# Patient Record
Sex: MALE | Race: WHITE | Employment: FULL TIME | URBAN - METROPOLITAN AREA
[De-identification: names, ages, dates, MRNs, and addresses within clinical notes are randomized per-mention and may not be internally consistent; named-entity substitution may affect disease eponyms.]

---

## 2018-10-24 ENCOUNTER — OFFICE VISIT (OUTPATIENT)
Dept: ORTHOPEDIC SURGERY | Age: 58
End: 2018-10-24

## 2018-10-24 VITALS
HEIGHT: 70 IN | RESPIRATION RATE: 16 BRPM | DIASTOLIC BLOOD PRESSURE: 106 MMHG | BODY MASS INDEX: 38.25 KG/M2 | WEIGHT: 267.2 LBS | HEART RATE: 89 BPM | SYSTOLIC BLOOD PRESSURE: 187 MMHG

## 2018-10-24 DIAGNOSIS — M54.42 CHRONIC BILATERAL LOW BACK PAIN WITH LEFT-SIDED SCIATICA: Primary | ICD-10-CM

## 2018-10-24 DIAGNOSIS — G89.29 CHRONIC BILATERAL LOW BACK PAIN WITH LEFT-SIDED SCIATICA: Primary | ICD-10-CM

## 2018-10-24 PROBLEM — E66.01 SEVERE OBESITY (HCC): Status: ACTIVE | Noted: 2018-10-24

## 2018-10-24 RX ORDER — GABAPENTIN 300 MG/1
CAPSULE ORAL
Qty: 90 CAP | Refills: 1 | Status: SHIPPED | OUTPATIENT
Start: 2018-10-24 | End: 2018-12-19 | Stop reason: ALTCHOICE

## 2018-10-24 RX ORDER — ISOSORBIDE MONONITRATE 60 MG/1
TABLET, EXTENDED RELEASE ORAL
Refills: 0 | COMMUNITY
Start: 2018-10-13 | End: 2020-09-30

## 2018-10-24 RX ORDER — NITROGLYCERIN 400 UG/1
SPRAY ORAL
Refills: 1 | COMMUNITY
Start: 2018-10-13 | End: 2020-10-26

## 2018-10-24 RX ORDER — CLOPIDOGREL BISULFATE 75 MG/1
75 TABLET ORAL DAILY
Refills: 0 | COMMUNITY
Start: 2018-08-27 | End: 2020-11-25

## 2018-10-24 RX ORDER — ATORVASTATIN CALCIUM 20 MG/1
TABLET, FILM COATED ORAL
Refills: 0 | COMMUNITY
Start: 2018-09-25 | End: 2020-12-24

## 2018-10-24 RX ORDER — GLUCOSAMINE/MSM/CHONDROITIN A 500-83-400
100 TABLET ORAL DAILY
COMMUNITY
End: 2021-06-25

## 2018-10-24 RX ORDER — METOPROLOL TARTRATE 100 MG/1
100 TABLET ORAL DAILY
Refills: 0 | COMMUNITY
Start: 2018-08-06 | End: 2020-08-03

## 2018-10-24 RX ORDER — FLUOCINONIDE 0.5 MG/G
CREAM TOPICAL
Refills: 3 | COMMUNITY
Start: 2018-10-17 | End: 2021-06-25

## 2018-10-24 RX ORDER — DICLOFENAC SODIUM 16.05 MG/ML
SOLUTION TOPICAL
Refills: 3 | Status: ON HOLD | COMMUNITY
Start: 2018-10-17 | End: 2021-06-09

## 2018-10-24 RX ORDER — FUROSEMIDE 20 MG/1
TABLET ORAL
Refills: 0 | COMMUNITY
Start: 2018-10-13 | End: 2020-10-26

## 2018-10-24 NOTE — PATIENT INSTRUCTIONS
Back Pain: Care Instructions  Your Care Instructions    Back pain has many possible causes. It is often related to problems with muscles and ligaments of the back. It may also be related to problems with the nerves, discs, or bones of the back. Moving, lifting, standing, sitting, or sleeping in an awkward way can strain the back. Sometimes you don't notice the injury until later. Arthritis is another common cause of back pain. Although it may hurt a lot, back pain usually improves on its own within several weeks. Most people recover in 12 weeks or less. Using good home treatment and being careful not to stress your back can help you feel better sooner. Follow-up care is a key part of your treatment and safety. Be sure to make and go to all appointments, and call your doctor if you are having problems. It's also a good idea to know your test results and keep a list of the medicines you take. How can you care for yourself at home? · Sit or lie in positions that are most comfortable and reduce your pain. Try one of these positions when you lie down:  ? Lie on your back with your knees bent and supported by large pillows. ? Lie on the floor with your legs on the seat of a sofa or chair. ? Lie on your side with your knees and hips bent and a pillow between your legs. ? Lie on your stomach if it does not make pain worse. · Do not sit up in bed, and avoid soft couches and twisted positions. Bed rest can help relieve pain at first, but it delays healing. Avoid bed rest after the first day of back pain. · Change positions every 30 minutes. If you must sit for long periods of time, take breaks from sitting. Get up and walk around, or lie in a comfortable position. · Try using a heating pad on a low or medium setting for 15 to 20 minutes every 2 or 3 hours. Try a warm shower in place of one session with the heating pad. · You can also try an ice pack for 10 to 15 minutes every 2 to 3 hours.  Put a thin cloth between the ice pack and your skin. · Take pain medicines exactly as directed. ? If the doctor gave you a prescription medicine for pain, take it as prescribed. ? If you are not taking a prescription pain medicine, ask your doctor if you can take an over-the-counter medicine. · Take short walks several times a day. You can start with 5 to 10 minutes, 3 or 4 times a day, and work up to longer walks. Walk on level surfaces and avoid hills and stairs until your back is better. · Return to work and other activities as soon as you can. Continued rest without activity is usually not good for your back. · To prevent future back pain, do exercises to stretch and strengthen your back and stomach. Learn how to use good posture, safe lifting techniques, and proper body mechanics. When should you call for help? Call your doctor now or seek immediate medical care if:    · You have new or worsening numbness in your legs.     · You have new or worsening weakness in your legs. (This could make it hard to stand up.)     · You lose control of your bladder or bowels.    Watch closely for changes in your health, and be sure to contact your doctor if:    · You have a fever, lose weight, or don't feel well.     · You do not get better as expected. Where can you learn more? Go to http://meg-edy.info/. Enter D931 in the search box to learn more about \"Back Pain: Care Instructions. \"  Current as of: November 29, 2017  Content Version: 11.8  © 5627-0463 [employer]. Care instructions adapted under license by [employer] (which disclaims liability or warranty for this information). If you have questions about a medical condition or this instruction, always ask your healthcare professional. [place] [address] any warranty or liability for your use of this information.

## 2018-10-24 NOTE — LETTER
NOTIFICATION OF RETURN TO WORK / SCHOOL 
 
10/24/2018 2:09 PM 
 
Mr. Liss Chavez [address] [place] [address] Willi Quinteros To Whom It May Concern: 
 
Liss Chavez was under the care of [address] today 10-24-18. Mr. Alberto Jimenez is to remain out of work until his follow up appointment on 11-21-18 . If you have any questions please call the office at [phone]. Sincerely, Олег Hitchcock NP

## 2018-10-24 NOTE — PROGRESS NOTES
Chief complaint/History of Present Illness:  Chief Complaint   Patient presents with    Back Pain     NP ref by Dr. Brain Morales for low back pain     Leg Pain     LLE    Foot Pain     left foot     NEW PATEINT:    DOMINGA Moncada is a  59 y.o.  male      HISTORY OF PRESENT ILLNESS:  Mr. Feliciano Lisa is a new patient comes in today with severe back pain that radiates to his left hip, anterior thigh and down towards his toes. He gets numbness in his anterior thigh. He has numbness at the bottom of his left foot. This has been ongoing for about 6 months, but over the last month or so, it has gotten extremely bad without injury. He states on 09/03/2018 he underwent a stent placement in his heart. He has had 2 prior stents back in 2001 and 2003. He is seen with [facility]. He states while he was on the table getting the stent placed was when his back really flared up and started hurting. Since then, it has just gotten progressively worse to the point it is a 10/10 pain all the time now. He does not have any bowel or bladder dysfunction, but he states it makes it very difficult for him to void or defecate due to the pain. He is able to get it out. He has had prednisone in the past, tramadol, Percocet and Vicodin all of which did not help. He was placed on a Medrol Dosepak yesterday. He has not seen any difference in that yet. They also gave him some diclofenac cream.  He has not started yet. He had a CT of the left hip that only showed mild arthritis there. PMH:  CAD, hypertension,hyperlipidemia. He has had a cholecystectomy and abdominal hernia repair in  the past.  He denies fever, bowel or bladder dysfunction. He describes the pain in his back and leg as a burning electrical pain. It is worse at night. He works as a  at the [employer] in [place]. His last work was 09/03/2018. That was due to his heart, but now he is out more for his back.       PHYSICAL EXAMINATION:  Mr. Boykin Primrose is a 49-year-old male. He is alert and oriented with normal mood and affect. He has a full weight bearing antalgic gait favoring that left leg. No assistive device. He has 5/5 strength of his right lower extremity and 3/5 strength of his left lower extremity. I think a lot of that is more pain inhibited. He has pain with hyperextension of the spine. ASSESSMENT/PLAN:  This is a patient who is having severe low back pain with radiating left hip pain and anterior thigh pain that radiates down to his foot with numbness and burning. He is going to finish out the Medrol Dosepak. I am going to start him on gabapentin 300 mg b.i.d. and may go to 300 in the morning and 2 at night. After 5 days, I hope that will help with the radicular pain. I am also going to put him in some physical therapy. He is unable to take NSAIDs due to his heart issues. Currently if he cannot tolerate the physical therapy or it does not help, we will have to get a CT myelogram.  We will see him back in 4 weeks, sooner if needed.            Review of systems:    Past Medical History:   Diagnosis Date    Coronary arteriosclerosis     Dupuytren's disease of palm     High cholesterol     Hypertension      Past Surgical History:   Procedure Laterality Date    HX CHOLECYSTECTOMY      HX CORONARY STENT PLACEMENT      HX HERNIA REPAIR       Social History     Socioeconomic History    Marital status:      Spouse name: Not on file    Number of children: Not on file    Years of education: Not on file    Highest education level: Not on file   Social Needs    Financial resource strain: Not on file    Food insecurity - worry: Not on file    Food insecurity - inability: Not on file   [employer] needs - medical: Not on file   [employer] needs - non-medical: Not on file   Occupational History    Not on file   Tobacco Use    Smoking status: Former Smoker    Smokeless tobacco: Never Used Substance and Sexual Activity    Alcohol use: Yes    Drug use: No    Sexual activity: Not on file   Other Topics Concern     Service Not Asked    Blood Transfusions Not Asked    Caffeine Concern Not Asked    Occupational Exposure Not Asked    Hobby Hazards Not Asked    Sleep Concern Not Asked    Stress Concern Not Asked    Weight Concern Not Asked    Special Diet Not Asked    Back Care Not Asked    Exercise Not Asked    Bike Helmet Not Asked   [address] Not Asked    Self-Exams Not Asked   Social History Narrative    Not on file     Family History   Problem Relation Age of Onset    Hypertension Mother     Diabetes Mother        Physical Exam:  Visit Vitals  BP (!) 187/106   Pulse 89   Resp 16   Ht 5' 10\" (1.778 m)   Wt 267 lb 3.2 oz (121.2 kg)   BMI 38.34 kg/m²     Pain Scale: 10 - Worst pain ever/10       has been . reviewed and is appropriate        Diagnoses and all orders for this visit:    1. Chronic bilateral low back pain with left-sided sciatica  -     AMB POC XRAY, SPINE, LUMBOSACRAL; 4+  -     REFERRAL TO PHYSICAL THERAPY    Other orders  -     gabapentin (NEURONTIN) 300 mg capsule; 1 cap bid x 5 days then 1 in the am and 2 in the pm            Follow-up Disposition:  Return in about 4 weeks (around 11/21/2018) for with Flor.         We have informed Jessica Nix to notify us for immediate appointment if he has any worsening neurogical symptoms or if an emergency situation presents, then call [phone]

## 2018-11-05 ENCOUNTER — DOCUMENTATION ONLY (OUTPATIENT)
Dept: ORTHOPEDIC SURGERY | Age: 58
End: 2018-11-05

## 2018-11-21 ENCOUNTER — OFFICE VISIT (OUTPATIENT)
Dept: ORTHOPEDIC SURGERY | Age: 58
End: 2018-11-21

## 2018-11-21 ENCOUNTER — TELEPHONE (OUTPATIENT)
Dept: ORTHOPEDIC SURGERY | Age: 58
End: 2018-11-21

## 2018-11-21 VITALS
RESPIRATION RATE: 18 BRPM | OXYGEN SATURATION: 98 % | SYSTOLIC BLOOD PRESSURE: 142 MMHG | HEIGHT: 70 IN | DIASTOLIC BLOOD PRESSURE: 89 MMHG | BODY MASS INDEX: 38.11 KG/M2 | HEART RATE: 86 BPM | WEIGHT: 266.2 LBS

## 2018-11-21 DIAGNOSIS — G89.4 CHRONIC PAIN SYNDROME: ICD-10-CM

## 2018-11-21 DIAGNOSIS — G89.29 CHRONIC LEFT-SIDED LOW BACK PAIN WITH LEFT-SIDED SCIATICA: Primary | ICD-10-CM

## 2018-11-21 DIAGNOSIS — M54.42 CHRONIC LEFT-SIDED LOW BACK PAIN WITH LEFT-SIDED SCIATICA: Primary | ICD-10-CM

## 2018-11-21 RX ORDER — TOPIRAMATE 25 MG/1
TABLET ORAL
Qty: 90 TAB | Refills: 1 | Status: ON HOLD | OUTPATIENT
Start: 2018-11-21 | End: 2021-06-09

## 2018-11-21 NOTE — TELEPHONE ENCOUNTER
11- Patient brought [employer] Disability Form to be completed at the [facility] .  Please call patient when done

## 2018-11-21 NOTE — LETTER
NOTIFICATION OF RETURN TO WORK / SCHOOL 
 
11/21/2018 11:41 AM 
 
Mr. Shira Pierce [address] To Whom It May Concern: 
 
Shira Pierce was under the care of [address] today 11-21-18. Mr. Dell Stephens is to remain out of work until his follow up appointment on 12-19-18 . If you have any questions please call the office at [phone]. Sincerely, Jackelyn Gibbs MD

## 2018-11-21 NOTE — PROGRESS NOTES
HISTORY OF PRESENT ILLNESS:   is reviewed. The patient is new to me. He was most recently seen by our nurse practitioner, Eliane Piña, on 10/24/2018. Her note was reviewed. Per her note, he had severe low back pain radiating to the left lower extremity. At that time, he noted it had been going on for roughly 6 months and progressive in nature. It was noted that he underwent cardiac stenting on 09/03/2018 through [facility]. At the last clinic appointment, she noted his pain to be a 10/10. She started him on Neurontin. He was subsequently set up for physical therapy. She discussed setting him up for physical therapy. She discussed setting him up for a CT myelogram, as he is not a candidate for MRI due to his recent cardiac stenting. The physical therapy note is reviewed. The patient reports that he was intolerant of the gabapentin and discontinued its use. He has continued complaints of low back pain extending to the left lower extremity in roughly an L5 and S1 distribution to the foot. He denies any change in bowel and bladder habit. PHYSICAL EXAMINATION:  He rates his pain to be a 6/10. He is in no apparent distress, alert and oriented times 3. Sensation decreased to light touch in the L4 and L5 distribution of the left lower extremity. Straight leg raise negative bilaterally. Motor examination revealed the patient to have 4+/5 strength throughout the bilateral lower extremities. ASSESSMENT/PLAN:  The patient does not think he can work with his current level of pain. I have given him a note to be out of work until follow-up. I am going to set him up for a CT myelogram for further evaluation of his back and leg pain. I am going to try him on Topamax. He has been instructed to call the office if he is intolerant of the Topamax.   I will plan on seeing him back after the myelogram.      Dianne Martines III, MD              Visit Vitals  /89   Pulse 86 Resp 18   Ht 5' 10\" (1.778 m)   Wt 120.7 kg (266 lb 3.2 oz)   SpO2 98%   BMI 38.20 kg/m²             Past Medical History:   Diagnosis Date    Coronary arteriosclerosis     Dupuytren's disease of palm     High cholesterol     Hypertension                Current Outpatient Medications:     topiramate (TOPAMAX) 25 mg tablet, 3 tabs PO QHS, Disp: 90 Tab, Rfl: 1    fluocinoNIDE (LIDEX) 0.05 % topical cream, , Disp: , Rfl: 3    diclofenac sodium 1.5 % drop, , Disp: , Rfl: 3    atorvastatin (LIPITOR) 20 mg tablet, take 1 tablet by mouth once daily, Disp: , Rfl: 0    metoprolol tartrate (LOPRESSOR) 100 mg IR tablet, , Disp: , Rfl: 0    isosorbide mononitrate ER (IMDUR) 60 mg CR tablet, TAKE 1 TABLET BY MOUTH TWICE DAILY, Disp: , Rfl: 0    clopidogrel (PLAVIX) 75 mg tab, , Disp: , Rfl: 0    furosemide (LASIX) 20 mg tablet, take 1 tablet by mouth once daily, Disp: , Rfl: 0    co-enzyme Q-10 (COQ-10) 100 mg capsule, Take 100 mg by mouth daily. , Disp: , Rfl:     nitroglycerin (NITROLINGUAL) 400 mcg/spray spray, PLACE 1 SPRAY UNDER THE TONGUE EVERY 5 MINUTES AS NEEDED FOR CHEST PAIN, Disp: , Rfl: 1    gabapentin (NEURONTIN) 300 mg capsule, 1 cap bid x 5 days then 1 in the am and 2 in the pm (Patient not taking: Reported on 11/21/2018), Disp: 90 Cap, Rfl: 1           ALLERGIES:    Pcn [penicillins]

## 2018-11-27 ENCOUNTER — DOCUMENTATION ONLY (OUTPATIENT)
Dept: ORTHOPEDIC SURGERY | Age: 58
End: 2018-11-27

## 2018-11-27 NOTE — PROGRESS NOTES
PATIENT HAD BROUGHT IN AN [employer] FORM ON 11-. IT WAS THE WRONG FORM HE NEEDED COMPLETED. PER BEREKET AND SUJIT WE REFUNDED THE MAN HIS $20, BUT I RECOLLECTED IT TODAY AS WE RECEIVED A CORRECT FORM STRAIGHT FROM [employer]. I HAVE GIVEN FORM TO BEREKET AS SHE HAD THE OLD FORM AND LET HER KNOW THAT THE PATIENT HAS NOT HAD A CHECK IN 1 MONTH.   SHE STATED SHE WOULD GET TO IT Loren TELLEZ

## 2018-11-29 ENCOUNTER — DOCUMENTATION ONLY (OUTPATIENT)
Dept: ORTHOPEDIC SURGERY | Age: 58
End: 2018-11-29

## 2018-11-29 NOTE — PROGRESS NOTES
Myelogram/CT Lumbar Spine with contrast is scheduled at [facility], [phone], on 12/14/18, arrive 7:00AM, test 9:00AM. Must have a , must stop Plavix on 12/11/18. No BCBS pre-authorization required per [facility], Hao Crowley on 11/29/18 at 1:19 CST.

## 2018-12-11 NOTE — PROGRESS NOTES
YUN CASAREZ  11/21/2018      HISTORY OF PRESENT ILLNESS:   is reviewed. The patient is new to me. He was most recently seen by our nurse practitioner, Criselda Mckeon, on 10/24/2018. Her note was reviewed. Per her note, he had severe low back pain radiating to the left lower extremity. At that time, he noted it had been going on for roughly 6 months and progressive in nature. It was noted that he underwent cardiac stenting on 09/03/2018 through [facility]. At the last clinic appointment, she noted his pain to be a 10/10. She started him on Neurontin. He was subsequently set up for physical therapy. She discussed setting him up for physical therapy. She discussed setting him up for a CT myelogram, as he is not a candidate for MRI due to his recent cardiac stenting. The physical therapy note is reviewed. The patient reports that he was intolerant of the gabapentin and discontinued its use. He has continued complaints of low back pain extending to the left lower extremity in roughly an L5 and S1 distribution to the foot. He denies any change in bowel and bladder habit. PHYSICAL EXAMINATION:  He rates his pain to be a 6/10. He is in no apparent distress, alert and oriented times 3. Sensation decreased to light touch in the L4 and L5 distribution of the left lower extremity. Straight leg raise negative bilaterally. Motor examination revealed the patient to have 4+/5 strength throughout the bilateral lower extremities. ASSESSMENT/PLAN:  The patient does not think he can work with his current level of pain. I have given him a note to be out of work until follow-up. I am going to set him up for a CT myelogram for further evaluation of his back and leg pain. I am going to try him on Topamax. He has been instructed to call the office if he is intolerant of the Topamax.   I will plan on seeing him back after the myelogram.      Christian Hilliard III, MD

## 2018-12-19 ENCOUNTER — OFFICE VISIT (OUTPATIENT)
Dept: ORTHOPEDIC SURGERY | Age: 58
End: 2018-12-19

## 2018-12-19 VITALS
WEIGHT: 271.6 LBS | OXYGEN SATURATION: 97 % | SYSTOLIC BLOOD PRESSURE: 141 MMHG | HEART RATE: 83 BPM | TEMPERATURE: 98.7 F | DIASTOLIC BLOOD PRESSURE: 98 MMHG | BODY MASS INDEX: 38.88 KG/M2 | RESPIRATION RATE: 18 BRPM | HEIGHT: 70 IN

## 2018-12-19 DIAGNOSIS — F41.8 TEST ANXIETY: Primary | ICD-10-CM

## 2018-12-19 DIAGNOSIS — M47.817 LUMBOSACRAL SPONDYLOSIS WITHOUT MYELOPATHY: ICD-10-CM

## 2018-12-19 DIAGNOSIS — G89.29 CHRONIC MIDLINE LOW BACK PAIN WITH LEFT-SIDED SCIATICA: ICD-10-CM

## 2018-12-19 DIAGNOSIS — M51.36 DDD (DEGENERATIVE DISC DISEASE), LUMBAR: ICD-10-CM

## 2018-12-19 DIAGNOSIS — M54.16 LUMBAR NEURITIS: ICD-10-CM

## 2018-12-19 DIAGNOSIS — M54.42 CHRONIC MIDLINE LOW BACK PAIN WITH LEFT-SIDED SCIATICA: ICD-10-CM

## 2018-12-19 DIAGNOSIS — M51.26 HNP (HERNIATED NUCLEUS PULPOSUS), LUMBAR: Primary | ICD-10-CM

## 2018-12-19 RX ORDER — DIAZEPAM 10 MG/1
TABLET ORAL
Qty: 1 TAB | Refills: 0 | Status: ON HOLD | OUTPATIENT
Start: 2018-12-19 | End: 2021-06-09

## 2018-12-19 RX ORDER — PREGABALIN 50 MG/1
50 CAPSULE ORAL 2 TIMES DAILY
Qty: 60 CAP | Refills: 1 | Status: ON HOLD | OUTPATIENT
Start: 2018-12-19 | End: 2021-06-09

## 2018-12-19 RX ORDER — PREGABALIN 50 MG/1
CAPSULE ORAL
Qty: 42 CAP | Refills: 0 | Status: SHIPPED | OUTPATIENT
Start: 2018-12-19 | End: 2019-07-01 | Stop reason: SDUPTHER

## 2018-12-19 NOTE — LETTER
NOTIFICATION OF RETURN TO WORK / SCHOOL 
 
12/19/2018 10:46 AM 
 
Mr. Navi Katz [address] [address] Alex Garcia To Whom It May Concern: 
 
Navi Katz was under the care of [address] today 12-19-18. Mr. Sade Billingsley is to remain out of work until his follow up appointment on 1-23-19. If you have any questions please call the office at [phone]. Sincerely, Stacie Andrea MD

## 2018-12-19 NOTE — PROGRESS NOTES
[facility]  [address], [address]  Phone: [phone]  Fax: [phone]        PROGRESS NOTE      HISTORY OF PRESENT ILLNESS:  The patient is a 59 y.o. male and was seen today for follow up of severe low back pain radiating to the left lower extremity in roughly an L5 and S1 distribution to the foot. At that time, he noted it had been going on for roughly 6 months and progressive in nature. It was noted that he underwent cardiac stenting on 09/03/2018 through [facility]. The patient reports that he was intolerant of the Neurontin and discontinued its use. He is on Plavix. At his last clinical appointment, the patient did not think he could work with his current level of pain. I have given him a note to be out of work until follow-up. I set him up for a CT myelogram for further evaluation of his back and leg pain. I tried him on Topamax. The patient returns today with pain location and distribution remain unchanged. He rates his pain 4-10/10, consistent with his last visit (6/10). Pt is accompanied by his wife today. He did not tolerate Topamax 75 mg qhs due to nightmares, although he reports it did provide relief. Pt denies change in bowel or bladder habits. Lumbar spine CT Myelogram dated 12/14/18 reviewed. Per report, left paracentral and inferior disc extrusion at L3-L4. This does appear to affect the left L4 root. Clinical correlation is recommended as to whether or not the patient's left leg symptoms might be L4 in nature. Fairly severe subarticular and peripheral outlet foramen stenosis at L5-S1. This does appear to potentially affect the left L5 nerve root. Clinical correlation is recommended as to whether or not the patient's left leg symptoms might be L5 in nature. Broad-based protrusion/inferior extrusion on the left at L5-S1 which does appear to affect the left S1 root.  Clinical correlation is recommended as to whether or not the patient's left leg symptoms might be S1 in nature. Additional degenerative change but no additional high-grade stenosis. Melvin Patino  reviewed. Body mass index is 38.97 kg/m². PCP: Paco Engel MD      Past Medical History:   Diagnosis Date    Coronary arteriosclerosis     Dupuytren's disease of palm     High cholesterol     Hypertension         Social History     Socioeconomic History    Marital status:      Spouse name: Not on file    Number of children: Not on file    Years of education: Not on file    Highest education level: Not on file   Social Needs    Financial resource strain: Not on file    Food insecurity - worry: Not on file    Food insecurity - inability: Not on file    Transportation needs - medical: Not on file   [employer] needs - non-medical: Not on file   Occupational History    Not on file   Tobacco Use    Smoking status: Former Smoker    Smokeless tobacco: Never Used   Substance and Sexual Activity    Alcohol use:  Yes    Drug use: No    Sexual activity: Not on file   Other Topics Concern     Service Not Asked    Blood Transfusions Not Asked    Caffeine Concern Not Asked    Occupational Exposure Not Asked    Hobby Hazards Not Asked    Sleep Concern Not Asked    Stress Concern Not Asked    Weight Concern Not Asked    Special Diet Not Asked    Back Care Not Asked    Exercise Not Asked    Bike Helmet Not Asked    Seat Belt Not Asked    Self-Exams Not Asked   Social History Narrative    Not on file       Current Outpatient Medications   Medication Sig Dispense Refill    topiramate (TOPAMAX) 25 mg tablet 3 tabs PO QHS 90 Tab 1    fluocinoNIDE (LIDEX) 0.05 % topical cream   3    diclofenac sodium 1.5 % drop   3    atorvastatin (LIPITOR) 20 mg tablet take 1 tablet by mouth once daily  0    metoprolol tartrate (LOPRESSOR) 100 mg IR tablet   0    isosorbide mononitrate ER (IMDUR) 60 mg CR tablet TAKE 1 TABLET BY MOUTH TWICE DAILY  0    clopidogrel (PLAVIX) 75 mg tab   0    furosemide (LASIX) 20 mg tablet take 1 tablet by mouth once daily  0    nitroglycerin (NITROLINGUAL) 400 mcg/spray spray PLACE 1 SPRAY UNDER THE TONGUE EVERY 5 MINUTES AS NEEDED FOR CHEST PAIN  1    co-enzyme Q-10 (COQ-10) 100 mg capsule Take 100 mg by mouth daily.  gabapentin (NEURONTIN) 300 mg capsule 1 cap bid x 5 days then 1 in the am and 2 in the pm (Patient not taking: Reported on 11/21/2018) 90 Cap 1       Allergies   Allergen Reactions    Pcn [Penicillins] Hives          PHYSICAL EXAMINATION    Visit Vitals  BP (!) 141/98   Pulse 83   Temp 98.7 °F (37.1 °C) (Oral)   Resp 18   Ht 5' 10\" (1.778 m)   Wt 271 lb 9.6 oz (123.2 kg)   SpO2 97%   BMI 38.97 kg/m²       CONSTITUTIONAL: NAD, A&O x 3  SENSATION: Decreased sensation to light touch L4/5 in the LLE. Sensation to light touch otherwise intact. RANGE OF MOTION: The patient has full passive range of motion in all four extremities. MOTOR:  Straight Leg Raise: Positive, left               Hip Flex Knee Ext Knee Flex Ankle DF GTE Ankle PF Tone   Right +4/5 +4/5 +4/5 +4/5 +4/5 +4/5 +4/5   Left +4/5 +4/5 +4/5 +4/5 +4/5 +4/5 +4/5       ASSESSMENT   Diagnoses and all orders for this visit:    1. HNP (herniated nucleus pulposus), lumbar    2. Lumbosacral spondylosis without myelopathy    3. Lumbar neuritis    4. DDD (degenerative disc disease), lumbar    5. Chronic midline low back pain with left-sided sciatica          IMPRESSION AND PLAN:  Patient wishes to proceed with blocks. I will check with his cardiologist about coming off his Plavix temporarily. Following approval, I will order left-sided L4, L5, and S1 SNRBs. Physical therapy deferred until following blocks. Patient should wean off Topamax 75 mg qhs. I will try him on Lyrica 50 mg BID. The risks, benefits, and potential side effects of this medication were discussed. Patient understands and wishes to proceed.  Patient advised to call the office if intolerant to new medication. Patient is neurologically intact. Patient is not interested in surgical intervention at this time. I will see the patient back following blocks. Written by Violette Mar, as dictated by Claudia Abbasi MD  I examined the patient, reviewed and agree with the note.

## 2018-12-20 ENCOUNTER — TELEPHONE (OUTPATIENT)
Dept: ORTHOPEDIC SURGERY | Age: 58
End: 2018-12-20

## 2018-12-20 NOTE — TELEPHONE ENCOUNTER
I SENT FOR CLEARANCE FOR PT TO D/C HIS PLAVIX 5 DAYS BEFORE SPINAL INJECTION AND RECEIVED FAX BACK STATING THAT CARDIOLOGIST OKAYS THIS IF AN EMERGENCY  AND THAT HE RECOMMENDED THAT PATIENT WAIT UNTIL 3/2019. PLEASE ADVISE IF THIS  IS CONSIDERED AN EMERGENCY OR SHOULD PATIENT WAIT?

## 2019-01-03 ENCOUNTER — TELEPHONE (OUTPATIENT)
Dept: ORTHOPEDIC SURGERY | Age: 59
End: 2019-01-03

## 2019-01-03 ENCOUNTER — DOCUMENTATION ONLY (OUTPATIENT)
Dept: ORTHOPEDIC SURGERY | Age: 59
End: 2019-01-03

## 2019-01-03 DIAGNOSIS — M54.50 LUMBAR PAIN: Primary | ICD-10-CM

## 2019-01-03 RX ORDER — DIAZEPAM 10 MG/1
TABLET ORAL
Qty: 1 TAB | Refills: 0 | Status: ON HOLD | OUTPATIENT
Start: 2019-01-03 | End: 2021-06-09

## 2019-01-03 NOTE — PROGRESS NOTES
FRANCO RN FROM DR CASTRO'S OFFICE CALLED REGARDING DRS OKAY FOR PT TO STOP HIS PLAVIX AND ASPIRIN BEFORE SPINAL INJECTION. FRANCO TOLD ME THAT SHE WOULD SEND THE LAST OFFICE NOTE WHEN PATIENT WAS SEEN AT THEIR OFFICE ON 12/28/2019 AND THAT DR Isra Hernandez HAD ADVISED THIS PATIENT THAT HE WAS NOT COMFORTABLE WITH GIVING THE OKAY AS IT HAS ONLY BEEN 4 MONTHS SINCE HIS HEART CATHETERIZATION AND THAT THE OFFICE HAD EMPHASIZED THAT THEY DID NOT WANT TO INTERRUPT IT VOLUNTARILY AFTER THIS SHORT OF A TIME AFTER THE CATHETERIZATION. HE ALSO NOTED THAT THE PATIENT HAS D/C'D HIS BLOOD THINNER BEFORE FOR A MYLEOGRAM  VOLUNTARILY WITHOUT HIS CONSENT. ALSO IN DR HYATT NOTE IT STATES THAT THE RISKS INVOLVED HAD BEEN EXPLAINED TO THE PATIENT AND HIS WIFE. I SENT THIS DRS NOTE OVER TO DR BAPTISTE FOR REVIEW AND HE IS OKAY TO PROCEED AS LONG AS THE PATIENT IS AWARE OF THE RISKS. I WENT OVER THIS AGAIN TODAY WITH THE PATIENT AND HE STATES THAT HE DOES UNDERSTAND THE RISKS INVOLVED.

## 2019-01-09 ENCOUNTER — TELEPHONE (OUTPATIENT)
Dept: ORTHOPEDIC SURGERY | Age: 59
End: 2019-01-09

## 2019-01-09 NOTE — TELEPHONE ENCOUNTER
Patient called stating he had a spinal block injection yesterday 01/08/19 with Dr. Nanda Davenport, and last night he started having chest pains so bad he ended up going to the hospital, and he is currently still there as he was admitted last night. He is at [facility] in room 257. He is requesting a call back as soon as possible today at his room phone number [phone].

## 2019-01-09 NOTE — TELEPHONE ENCOUNTER
Let Dr. Alejandro Godwin know that I called the patient who was having a test at the moment, but I spoke with his wife and she stated the doctors are still working up his chest pain. He has a follow up appt with Dr. Alejandro Godwin on 1/23/19.

## 2019-01-11 NOTE — TELEPHONE ENCOUNTER
Informed Dr. Lizabeth Pantoja of the message per NP Graham Delaney and he stated \"Noted\". No further action needed at this time.

## 2019-01-21 ENCOUNTER — DOCUMENTATION ONLY (OUTPATIENT)
Dept: ORTHOPEDIC SURGERY | Age: 59
End: 2019-01-21

## 2019-01-23 ENCOUNTER — OFFICE VISIT (OUTPATIENT)
Dept: ORTHOPEDIC SURGERY | Age: 59
End: 2019-01-23

## 2019-01-23 VITALS
HEART RATE: 71 BPM | RESPIRATION RATE: 16 BRPM | OXYGEN SATURATION: 95 % | BODY MASS INDEX: 40.03 KG/M2 | WEIGHT: 279.6 LBS | DIASTOLIC BLOOD PRESSURE: 78 MMHG | TEMPERATURE: 97 F | HEIGHT: 70 IN | SYSTOLIC BLOOD PRESSURE: 131 MMHG

## 2019-01-23 DIAGNOSIS — M51.26 HNP (HERNIATED NUCLEUS PULPOSUS), LUMBAR: ICD-10-CM

## 2019-01-23 DIAGNOSIS — M54.16 LUMBAR RADICULOPATHY: ICD-10-CM

## 2019-01-23 DIAGNOSIS — M51.36 DDD (DEGENERATIVE DISC DISEASE), LUMBAR: Primary | ICD-10-CM

## 2019-01-23 RX ORDER — TIAGABINE HYDROCHLORIDE 2 MG/1
4 TABLET, FILM COATED ORAL 2 TIMES DAILY WITH MEALS
Qty: 60 TAB | Refills: 1 | Status: ON HOLD | OUTPATIENT
Start: 2019-01-23 | End: 2021-06-09

## 2019-01-23 NOTE — LETTER
NOTIFICATION OF RETURN TO WORK / SCHOOL 
 
1/23/2019 1:41 PM 
 
Mr. Misael Michelle [address] [address] [facility] To Whom It May Concern: 
 
Misael Michelle was under the care of [address] [place] [address] [facility] today 1-23-19. Mr. Chelsi Najera is cleared to return to work from a [address]. If you have any questions please call the office at [phone]. Sincerely, Renate Osler, MD

## 2019-01-23 NOTE — PROGRESS NOTES
[facility]  [address], [address]  Phone: [phone]  Fax: [phone]        PROGRESS NOTE      HISTORY OF PRESENT ILLNESS:  The patient is a 59 y.o. male and was seen today for follow up of severe low back pain radiating to the left lower extremity in roughly an L5 and S1 distribution to the foot. At that time, he noted it had been going on for roughly 6 months and progressive in nature. It was noted that he underwent cardiac stenting on 09/03/2018 through [facility]. The patient reports that he was intolerant of the Neurontin and discontinued its use. He did not tolerate Topamax 75 mg qhs due to nightmares, although he reports it did provide relief. He is on Plavix. Lumbar spine CT Myelogram dated 12/14/18 reviewed. Per report, left paracentral and inferior disc extrusion at L3-L4. This does appear to affect the left L4 root. Clinical correlation is recommended as to whether or not the patient's left leg symptoms might be L4 in nature. Fairly severe subarticular and peripheral outlet foramen stenosis at L5-S1. This does appear to potentially affect the left L5 nerve root. Clinical correlation is recommended as to whether or not the patient's left leg symptoms might be L5 in nature. Broad-based protrusion/inferior extrusion on the left at L5-S1 which does appear to affect the left S1 root. Clinical correlation is recommended as to whether or not the patient's left leg symptoms might be S1 in nature. Additional degenerative change but no additional high-grade stenosis. At his last clinical appointment, patient wished to proceed with blocks. I checked with his cardiologist about coming off his Plavix temporarily. Following approval, I ordered left-sided L4, L5, and S1 SNRBs. Physical therapy deferred until following blocks. Patient weaned off Topamax 75 mg qhs. I tried him on Lyrica 50 mg BID.        The patient returns today with pain location and distribution remain unchanged. He rates his pain 2-6/10, a decrease from his last visit (4-10/10). He is tolerating Lyrica 50 mg BID with some relief, although he does report some memory loss. Pt is accompanied by his wife today. Per patient, he was sent to [facility] for additional cardiac testing. He reports his stents are fine, and his tests showed unchanged damage from prior to his recent hospital stay. He has a f/u appointment with his cardiologist in July. Pt denies change in bowel or bladder habits. Hospital note indicating patient was admitted 1/8/18, discharged 1/10/19 at [facility] Admitted for chest pains. He had a myocardial perfusion test which was abnormal. Hx of alcohol abuse. Patient reports that in spite of his cardiologists recommendation, he came off his Plavix temporarily for blocks. Pt underwent left-sided L4, L5, and S1 SNRBs on 1/8/19 with some relief.  reviewed. Body mass index is 40.12 kg/m². PCP: Lauren Kaufman MD      Past Medical History:   Diagnosis Date    Coronary arteriosclerosis     Dupuytren's disease of palm     High cholesterol     Hypertension         Social History     Socioeconomic History    Marital status:      Spouse name: Not on file    Number of children: Not on file    Years of education: Not on file    Highest education level: Not on file   Social Needs    Financial resource strain: Not on file    Food insecurity - worry: Not on file    Food insecurity - inability: Not on file    Transportation needs - medical: Not on file   [employer] needs - non-medical: Not on file   Occupational History    Not on file   Tobacco Use    Smoking status: Former Smoker    Smokeless tobacco: Never Used   Substance and Sexual Activity    Alcohol use:  Yes    Drug use: No    Sexual activity: Not on file   Other Topics Concern     Service Not Asked    Blood Transfusions Not Asked    Caffeine Concern Not Asked    Occupational Exposure Not Asked    Hobby Hazards Not Asked    Sleep Concern Not Asked    Stress Concern Not Asked    Weight Concern Not Asked    Special Diet Not Asked    Back Care Not Asked    Exercise Not Asked    Bike Helmet Not Asked   [address] Not Asked    Self-Exams Not Asked   Social History Narrative    Not on file       Current Outpatient Medications   Medication Sig Dispense Refill    diazePAM (VALIUM) 10 mg tablet Take 1 tab by mouth as directed by nurse prior to procedure 1 Tab 0    pregabalin (LYRICA) 50 mg capsule 1 tab PO BID 42 Cap 0    pregabalin (LYRICA) 50 mg capsule Take 1 Cap by mouth two (2) times a day. Max Daily Amount: 100 mg. 60 Cap 1    diazePAM (VALIUM) 10 mg tablet Take 1 tab by mouth as directed by nurse prior to procedure 1 Tab 0    topiramate (TOPAMAX) 25 mg tablet 3 tabs PO QHS (Patient taking differently: Take 75 mg by mouth nightly. 3 tabs PO QHS) 90 Tab 1    fluocinoNIDE (LIDEX) 0.05 % topical cream   3    diclofenac sodium 1.5 % drop   3    atorvastatin (LIPITOR) 20 mg tablet take 1 tablet by mouth once daily  0    metoprolol tartrate (LOPRESSOR) 100 mg IR tablet Take 100 mg by mouth daily. 0    isosorbide mononitrate ER (IMDUR) 60 mg CR tablet TAKE 1 TABLET BY MOUTH TWICE DAILY  0    clopidogrel (PLAVIX) 75 mg tab Take 75 mg by mouth daily. 0    furosemide (LASIX) 20 mg tablet take 1 tablet by mouth once daily  0    nitroglycerin (NITROLINGUAL) 400 mcg/spray spray PLACE 1 SPRAY UNDER THE TONGUE EVERY 5 MINUTES AS NEEDED FOR CHEST PAIN  1    co-enzyme Q-10 (COQ-10) 100 mg capsule Take 100 mg by mouth daily.          Allergies   Allergen Reactions    Statins-Hmg-Coa Reductase Inhibitors Other (comments)     Other reaction(s): SEVERE PAIN PER PT    Gabapentin Other (comments)     \"Caused bad nightmares\"    Pcn [Penicillins] Hives          PHYSICAL EXAMINATION    Visit Vitals  /78   Pulse 71   Temp 97 °F (36.1 °C) (Oral)   Resp 16   Ht 5' 10\" (1.778 m)   Wt 279 lb 9.6 oz (126.8 kg)   SpO2 95%   BMI 40.12 kg/m²       CONSTITUTIONAL: NAD, A&O x 3  SENSATION: Decreased sensation to light touch L4 in the LLE. Sensation to light touch otherwise intact. RANGE OF MOTION: The patient has full passive range of motion in all four extremities. MOTOR:  Straight Leg Raise: Negative, bilateral               Hip Flex Knee Ext Knee Flex Ankle DF GTE Ankle PF Tone   Right +4/5 +4/5 +4/5 +4/5 +4/5 +4/5 +4/5   Left +4/5 +4/5 +4/5 +4/5 +4/5 +4/5 +4/5       ASSESSMENT   Diagnoses and all orders for this visit:    1. DDD (degenerative disc disease), lumbar    2. HNP (herniated nucleus pulposus), lumbar    3. Lumbar radiculopathy          IMPRESSION AND PLAN:  Patient is s/p left-sided L4, L5, and S1 SNRBs on 1/8/19 noting some relief. I do not recommend patient undergo additional blocks at this time due to his cardiac issues following coming off his Plavix for his recent block. I have advised he see his cardiologist prior to proceeding with future procedures. I provided him with a note that it is ok to return to work from a spinal standpoint. We discussed that increasing his Lyrica might not be advisable due to his reported memory loss. Patient should wean off his Lyrica 50 mg BID. I will try him on Gabitril 2 mg BID. The risks, benefits, and potential side effects of this medication were discussed. Patient understands and wishes to proceed. Patient advised to call the office if intolerant to new medication. Patient is neurologically intact. Patient is not interested in surgical intervention or lumbar blocks at this time. I will see the patient back in 1 month's time or earlier if needed. Written by Margarita Nj, as dictated by Jocy Cantu MD  I examined the patient, reviewed and agree with the note.

## 2019-01-24 DIAGNOSIS — G89.29 CHRONIC LEFT-SIDED LOW BACK PAIN WITH LEFT-SIDED SCIATICA: ICD-10-CM

## 2019-01-24 DIAGNOSIS — G89.4 CHRONIC PAIN SYNDROME: ICD-10-CM

## 2019-01-24 DIAGNOSIS — M54.42 CHRONIC LEFT-SIDED LOW BACK PAIN WITH LEFT-SIDED SCIATICA: ICD-10-CM

## 2019-01-24 RX ORDER — TOPIRAMATE 25 MG/1
TABLET ORAL
Qty: 90 TAB | Refills: 1 | Status: CANCELLED | OUTPATIENT
Start: 2019-01-24

## 2019-01-24 NOTE — TELEPHONE ENCOUNTER
Last Visit: 1/23  Next Appt: RTC 2/20/19  Previous Refill Encounter: 11/21-90+1    Requested Prescriptions     Pending Prescriptions Disp Refills    topiramate (TOPAMAX) 25 mg tablet 90 Tab 1     Sig: 3 tabs PO QHS

## 2019-01-24 NOTE — TELEPHONE ENCOUNTER
Per last office note,     He did not tolerate Topamax 75 mg qhs due to nightmares, although he reports it did provide relief.

## 2019-02-08 ENCOUNTER — TELEPHONE (OUTPATIENT)
Dept: ORTHOPEDIC SURGERY | Age: 59
End: 2019-02-08

## 2019-02-08 DIAGNOSIS — M54.16 LUMBAR RADICULOPATHY: Primary | ICD-10-CM

## 2019-02-08 RX ORDER — DULOXETIN HYDROCHLORIDE 20 MG/1
CAPSULE, DELAYED RELEASE ORAL
Qty: 30 CAP | Refills: 1 | Status: SHIPPED | OUTPATIENT
Start: 2019-02-08 | End: 2019-04-10 | Stop reason: SDUPTHER

## 2019-02-08 NOTE — TELEPHONE ENCOUNTER
Patient states he can't tell if the prescription tiaGABine (GABITRIL) 2 mg tablet is working or not but it is giving him nightmares. He wants to know if it is possible for him to go back to taking the prescription pregabalin (LYRICA) 50 mg capsule. Please advise patient back regarding this at [phone].

## 2019-02-08 NOTE — TELEPHONE ENCOUNTER
Would he be willing to try Cymbalta 20 mg QHS first, #30, 1 RF? I really do not want to prescribe a medication that causes known memory issues with him.

## 2019-02-08 NOTE — TELEPHONE ENCOUNTER
Called patient and inquired per the if he had been on any anti- depressants. Patient stated no he has not. I inquired if he had a history of kidney issues. Patient stated he does not. I asked the patient was he familiar with a medication called Cymbalta and he stated he had seen it on the television before. I also informed the patient that the provider stopped the Lyrica because it caused memory loss. Patient stated he would rather deal with the memory loss. He states that all the medications he has taken he can not tell if the medication is helping. Patient uses [facility] in [facility]. Please advise.

## 2019-02-08 NOTE — TELEPHONE ENCOUNTER
Per the note, Lyrica caused memory loss. Is he on anti-depressants? I do not see where he has tried Cymbalta. Please confirm he does not have a history of kidney issues. I do not see any recent labs.

## 2019-02-08 NOTE — TELEPHONE ENCOUNTER
Called patient and inquired per the provider if he would like to try the Cymbalta 20 mg. Patient states he would like to try it. Patient inquired how does he come off the [place]. I informed him that he will go down to taking 1 pill a day for 3 days then stop and then start taking the Cymbalta 20 mg 1 pill at night. Patient verbalized understanding. Spoke to NP [facility] and she gave a verbal to have the patient wean off the Gabitril.

## 2019-04-10 DIAGNOSIS — M54.16 LUMBAR RADICULOPATHY: ICD-10-CM

## 2019-04-11 RX ORDER — DULOXETIN HYDROCHLORIDE 20 MG/1
CAPSULE, DELAYED RELEASE ORAL
Qty: 30 CAP | Refills: 1 | Status: SHIPPED | OUTPATIENT
Start: 2019-04-11 | End: 2019-07-01 | Stop reason: DRUGHIGH

## 2019-04-11 NOTE — TELEPHONE ENCOUNTER
Called patient to schedule him an appointment but did not get an answer. . This refill request was submitted through [facility]. The patient was changed to this medication d/t not being able to tolerate the previously prescibed medication. Mr. Maritza Schmidt MUST be seen prior to ANYMORE refills as he has not been seen since starting Cymbalta.

## 2019-06-05 DIAGNOSIS — M54.16 LUMBAR RADICULOPATHY: ICD-10-CM

## 2019-06-05 RX ORDER — DULOXETIN HYDROCHLORIDE 20 MG/1
CAPSULE, DELAYED RELEASE ORAL
Qty: 30 CAP | Refills: 0 | OUTPATIENT
Start: 2019-06-05

## 2019-06-18 NOTE — TELEPHONE ENCOUNTER
Patient called regarding his refill for DULoxetine (CYMBALTA) 20 mg capsule. He said he has been ut of it for about a week now and would like it to be sent to he preferred pharmacy [facility], [address] [place] [address]. Please advise patient at [phone] when completed.

## 2019-06-21 NOTE — TELEPHONE ENCOUNTER
We have to see him back for a FU. We already made an exception for hte last refill. Can obtain from PCP if he does not plan on FU with Spine.

## 2019-06-21 NOTE — TELEPHONE ENCOUNTER
Patient called again and was advise that a FU appointment was needed. Patient states that he can not get off work. That he works 14 hours a day , 7 days a week. That he is unable to get off work. Patient is asking if we can make an exception . Patient tel. [phone].

## 2019-07-01 ENCOUNTER — OFFICE VISIT (OUTPATIENT)
Dept: ORTHOPEDIC SURGERY | Age: 59
End: 2019-07-01

## 2019-07-01 VITALS
TEMPERATURE: 98 F | WEIGHT: 279.4 LBS | SYSTOLIC BLOOD PRESSURE: 137 MMHG | OXYGEN SATURATION: 96 % | DIASTOLIC BLOOD PRESSURE: 87 MMHG | BODY MASS INDEX: 40 KG/M2 | HEIGHT: 70 IN | HEART RATE: 83 BPM | RESPIRATION RATE: 18 BRPM

## 2019-07-01 DIAGNOSIS — M54.16 LUMBAR RADICULOPATHY: ICD-10-CM

## 2019-07-01 RX ORDER — ASPIRIN 81 MG/1
81 TABLET ORAL DAILY
Refills: 1 | COMMUNITY
Start: 2019-04-29

## 2019-07-01 RX ORDER — DULOXETIN HYDROCHLORIDE 30 MG/1
30 CAPSULE, DELAYED RELEASE ORAL DAILY
Qty: 90 CAP | Refills: 1 | Status: SHIPPED | OUTPATIENT
Start: 2019-07-01 | End: 2019-08-15 | Stop reason: ALTCHOICE

## 2019-07-01 NOTE — PATIENT INSTRUCTIONS
Back Pain: Care Instructions  Your Care Instructions    Back pain has many possible causes. It is often related to problems with muscles and ligaments of the back. It may also be related to problems with the nerves, discs, or bones of the back. Moving, lifting, standing, sitting, or sleeping in an awkward way can strain the back. Sometimes you don't notice the injury until later. Arthritis is another common cause of back pain. Although it may hurt a lot, back pain usually improves on its own within several weeks. Most people recover in 12 weeks or less. Using good home treatment and being careful not to stress your back can help you feel better sooner. Follow-up care is a key part of your treatment and safety. Be sure to make and go to all appointments, and call your doctor if you are having problems. It's also a good idea to know your test results and keep a list of the medicines you take. How can you care for yourself at home? · Sit or lie in positions that are most comfortable and reduce your pain. Try one of these positions when you lie down:  ? Lie on your back with your knees bent and supported by large pillows. ? Lie on the floor with your legs on the seat of a sofa or chair. ? Lie on your side with your knees and hips bent and a pillow between your legs. ? Lie on your stomach if it does not make pain worse. · Do not sit up in bed, and avoid soft couches and twisted positions. Bed rest can help relieve pain at first, but it delays healing. Avoid bed rest after the first day of back pain. · Change positions every 30 minutes. If you must sit for long periods of time, take breaks from sitting. Get up and walk around, or lie in a comfortable position. · Try using a heating pad on a low or medium setting for 15 to 20 minutes every 2 or 3 hours. Try a warm shower in place of one session with the heating pad. · You can also try an ice pack for 10 to 15 minutes every 2 to 3 hours.  Put a thin cloth between the ice pack and your skin. · Take pain medicines exactly as directed. ? If the doctor gave you a prescription medicine for pain, take it as prescribed. ? If you are not taking a prescription pain medicine, ask your doctor if you can take an over-the-counter medicine. · Take short walks several times a day. You can start with 5 to 10 minutes, 3 or 4 times a day, and work up to longer walks. Walk on level surfaces and avoid hills and stairs until your back is better. · Return to work and other activities as soon as you can. Continued rest without activity is usually not good for your back. · To prevent future back pain, do exercises to stretch and strengthen your back and stomach. Learn how to use good posture, safe lifting techniques, and proper body mechanics. When should you call for help? Call your doctor now or seek immediate medical care if:    · You have new or worsening numbness in your legs.     · You have new or worsening weakness in your legs. (This could make it hard to stand up.)     · You lose control of your bladder or bowels.    Watch closely for changes in your health, and be sure to contact your doctor if:    · You have a fever, lose weight, or don't feel well.     · You do not get better as expected. Where can you learn more? Go to http://meg-edy.info/. Enter U554 in the search box to learn more about \"Back Pain: Care Instructions. \"  Current as of: September 20, 2018  Content Version: 11.9  © 1003-9307 [employer]. Care instructions adapted under license by [employer] (which disclaims liability or warranty for this information). If you have questions about a medical condition or this instruction, always ask your healthcare professional. [place] [address] any warranty or liability for your use of this information.          Low Back Arthritis: Exercises  Your Care Instructions  Here are some examples of typical rehabilitation exercises for your condition. Start each exercise slowly. Ease off the exercise if you start to have pain. Your doctor or physical therapist will tell you when you can start these exercises and which ones will work best for you. When you are not being active, find a comfortable position for rest. Some people are comfortable on the floor or a medium-firm bed with a small pillow under their head and another under their knees. Some people prefer to lie on their side with a pillow between their knees. Don't stay in one position for too long. Take short walks (10 to 20 minutes) every 2 to 3 hours. Avoid slopes, hills, and stairs until you feel better. Walk only distances you can manage without pain, especially leg pain. How to do the exercises  Pelvic tilt    1. Lie on your back with your knees bent. 2. \"Brace\" your stomach--tighten your muscles by pulling in and imagining your belly button moving toward your spine. 3. Press your lower back into the floor. You should feel your hips and pelvis rock back. 4. Hold for 6 seconds while breathing smoothly. 5. Relax and allow your pelvis and hips to rock forward. 6. Repeat 8 to 12 times. Back stretches    1. Get down on your hands and knees on the floor. 2. Relax your head and allow it to droop. Round your back up toward the ceiling until you feel a nice stretch in your upper, middle, and lower back. Hold this stretch for as long as it feels comfortable, or about 15 to 30 seconds. 3. Return to the starting position with a flat back while you are on your hands and knees. 4. Let your back sway by pressing your stomach toward the floor. Lift your buttocks toward the ceiling. 5. Hold this position for 15 to 30 seconds. 6. Repeat 2 to 4 times. Follow-up care is a key part of your treatment and safety. Be sure to make and go to all appointments, and call your doctor if you are having problems.  It's also a good idea to know your test results and keep a list of the medicines you take. Where can you learn more? Go to http://meg-edy.info/. Enter O828 in the search box to learn more about \"Low Back Arthritis: Exercises. \"  Current as of: September 20, 2018  Content Version: 11.9  © 2507-3604 [employer]. Care instructions adapted under license by [employer] (which disclaims liability or warranty for this information). If you have questions about a medical condition or this instruction, always ask your healthcare professional. [address] any warranty or liability for your use of this information.

## 2019-07-01 NOTE — PROGRESS NOTES
[address]  [place]  Phone: [phone]  Fax: [phone]    Anais Crawley  : 1960  PCP: Rogerio Alanis MD    PROGRESS NOTE    HISTORY OF PRESENT ILLNESS:  Chief Complaint   Patient presents with    Back Pain     Follow up and med refill    Hip Pain     left hip    Leg Pain     LLE    Foot Pain     left foot     Boy Arrington is a 59 y.o.  male with history of severe low back pain radiating to the left lower extremity in roughly an L5 and S1 distribution to the foot. The patient reports that he was intolerant of the Neurontin and discontinued its use. He did not tolerate Topamax 75 mg qhs due to nightmares, although he reports it did provide relief. He is on Plavix. Dr. Juana Cartwright was going to check with his cardiologist about coming off his Plavix temporarily. Following approval, I ordered left-sided L4, L5, and S1 SNRBs. Physical therapy deferred until following blocks. Patient weaned off Topamax 75 mg qhs and was started on Lyrica 50 mg BID. Martha Pratt Hx of alcohol abuse. Patient reports that in spite of his cardiologists recommendation, he came off his Plavix temporarily for blocks. Pt underwent left-sided L4, L5, and S1 SNRBs on 19 with some relief. Patient is s/p left-sided L4, L5, and S1 SNRBs on 19 noting some relief. Dr. Juana Cartwright do not recommend patient undergo additional blocks at this time due to his cardiac issues following coming off his Plavix for his recent block. he was advised to see his cardiologist prior to proceeding with future procedures. He was provided a note that it is ok to return to work from a spinal standpoint. He was to wean off Lyrica and was started on Gabitril 2 mg BID. He was to continue Cymbalta. Today, he states he is feeling terrible as he ran out of the cymbalta. He stopped the gabitril. He is asking for a higher does of Cymbalta. He has left leg pain into his foot. Lumbar spine CT Myelogram dated 12/14/18 reviewed. Per report, left paracentral and inferior disc extrusion at L3-L4. This does appear to affect the left L4 root. Clinical correlation is recommended as to whether or not the patient's left leg symptoms might be L4 in nature. Fairly severe subarticular and peripheral outlet foramen stenosis at L5-S1. This does appear to potentially affect the left L5 nerve root. Clinical correlation is recommended as to whether or not the patient's left leg symptoms might be L5 in nature. Broad-based protrusion/inferior extrusion on the left at L5-S1 which does appear to affect the left S1 root. Clinical correlation is recommended as to whether or not the patient's left leg symptoms might be S1 in nature. Additional degenerative change but no additional high-grade stenosis    Denies bladder/bowel dysfunction, saddle paresthesia, weakness, gait disturbance, or other neurological deficit. Pt at this time desires to  continue with current care/proceed with medication evaluation. ASSESSMENT  59 y.o. male with lumbar pain. Diagnoses and all orders for this visit:    1. Lumbar radiculopathy  -     DULoxetine (CYMBALTA) 30 mg capsule; Take 1 Cap by mouth daily. IMPRESSION/PLAN    1) Pt was given information on lumbar exercises. 2) Restart Cymbalta, 30 mg.  3) Can call in the next 1-2 months and we can increase his Cymbalta to either 60 mg daily or 30 mg BID. 4) Mr. Ernestine Jarvis has a reminder for a \"due or due soon\" health maintenance. I have asked that he contact his primary care provider, Alyce Orta MD, for follow-up on this health maintenance. 5) We have informed patient to notify us for immediate appointment if he has any worsening neurogical symptoms or if an emergency situation presents, then call 911  6) Pt will follow-up in 6 months for med fu. Risks and benefits of ongoing  therapy have been reviewed with the patient.  is appropriate.      PAST MEDICAL HISTORY  Past Medical History:   Diagnosis Date    Coronary arteriosclerosis     Dupuytren's disease of palm     High cholesterol     Hypertension         MEDICATIONS  Current Outpatient Medications   Medication Sig Dispense Refill    aspirin delayed-release 81 mg tablet   1    DULoxetine (CYMBALTA) 30 mg capsule Take 1 Cap by mouth daily. 90 Cap 1    atorvastatin (LIPITOR) 20 mg tablet take 1 tablet by mouth once daily  0    metoprolol tartrate (LOPRESSOR) 100 mg IR tablet Take 100 mg by mouth daily. 0    isosorbide mononitrate ER (IMDUR) 60 mg CR tablet TAKE 1 TABLET BY MOUTH TWICE DAILY  0    clopidogrel (PLAVIX) 75 mg tab Take 75 mg by mouth daily. 0    furosemide (LASIX) 20 mg tablet take 1 tablet by mouth once daily  0    co-enzyme Q-10 (COQ-10) 100 mg capsule Take 100 mg by mouth daily.  tiaGABine (GABITRIL) 2 mg tablet Take 2 Tabs by mouth two (2) times daily (with meals). 60 Tab 1    diazePAM (VALIUM) 10 mg tablet Take 1 tab by mouth as directed by nurse prior to procedure 1 Tab 0    pregabalin (LYRICA) 50 mg capsule Take 1 Cap by mouth two (2) times a day. Max Daily Amount: 100 mg. 60 Cap 1    diazePAM (VALIUM) 10 mg tablet Take 1 tab by mouth as directed by nurse prior to procedure 1 Tab 0    topiramate (TOPAMAX) 25 mg tablet 3 tabs PO QHS (Patient taking differently: Take 75 mg by mouth nightly.  3 tabs PO QHS) 90 Tab 1    fluocinoNIDE (LIDEX) 0.05 % topical cream   3    diclofenac sodium 1.5 % drop   3    nitroglycerin (NITROLINGUAL) 400 mcg/spray spray PLACE 1 SPRAY UNDER THE TONGUE EVERY 5 MINUTES AS NEEDED FOR CHEST PAIN  1       ALLERGIES  Allergies   Allergen Reactions    Statins-Hmg-Coa Reductase Inhibitors Other (comments)     Other reaction(s): SEVERE PAIN PER PT    Gabapentin Other (comments)     \"Caused bad nightmares\"    Pcn [Penicillins] Hives       SOCIAL HISTORY    Social History     Socioeconomic History    Marital status:      Spouse name: Not on file  Number of children: Not on file    Years of education: Not on file    Highest education level: Not on file   Occupational History    Not on file   Social Needs    Financial resource strain: Not on file    Food insecurity:     Worry: Not on file     Inability: Not on file    Transportation needs:     Medical: Not on file     Non-medical: Not on file   Tobacco Use    Smoking status: Former Smoker    Smokeless tobacco: Never Used   Substance and Sexual Activity    Alcohol use: Yes    Drug use: No    Sexual activity: Not on file   Lifestyle    Physical activity:     Days per week: Not on file     Minutes per session: Not on file    Stress: Not on file   Relationships    Social connections:     Talks on phone: Not on file     Gets together: Not on file     Attends Restorationist service: Not on file     Active member of club or organization: Not on file     Attends meetings of clubs or organizations: Not on file     Relationship status: Not on file    Intimate partner violence:     Fear of current or ex partner: Not on file     Emotionally abused: Not on file     Physically abused: Not on file     Forced sexual activity: Not on file   Other Topics Concern     Service Not Asked    Blood Transfusions Not Asked    Caffeine Concern Not Asked    Occupational Exposure Not Asked   Heri Pat Hazards Not Asked    Sleep Concern Not Asked    Stress Concern Not Asked    Weight Concern Not Asked    Special Diet Not Asked    Back Care Not Asked    Exercise Not Asked    Bike Helmet Not Asked   [address] Not Asked    Self-Exams Not Asked   Social History Narrative    Not on file       SUBJECTIVE      Pain Scale: 8/10    Pain Assessment  7/1/2019   Location of Pain Back;Leg;Hip; Foot   Location Modifiers Left   Severity of Pain 8   Quality of Pain Throbbing; Sharp;Dull;Burning; Other (Comment)   Quality of Pain Comment stabbing, throbbing   Duration of Pain Persistent   Frequency of Pain Constant Aggravating Factors Bending;Walking;Stretching;Stairs;Straightening; Other (Comment); Exercise;Kneeling;Squatting;Standing   Aggravating Factors Comment -   Limiting Behavior Some   Relieving Factors Rest   Relieving Factors Comment certain way he sits   Result of Injury -       Accompanied by family member. REVIEW OF SYSTEMS  ROS    Constitutional: Negative for fever, chills, or weight change. Respiratory: Negative for cough or shortness of breath. Cardiovascular: Negative for chest pain or palpitations. Gastrointestinal: Negative for acid reflux, change in bowel habits, or constipation. Genitourinary: Negative for incontinence, dysuria and flank pain. Musculoskeletal: Positive for lumbar pain. Skin: Negative for rash. Neurological: Negative for headaches, dizziness, or numbness. Endo/Heme/Allergies: Negative . Psychiatric/Behavioral: Negative. PHYSICAL EXAMINATION  Visit Vitals  /87   Pulse 83   Temp 98 °F (36.7 °C) (Oral)   Resp 18   Ht 5' 10\" (1.778 m)   Wt 279 lb 6.4 oz (126.7 kg)   SpO2 96%   BMI 40.09 kg/m²       Constitutional: Well developed,  well nourished,  awake, alert, and in no acute distress. Neurological:  Sensation to light touch is intact. Psychiatric: Affect and mood are appropriate. Integumentary: No rashes or abrasions noted on exposed areas,  warm, dry and intact. Cardiovascular/Peripheral Vascular:  No peripheral edema is noted. Lymphatic:  No evidence of lymphedema. No cervical lymphadenopathy. SPINE/MUSCULOSKELETAL EXAM    Lumbar spine:  No rash, ecchymosis, or gross obliquity. No fasciculations. No focal atrophy is noted. Range of motion is intact. NoTenderness to palpation to lumbar spine. SI joints non-tender. Trochanters non tender. Musculoskeletal:  No pain with extension, axial loading, or forward flexion. No pain with internal or external rotation of his hips.      MOTOR     Hip Flex  Quads Hamstrings Ankle DF EHL Ankle PF   Right +4/5 +4/5 +4/5 +4/5 +4/5 +4/5   Left +4/5 +4/5 +4/5 +4/5 +4/5 +4/5     Straight Leg raise - bilaterally. normal gait and station    Ambulation without assistive device. full weight bearing, non-antalgic gait.     Monae Stands, NP

## 2019-08-15 ENCOUNTER — TELEPHONE (OUTPATIENT)
Dept: ORTHOPEDIC SURGERY | Age: 59
End: 2019-08-15

## 2019-08-15 RX ORDER — DULOXETIN HYDROCHLORIDE 60 MG/1
60 CAPSULE, DELAYED RELEASE ORAL DAILY
Qty: 30 CAP | Refills: 1 | Status: SHIPPED | OUTPATIENT
Start: 2019-08-15 | End: 2019-10-02 | Stop reason: SDUPTHER

## 2019-08-15 NOTE — TELEPHONE ENCOUNTER
60mg is the highest dose. He should set-up a sooner follow up if he needs more than that.     60mg QHS sent to pharmacy

## 2019-08-15 NOTE — TELEPHONE ENCOUNTER
Regarding RX:   DULoxetine (CYMBALTA) 30 mg capsule    Preferred Pharmacy:   [id number]     Received call from patient asking to speak w/ NP Tam about the [address] she prescribed him. The patient advised he has been messing around with the prescription doses trying to make it work and relieve his pain. Patient advised he tried taking 1 pill at night (no relief), 2 pills at night ((no relief), 1 pill in AM and 1 pill in PM (no relief), and 2 pills in AM and 2 pills in PM (no relief). Patient advised he is still in a lot of pain and it is affecting his work. Patient asked if NP could increase the dose to something higher then 30mg and prescribe it so he can take 2 pills in AM and 2 pills in PM (4 pills daily). I advised would have nurses review and follow up to advise.      Patient can be reached at [phone]

## 2019-08-15 NOTE — TELEPHONE ENCOUNTER
Called patient and left voice mail informing patient that the provider has sent a new prescription for Cymbalta 60 mg take 1 po Q hs and this is the highest dose. If he feels he needs more than that then he needs to set up an earlier appointment. No further action needed at this time.

## 2020-08-03 RX ORDER — METOPROLOL TARTRATE 100 MG/1
TABLET ORAL
Qty: 90 TAB | Refills: 1 | Status: SHIPPED | OUTPATIENT
Start: 2020-08-03 | End: 2021-01-18

## 2020-09-30 RX ORDER — ISOSORBIDE MONONITRATE 60 MG/1
TABLET, EXTENDED RELEASE ORAL
Qty: 180 TAB | Refills: 3 | Status: SHIPPED | OUTPATIENT
Start: 2020-09-30 | End: 2021-06-04

## 2020-10-26 RX ORDER — NITROGLYCERIN 400 UG/1
SPRAY ORAL
Qty: 1 CONTAINER | Refills: 5 | Status: SHIPPED | OUTPATIENT
Start: 2020-10-26 | End: 2021-05-23

## 2020-10-26 RX ORDER — FUROSEMIDE 20 MG/1
TABLET ORAL
Qty: 180 TAB | Refills: 2 | Status: SHIPPED | OUTPATIENT
Start: 2020-10-26 | End: 2021-06-17

## 2020-11-25 RX ORDER — CLOPIDOGREL BISULFATE 75 MG/1
TABLET ORAL
Qty: 90 TAB | Refills: 3 | Status: SHIPPED | OUTPATIENT
Start: 2020-11-25 | End: 2021-06-17

## 2020-12-24 RX ORDER — ATORVASTATIN CALCIUM 20 MG/1
TABLET, FILM COATED ORAL
Qty: 90 TAB | Refills: 3 | Status: SHIPPED | OUTPATIENT
Start: 2020-12-24 | End: 2021-06-17

## 2021-01-18 RX ORDER — METOPROLOL TARTRATE 100 MG/1
TABLET ORAL
Qty: 90 TAB | Refills: 1 | Status: SHIPPED | OUTPATIENT
Start: 2021-01-18 | End: 2021-06-17

## 2021-05-23 RX ORDER — NITROGLYCERIN 400 UG/1
SPRAY ORAL
Qty: 12 G | Refills: 5 | Status: SHIPPED | OUTPATIENT
Start: 2021-05-23

## 2021-06-03 DIAGNOSIS — I25.810 CORONARY ARTERIOSCLEROSIS AFTER CORONARY ARTERY BYPASS GRAFTING: Primary | ICD-10-CM

## 2021-06-04 RX ORDER — ISOSORBIDE MONONITRATE 60 MG/1
TABLET, EXTENDED RELEASE ORAL
Qty: 180 TABLET | Refills: 3 | Status: SHIPPED | OUTPATIENT
Start: 2021-06-04 | End: 2021-06-17

## 2021-06-09 ENCOUNTER — APPOINTMENT (OUTPATIENT)
Dept: GENERAL RADIOLOGY | Age: 61
DRG: 164 | End: 2021-06-09
Attending: EMERGENCY MEDICINE
Payer: COMMERCIAL

## 2021-06-09 ENCOUNTER — APPOINTMENT (OUTPATIENT)
Dept: NON INVASIVE DIAGNOSTICS | Age: 61
DRG: 164 | End: 2021-06-09
Attending: PHYSICIAN ASSISTANT
Payer: COMMERCIAL

## 2021-06-09 ENCOUNTER — HOSPITAL ENCOUNTER (INPATIENT)
Age: 61
LOS: 8 days | Discharge: HOME | DRG: 164 | End: 2021-06-17
Attending: EMERGENCY MEDICINE | Admitting: INTERNAL MEDICINE
Payer: COMMERCIAL

## 2021-06-09 DIAGNOSIS — I26.02 ACUTE SADDLE PULMONARY EMBOLISM WITH ACUTE COR PULMONALE (HCC): ICD-10-CM

## 2021-06-09 DIAGNOSIS — F17.200 TOBACCO DEPENDENCE: ICD-10-CM

## 2021-06-09 DIAGNOSIS — I26.94 MULTIPLE SUBSEGMENTAL PULMONARY EMBOLI WITHOUT ACUTE COR PULMONALE (HCC): ICD-10-CM

## 2021-06-09 DIAGNOSIS — I20.0 UNSTABLE ANGINA (HCC): ICD-10-CM

## 2021-06-09 DIAGNOSIS — I25.10 CORONARY ARTERY DISEASE INVOLVING NATIVE HEART WITHOUT ANGINA PECTORIS, UNSPECIFIED VESSEL OR LESION TYPE: ICD-10-CM

## 2021-06-09 DIAGNOSIS — F17.210 NICOTINE DEPENDENCE, CIGARETTES, UNCOMPLICATED: ICD-10-CM

## 2021-06-09 DIAGNOSIS — G47.33 OSA (OBSTRUCTIVE SLEEP APNEA): ICD-10-CM

## 2021-06-09 DIAGNOSIS — E66.01 SEVERE OBESITY (HCC): ICD-10-CM

## 2021-06-09 PROBLEM — R07.9 CHEST PAIN: Status: ACTIVE | Noted: 2021-06-09

## 2021-06-09 PROBLEM — E78.5 HYPERLIPIDEMIA: Status: ACTIVE | Noted: 2021-06-09

## 2021-06-09 LAB
ACT BLD: 153 SECS (ref 79–138)
ACT BLD: 241 SECS (ref 79–138)
ALBUMIN SERPL-MCNC: 3.2 G/DL (ref 3.4–5)
ALBUMIN SERPL-MCNC: 3.4 G/DL (ref 3.4–5)
ALBUMIN/GLOB SERPL: 1 {RATIO} (ref 0.8–1.7)
ALBUMIN/GLOB SERPL: 1.1 {RATIO} (ref 0.8–1.7)
ALP SERPL-CCNC: 90 U/L (ref 45–117)
ALP SERPL-CCNC: 91 U/L (ref 45–117)
ALT SERPL-CCNC: 55 U/L (ref 16–61)
ALT SERPL-CCNC: 60 U/L (ref 16–61)
ANION GAP SERPL CALC-SCNC: 11 MMOL/L (ref 3–18)
APPEARANCE UR: CLEAR
APTT PPP: 27.8 SEC (ref 23–36.4)
APTT PPP: 86.5 SEC (ref 23–36.4)
AST SERPL-CCNC: 35 U/L (ref 10–38)
AST SERPL-CCNC: 47 U/L (ref 10–38)
ATRIAL RATE: 100 BPM
ATRIAL RATE: 84 BPM
ATRIAL RATE: 86 BPM
BACTERIA URNS QL MICRO: ABNORMAL /HPF
BASOPHILS # BLD: 0.1 K/UL (ref 0–0.1)
BASOPHILS NFR BLD: 1 % (ref 0–2)
BILIRUB DIRECT SERPL-MCNC: 0.5 MG/DL (ref 0–0.2)
BILIRUB SERPL-MCNC: 1.3 MG/DL (ref 0.2–1)
BILIRUB SERPL-MCNC: 1.5 MG/DL (ref 0.2–1)
BILIRUB UR QL: NEGATIVE
BNP SERPL-MCNC: 1366 PG/ML (ref 0–900)
BUN SERPL-MCNC: 13 MG/DL (ref 7–18)
BUN/CREAT SERPL: 14 (ref 12–20)
CALCIUM SERPL-MCNC: 8.6 MG/DL (ref 8.5–10.1)
CALCULATED P AXIS, ECG09: 63 DEGREES
CALCULATED P AXIS, ECG09: 64 DEGREES
CALCULATED P AXIS, ECG09: 68 DEGREES
CALCULATED R AXIS, ECG10: -159 DEGREES
CALCULATED R AXIS, ECG10: -27 DEGREES
CALCULATED R AXIS, ECG10: -3 DEGREES
CALCULATED T AXIS, ECG11: -34 DEGREES
CALCULATED T AXIS, ECG11: -46 DEGREES
CALCULATED T AXIS, ECG11: -50 DEGREES
CHLORIDE SERPL-SCNC: 106 MMOL/L (ref 100–111)
CK MB CFR SERPL CALC: ABNORMAL % (ref 0–4)
CK MB SERPL-MCNC: <1 NG/ML (ref 5–25)
CK SERPL-CCNC: 29 U/L (ref 39–308)
CK SERPL-CCNC: 32 U/L (ref 39–308)
CK SERPL-CCNC: 34 U/L (ref 39–308)
CO2 SERPL-SCNC: 24 MMOL/L (ref 21–32)
COLOR UR: ABNORMAL
CREAT SERPL-MCNC: 0.94 MG/DL (ref 0.6–1.3)
D DIMER PPP FEU-MCNC: 2.54 UG/ML(FEU)
DIAGNOSIS, 93000: NORMAL
DIFFERENTIAL METHOD BLD: ABNORMAL
ECHO AO ROOT DIAM: 3.84 CM
ECHO LV E' LATERAL VELOCITY: 7.12 CM/S
ECHO LV INTERNAL DIMENSION DIASTOLIC: 4.69 CM (ref 4.2–5.9)
ECHO LV INTERNAL DIMENSION SYSTOLIC: 3.61 CM
ECHO LV IVSD: 1.08 CM (ref 0.6–1)
ECHO LV MASS 2D: 179.9 G (ref 88–224)
ECHO LV MASS INDEX 2D: 78.5 G/M2 (ref 49–115)
ECHO LV POSTERIOR WALL DIASTOLIC: 1.06 CM (ref 0.6–1)
ECHO LVOT CARDIAC OUTPUT: 5.03 LITER/MINUTE
ECHO LVOT DIAM: 2.19 CM
ECHO LVOT PEAK GRADIENT: 4.26 MMHG
ECHO LVOT PEAK VELOCITY: 103.06 CM/S
ECHO LVOT SV: 59.5 ML
ECHO LVOT VTI: 15.83 CM
ECHO MV A VELOCITY: 72.46 CM/S
ECHO MV E DECELERATION TIME (DT): 221.14 MS
ECHO MV E VELOCITY: 53.03 CM/S
ECHO MV E/A RATIO: 0.73
ECHO MV E/E' LATERAL: 7.45
ECHO PV REGURGITANT MAX VELOCITY: 363.38 CM/S
ECHO RV TAPSE: 1.71 CM (ref 1.5–2)
ECHO TV REGURGITANT PEAK GRADIENT: 53.37 MMHG
EOSINOPHIL # BLD: 0.1 K/UL (ref 0–0.4)
EOSINOPHIL NFR BLD: 1 % (ref 0–5)
EPITH CASTS URNS QL MICRO: ABNORMAL /LPF (ref 0–5)
ERYTHROCYTE [DISTWIDTH] IN BLOOD BY AUTOMATED COUNT: 11.9 % (ref 11.6–14.5)
GLOBULIN SER CALC-MCNC: 3.1 G/DL (ref 2–4)
GLOBULIN SER CALC-MCNC: 3.3 G/DL (ref 2–4)
GLUCOSE SERPL-MCNC: 133 MG/DL (ref 74–99)
GLUCOSE UR STRIP.AUTO-MCNC: NEGATIVE MG/DL
HBA1C MFR BLD: 5.3 % (ref 4.2–5.6)
HCT VFR BLD AUTO: 39.1 % (ref 36–48)
HGB BLD-MCNC: 14 G/DL (ref 13–16)
HGB UR QL STRIP: ABNORMAL
INR PPP: 1.1 (ref 0.8–1.2)
KETONES UR QL STRIP.AUTO: ABNORMAL MG/DL
LEUKOCYTE ESTERASE UR QL STRIP.AUTO: NEGATIVE
LVOT MG: 2.1 MMHG
LYMPHOCYTES # BLD: 2.5 K/UL (ref 0.9–3.6)
LYMPHOCYTES NFR BLD: 23 % (ref 21–52)
MAGNESIUM SERPL-MCNC: 1.6 MG/DL (ref 1.6–2.6)
MCH RBC QN AUTO: 33.8 PG (ref 24–34)
MCHC RBC AUTO-ENTMCNC: 35.8 G/DL (ref 31–37)
MCV RBC AUTO: 94.4 FL (ref 74–97)
MONOCYTES # BLD: 0.9 K/UL (ref 0.05–1.2)
MONOCYTES NFR BLD: 8 % (ref 3–10)
NEUTS SEG # BLD: 7.2 K/UL (ref 1.8–8)
NEUTS SEG NFR BLD: 66 % (ref 40–73)
NITRITE UR QL STRIP.AUTO: NEGATIVE
P-R INTERVAL, ECG05: 146 MS
P-R INTERVAL, ECG05: 148 MS
P-R INTERVAL, ECG05: 148 MS
PH UR STRIP: 6 [PH] (ref 5–8)
PLATELET # BLD AUTO: 162 K/UL (ref 135–420)
PMV BLD AUTO: 10.4 FL (ref 9.2–11.8)
POTASSIUM SERPL-SCNC: 3.1 MMOL/L (ref 3.5–5.5)
PROT SERPL-MCNC: 6.5 G/DL (ref 6.4–8.2)
PROT SERPL-MCNC: 6.5 G/DL (ref 6.4–8.2)
PROT UR STRIP-MCNC: ABNORMAL MG/DL
PROTHROMBIN TIME: 14.5 SEC (ref 11.5–15.2)
Q-T INTERVAL, ECG07: 356 MS
Q-T INTERVAL, ECG07: 408 MS
Q-T INTERVAL, ECG07: 418 MS
QRS DURATION, ECG06: 100 MS
QRS DURATION, ECG06: 96 MS
QRS DURATION, ECG06: 98 MS
QTC CALCULATION (BEZET), ECG08: 459 MS
QTC CALCULATION (BEZET), ECG08: 488 MS
QTC CALCULATION (BEZET), ECG08: 493 MS
RBC # BLD AUTO: 4.14 M/UL (ref 4.35–5.65)
RBC #/AREA URNS HPF: ABNORMAL /HPF (ref 0–5)
SODIUM SERPL-SCNC: 141 MMOL/L (ref 136–145)
SP GR UR REFRACTOMETRY: >1.03 (ref 1–1.03)
TROPONIN I SERPL-MCNC: 0.04 NG/ML (ref 0–0.04)
TROPONIN I SERPL-MCNC: 0.05 NG/ML (ref 0–0.04)
TROPONIN I SERPL-MCNC: 0.05 NG/ML (ref 0–0.04)
UROBILINOGEN UR QL STRIP.AUTO: 1 EU/DL (ref 0.2–1)
VENTRICULAR RATE, ECG03: 100 BPM
VENTRICULAR RATE, ECG03: 84 BPM
VENTRICULAR RATE, ECG03: 86 BPM
WBC # BLD AUTO: 10.8 K/UL (ref 4.6–13.2)
WBC URNS QL MICRO: ABNORMAL /HPF (ref 0–5)

## 2021-06-09 PROCEDURE — 85730 THROMBOPLASTIN TIME PARTIAL: CPT

## 2021-06-09 PROCEDURE — 99285 EMERGENCY DEPT VISIT HI MDM: CPT

## 2021-06-09 PROCEDURE — 74011250637 HC RX REV CODE- 250/637: Performed by: EMERGENCY MEDICINE

## 2021-06-09 PROCEDURE — 93458 L HRT ARTERY/VENTRICLE ANGIO: CPT | Performed by: INTERNAL MEDICINE

## 2021-06-09 PROCEDURE — 99152 MOD SED SAME PHYS/QHP 5/>YRS: CPT | Performed by: INTERNAL MEDICINE

## 2021-06-09 PROCEDURE — 74011250636 HC RX REV CODE- 250/636: Performed by: INTERNAL MEDICINE

## 2021-06-09 PROCEDURE — 96374 THER/PROPH/DIAG INJ IV PUSH: CPT

## 2021-06-09 PROCEDURE — 85576 BLOOD PLATELET AGGREGATION: CPT

## 2021-06-09 PROCEDURE — 93005 ELECTROCARDIOGRAM TRACING: CPT

## 2021-06-09 PROCEDURE — 80053 COMPREHEN METABOLIC PANEL: CPT

## 2021-06-09 PROCEDURE — 77030012597: Performed by: INTERNAL MEDICINE

## 2021-06-09 PROCEDURE — 74011250637 HC RX REV CODE- 250/637: Performed by: PHYSICIAN ASSISTANT

## 2021-06-09 PROCEDURE — 74011000250 HC RX REV CODE- 250: Performed by: INTERNAL MEDICINE

## 2021-06-09 PROCEDURE — C1894 INTRO/SHEATH, NON-LASER: HCPCS | Performed by: INTERNAL MEDICINE

## 2021-06-09 PROCEDURE — 81001 URINALYSIS AUTO W/SCOPE: CPT

## 2021-06-09 PROCEDURE — C1876 STENT, NON-COA/NON-COV W/DEL: HCPCS | Performed by: INTERNAL MEDICINE

## 2021-06-09 PROCEDURE — 74011000250 HC RX REV CODE- 250: Performed by: SPECIALIST

## 2021-06-09 PROCEDURE — 77030015766: Performed by: INTERNAL MEDICINE

## 2021-06-09 PROCEDURE — 85379 FIBRIN DEGRADATION QUANT: CPT

## 2021-06-09 PROCEDURE — 71045 X-RAY EXAM CHEST 1 VIEW: CPT

## 2021-06-09 PROCEDURE — 83036 HEMOGLOBIN GLYCOSYLATED A1C: CPT

## 2021-06-09 PROCEDURE — 85025 COMPLETE CBC W/AUTO DIFF WBC: CPT

## 2021-06-09 PROCEDURE — 99153 MOD SED SAME PHYS/QHP EA: CPT | Performed by: INTERNAL MEDICINE

## 2021-06-09 PROCEDURE — 80076 HEPATIC FUNCTION PANEL: CPT

## 2021-06-09 PROCEDURE — 93306 TTE W/DOPPLER COMPLETE: CPT

## 2021-06-09 PROCEDURE — 77030013761 HC KT HRT LFT ANGI -B: Performed by: INTERNAL MEDICINE

## 2021-06-09 PROCEDURE — 99223 1ST HOSP IP/OBS HIGH 75: CPT | Performed by: INTERNAL MEDICINE

## 2021-06-09 PROCEDURE — 74011000636 HC RX REV CODE- 636: Performed by: INTERNAL MEDICINE

## 2021-06-09 PROCEDURE — 77030029997 HC DEV COM RDL R BND TELE -B: Performed by: INTERNAL MEDICINE

## 2021-06-09 PROCEDURE — 77030013797 HC KT TRNSDUC PRSSR EDWD -A: Performed by: INTERNAL MEDICINE

## 2021-06-09 PROCEDURE — 83735 ASSAY OF MAGNESIUM: CPT

## 2021-06-09 PROCEDURE — 36415 COLL VENOUS BLD VENIPUNCTURE: CPT

## 2021-06-09 PROCEDURE — 96375 TX/PRO/DX INJ NEW DRUG ADDON: CPT

## 2021-06-09 PROCEDURE — 74011250636 HC RX REV CODE- 250/636: Performed by: EMERGENCY MEDICINE

## 2021-06-09 PROCEDURE — 99255 IP/OBS CONSLTJ NEW/EST HI 80: CPT | Performed by: SPECIALIST

## 2021-06-09 PROCEDURE — 65620000000 HC RM CCU GENERAL

## 2021-06-09 PROCEDURE — 83880 ASSAY OF NATRIURETIC PEPTIDE: CPT

## 2021-06-09 PROCEDURE — 74011250637 HC RX REV CODE- 250/637: Performed by: INTERNAL MEDICINE

## 2021-06-09 PROCEDURE — 85347 COAGULATION TIME ACTIVATED: CPT

## 2021-06-09 PROCEDURE — 85610 PROTHROMBIN TIME: CPT

## 2021-06-09 PROCEDURE — 82553 CREATINE MB FRACTION: CPT

## 2021-06-09 PROCEDURE — C1887 CATHETER, GUIDING: HCPCS | Performed by: INTERNAL MEDICINE

## 2021-06-09 RX ORDER — POLYETHYLENE GLYCOL 3350 17 G/17G
17 POWDER, FOR SOLUTION ORAL DAILY PRN
Status: DISCONTINUED | OUTPATIENT
Start: 2021-06-09 | End: 2021-06-17 | Stop reason: HOSPADM

## 2021-06-09 RX ORDER — FENTANYL CITRATE 50 UG/ML
INJECTION, SOLUTION INTRAMUSCULAR; INTRAVENOUS AS NEEDED
Status: DISCONTINUED | OUTPATIENT
Start: 2021-06-09 | End: 2021-06-09 | Stop reason: HOSPADM

## 2021-06-09 RX ORDER — SODIUM CHLORIDE 9 MG/ML
50 INJECTION, SOLUTION INTRAVENOUS CONTINUOUS
Status: DISCONTINUED | OUTPATIENT
Start: 2021-06-09 | End: 2021-06-17 | Stop reason: HOSPADM

## 2021-06-09 RX ORDER — ASPIRIN 81 MG/1
81 TABLET ORAL DAILY
Status: DISCONTINUED | OUTPATIENT
Start: 2021-06-10 | End: 2021-06-17 | Stop reason: HOSPADM

## 2021-06-09 RX ORDER — HEPARIN SODIUM 10000 [USP'U]/100ML
8.8-25 INJECTION, SOLUTION INTRAVENOUS
Status: DISCONTINUED | OUTPATIENT
Start: 2021-06-09 | End: 2021-06-09

## 2021-06-09 RX ORDER — ATORVASTATIN CALCIUM 40 MG/1
40 TABLET, FILM COATED ORAL
Status: DISCONTINUED | OUTPATIENT
Start: 2021-06-09 | End: 2021-06-16

## 2021-06-09 RX ORDER — METOPROLOL TARTRATE 5 MG/5ML
2.5 INJECTION INTRAVENOUS
Status: DISCONTINUED | OUTPATIENT
Start: 2021-06-09 | End: 2021-06-09

## 2021-06-09 RX ORDER — ACETAMINOPHEN 325 MG/1
650 TABLET ORAL
Status: DISCONTINUED | OUTPATIENT
Start: 2021-06-09 | End: 2021-06-17 | Stop reason: HOSPADM

## 2021-06-09 RX ORDER — FUROSEMIDE 40 MG/1
40 TABLET ORAL
Status: DISCONTINUED | OUTPATIENT
Start: 2021-06-09 | End: 2021-06-17 | Stop reason: HOSPADM

## 2021-06-09 RX ORDER — HEPARIN SODIUM 10000 [USP'U]/100ML
18-36 INJECTION, SOLUTION INTRAVENOUS
Status: DISCONTINUED | OUTPATIENT
Start: 2021-06-09 | End: 2021-06-16

## 2021-06-09 RX ORDER — SODIUM CHLORIDE 0.9 % (FLUSH) 0.9 %
5-40 SYRINGE (ML) INJECTION AS NEEDED
Status: DISCONTINUED | OUTPATIENT
Start: 2021-06-09 | End: 2021-06-16 | Stop reason: SDUPTHER

## 2021-06-09 RX ORDER — FAMOTIDINE 20 MG/1
20 TABLET, FILM COATED ORAL EVERY EVENING
Status: DISCONTINUED | OUTPATIENT
Start: 2021-06-09 | End: 2021-06-17 | Stop reason: HOSPADM

## 2021-06-09 RX ORDER — NITROGLYCERIN 0.4 MG/1
0.4 TABLET SUBLINGUAL
Status: COMPLETED | OUTPATIENT
Start: 2021-06-09 | End: 2021-06-09

## 2021-06-09 RX ORDER — MORPHINE SULFATE 2 MG/ML
2 INJECTION, SOLUTION INTRAMUSCULAR; INTRAVENOUS ONCE
Status: COMPLETED | OUTPATIENT
Start: 2021-06-09 | End: 2021-06-09

## 2021-06-09 RX ORDER — MORPHINE SULFATE 2 MG/ML
2 INJECTION, SOLUTION INTRAMUSCULAR; INTRAVENOUS
Status: DISCONTINUED | OUTPATIENT
Start: 2021-06-09 | End: 2021-06-09

## 2021-06-09 RX ORDER — ATORVASTATIN CALCIUM 40 MG/1
40 TABLET, FILM COATED ORAL
Status: DISCONTINUED | OUTPATIENT
Start: 2021-06-09 | End: 2021-06-09

## 2021-06-09 RX ORDER — LANOLIN ALCOHOL/MO/W.PET/CERES
400 CREAM (GRAM) TOPICAL DAILY
Status: COMPLETED | OUTPATIENT
Start: 2021-06-09 | End: 2021-06-11

## 2021-06-09 RX ORDER — POTASSIUM CHLORIDE 7.45 MG/ML
10 INJECTION INTRAVENOUS
Status: DISCONTINUED | OUTPATIENT
Start: 2021-06-09 | End: 2021-06-09

## 2021-06-09 RX ORDER — DOCUSATE SODIUM 100 MG/1
100 CAPSULE, LIQUID FILLED ORAL 2 TIMES DAILY
Status: DISCONTINUED | OUTPATIENT
Start: 2021-06-09 | End: 2021-06-17 | Stop reason: HOSPADM

## 2021-06-09 RX ORDER — HEPARIN SODIUM 1000 [USP'U]/ML
4000 INJECTION, SOLUTION INTRAVENOUS; SUBCUTANEOUS ONCE
Status: COMPLETED | OUTPATIENT
Start: 2021-06-09 | End: 2021-06-09

## 2021-06-09 RX ORDER — SODIUM CHLORIDE 0.9 % (FLUSH) 0.9 %
5-40 SYRINGE (ML) INJECTION EVERY 8 HOURS
Status: DISCONTINUED | OUTPATIENT
Start: 2021-06-09 | End: 2021-06-16 | Stop reason: SDUPTHER

## 2021-06-09 RX ORDER — AMIODARONE HYDROCHLORIDE 200 MG/1
400 TABLET ORAL 3 TIMES DAILY
Status: DISPENSED | OUTPATIENT
Start: 2021-06-09 | End: 2021-06-14

## 2021-06-09 RX ORDER — METOPROLOL TARTRATE 50 MG/1
50 TABLET ORAL 2 TIMES DAILY
Status: DISCONTINUED | OUTPATIENT
Start: 2021-06-09 | End: 2021-06-17 | Stop reason: HOSPADM

## 2021-06-09 RX ORDER — MIDAZOLAM HYDROCHLORIDE 1 MG/ML
INJECTION, SOLUTION INTRAMUSCULAR; INTRAVENOUS AS NEEDED
Status: DISCONTINUED | OUTPATIENT
Start: 2021-06-09 | End: 2021-06-09 | Stop reason: HOSPADM

## 2021-06-09 RX ORDER — NITROGLYCERIN 40 MG/100ML
INJECTION INTRAVENOUS
Status: COMPLETED | OUTPATIENT
Start: 2021-06-09 | End: 2021-06-09

## 2021-06-09 RX ORDER — NITROGLYCERIN 40 MG/100ML
0-20 INJECTION INTRAVENOUS
Status: DISCONTINUED | OUTPATIENT
Start: 2021-06-09 | End: 2021-06-09

## 2021-06-09 RX ORDER — PROMETHAZINE HYDROCHLORIDE 12.5 MG/1
12.5 TABLET ORAL
Status: DISCONTINUED | OUTPATIENT
Start: 2021-06-09 | End: 2021-06-17 | Stop reason: HOSPADM

## 2021-06-09 RX ORDER — ACETAMINOPHEN 650 MG/1
650 SUPPOSITORY RECTAL
Status: DISCONTINUED | OUTPATIENT
Start: 2021-06-09 | End: 2021-06-17 | Stop reason: HOSPADM

## 2021-06-09 RX ORDER — MUPIROCIN 20 MG/G
OINTMENT TOPICAL 2 TIMES DAILY
Status: DISCONTINUED | OUTPATIENT
Start: 2021-06-09 | End: 2021-06-17 | Stop reason: HOSPADM

## 2021-06-09 RX ORDER — HEPARIN SODIUM 10000 [USP'U]/100ML
8.8-25 INJECTION, SOLUTION INTRAVENOUS
Status: DISCONTINUED | OUTPATIENT
Start: 2021-06-09 | End: 2021-06-09 | Stop reason: SDUPTHER

## 2021-06-09 RX ORDER — MAGNESIUM SULFATE HEPTAHYDRATE 40 MG/ML
2 INJECTION, SOLUTION INTRAVENOUS ONCE
Status: DISCONTINUED | OUTPATIENT
Start: 2021-06-09 | End: 2021-06-09

## 2021-06-09 RX ORDER — IPRATROPIUM BROMIDE AND ALBUTEROL SULFATE 2.5; .5 MG/3ML; MG/3ML
3 SOLUTION RESPIRATORY (INHALATION)
Status: DISCONTINUED | OUTPATIENT
Start: 2021-06-09 | End: 2021-06-17 | Stop reason: HOSPADM

## 2021-06-09 RX ORDER — POTASSIUM CHLORIDE 750 MG/1
40 TABLET, FILM COATED, EXTENDED RELEASE ORAL
Status: COMPLETED | OUTPATIENT
Start: 2021-06-09 | End: 2021-06-09

## 2021-06-09 RX ORDER — VERAPAMIL HYDROCHLORIDE 2.5 MG/ML
INJECTION, SOLUTION INTRAVENOUS AS NEEDED
Status: DISCONTINUED | OUTPATIENT
Start: 2021-06-09 | End: 2021-06-09 | Stop reason: HOSPADM

## 2021-06-09 RX ORDER — ONDANSETRON 2 MG/ML
4 INJECTION INTRAMUSCULAR; INTRAVENOUS
Status: DISCONTINUED | OUTPATIENT
Start: 2021-06-09 | End: 2021-06-17 | Stop reason: HOSPADM

## 2021-06-09 RX ORDER — POTASSIUM CHLORIDE 750 MG/1
20 TABLET, FILM COATED, EXTENDED RELEASE ORAL DAILY
Status: COMPLETED | OUTPATIENT
Start: 2021-06-10 | End: 2021-06-12

## 2021-06-09 RX ORDER — ONDANSETRON 2 MG/ML
4 INJECTION INTRAMUSCULAR; INTRAVENOUS
Status: COMPLETED | OUTPATIENT
Start: 2021-06-09 | End: 2021-06-09

## 2021-06-09 RX ORDER — HEPARIN SODIUM 1000 [USP'U]/ML
INJECTION, SOLUTION INTRAVENOUS; SUBCUTANEOUS AS NEEDED
Status: DISCONTINUED | OUTPATIENT
Start: 2021-06-09 | End: 2021-06-09 | Stop reason: HOSPADM

## 2021-06-09 RX ORDER — NITROGLYCERIN 40 MG/100ML
0-200 INJECTION INTRAVENOUS
Status: DISCONTINUED | OUTPATIENT
Start: 2021-06-09 | End: 2021-06-11

## 2021-06-09 RX ORDER — LIDOCAINE HYDROCHLORIDE 10 MG/ML
INJECTION INFILTRATION; PERINEURAL AS NEEDED
Status: DISCONTINUED | OUTPATIENT
Start: 2021-06-09 | End: 2021-06-09 | Stop reason: HOSPADM

## 2021-06-09 RX ADMIN — POTASSIUM CHLORIDE 40 MEQ: 750 TABLET, FILM COATED, EXTENDED RELEASE ORAL at 13:11

## 2021-06-09 RX ADMIN — HEPARIN SODIUM 8.8 UNITS/KG/HR: 10000 INJECTION, SOLUTION INTRAVENOUS at 14:11

## 2021-06-09 RX ADMIN — NITROGLYCERIN 0.4 MG: 0.4 TABLET SUBLINGUAL at 09:08

## 2021-06-09 RX ADMIN — SODIUM CHLORIDE 50 ML/HR: 900 INJECTION, SOLUTION INTRAVENOUS at 15:56

## 2021-06-09 RX ADMIN — AMIODARONE HYDROCHLORIDE 400 MG: 200 TABLET ORAL at 15:50

## 2021-06-09 RX ADMIN — ONDANSETRON 4 MG: 2 INJECTION INTRAMUSCULAR; INTRAVENOUS at 10:01

## 2021-06-09 RX ADMIN — FAMOTIDINE 20 MG: 20 TABLET ORAL at 17:36

## 2021-06-09 RX ADMIN — METOPROLOL TARTRATE 50 MG: 50 TABLET, FILM COATED ORAL at 21:23

## 2021-06-09 RX ADMIN — MUPIROCIN: 20 OINTMENT TOPICAL at 17:36

## 2021-06-09 RX ADMIN — NITROGLYCERIN 30 MCG/MIN: 40 INJECTION INTRAVENOUS at 14:57

## 2021-06-09 RX ADMIN — DOCUSATE SODIUM 100 MG: 100 CAPSULE, LIQUID FILLED ORAL at 17:36

## 2021-06-09 RX ADMIN — ATORVASTATIN CALCIUM 40 MG: 40 TABLET, FILM COATED ORAL at 21:23

## 2021-06-09 RX ADMIN — FUROSEMIDE 40 MG: 40 TABLET ORAL at 15:50

## 2021-06-09 RX ADMIN — HEPARIN SODIUM 4000 UNITS: 1000 INJECTION INTRAVENOUS; SUBCUTANEOUS at 10:09

## 2021-06-09 RX ADMIN — ACETAMINOPHEN 650 MG: 325 TABLET, FILM COATED ORAL at 17:36

## 2021-06-09 RX ADMIN — Medication 10 ML: at 13:12

## 2021-06-09 RX ADMIN — Medication 400 MG: at 13:11

## 2021-06-09 RX ADMIN — MORPHINE SULFATE 2 MG: 2 INJECTION, SOLUTION INTRAMUSCULAR; INTRAVENOUS at 15:50

## 2021-06-09 NOTE — CONSULTS
Cardiology Consult Note    Consultation request by No admitting provider for patient encounter. for advice/opinion related to evaluating CP    Date of  Admission: 6/9/2021  8:52 AM   Primary Care Physician:  Octavio Miles MD    Consulting Cardiologist: Dr. Lila Noguera:     -Unstable Angina, presented with central chest pain radiating to his throat, with associated N/V and diaphoresis. Symptoms are historically similar. Reports worsening exertional chest pain requiring increased NTG usage. Initial troponin normal. ECG SR without dynamic ST changes. -CAD s/p LHC (2019,2018) Widely patent Lcx stents, known occluded RCA, diffuse LM/LAD disease.   -Hypertension, on lopressor, Imdur, Lasix as outpatient   -Hyperlipidemia  -Tobacco Abuse, 1 ppd, cessation advised    Primary Cardiologist: [address]:       I saw, evaluated, interviewed and examined the patient personally. I agree with the findings and plan of care as documented below with PA-C note  Saw patient emergently at bedside for chest pain started this morning. Symptoms are concerning for unstable angina. Hemodynamically stable. Exam without any significant fluid overload. Pertinent labs and EKG reviewed. EKG with some subtle ST changes  His symptoms are consistent with onset of angina. Last cardiac catheterization from [facility] reviewed which showed patent circumflex that and mild to moderate diffuse LAD disease. RCA was occluded with collaterals from the left  Start IV heparin per ACS protocol  Start IV nitroglycerin  Aspirin, beta-blocker and statin  Discussed regarding management strategy which includes medical management vs. Ischemia evaluation ( non-invasive vs. Invasive). Risk, benefit and alternatives of each strategy discussed in detail.   Risk, benefit, complication of LHC and possible PCI ( including but not limited to bleeding, vascular trauma requiring surgery,  infection, heart failure, stroke, MI, emergent bypass surgery, severe allergic reactions, kidney failure, dialysis and death ) were discussed with patient and also spouse on the phone and they are and willing to proceed with procedure. Will be using moderate sedation   By stating these are possible risks, this does not exclude the potential for additional risks not named here. Addendum:  Cath finding discussed with patient. CT surgery evaluation in progress  Echocardiogram images reviewed personally which showed RV dysfunction, dilation of RV with elevated PA P. Concerning for PE.  D-dimer added which showed elevation of D-dimer  Discussed with attending physician and plan to change heparin to PE protocol  Plan to consider CT chest for PE tomorrow. Gentle IV hydration overnight      Hanna Templeton MD       -NPO for [facility] today with tentative PCI. Discussed risks and benefits of LHC with patient, he is willing to proceed. -ASA given en route, Heparin gtt started in the ED   -Will give one dose of IV lopressor now for tachycardia  -Zofran for nausea and vomiting   -Patient currently with 6/10 chest pain, orders to start IV NTG gtt given in the ED   -Replace K+, recommend maintaining at 4.0; Replace Mg recommend maintaining at 2.0   -Trend cardiac biomarkers   -Continue outpatient antihypertensives   -Continue high intensity statin and ASA   -Further recommendations to follow      History of Present Illness: This is a 56 y.o. male admitted for No admission diagnoses are documented for this encounter. .    Patient complains of: chest pain       Aixa James is a 56 y.o. male, PMHx as stated above, who we are seeing in consult for chest pain. Patient states he awoke from his sleep with centralized chest pressure, rated at 8/10. States this is similar to the pain he felt prior to his last heart catheterization. He states the pain radiates up his neck. He endorses associated diaphoresis, nausea and vomiting. Denies SOB.  States over the last few months, he has been having more exertional chest pain that is requiring more frequent NTG use. Denies near syncope or syncope, orthopnea, PND, palpitations, dizziness. Cardiac risk factors: smoking/ tobacco exposure, dyslipidemia, obesity, male gender, hypertension      Review of Symptoms:  Except as stated above include:  Constitutional:  negative  Respiratory:  negative  Cardiovascular:  negative  Gastrointestinal: negative  Genitourinary:  negative  Musculoskeletal:  Negative  Neurological:  Negative  Dermatological:  Negative  Endocrinological: Negative  Psychological:  Negative    A comprehensive review of systems was negative except for that written in the HPI. Past Medical History:     Past Medical History:   Diagnosis Date    Coronary arteriosclerosis     Dupuytren's disease of palm     High cholesterol     Hypertension          Social History:     Social History     Socioeconomic History    Marital status:      Spouse name: Not on file    Number of children: Not on file    Years of education: Not on file    Highest education level: Not on file   Tobacco Use    Smoking status: Former Smoker    Smokeless tobacco: Never Used   Substance and Sexual Activity    Alcohol use: Yes    Drug use: No   Other Topics Concern     Social Determinants of Health     Financial Resource Strain:     Difficulty of Paying Living Expenses:    Food Insecurity:     Worried About Running Out of Food in the Last Year:     [address] in the Last Year:    Transportation Needs:     Lack of Transportation (Medical):      Lack of Transportation (Non-Medical):    Physical Activity:     Days of Exercise per Week:     Minutes of Exercise per Session:    Stress:     Feeling of Stress :    Social Connections:     Frequency of Communication with Friends and Family:     Frequency of Social Gatherings with Friends and Family:     Attends [employer] Services:     Active Member of Clubs or Organizations:  Attends Club or Organization Meetings:     Marital Status:         Family History:     Family History   Problem Relation Age of Onset    Hypertension Mother     Diabetes Mother         Medications: Allergies   Allergen Reactions    Statins-Hmg-Coa Reductase Inhibitors Other (comments)     Other reaction(s): SEVERE PAIN PER PT    Gabapentin Other (comments)     \"Caused bad nightmares\"    Pcn [Penicillins] Hives        Current Facility-Administered Medications   Medication Dose Route Frequency    morphine injection 2 mg  2 mg IntraVENous NOW    nitroglycerin (TRIDIL) 400 mcg/ml infusion  0-20 mcg/min IntraVENous TITRATE    heparin 25,000 units in D5W 250 ml infusion  8.8-25 Units/kg/hr IntraVENous NOW    heparin (porcine) 1,000 unit/mL injection 4,000 Units  4,000 Units IntraVENous ONCE    heparin 25,000 units in D5W 250 ml infusion  12-25 Units/kg/hr IntraVENous TITRATE     Current Outpatient Medications   Medication Sig    isosorbide mononitrate ER (IMDUR) 60 mg CR tablet TAKE 1 TABLET BY MOUTH TWICE A DAY AS DIRECTED     nitroglycerin (NITROLINGUAL) 400 mcg/spray spray SPRAY ONE SPRAY UNDER THE TONGUE AS NEEDED MAY REPEAT UP TO 2 TIMES AS DIRECTED     metoprolol tartrate (LOPRESSOR) 100 mg IR tablet TAKE 1/2 TABLET (50MG) TWICE A DAY FOR  BLOOD PRESSURE     atorvastatin (LIPITOR) 20 mg tablet TAKE 1 TABLET BY MOUTH DAILY FOR 90 DAYS     clopidogreL (PLAVIX) 75 mg tab TAKE 1 TABLET BY MOUTH DAILY FOR 90 DAYS    furosemide (LASIX) 20 mg tablet TAKE TWO (2) TABLETS BY MOUTH EVERY DAY AS NEEDED FOR SWELLING    DULoxetine (CYMBALTA) 60 mg capsule TAKE 1 CAPSULE BY MOUTH DAILY.  aspirin delayed-release 81 mg tablet     tiaGABine (GABITRIL) 2 mg tablet Take 2 Tabs by mouth two (2) times daily (with meals).  diazePAM (VALIUM) 10 mg tablet Take 1 tab by mouth as directed by nurse prior to procedure    pregabalin (LYRICA) 50 mg capsule Take 1 Cap by mouth two (2) times a day.  Max Daily Amount: 100 mg.    diazePAM (VALIUM) 10 mg tablet Take 1 tab by mouth as directed by nurse prior to procedure    topiramate (TOPAMAX) 25 mg tablet 3 tabs PO QHS (Patient taking differently: Take 75 mg by mouth nightly. 3 tabs PO QHS)    fluocinoNIDE (LIDEX) 0.05 % topical cream     diclofenac sodium 1.5 % drop     co-enzyme Q-10 (COQ-10) 100 mg capsule Take 100 mg by mouth daily. Physical Exam:     Visit Vitals  /73   Pulse 85   Resp 22   Wt 112.5 kg (248 lb)   SpO2 91%   BMI 35.58 kg/m²       TELE: normal sinus rhythm    BP Readings from Last 3 Encounters:   06/09/21 116/73   07/01/19 137/87   01/23/19 131/78     Pulse Readings from Last 3 Encounters:   06/09/21 85   07/01/19 83   01/23/19 71     Wt Readings from Last 3 Encounters:   06/09/21 112.5 kg (248 lb)   07/01/19 126.7 kg (279 lb 6.4 oz)   01/23/19 126.8 kg (279 lb 9.6 oz)       General:  alert, cooperative, no distress, appears stated age  Neck:  nontender, no JVD  Lungs:  clear to auscultation bilaterally  Heart:  regular rate and rhythm, S1, S2 normal, no murmur, click, rub or gallop  Abdomen:  abdomen is soft without significant tenderness, masses, organomegaly or guarding  Extremities:  extremities normal, atraumatic, no cyanosis or edema  Skin: Warm and dry. no hyperpigmentation, vitiligo, or suspicious lesions  Neuro: alert, oriented x3, affect appropriate, no focal neurological deficits, moves all extremities well, no involuntary movements  Psych: non focal     Data Review:     No results for input(s): WBC, HGB, HCT, PLT, HGBEXT, HCTEXT, PLTEXT in the last 72 hours. No results for input(s): NA, K, CL, CO2, GLU, BUN, CREA, CA, MG, PHOS, ALB, TBIL, ALT, INR, INREXT in the last 72 hours. No lab exists for component: SGOT,  BNP    No results found for this or any previous visit.     All Cardiac Markers in the last 24 hours:  No results found for: CPK, CK, CKMMB, CKMB, RCK3, CKMBT, CKNDX, CKND1, PALMA, TROPT, TROIQ, SIL, TROPT, TNIPOC, BNP, BNPP    Last Lipid:  No results found for: CHOL, CHOLX, CHLST, CHOLV, HDL, HDLP, LDL, LDLC, DLDLP, TGLX, TRIGL, TRIGP, CHHD, CHHDX    Cardiographics:     EKG Results     Procedure 720 Value Units Date/Time    EKG, 12 LEAD, SUBSEQUENT [[id number]]     Order Status: Sent     EKG, 12 LEAD, INITIAL [[id number]]     Order Status: Sent             02/08/19    NM [facility] MYOCARDIAL PERFUSION MULTIPLE  2/8/2019          XR Results (most recent):  Results from Abstract encounter on 02/08/19    XR CHEST AP LAT        Signed By: Tree Hodgson PA-C     June 9, 2021

## 2021-06-09 NOTE — ED PROVIDER NOTES
EMERGENCY DEPARTMENT HISTORY AND PHYSICAL EXAM    8:41 AM  Date: (Not on file)  Patient Name: Andrew Powell    History of Presenting Illness       History Provided By: Patient    HPI: Andrew Powell is a 56 y.o. male with medical history of hypertension hypercholesterolemia, coronary artery disease with 3 stents presents with chest pain that woke him up from sleep at 4:00. His pain was central, severe, sharp, accompanied by shortness of breath. Denies any nausea, no vomiting. Patient received 10 mg of morphine, aspirin and nitroglycerin in route. Social history smoker   PCP: Malena Hardy MD    Past History     Past Medical History:  Past Medical History:   Diagnosis Date    Coronary arteriosclerosis     Dupuytren's disease of palm     High cholesterol     Hypertension        Past Surgical History:  Past Surgical History:   Procedure Laterality Date    HX CHOLECYSTECTOMY      HX CORONARY STENT PLACEMENT      HX HERNIA REPAIR         Family History:  Family History   Problem Relation Age of Onset    Hypertension Mother     Diabetes Mother        Social History:  Social History     Tobacco Use    Smoking status: Former Smoker    Smokeless tobacco: Never Used   Substance Use Topics    Alcohol use: Yes    Drug use: No       Allergies: Allergies   Allergen Reactions    Statins-Hmg-Coa Reductase Inhibitors Other (comments)     Other reaction(s): SEVERE PAIN PER PT    Gabapentin Other (comments)     \"Caused bad nightmares\"    Pcn [Penicillins] Hives       Review of Systems   Review of Systems   Constitutional: Negative for activity change, appetite change and chills. HENT: Negative for congestion, ear discharge, ear pain and sore throat. Eyes: Negative for photophobia and pain. Respiratory: Negative for cough and choking. Cardiovascular: Positive for chest pain. Negative for palpitations and leg swelling. Gastrointestinal: Negative for anal bleeding and rectal pain.    Endocrine: Negative for polydipsia and polyuria. Genitourinary: Negative for genital sores and urgency. Musculoskeletal: Negative for arthralgias and myalgias. Neurological: Negative for dizziness, seizures and speech difficulty. Psychiatric/Behavioral: Negative for hallucinations, self-injury and suicidal ideas. Physical Exam     No data found. Physical Exam  Vitals and nursing note reviewed. Constitutional:       Appearance: He is well-developed. He is obese. HENT:      Head: Normocephalic and atraumatic. Eyes:      General:         Right eye: No discharge. Left eye: No discharge. Cardiovascular:      Rate and Rhythm: Normal rate and regular rhythm. Heart sounds: Normal heart sounds. No murmur heard. Pulmonary:      Effort: Pulmonary effort is normal. No respiratory distress. Breath sounds: Normal breath sounds. No stridor. No wheezing or rales. Chest:      Chest wall: No tenderness. Abdominal:      General: Bowel sounds are normal. There is no distension. Palpations: Abdomen is soft. Tenderness: There is no abdominal tenderness. There is no guarding or rebound. Musculoskeletal:         General: Normal range of motion. Cervical back: Normal range of motion and neck supple. Skin:     General: Skin is warm and dry. Neurological:      Mental Status: He is alert and oriented to person, place, and time. Diagnostic Study Results     Labs -  No results found for this or any previous visit (from the past 12 hour(s)). Radiologic Studies -   No results found. Medical Decision Making     ED Course: Progress Notes, Reevaluation, and Consults:    8:41 AM Initial assessment performed. The patients presenting problems have been discussed, and they/their family are in agreement with the care plan formulated and outlined with them. I have encouraged them to ask questions as they arise throughout their visit.       Provider Notes (Medical Decision Making):   Patient presents with chest pain   Patient has multiple risk factors, heart score 5  Plan to obtain labs, imaging  Old medical records reviewed:  EKG as interpreted by me: 86, sinus rhythm, no ST changes, no ST changes  Patient initially chest pain-free on arrival  Started complaining of chest pain while in the ED  Emergently consulted cardiology  Patient started on Heparin and nitroglycerin drip  Labs interpreted by me no leukocytosis, mild hypokalemia. Potassium and magnesium to be repleted. First troponin negative  X-ray chest no pneumonia  Consulted cardiology Dr. Aarti Barrera at bedside  Patient taken to PCI for unstable angina  Discussed with hospitalist Dr. Elier Parson who accepts admission. Critical Care:  CRITICAL CARE:    I have spent 35 minutes of critical care time involved in lab review, consultations with specialist, family decision-making, and documentation. This time does not include separately billable procedures. During this entire length of time I was immediately available to the patient. Critical Care: The reason for providing this level of medical care for this critically ill patient was due a critical illness that impaired one or more vital organ systems such that there was a high probability of imminent or life threatening deterioration in the patients condition. This care involved high complexity decision making to assess, manipulate, and support vital system functions, to treat this degreee vital organ system failure and to prevent further life threatening deterioration of the patients condition. Vital Signs-Reviewed the patient's vital signs. Reviewed pt's pulse ox reading. Records Reviewed: old medical records  -I am the first provider for this patient.  -I reviewed the vital signs, available nursing notes, past medical history, past surgical history, family history and social history.     Current Outpatient Medications   Medication Sig Dispense Refill    isosorbide mononitrate ER (IMDUR) 60 mg CR tablet TAKE 1 TABLET BY MOUTH TWICE A DAY AS DIRECTED  180 Tablet 3    nitroglycerin (NITROLINGUAL) 400 mcg/spray spray SPRAY ONE SPRAY UNDER THE TONGUE AS NEEDED MAY REPEAT UP TO 2 TIMES AS DIRECTED  12 g 5    metoprolol tartrate (LOPRESSOR) 100 mg IR tablet TAKE 1/2 TABLET (50MG) TWICE A DAY FOR  BLOOD PRESSURE  90 Tab 1    atorvastatin (LIPITOR) 20 mg tablet TAKE 1 TABLET BY MOUTH DAILY FOR 90 DAYS  90 Tab 3    clopidogreL (PLAVIX) 75 mg tab TAKE 1 TABLET BY MOUTH DAILY FOR 90 DAYS 90 Tab 3    furosemide (LASIX) 20 mg tablet TAKE TWO (2) TABLETS BY MOUTH EVERY DAY AS NEEDED FOR SWELLING 180 Tab 2    DULoxetine (CYMBALTA) 60 mg capsule TAKE 1 CAPSULE BY MOUTH DAILY. 30 Cap 2    aspirin delayed-release 81 mg tablet   1    tiaGABine (GABITRIL) 2 mg tablet Take 2 Tabs by mouth two (2) times daily (with meals). 60 Tab 1    diazePAM (VALIUM) 10 mg tablet Take 1 tab by mouth as directed by nurse prior to procedure 1 Tab 0    pregabalin (LYRICA) 50 mg capsule Take 1 Cap by mouth two (2) times a day. Max Daily Amount: 100 mg. 60 Cap 1    diazePAM (VALIUM) 10 mg tablet Take 1 tab by mouth as directed by nurse prior to procedure 1 Tab 0    topiramate (TOPAMAX) 25 mg tablet 3 tabs PO QHS (Patient taking differently: Take 75 mg by mouth nightly. 3 tabs PO QHS) 90 Tab 1    fluocinoNIDE (LIDEX) 0.05 % topical cream   3    diclofenac sodium 1.5 % drop   3    co-enzyme Q-10 (COQ-10) 100 mg capsule Take 100 mg by mouth daily. Clinical Impression     Clinical Impression: No diagnosis found. Disposition: This note was dictated utilizing voice recognition software which may lead to typographical errors. I apologize in advance if the situation occurs. If questions arise please do not hesitate to contact me or call our department.     Adal Fisher MD  8:41 AM

## 2021-06-09 NOTE — PROGRESS NOTES
Bedside and Verbal shift change report given to Kelvin Nova RN (oncoming nurse) by Francisca Stacy RN (offgoing nurse). Report included the following information SBAR, Kardex, Procedure Summary, Intake/Output, MAR, Recent Results and Med Rec Status.

## 2021-06-09 NOTE — Clinical Note
TRANSFER - OUT REPORT:     Verbal report given to: Mayito Corrales. Report consisted of patient's Situation, Background, Assessment and   Recommendations(SBAR). Opportunity for questions and clarification was provided. Patient transported with a Cardiac Cath Tech / Patient Care Tech. Patient transported to: CVT ICU.
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
## 2021-06-09 NOTE — Clinical Note
Sheath #1: Sheath: inserted. Sheath inserted/placed in the right radial  artery. Hemostasis achieved. Upon evaluation of the common femoral artery stick using fluoroscopy, the access site puncture was within the safe zone.

## 2021-06-09 NOTE — H&P
History & Physical    Patient: Nissa Wilson MRN: [id number]  CSN: [id number]    YOB: 1960  Age: 56 y.o. Sex: male      DOA: 6/9/2021  CC: chest pain and shortness of breath    PCP: Johan Payton MD       HPI:     Nissa Wilson is a 56 y.o. male with medical co-morbidities including HTN, CAD with stent, hyperlipidemia, active tobacco smoking, SHILPI on CPAP, morbid obesity, presented from home with chest pain. Accordingly, he has substernal chest pain associates with exertion over the last weeks. He has chest pain with radiation to his neck today when it woke him from his sleep. He has associates shortness of breath. No chest palpitation. No leg swelling. No cough. No recent sick contact. In the ER, cardiac enzyme was negative. EKG was non specific ST change. Cardiology consulted for concern of unstable angina. He was taken to OR for cardiac cath. Per cardiology, he has significant atherosclerosis disease but no obstructive finding. No stent place today. He continue on heparin gtt and Nitrogen gtt. He remains with intermittent chest pain. Review of Systems  GENERAL: No fever, No chill, + malaise   HEENT: No change in vision, no ear ache, no sore throat or sinus congestion. NECK: No pain or stiffness. PULMONARY: No shortness of breath, no cough or wheeze. Cardiovascular: no pnd / orthopnea, ++ Chest Pain  GASTROINTESTINAL: No abd pain, No nausea/vomiting, No diarrhea, No bright red blood per rectum. GENITOURINARY: No urinary frequency, No urgency or pain with urination. MUSCULOSKELETAL: No joint or muscle pain, no back pain, no recent trauma. DERMATOLOGIC: No rash, no itching, no lesions. ENDOCRINE: No polyuria, polydipsia, No recent change in weight. HEMATOLOGICAL: No easy bruising or bleeding.    NEUROLOGIC: No headache, No seizures, No generalized weakness         Past Medical History:   Diagnosis Date    CAD (coronary artery disease) 6/9/2021    Coronary arteriosclerosis     Dupuytren's disease of palm     Hyperlipidemia 6/9/2021    Hypertension     SHILPI (obstructive sleep apnea) 6/9/2021    Severe obesity ([address] 10/24/2018    Tobacco dependence 6/9/2021       Past Surgical History:   Procedure Laterality Date    HX CHOLECYSTECTOMY      HX CORONARY STENT PLACEMENT      HX HERNIA REPAIR         Family History   Problem Relation Age of Onset    Hypertension Mother     Diabetes Mother        Social History     Socioeconomic History    Marital status:      Spouse name: Not on file    Number of children: Not on file    Years of education: Not on file    Highest education level: Not on file   Tobacco Use    Smoking status: Current Every Day Smoker     Types: Cigarettes    Smokeless tobacco: Never Used   Substance and Sexual Activity    Alcohol use: Yes    Drug use: No   Other Topics Concern     Social Determinants of Health     Financial Resource Strain:     Difficulty of Paying Living Expenses:    Food Insecurity:     Worried About Running Out of Food in the Last Year:     [address] in the Last Year:    Transportation Needs:     Lack of Transportation (Medical):  Lack of Transportation (Non-Medical):    Physical Activity:     Days of Exercise per Week:     Minutes of Exercise per Session:    Stress:     Feeling of Stress :    Social Connections:     Frequency of Communication with Friends and Family:     Frequency of Social Gatherings with Friends and Family:     Attends Confucianist Services:     Active Member of Clubs or Organizations:     Attends Club or Organization Meetings:     Marital Status:        Prior to Admission medications    Medication Sig Start Date End Date Taking?  Authorizing Provider   isosorbide mononitrate ER (IMDUR) 60 mg CR tablet TAKE 1 TABLET BY MOUTH TWICE A DAY AS DIRECTED  6/4/21  Yes Luzmaria Narayan MD   nitroglycerin (NITROLINGUAL) 400 mcg/spray spray SPRAY ONE SPRAY UNDER THE TONGUE AS NEEDED MAY REPEAT UP TO 2 TIMES AS DIRECTED  5/23/21  Yes Andrew Moyer MD   metoprolol tartrate (LOPRESSOR) 100 mg IR tablet TAKE 1/2 TABLET (50MG) TWICE A DAY FOR  BLOOD PRESSURE  1/18/21  Yes Andrew Moyer MD   atorvastatin (LIPITOR) 20 mg tablet TAKE 1 TABLET BY MOUTH DAILY FOR 90 DAYS  12/24/20  Yes Andrew Moyer MD   clopidogreL (PLAVIX) 75 mg tab TAKE 1 TABLET BY MOUTH DAILY FOR 90 DAYS 11/25/20  Yes Andrew Moyer MD   furosemide (LASIX) 20 mg tablet TAKE TWO (2) TABLETS BY MOUTH EVERY DAY AS NEEDED FOR SWELLING 10/26/20  Yes Andrew Moyer MD   aspirin delayed-release 81 mg tablet  4/29/19  Yes Provider, Historical   co-enzyme Q-10 (COQ-10) 100 mg capsule Take 100 mg by mouth daily. Yes Provider, Historical   fluocinoNIDE (LIDEX) 0.05 % topical cream  10/17/18   Provider, Historical       Allergies   Allergen Reactions    Statins-Hmg-Coa Reductase Inhibitors Other (comments)     Other reaction(s): SEVERE PAIN PER PT    Gabapentin Other (comments)     \"Caused bad nightmares\"    Pcn [Penicillins] Hives              Physical Exam:      Visit Vitals  /82   Pulse 84   Resp 16   Ht 5' 10.08\" (1.78 m)   Wt 112.5 kg (248 lb)   SpO2 94%   BMI 35.50 kg/m²       Physical Exam:  Tele: sinus   General:  Cooperative, Not in acute distress, speaks in full sentence while in bed  HEENT: PERRL, EOMI, supple neck, no JVD, dry oral mucosa  Cardiovascular: S1S2 regular, no rub/gallop   Pulmonary: air entry bilaterally, + wheezing, no crackle  GI:  Soft, non tender, non distended, +bs, no guarding   Extremities:  No pedal edema, +distal pulses appreciated   Neuro: AOx3, moving all extremities, no gross deficit.      Lab/Data Review:  Labs: Results:       Chemistry Recent Labs     06/09/21  0915   *      K 3.1*      CO2 24   BUN 13   CREA 0.94   CA 8.6   AGAP 11   BUCR 14   AP 91   TP 6.5   ALB 3.2*   GLOB 3.3   AGRAT 1.0      CBC w/Diff Recent Labs     06/09/21  0915   WBC 10.8   RBC 4.14*   HGB 14.0   HCT 39.1      GRANS 66   LYMPH 23   EOS 1      Coagulation Recent Labs     06/09/21  0915   APTT 27.8       Iron/Ferritin No results for input(s): IRON in the last 72 hours. No lab exists for component: TIBCCALC   BNP No results for input(s): BNPP in the last 72 hours. Cardiac Enzymes Recent Labs     06/09/21  0915   CPK 32*   CKND1 CALCULATION NOT PERFORMED WHEN RESULT IS BELOW LINEAR LIMIT      Liver Enzymes Recent Labs     06/09/21  0915   TP 6.5   ALB 3.2*   AP 91      Thyroid Studies No results found for: T4, T3U, TSH, TSHEXT, TSHEXT       All Micro Results     None          Imaging Reviewed:  XR Results (most recent):  Results from Hospital Encounter encounter on 06/09/21    XR CHEST PORT    Narrative  AP CHEST, PORTABLE    INDICATION: Chest pain    COMPARISON: Chest x-ray 1/8/2019    FINDINGS:  EKG leads overlie the patient. The cardiac silhouette is normal in size. Central pulmonary arteries appear  slightly prominent, similar to prior exams. The pulmonary vasculature is  unremarkable. No focal consolidation, pleural effusion, or pneumothorax. No  acute osseous abnormalities are identified. Impression  1. No clearly acute finding. 2. Chronic prominence of the central pulmonary arteries, could indicate  pulmonary arterial hypertension          Assessment:   Active Problems:  1   Unstable angina ([address] (6/9/2021)  2. CAD (coronary artery disease) with h/o stent   3. Hypertension   4. Hyperlipidemia   5. Tobacco dependence  6. SHILPI (obstructive sleep apnea), on CPAP   7. Hypokalemia   8. Wheezing, suspected bronchial reactive disease, he stated his PCP thinks he has COPD      Plan:     Admitted to CVT ICU post cardiac cath. Agree with Nitro gtt and Heparin gtt for now. Cont ASA and Statin.  Hold plavix for now   Will check D-dimer and duplex of leg, if significantly elevated D-dimer, will consider CTA chest.   Cardiac enzyme trending, check lipid and HgbA1c  Cardiothoracic surgery consult follow up  Cardiology follows   CPAP ordered.    Risks associate with tobacco smoking educated, advised on tobacco smoking cessation  pepcid   ICS       Risk of deterioration:  []Low    [x]Moderate  []High     Prophylaxis:  []Lovenox  []Coumadin  [x]Hep gg  []SCDs  [x]H2B/PPI     Disposition:  []Home w/ Family   [x]HH PT,OT,RN   []SNF/LTC   []SAH/Rehab     Discussed Code Status:         [x]Full Code      []DNR         ___________________________________________________     Care Plan discussed with:    [x]Patient   []Family    []ED Care Manager  [x]ED Doc   [x]Specialist :  Total Time Coordinating Admission:   70   minutes    []Total Critical Care Time:       Misti Barrow MD  6/9/2021, 12:01 PM

## 2021-06-09 NOTE — ED TRIAGE NOTES
Per EMS, Patient had c/p that awoke the patient x3 hr ago. Sublingual spray nitro without relief. He called around 0710. He woke up around 0400. Patient is currently pain free. Short of breath.  324 ASA, 10 mg morphine, Dr. Jeovany Jones at [facility]

## 2021-06-09 NOTE — Clinical Note
Pt. Being moved to Stretcher on portable monitor. Will go to [address] [place] [address] when room ready.

## 2021-06-09 NOTE — PROGRESS NOTES
conducted an initial consultation and Spiritual Assessment for Sam Gomez, who is a 56 y. o.,male. Patients Primary Language is: [place]. According to the patients EMR Restorationist Affiliation is: Islam.     Pt is nervous about his surgery and has a lot of fears. Pt could use daily spiritual support. His family can't be here consistently. Spiritual Care will continue to follow-up. The reason the Patient came to the hospital is:   Patient Active Problem List    Diagnosis Date Noted    Unstable angina ([facility] 06/09/2021    Chest pain 06/09/2021    CAD (coronary artery disease) 06/09/2021    SHILPI (obstructive sleep apnea) 06/09/2021    Tobacco dependence 06/09/2021    Hyperlipidemia 06/09/2021    Severe obesity ([facility] 10/24/2018        The  provided the following Interventions:  Initiated a relationship of care and support. Explored issues of garrett, spirituality and/or Druze needs while hospitalized. Listened empathically. Provided chaplaincy education. Provided information about Spiritual Care Services. Offered prayer and assurance of continued prayers on patient's behalf. Chart reviewed. The following outcomes were achieved:  Patient shared some information about their medical narrative and spiritual journey/beliefs. Patient processed feeling about current hospitalization. Patient expressed gratitude for the 's visit. Assessment:  Patient did not indicate any spiritual or Druze issues which require Spiritual Care Services interventions at this time. Patient does not have any Druze/cultural needs that will affect patients preferences in health care. Plan:  Chaplains will continue to follow and will provide pastoral care on an as needed or requested basis.  recommends bedside caregivers page  on duty if patient shows signs of acute spiritual or emotional distress.

## 2021-06-10 ENCOUNTER — APPOINTMENT (OUTPATIENT)
Dept: VASCULAR SURGERY | Age: 61
DRG: 164 | End: 2021-06-10
Attending: PHYSICIAN ASSISTANT
Payer: COMMERCIAL

## 2021-06-10 ENCOUNTER — APPOINTMENT (OUTPATIENT)
Dept: VASCULAR SURGERY | Age: 61
DRG: 164 | End: 2021-06-10
Attending: INTERNAL MEDICINE
Payer: COMMERCIAL

## 2021-06-10 ENCOUNTER — APPOINTMENT (OUTPATIENT)
Dept: CT IMAGING | Age: 61
DRG: 164 | End: 2021-06-10
Attending: INTERNAL MEDICINE
Payer: COMMERCIAL

## 2021-06-10 LAB
ABO + RH BLD: NORMAL
ALBUMIN SERPL-MCNC: 3.2 G/DL (ref 3.4–5)
ALBUMIN/GLOB SERPL: 1 {RATIO} (ref 0.8–1.7)
ALP SERPL-CCNC: 85 U/L (ref 45–117)
ALT SERPL-CCNC: 48 U/L (ref 16–61)
ANION GAP SERPL CALC-SCNC: 5 MMOL/L (ref 3–18)
APTT PPP: 58.6 SEC (ref 23–36.4)
APTT PPP: 81.6 SEC (ref 23–36.4)
APTT PPP: >180 SEC (ref 23–36.4)
APTT PPP: >180 SEC (ref 23–36.4)
AST SERPL-CCNC: 25 U/L (ref 10–38)
B PERT DNA SPEC QL NAA+PROBE: NOT DETECTED
BILIRUB DIRECT SERPL-MCNC: 0.2 MG/DL (ref 0–0.2)
BILIRUB SERPL-MCNC: 0.8 MG/DL (ref 0.2–1)
BLOOD GROUP ANTIBODIES SERPL: NORMAL
BORDETELLA PARAPERTUSSIS PCR, BORPAR: NOT DETECTED
BUN SERPL-MCNC: 17 MG/DL (ref 7–18)
BUN/CREAT SERPL: 17 (ref 12–20)
C PNEUM DNA SPEC QL NAA+PROBE: NOT DETECTED
CALCIUM SERPL-MCNC: 8.9 MG/DL (ref 8.5–10.1)
CHLORIDE SERPL-SCNC: 108 MMOL/L (ref 100–111)
CHOLEST SERPL-MCNC: 120 MG/DL
CO2 SERPL-SCNC: 27 MMOL/L (ref 21–32)
CREAT SERPL-MCNC: 1.03 MG/DL (ref 0.6–1.3)
ERYTHROCYTE [DISTWIDTH] IN BLOOD BY AUTOMATED COUNT: 12 % (ref 11.6–14.5)
ERYTHROCYTE [SEDIMENTATION RATE] IN BLOOD: 9 MM/HR (ref 0–20)
EST. AVERAGE GLUCOSE BLD GHB EST-MCNC: 105 MG/DL
FLUAV H1 2009 PAND RNA SPEC QL NAA+PROBE: NOT DETECTED
FLUAV H1 RNA SPEC QL NAA+PROBE: NOT DETECTED
FLUAV H3 RNA SPEC QL NAA+PROBE: NOT DETECTED
FLUAV SUBTYP SPEC NAA+PROBE: NOT DETECTED
FLUBV RNA SPEC QL NAA+PROBE: NOT DETECTED
GLOBULIN SER CALC-MCNC: 3.1 G/DL (ref 2–4)
GLUCOSE SERPL-MCNC: 100 MG/DL (ref 74–99)
HADV DNA SPEC QL NAA+PROBE: NOT DETECTED
HBA1C MFR BLD: 5.3 % (ref 4.2–5.6)
HCOV 229E RNA SPEC QL NAA+PROBE: NOT DETECTED
HCOV HKU1 RNA SPEC QL NAA+PROBE: NOT DETECTED
HCOV NL63 RNA SPEC QL NAA+PROBE: NOT DETECTED
HCOV OC43 RNA SPEC QL NAA+PROBE: NOT DETECTED
HCT VFR BLD AUTO: 42.6 % (ref 36–48)
HDLC SERPL-MCNC: 33 MG/DL (ref 40–60)
HDLC SERPL: 3.6 {RATIO} (ref 0–5)
HGB BLD-MCNC: 14.1 G/DL (ref 13–16)
HMPV RNA SPEC QL NAA+PROBE: NOT DETECTED
HPIV1 RNA SPEC QL NAA+PROBE: NOT DETECTED
HPIV2 RNA SPEC QL NAA+PROBE: NOT DETECTED
HPIV3 RNA SPEC QL NAA+PROBE: NOT DETECTED
HPIV4 RNA SPEC QL NAA+PROBE: NOT DETECTED
LDLC SERPL CALC-MCNC: 69.6 MG/DL (ref 0–100)
LEFT CCA DIST DIAS: 24.1 CM/S
LEFT CCA DIST SYS: 63.6 CM/S
LEFT CCA MID DIAS: 25.17 CM/S
LEFT CCA MID SYS: 64.73 CM/S
LEFT CCA PROX DIAS: 26.3 CM/S
LEFT CCA PROX SYS: 71.3 CM/S
LEFT ECA DIAS: 14.2 CM/S
LEFT ECA SYS: 50.5 CM/S
LEFT ICA DIST DIAS: 19.9 CM/S
LEFT ICA DIST SYS: 53.2 CM/S
LEFT ICA MID DIAS: 34 CM/S
LEFT ICA MID SYS: 74.7 CM/S
LEFT ICA PROX DIAS: 18.6 CM/S
LEFT ICA PROX SYS: 45 CM/S
LEFT ICA/CCA SYS: 1.17
LEFT SUBCLAVIAN SYS: 62.2 CM/S
LEFT VERTEBRAL DIAS: 13.87 CM/S
LEFT VERTEBRAL SYS: 33.1 CM/S
LIPID PROFILE,FLP: ABNORMAL
M PNEUMO DNA SPEC QL NAA+PROBE: NOT DETECTED
MAGNESIUM SERPL-MCNC: 2.1 MG/DL (ref 1.6–2.6)
MCH RBC QN AUTO: 33 PG (ref 24–34)
MCHC RBC AUTO-ENTMCNC: 33.1 G/DL (ref 31–37)
MCV RBC AUTO: 99.8 FL (ref 74–97)
PLATELET # BLD AUTO: 172 K/UL (ref 135–420)
PMV BLD AUTO: 11.2 FL (ref 9.2–11.8)
POTASSIUM SERPL-SCNC: 3.7 MMOL/L (ref 3.5–5.5)
PROT SERPL-MCNC: 6.3 G/DL (ref 6.4–8.2)
RBC # BLD AUTO: 4.27 M/UL (ref 4.35–5.65)
RIGHT CCA DIST DIAS: 22 CM/S
RIGHT CCA DIST SYS: 54 CM/S
RIGHT CCA MID DIAS: 27.26 CM/S
RIGHT CCA MID SYS: 67.7 CM/S
RIGHT CCA PROX DIAS: 22.1 CM/S
RIGHT CCA PROX SYS: 72.9 CM/S
RIGHT ECA DIAS: 18.66 CM/S
RIGHT ECA SYS: 75 CM/S
RIGHT ICA DIST DIAS: 33.4 CM/S
RIGHT ICA DIST SYS: 73.7 CM/S
RIGHT ICA MID DIAS: 23.4 CM/S
RIGHT ICA MID SYS: 53.4 CM/S
RIGHT ICA PROX SYS: 53.4 CM/S
RIGHT ICA/CCA SYS: 1.4
RIGHT SUBCLAVIAN DIAS: 0 CM/S
RIGHT SUBCLAVIAN SYS: 60.2 CM/S
RIGHT VERTEBRAL DIAS: 12.25 CM/S
RIGHT VERTEBRAL SYS: 28.9 CM/S
RSV RNA SPEC QL NAA+PROBE: NOT DETECTED
RV+EV RNA SPEC QL NAA+PROBE: NOT DETECTED
SARS-COV-2 PCR, COVPCR: NOT DETECTED
SODIUM SERPL-SCNC: 140 MMOL/L (ref 136–145)
SPECIMEN EXP DATE BLD: NORMAL
TRIGL SERPL-MCNC: 87 MG/DL (ref ?–150)
VLDLC SERPL CALC-MCNC: 17.4 MG/DL
WBC # BLD AUTO: 8.2 K/UL (ref 4.6–13.2)

## 2021-06-10 PROCEDURE — 80061 LIPID PANEL: CPT

## 2021-06-10 PROCEDURE — 85652 RBC SED RATE AUTOMATED: CPT

## 2021-06-10 PROCEDURE — 65620000000 HC RM CCU GENERAL

## 2021-06-10 PROCEDURE — 93970 EXTREMITY STUDY: CPT

## 2021-06-10 PROCEDURE — 74011000636 HC RX REV CODE- 636: Performed by: INTERNAL MEDICINE

## 2021-06-10 PROCEDURE — 74011250637 HC RX REV CODE- 250/637: Performed by: INTERNAL MEDICINE

## 2021-06-10 PROCEDURE — 71275 CT ANGIOGRAPHY CHEST: CPT

## 2021-06-10 PROCEDURE — 74011250637 HC RX REV CODE- 250/637: Performed by: PHYSICIAN ASSISTANT

## 2021-06-10 PROCEDURE — 85027 COMPLETE CBC AUTOMATED: CPT

## 2021-06-10 PROCEDURE — 99233 SBSQ HOSP IP/OBS HIGH 50: CPT | Performed by: INTERNAL MEDICINE

## 2021-06-10 PROCEDURE — 36415 COLL VENOUS BLD VENIPUNCTURE: CPT

## 2021-06-10 PROCEDURE — 99233 SBSQ HOSP IP/OBS HIGH 50: CPT | Performed by: PHYSICIAN ASSISTANT

## 2021-06-10 PROCEDURE — 0202U NFCT DS 22 TRGT SARS-COV-2: CPT

## 2021-06-10 PROCEDURE — 80048 BASIC METABOLIC PNL TOTAL CA: CPT

## 2021-06-10 PROCEDURE — 93880 EXTRACRANIAL BILAT STUDY: CPT

## 2021-06-10 PROCEDURE — 99232 SBSQ HOSP IP/OBS MODERATE 35: CPT | Performed by: PHYSICIAN ASSISTANT

## 2021-06-10 PROCEDURE — 86901 BLOOD TYPING SEROLOGIC RH(D): CPT

## 2021-06-10 PROCEDURE — 83735 ASSAY OF MAGNESIUM: CPT

## 2021-06-10 PROCEDURE — 85730 THROMBOPLASTIN TIME PARTIAL: CPT

## 2021-06-10 PROCEDURE — 74011250636 HC RX REV CODE- 250/636: Performed by: INTERNAL MEDICINE

## 2021-06-10 PROCEDURE — 99223 1ST HOSP IP/OBS HIGH 75: CPT | Performed by: INTERNAL MEDICINE

## 2021-06-10 PROCEDURE — 83036 HEMOGLOBIN GLYCOSYLATED A1C: CPT

## 2021-06-10 PROCEDURE — 80076 HEPATIC FUNCTION PANEL: CPT

## 2021-06-10 RX ORDER — MORPHINE SULFATE 2 MG/ML
1 INJECTION, SOLUTION INTRAMUSCULAR; INTRAVENOUS
Status: DISCONTINUED | OUTPATIENT
Start: 2021-06-10 | End: 2021-06-17 | Stop reason: HOSPADM

## 2021-06-10 RX ORDER — ZOLPIDEM TARTRATE 5 MG/1
5 TABLET ORAL
Status: DISCONTINUED | OUTPATIENT
Start: 2021-06-10 | End: 2021-06-17 | Stop reason: HOSPADM

## 2021-06-10 RX ADMIN — Medication 10 ML: at 14:46

## 2021-06-10 RX ADMIN — AMIODARONE HYDROCHLORIDE 400 MG: 200 TABLET ORAL at 17:40

## 2021-06-10 RX ADMIN — MUPIROCIN: 20 OINTMENT TOPICAL at 17:40

## 2021-06-10 RX ADMIN — METOPROLOL TARTRATE 50 MG: 50 TABLET, FILM COATED ORAL at 23:12

## 2021-06-10 RX ADMIN — METOPROLOL TARTRATE 50 MG: 50 TABLET, FILM COATED ORAL at 11:03

## 2021-06-10 RX ADMIN — DOCUSATE SODIUM 100 MG: 100 CAPSULE, LIQUID FILLED ORAL at 17:40

## 2021-06-10 RX ADMIN — HEPARIN SODIUM 12 UNITS/KG/HR: 10000 INJECTION, SOLUTION INTRAVENOUS at 08:00

## 2021-06-10 RX ADMIN — Medication 10 ML: at 23:18

## 2021-06-10 RX ADMIN — HEPARIN SODIUM 15 UNITS/KG/HR: 10000 INJECTION, SOLUTION INTRAVENOUS at 01:04

## 2021-06-10 RX ADMIN — FUROSEMIDE 40 MG: 40 TABLET ORAL at 17:40

## 2021-06-10 RX ADMIN — IOPAMIDOL 100 ML: 755 INJECTION, SOLUTION INTRAVENOUS at 10:21

## 2021-06-10 RX ADMIN — ZOLPIDEM TARTRATE 5 MG: 5 TABLET ORAL at 23:18

## 2021-06-10 RX ADMIN — Medication 81 MG: at 11:02

## 2021-06-10 RX ADMIN — ATORVASTATIN CALCIUM 40 MG: 40 TABLET, FILM COATED ORAL at 23:12

## 2021-06-10 RX ADMIN — DOCUSATE SODIUM 100 MG: 100 CAPSULE, LIQUID FILLED ORAL at 11:02

## 2021-06-10 RX ADMIN — MUPIROCIN: 20 OINTMENT TOPICAL at 09:00

## 2021-06-10 RX ADMIN — Medication 400 MG: at 11:02

## 2021-06-10 RX ADMIN — FAMOTIDINE 20 MG: 20 TABLET ORAL at 17:40

## 2021-06-10 RX ADMIN — POTASSIUM CHLORIDE 20 MEQ: 750 TABLET, FILM COATED, EXTENDED RELEASE ORAL at 11:03

## 2021-06-10 RX ADMIN — MORPHINE SULFATE 1 MG: 2 INJECTION, SOLUTION INTRAMUSCULAR; INTRAVENOUS at 17:40

## 2021-06-10 RX ADMIN — Medication 10 ML: at 07:00

## 2021-06-10 RX ADMIN — AMIODARONE HYDROCHLORIDE 400 MG: 200 TABLET ORAL at 11:02

## 2021-06-10 RX ADMIN — FUROSEMIDE 40 MG: 40 TABLET ORAL at 11:03

## 2021-06-10 RX ADMIN — ZOLPIDEM TARTRATE 5 MG: 5 TABLET ORAL at 01:04

## 2021-06-10 RX ADMIN — HEPARIN SODIUM 15 UNITS/KG/HR: 10000 INJECTION, SOLUTION INTRAVENOUS at 03:57

## 2021-06-10 RX ADMIN — AMIODARONE HYDROCHLORIDE 400 MG: 200 TABLET ORAL at 23:11

## 2021-06-10 NOTE — ADT AUTH CERT NOTES
[address] [place] [address]      FACILITY NPI : [id number]     [address] [place] [address] Dr [facility]  [facility] [address] [phone]            Patient Name :Kaila Falcon   : 1960 (60 yrs)  MRN : [id number]     Patient Mailing Address [address]                                          [facility] [47] , [address]                                                               .         Insurance Plan Payor: Art Spivey / Plan: Treinta Y Curt [id number] PPO / Product Type: PPO /      Primary Coverage Subscriber ID : [id number]      Current Patient Class : INPATIENT  Admit Date : 2021     REQUESTED LEVEL OF CARE: INPATIENT [101]                                                           Diagnosis : Chest pain;Unstable angina [facility])                          ICD10 Code : Chest pain [R07.9]  Unstable angina ([facility] [address] [I20.0]        Admitting and Attending Info:  Admitting Provider : Manuel Choudhary MD   NPI: [id number]  Admitting Provider Phone. [phone]  Admitting Provider Address: [address] [place] [address]     Attending Provider Temo Marquez MD   [id number]  Attending Provider Address:  SAME AS FACILITY            Comments  Comment     Last edited by  on  [address] Name: [address] [place] [address]                                Patient Demographics    Patient Name   Eri Sharif Legal Sex   Male    1960 Address   [address]   [address] [address] Phone   [phone] (Home)   [phone] [facility] Account    Name Acct ID Class Status Primary Coverage   Eri Sharif [id number] INPATIENT Open BLUE CROSS - Treinta Y Curt [id number] PPO          Guarantor Account (for Hospital Account [de-identified])    Name Relation to Pt Service Area Active?  Acct Type   Hildrethrama Sharif Self [facility] Yes Personal/Family   Address Phone     [address]   [facility] [phone]()          Coverage Information (for Hospital Account [de-identified])    F/O Payor/Plan Subscriber  Subscriber Sex Precert #   WIL CROSS/VA ene CROSS [facility] PPO 56 M    Subscriber Subscriber #   Eri Sharif [id number]   Grp # Group Name   [id number]    Address Phone   [address] [facility], [address] [facility]    Policy Number Status Effective Date Benefits Phone   [id number] -  -   Auth/Cert             Diagnosis     Codes Comments   Coronary artery disease involving native heart without angina pectoris, unspecified vessel or lesion type  ICD-10-CM: I25.10   ICD-9-CM: 414.01           Admission Information    Arrival Date/Time: 2021 0835 Admit Date/Time: 2021 0373 IP Adm.  Date/Time: 2021 1017   Admission Type: Emergency Point of Origin: Non-health Care Facility/self Admit Category: Home   Means of Arrival: Ambulance Primary Service: Medicine Secondary Service: N/A   Transfer Source:  Service Area: [facility] Unit: [facility]  [address] Provider: Sami Smallwood MD Attending Provider: Alejandro Gordon MD Referring Provider:    Admission Information    Attending Provider Admission Dx Admitted on   Temo Marquez MD Chest pain, Unstable angina [facility]) 21   Service Isolation Code Status   MEDICINE  Full Code   Allergies Advance Care Planning    Statins-hmg-coa Reductase Inhibitors, Gabapentin, Pcn [Penicillins] Jump to the Activity     Admission Information    Unit/Bed: [facility] CV INTNSV CARE/01 Service: MEDICINE   Admitting provider: Sami Smallwood MD Phone: [phone]   Attending provider: Temo Marquez MD Phone: [phone]   PCP: Duyen Nelson MD Phone: [phone]   Admission dx:  Patient class: I   Admission type: ER     Patient Demographics    Patient Name   Gil Apgar   [id number] Legal Sex   Male    1960 Address   [address]   [address] [address] Phone   [phone] (Home)   [phone] (Mobile)   H&P Notes     H&P by Sami Smallwood MD at 06/09/21 1201 documented on ED to Hosp-Admission (Current) from 6/9/2021 in [facility] CV INTNSV CARE  Author: Selena Valerio MD Author Type: Physician Filed: 06/09/21 1607   Note Status: Signed Cosign: Cosign Not Required Date of Service: 06/09/21 1201   : Selena Valerio MD (Physician)   Expand AllCollapse All                History & Physical     Patient: Jorge Turner MRN: [id number]  CSN: [id number]    YOB: 1960  Age: 56 y.o. Sex: male       DOA: 6/9/2021  CC: chest pain and shortness of breath     PCP: Yousif Wang MD       HPI:      Jorge Turner is a 56 y.o. male with medical co-morbidities including HTN, CAD with stent, hyperlipidemia, active tobacco smoking, SHILPI on CPAP, morbid obesity, presented from home with chest pain. Accordingly, he has substernal chest pain associates with exertion over the last weeks. He has chest pain with radiation to his neck today when it woke him from his sleep. He has associates shortness of breath. No chest palpitation. No leg swelling. No cough. No recent sick contact. In the ER, cardiac enzyme was negative. EKG was non specific ST change. Cardiology consulted for concern of unstable angina. He was taken to OR for cardiac cath. Per cardiology, he has significant atherosclerosis disease but no obstructive finding. No stent place today. He continue on heparin gtt and Nitrogen gtt. He remains with intermittent chest pain.             Review of Systems  GENERAL: No fever, No chill, + malaise   HEENT: No change in vision, no ear ache, no sore throat or sinus congestion. NECK: No pain or stiffness. PULMONARY: No shortness of breath, no cough or wheeze. Cardiovascular: no pnd / orthopnea, ++ Chest Pain  GASTROINTESTINAL: No abd pain, No nausea/vomiting, No diarrhea, No bright red blood per rectum. GENITOURINARY: No urinary frequency, No urgency or pain with urination. MUSCULOSKELETAL: No joint or muscle pain, no back pain, no recent trauma. DERMATOLOGIC: No rash, no itching, no lesions. ENDOCRINE: No polyuria, polydipsia, No recent change in weight. HEMATOLOGICAL: No easy bruising or bleeding. NEUROLOGIC: No headache, No seizures, No generalized weakness                 Past Medical History:   Diagnosis Date    CAD (coronary artery disease) 6/9/2021    Coronary arteriosclerosis      Dupuytren's disease of palm      Hyperlipidemia 6/9/2021    Hypertension      SHILPI (obstructive sleep apnea) 6/9/2021    Severe obesity ([address] 10/24/2018    Tobacco dependence 6/9/2021               Past Surgical History:   Procedure Laterality Date    HX CHOLECYSTECTOMY        HX CORONARY STENT PLACEMENT        HX HERNIA REPAIR                   Family History   Problem Relation Age of Onset    Hypertension Mother      Diabetes Mother           Social History               Socioeconomic History    Marital status:        Spouse name: Not on file    Number of children: Not on file    Years of education: Not on file    Highest education level: Not on file   Tobacco Use    Smoking status: Current Every Day Smoker       Types: Cigarettes    Smokeless tobacco: Never Used   Substance and Sexual Activity    Alcohol use: Yes    Drug use: No   Other Topics Concern      Social Determinants of Health          Financial Resource Strain:     Difficulty of Paying Living Expenses:    Food Insecurity:     Worried About Running Out of Food in the Last Year:     [address] in the Last Year:    Transportation Needs:     Lack of Transportation (Medical):      Lack of Transportation (Non-Medical):    Physical Activity:     Days of Exercise per Week:     Minutes of Exercise per Session:    Stress:     Feeling of Stress :    Social Connections:     Frequency of Communication with Friends and Family:     Frequency of Social Gatherings with Friends and Family:     Attends Confucianist Services:     Active Member of Clubs or Organizations:     Attends Club or Organization Meetings:     Marital Status:                     Prior to Admission medications    Medication Sig Start Date End Date Taking?  Authorizing Provider   isosorbide mononitrate ER (IMDUR) 60 mg CR tablet TAKE 1 TABLET BY MOUTH TWICE A DAY AS DIRECTED  6/4/21   Yes Johan Payton MD   nitroglycerin (NITROLINGUAL) 400 mcg/spray spray SPRAY ONE SPRAY UNDER THE TONGUE AS NEEDED MAY REPEAT UP TO 2 TIMES AS DIRECTED  5/23/21   Yes Johan Payton MD   metoprolol tartrate (LOPRESSOR) 100 mg IR tablet TAKE 1/2 TABLET (50MG) TWICE A DAY FOR  BLOOD PRESSURE  1/18/21   Yes Johan Payton MD   atorvastatin (LIPITOR) 20 mg tablet TAKE 1 TABLET BY MOUTH DAILY FOR 90 DAYS  12/24/20   Yes Johan Payton MD   clopidogreL (PLAVIX) 75 mg tab TAKE 1 TABLET BY MOUTH DAILY FOR 90 DAYS 11/25/20   Yes Johan Payton MD   furosemide (LASIX) 20 mg tablet TAKE TWO (2) TABLETS BY MOUTH EVERY DAY AS NEEDED FOR SWELLING 10/26/20   Yes Johan Payton MD   aspirin delayed-release 81 mg tablet   4/29/19   Yes Provider, Historical   co-enzyme Q-10 (COQ-10) 100 mg capsule Take 100 mg by mouth daily.     Yes Provider, Historical   fluocinoNIDE (LIDEX) 0.05 % topical cream   10/17/18     Provider, Historical               Allergies   Allergen Reactions    Statins-Hmg-Coa Reductase Inhibitors Other (comments)       Other reaction(s): SEVERE PAIN PER PT    Gabapentin Other (comments)       \"Caused bad nightmares\"    Pcn [Penicillins] Hives                 Physical Exam:      Visit Vitals  /82   Pulse 84   Resp 16   Ht 5' 10.08\" (1.78 m)   Wt 112.5 kg (248 lb)   SpO2 94%   BMI 35.50 kg/m²         Physical Exam:  Tele: sinus   General:  Cooperative, Not in acute distress, speaks in full sentence while in bed  HEENT: PERRL, EOMI, supple neck, no JVD, dry oral mucosa  Cardiovascular: S1S2 regular, no rub/gallop   Pulmonary: air entry bilaterally, + wheezing, no crackle  GI:  Soft, non tender, non distended, +bs, no guarding   Extremities:  No pedal edema, +distal pulses appreciated   Neuro: AOx3, moving all extremities, no gross deficit.      Lab/Data Review:  Labs: Results:         Chemistry     Recent Labs     06/09/21 0915   *      K 3.1*      CO2 24   BUN 13   CREA 0.94   CA 8.6   AGAP 11   BUCR 14   AP 91   TP 6.5   ALB 3.2*   GLOB 3.3   AGRAT 1.0       CBC w/Diff     Recent Labs     06/09/21 0915   WBC 10.8   RBC 4.14*   HGB 14.0   HCT 39.1      GRANS 66   LYMPH 23   EOS 1       Coagulation     Recent Labs     06/09/21 0915   APTT 27.8       Iron/Ferritin No results for input(s): IRON in the last 72 hours.     No lab exists for component: TIBCCALC   BNP No results for input(s): BNPP in the last 72 hours. Cardiac Enzymes     Recent Labs     06/09/21 0915   CPK 32*   CKND1 CALCULATION NOT PERFORMED WHEN RESULT IS BELOW LINEAR LIMIT       Liver Enzymes     Recent Labs     06/09/21 0915   TP 6.5   ALB 3.2*   AP 91       Thyroid Studies No results found for: T4, T3U, TSH, TSHEXT, TSHEXT            All Micro Results      None             Imaging Reviewed:  XR Results (most recent):  Results from [place] encounter on 06/09/21     XR CHEST PORT     Narrative  AP CHEST, PORTABLE     INDICATION: Chest pain     COMPARISON: Chest x-ray 1/8/2019     FINDINGS:  EKG leads overlie the patient.     The cardiac silhouette is normal in size. Central pulmonary arteries appear  slightly prominent, similar to prior exams. The pulmonary vasculature is  unremarkable. No focal consolidation, pleural effusion, or pneumothorax. No  acute osseous abnormalities are identified.     Impression  1. No clearly acute finding. 2. Chronic prominence of the central pulmonary arteries, could indicate  pulmonary arterial hypertension              Assessment:   Active Problems:  1   Unstable angina ([address] (6/9/2021)  2. CAD (coronary artery disease) with h/o stent   3. Hypertension   4. Hyperlipidemia   5. Tobacco dependence  6. SHILPI (obstructive sleep apnea), on CPAP   7. Hypokalemia   8. Wheezing, suspected bronchial reactive disease, he stated his PCP thinks he has COPD        Plan:      Admitted to CVT ICU post cardiac cath. Agree with Nitro gtt and Heparin gtt for now. Cont ASA and Statin. Hold plavix for now   Will check D-dimer and duplex of leg, if significantly elevated D-dimer, will consider CTA chest.   Cardiac enzyme trending, check lipid and HgbA1c  Cardiothoracic surgery consult follow up  Cardiology follows   CPAP ordered. Risks associate with tobacco smoking educated, advised on tobacco smoking cessation  pepcid   ICS         Risk of deterioration:  []? Low    [x]? Moderate  []? High     Prophylaxis:  []? Lovenox  []? Coumadin  [x]? Hep gg  []? SCDs  [x]? H2B/PPI     Disposition:  []? Home w/ Family   [x]? HH PT,OT,RN   []? SNF/LTC   []? SAH/Rehab     Discussed Code Status:         [x]? Full Code      []? DNR         ___________________________________________________     Care Plan discussed with:    [x]? Patient   []? Family    []? ED Care Manager  [x]? ED Doc   [x]? Specialist :  Total Time Coordinating Admission:   70   minutes    []? Total Critical Care Time: Lucas Pratt MD  2021, 12:01 PM            Patient Demographics    Patient Name   Chilango Poe   [id number] Legal Sex   Male    1960 Address   [address] [place] [address]   [address] [facility] [address] Phone   [phone] (Home)   [phone] (Mobile)   CSN:   [id number]   Admit Date: Admit Time Room Bed   2021  8:52 AM 3137 [02196] 01 [40841]   Attending Providers    Provider Pager From To   Ebony Jackson MD  21   Maris Patton MD  06/09/21 06/10/21   Marleny Clemente MD  06/10/21    Emergency Contact(s)    Name Relation Home Work [address] [facility]) Spouse [phone]     Utilization Reviews       Angina - Care Day 2 (6/10/2021) by Nayeli Nunn, RN       Review Entered Review Status   6/10/2021 13:57 Completed      Criteria Review      Care Day: 2 Care Date: 6/10/2021 Level of Care: ICU    Guideline Day 2    Clinical Status    (X) * Hemodynamic stability    6/10/2021 13:57:06 EDT by Akil Galindo      stable  VITALS:T- 97.8  B/P-140/96  HR-76  RR-18  SATS-93 5L NC    (X) * Dangerous arrhythmia absent    6/10/2021 13:57:06 EDT by Akil Galindo      absent    (X) * Chest pain, dyspnea, or anginal equivalent absent    6/10/2021 13:57:06 EDT by Akil Galindo      absent    (X) * No evidence of bleeding    6/10/2021 13:57:06 EDT by Akil Galindo      none    (X) * No evidence of myocardial ischemia    6/10/2021 13:57:06 EDT by Akil Galindo      none    (X) * Vascular access site without evidence of infection, aneurysm, or growing hematoma    6/10/2021 13:57:06 EDT by Akil Galindo      no hematoma    (X) * Renal function at baseline or acceptable for next level of care    6/10/2021 13:57:06 EDT by Akil Galindo      acceptable    ( ) * Discharge plans and education understood    Activity    ( ) * Ambulatory or acceptable for next level of care    Routes    (X) * Oral hydration    6/10/2021 13:57:06 EDT by Lorenzo Diallo as listed    (X) * Oral medications or regimen acceptable for next level of care    6/10/2021 13:57:06 EDT by Lorenzo Diallo as listed    (X) * Oral diet or acceptable for next level of care    6/10/2021 13:57:06 EDT by Lorenzo Diallo reg    Interventions    ( ) * Oxygen absent    6/10/2021 13:57:06 EDT by Lorenzo Diallo 5L NC    (X) Cardiac monitoring    6/10/2021 13:57:06 EDT by Akil Galindo      telemetry    (X) Possible cardiac angiography, with percutaneous coronary intervention if indicated    6/10/2021 13:57:06 EDT by Akil Gailndo      heart cath    Medications    ( ) * Anticoagulants absent    6/10/2021 13:57:06 EDT by Lorenzo Diallo continues on heparin gtt    (X) Antiplatelet agents (eg, aspirin, clopidogrel, ticagrelor)    6/10/2021 13:57:06 EDT by George Madison continues with asa, off plavix x 5 days    (X) Possible beta-blocker    6/10/2021 13:57:06 EDT by George Madison lopressor    (X) Possible statin    6/10/2021 13:57:06 EDT by Shane Mera      lipitor    6/10/2021 13:57:06 EDT by Shane Mera    Subject: Additional Clinical Information    Clinical 6-: Patient is now s/p heart cath:  Right Coronary Artery    Proximal-mid 100% in-stent occlusion. Previously known by cardiac catheterization in 2019. Evidence of good collaterals from left to right supplying distal RCA and RPDA    -ECHO (06/09/21) EF 65-07%, Systolic flattening of the interventricular septum consistent with right ventricle pressure overload. Mild (grade 1) left ventricular diastolic dysfunction. -Mild-Moderate TR by echo    -Moderate pulmonary HTN by echo PASP 60 mmHg    -Hypertension, on lopressor, Imdur, Lasix as outpatient    -Hyperlipidemia    -Tobacco Abuse, 1 ppd, cessation advised    -Obesity, BMI 35.58 kg/m2          Patient doing well s/p LHC yesterday. Right wrist w/o hematoma, strong radial pulse. -ECHO findings suggestive of possible PE-Right ventricular findings are consistent with Powell's sign, CT chest today- flaco.  pulmonary emboli     -Continued on Heparin gtt for ACS and possible PE    -Tentative CABG planned for Monday post plavix washout/PE workup    -Continue to follow CT sx recommendations              LABS:      WBC-8.2      h/h-14.1/42.6      PTT>180.0      Na-140      K+-3.7      glucose-100      albumin-3.2              ORDERS:      heparin gtt      d/c plavix      IV [email or web address]      d/c trildil gtt      continue current tx      cardiothoracic consult       * Milestone      Angina - Care Day 1 (6/9/2021) by Sheryl Tobar RN       Review Entered Review Status   6/10/2021 12:45 Completed      Criteria Review      Care Day: 1 Care Date: 6/9/2021 Level of Care: ICU    Guideline Day 1    Level Of Care    (X) ICU, intermediate care, or telemetry    6/10/2021 12:45:32 EDT by Zach Vanegas ICU    (X) Discharge planning    6/10/2021 12:45:32 EDT by Zach Vanegas as per d/c planning criteria    Clinical Status    (X) * Clinical Indications met    6/10/2021 12:45:32 EDT by Julia Katz      prolonged chest pain, SOB    Activity    (X) Possible limited ambulation    6/10/2021 12:45:32 EDT by Zach Vanegas up ad alida    Routes    (X) Oral hydration    6/10/2021 12:45:32 EDT by Zach Vanegas as listed    (X) Oral diet as tolerated    6/10/2021 12:45:32 EDT by Julia Katz      reg    (X) Parenteral and oral medications    6/10/2021 12:45:32 EDT by Zach Vanegas as listed    Interventions    (X) Cardiac monitoring    6/10/2021 12:45:32 EDT by Julia Katz      telemetry    (X) Possible PTT    6/10/2021 12:45:32 EDT by Zach Vanegas ordered    (X) ECG    6/10/2021 12:45:32 EDT by Zach Vanegas ordered    (X) Cardiac biomarkers    6/10/2021 12:45:32 EDT by Julia Katz      telemetry    (X) Oxygen    6/10/2021 12:45:32 EDT by Julia Katz      O2 2L NC    Medications    (X) Anticoagulants    6/10/2021 12:45:32 EDT by Julia Katz      heparin gtt    (X) Possible beta-blocker    6/10/2021 12:45:32 EDT by Zach Ang lopressor    (X) Possible statin    6/10/2021 12:45:32 EDT by Arian Love    (X) Possible nitroglycerin    6/10/2021 12:45:32 EDT by Zach Vanegas tridil gtt    6/10/2021 12:45:32 EDT by Julia Katz    Subject: Additional Clinical Information    Admission 6-9-2021: Patient is admitted to ICU for management of unstable angina, Lobo pul. emboli, DVT LLE, CAD, HTN, HLD, tobacco use, SHILPI, uses CPAP, hypokalemia, and wheezing. Cardiology is consulted.  Currently on heparin gtt              ORDERS:      ICU      telemetry      O2 2L NC      heparin gtt      IV [email or web address]      tridil gtt      amiodarone 400mg po TID      asa 81 mg po qd      lipitor 40 mg po q hs pepcid 20 mg po q hs      lasix 40 mg po bid      mag ox 400mg po qd      lopressor 50 mg po bid      klor con 20 meq po qd x 3 doses      tylenol 650 mg po/rect q 6h prn      duonebs q 6h prn      MS 1mg iv q 3h prn      phenergan 12.5 mg po q 6h prn      zofran 4mg iv q 6h prn      ABG      labs      reg diet      cough, deep breathe      IS q 2h      I&O      PT/OT               * Milestone      Angina - Clinical Indications for Admission to Inpatient Care by Nguyen Younger RN       Review Entered Review Status   6/10/2021 12:36 Completed      Criteria Review      Clinical Indications for Admission to Inpatient Care    Most Recent : Miguel Angel Mcadams Most Recent Date: 6/10/2021 12:36:42 EDT    (X) Admission is indicated for  ALL  of the following  (1) (2) (3) (4) (5):       (X) Unstable angina [A] is present due to ischemic symptoms (eg, chest pain, dyspnea) and  1 or       more  of the following :          (X) Symptoms at rest or with minimal exertion          6/10/2021 12:36:42 EDT by Miguel Angel Mcadams            woke up with chest pain radiating to neck       (X) Ischemic symptoms warrant acute intervention (eg, coronary angiography with intent to revascularize       if appropriate), as indicated by  1 or more  of the following :          (X) Prolonged (greater than 20 minutes) symptoms at rest          6/10/2021 12:36:42 EDT by Miguel Angel Mcadams            prolonged symptoms    Notes:    6/10/2021 12:36:42 EDT by Miguel Angel Mcadams    Subject: Additional Clinical Information    Admission 6-9-2021: 55 yo male admitted from ED with c/o being woke from sleep with chest pain radiating to neck, shortness of breath. Pain located centrally and severe. SATS declined to 70's in ED.               VITALS:T- none in ED      B/P-122/73      HR-85      RR-22      SATS 72%RA              LABS:      WBC-10.8      h/h-14.0/39.1      platelets-162      NBAPZCXVX-2.58,  0.05      Na-141      K+-3.1      glucose-133 t-bili-1.5      AST-47      albumin-3.2      BNP-1366      d-dimer- 2.54      U/A-SG>1.020, prot-tr,ket-tr,bact-few      BLE PVL- thrombus left popliteal & post tibial      CTA chest-1.  Extensive bilateral pulmonary emboli from distal mainstem extending to all    the lobar arteries and their branches with near occlusion. No occlusive emboli    also noted in inferior right pulmonary vein and multiple joining branches.         2.  Moderate dilatation of pulmonary trunk and proximal mainstem arteries.         3. Pulmonary infarction at posterior right lower lobe.         4. No pleural effusion or adenopathy.         5. Several small lung nodules as incidental findings.  Recommendation as provided    below      EKG-SR, occ PVC, LAE, incomplete RBBB, T wave abn              MEDS IN ED:      NTG SL 0.4 x 1      heparin 4000u iv x 1      heparin gtt      zofran 4 mg iv x 1

## 2021-06-10 NOTE — PROGRESS NOTES
PT order received and chart reviewed. Spoke with Dr. John Roberts and requests to hold therapy evaluation today. Will continue to follow. Thank you.    Jordyn Virgen PT, DPT

## 2021-06-10 NOTE — PROGRESS NOTES
Cardiology Progress Note    Consultation request by Low Grullon MD for advice/opinion related to evaluating CP    Date of  Admission: 6/9/2021  8:52 AM   Primary Care Physician:  Giovanny Ruiz MD    Attending Cardiologist: Dr. Ellis Chavarria:     -Acute Submassive B/L PE, initially presented with symptoms concerning for unstable angina. CT chest showed extensive bilateral large nonobstructing emboli noted in right distal pulmonary artery, left mainstem PA extending into the lobar, segmental and subsegmental arteries. In addition there is right inferior pulmonary vein clot. Initial troponin normal. ECG SR with subtle ST changes. ECHO this admission showed Dilated right ventricle. Abnormal wall motion: free wall hypokinesis of the right ventricle. Reduced systolic function. The findings are consistent with Powell's sign   -CAD s/p [facility] (2019,2018) Widely patent Lcx stents, known occluded RCA, diffuse LM/LAD disease. · S/p C (06/09/21)   Left Main   Large-caliber vessel. Distal vessel approximately 50% stenosis with calcification that involves LAD and circumflex bifurcation   Left Anterior Descending   The vessel is calcified. The vessel is tortuous. Ostial, proximal and the mid LAD has diffuse calcified moderate disease with sequential eccentric 75% lesions. Mid-distal LAD has no significant obstructive disease Diagonal branch: Moderate disease. Medium caliber vessel   Left Circumflex   The vessel is calcified. The vessel is tortuous. Proximal LCx stent has mild in-stent restenosis followed by hazy calcified borderline 70% stenosis. OM1: Small caliber vessel OM 2: Large bifurcating vessel. Calcified hazy 70% stenosis before bifurcation. Superior branch distally distally has subtotal occlusion with faint homocollateral. Inferior branch without any significant obstructive disease   Right Coronary Artery   Proximal-mid 100% in-stent occlusion. Previously known by cardiac catheterization in 2019. Evidence of good collaterals from left to right supplying distal RCA and RPDA   -ECHO (06/09/21) EF 98-86%, Systolic flattening of the interventricular septum consistent with right ventricle pressure overload. Mild (grade 1) left ventricular diastolic dysfunction. -Mild-Moderate TR by echo   -Moderate pulmonary HTN by echo PASP 60 mmHg  -Hypertension, on lopressor, Imdur, Lasix as outpatient   -Hyperlipidemia  -Tobacco Abuse, 1 ppd, cessation advised  -Obesity, BMI 35.58 kg/m2    Primary Cardiologist: [address]:     -Patient doing well s/p LHC yesterday. Right wrist w/o hematoma, strong radial pulse. -ECHO findings suggestive of possible PE-Right ventricular findings are consistent with Powell's sign, CT chest today, results pending.   -Continued on Heparin gtt for ACS and possible PE   -Tentative CABG planned for Monday post plavix washout/PE workup  -Continue to follow CT sx recommendations      Subjective:     Continues to have intermittent chest pressure. Past Medical History:     Past Medical History:   Diagnosis Date    CAD (coronary artery disease) 6/9/2021    Coronary arteriosclerosis     Dupuytren's disease of palm     Hyperlipidemia 6/9/2021    Hypertension     SHILPI (obstructive sleep apnea) 6/9/2021    Severe obesity ([address] 10/24/2018    Tobacco dependence 6/9/2021         Social History:     Social History     Socioeconomic History    Marital status:      Spouse name: Not on file    Number of children: Not on file    Years of education: Not on file    Highest education level: Not on file   Tobacco Use    Smoking status: Current Every Day Smoker     Types: Cigarettes    Smokeless tobacco: Never Used   Substance and Sexual Activity    Alcohol use:  Yes    Drug use: No   Other Topics Concern     Social Determinants of Health     Financial Resource Strain:     Difficulty of Paying Living Expenses:    Food Insecurity:     Worried About Running Out of Food in the Last Year:    [address] in the Last Year:    Transportation Needs:     Lack of Transportation (Medical):  Lack of Transportation (Non-Medical):    Physical Activity:     Days of Exercise per Week:     Minutes of Exercise per Session:    Stress:     Feeling of Stress :    Social Connections:     Frequency of Communication with Friends and Family:     Frequency of Social Gatherings with Friends and Family:     Attends Mandaen Services:     Active Member of Clubs or Organizations:     Attends Club or Organization Meetings:     Marital Status:         Family History:     Family History   Problem Relation Age of Onset    Hypertension Mother     Diabetes Mother         Medications:      Allergies   Allergen Reactions    Statins-Hmg-Coa Reductase Inhibitors Other (comments)     Other reaction(s): SEVERE PAIN PER PT    Gabapentin Other (comments)     \"Caused bad nightmares\"    Pcn [Penicillins] Hives        Current Facility-Administered Medications   Medication Dose Route Frequency    zolpidem (AMBIEN) tablet 5 mg  5 mg Oral QHS PRN    aspirin delayed-release tablet 81 mg  81 mg Oral DAILY    metoprolol tartrate (LOPRESSOR) tablet 50 mg  50 mg Oral BID    sodium chloride (NS) flush 5-40 mL  5-40 mL IntraVENous Q8H    sodium chloride (NS) flush 5-40 mL  5-40 mL IntraVENous PRN    acetaminophen (TYLENOL) tablet 650 mg  650 mg Oral Q6H PRN    Or    acetaminophen (TYLENOL) suppository 650 mg  650 mg Rectal Q6H PRN    polyethylene glycol (MIRALAX) packet 17 g  17 g Oral DAILY PRN    promethazine (PHENERGAN) tablet 12.5 mg  12.5 mg Oral Q6H PRN    Or    ondansetron (ZOFRAN) injection 4 mg  4 mg IntraVENous Q6H PRN    atorvastatin (LIPITOR) tablet 40 mg  40 mg Oral QHS    magnesium oxide (MAG-OX) tablet 400 mg  400 mg Oral DAILY    albuterol-ipratropium (DUO-NEB) 2.5 MG-0.5 MG/3 ML  3 mL Nebulization Q6H PRN    famotidine (PEPCID) tablet 20 mg  20 mg Oral QPM    furosemide (LASIX) tablet 40 mg  40 mg Oral ACB&D    potassium chloride SR (KLOR-CON 10) tablet 20 mEq  20 mEq Oral DAILY    nitroglycerin (TRIDIL) 400 mcg/ml infusion  0-200 mcg/min IntraVENous TITRATE    heparin 25,000 units in D5W 250 ml infusion  18-36 Units/kg/hr IntraVENous TITRATE    amiodarone (CORDARONE) tablet 400 mg  400 mg Oral TID    mupirocin (BACTROBAN) 2 % ointment   Both Nostrils BID    docusate sodium (COLACE) capsule 100 mg  100 mg Oral BID    0.9% sodium chloride infusion  50 mL/hr IntraVENous CONTINUOUS         Physical Exam:     Visit Vitals  /87   Pulse 75   Temp 97.8 °F (36.6 °C)   Resp 17   Ht 5' 10\" (1.778 m)   Wt 112.5 kg (248 lb)   SpO2 95%   BMI 35.58 kg/m²       TELE: normal sinus rhythm    BP Readings from Last 3 Encounters:   06/10/21 133/87   07/01/19 137/87   01/23/19 131/78     Pulse Readings from Last 3 Encounters:   06/10/21 75   07/01/19 83   01/23/19 71     Wt Readings from Last 3 Encounters:   06/09/21 112.5 kg (248 lb)   07/01/19 126.7 kg (279 lb 6.4 oz)   01/23/19 126.8 kg (279 lb 9.6 oz)       General:  alert, cooperative, no distress, appears stated age  Neck:  nontender, no JVD  Lungs:  clear to auscultation bilaterally  Heart:  regular rate and rhythm, S1, S2 normal, no murmur, click, rub or gallop  Abdomen:  abdomen is soft without significant tenderness, masses, organomegaly or guarding  Extremities:  extremities normal, atraumatic, no cyanosis or edema       Data Review:     Recent Labs     06/10/21  0530 06/09/21  0915   WBC 8.2 10.8   HGB 14.1 14.0   HCT 42.6 39.1    162     Recent Labs     06/10/21  0530 06/09/21  1610 06/09/21  0915     --  141   K 3.7  --  3.1*     --  106   CO2 27  --  24   *  --  133*   BUN 17  --  13   CREA 1.03  --  0.94   CA 8.9  --  8.6   MG 2.1  --  1.6   ALB 3.2* 3.4 3.2*   ALT 48 55 60   INR  --  1.1  --        Results for orders placed or performed during the hospital encounter of 06/09/21   EKG, 12 LEAD, INITIAL   Result Value Ref Range    Ventricular Rate 86 BPM    Atrial Rate 86 BPM    P-R Interval 148 ms    QRS Duration 100 ms    Q-T Interval 408 ms    QTC Calculation (Bezet) 488 ms    Calculated P Axis 68 degrees    Calculated R Axis -3 degrees    Calculated T Axis -50 degrees    Diagnosis       Sinus rhythm with occasional premature ventricular complexes  Possible Left atrial enlargement  Incomplete right bundle branch block  T wave abnormality, consider inferior ischemia  Abnormal ECG  No previous ECGs available  Confirmed by [employer] Oven ([id number]) on 6/9/2021 9:27:02 PM         All Cardiac Markers in the last 24 hours:    Lab Results   Component Value Date/Time    CPK 29 (L) 06/09/2021 07:45 PM    CPK 34 (L) 06/09/2021 12:45 PM    CKMB <1.0 06/09/2021 07:45 PM    CKMB <1.0 06/09/2021 12:45 PM    CKND1  06/09/2021 07:45 PM     CALCULATION NOT PERFORMED WHEN RESULT IS BELOW LINEAR LIMIT    CKND1  06/09/2021 12:45 PM     CALCULATION NOT PERFORMED WHEN RESULT IS BELOW LINEAR LIMIT    TROIQ 0.05 (H) 06/09/2021 07:45 PM    TROIQ 0.05 (H) 06/09/2021 12:45 PM       Last Lipid:    Lab Results   Component Value Date/Time    Cholesterol, total 120 06/10/2021 05:30 AM    HDL Cholesterol 33 (L) 06/10/2021 05:30 AM    LDL, calculated 69.6 06/10/2021 05:30 AM    Triglyceride 87 06/10/2021 05:30 AM    CHOL/HDL Ratio 3.6 06/10/2021 05:30 AM       Cardiographics:     EKG Results     Procedure 720 Value Units Date/Time    EKG, 12 LEAD, SUBSEQUENT [[id number]] Collected: 06/09/21 0953    Order Status: Completed Updated: 06/09/21 2129     Ventricular Rate 100 BPM      Atrial Rate 100 BPM      P-R Interval 146 ms      QRS Duration 96 ms      Q-T Interval 356 ms      QTC Calculation (Bezet) 459 ms      Calculated P Axis 63 degrees      Calculated R Axis -159 degrees      Calculated T Axis -34 degrees      Diagnosis --     Sinus rhythm with occasional premature ventricular complexes and premature   atrial complexes  Possible Left atrial enlargement  Indeterminate axis  Incomplete right bundle branch block  T wave abnormality, consider inferior ischemia  Abnormal ECG  When compared with ECG of 09-JUN-2021 09:31,  premature atrial complexes are now present  Nonspecific T wave abnormality has replaced inverted T waves in Anterior   leads  Confirmed by Saqib Hood ([id number]) on 6/9/2021 9:28:53 PM      EKG, 12 LEAD, SUBSEQUENT [[id number]] Collected: 06/09/21 0931    Order Status: Completed Updated: 06/09/21 2127     Ventricular Rate 84 BPM      Atrial Rate 84 BPM      P-R Interval 148 ms      QRS Duration 98 ms      Q-T Interval 418 ms      QTC Calculation (Bezet) 493 ms      Calculated P Axis 64 degrees      Calculated R Axis -27 degrees      Calculated T Axis -46 degrees      Diagnosis --     Sinus rhythm with occasional premature ventricular complexes  Possible Left atrial enlargement  Incomplete right bundle branch block  T wave abnormality, consider inferior ischemia  Prolonged QT  Abnormal ECG  When compared with ECG of 09-JUN-2021 08:51,  Inverted T waves have replaced nonspecific T wave abnormality in Anterior   leads  Confirmed by Saqib Hood ([id number]) on 6/9/2021 9:27:46 PM      EKG, 12 LEAD, INITIAL [[id number]] Collected: 06/09/21 0851    Order Status: Completed Updated: 06/09/21 2127     Ventricular Rate 86 BPM      Atrial Rate 86 BPM      P-R Interval 148 ms      QRS Duration 100 ms      Q-T Interval 408 ms      QTC Calculation (Bezet) 488 ms      Calculated P Axis 68 degrees      Calculated R Axis -3 degrees      Calculated T Axis -50 degrees      Diagnosis --     Sinus rhythm with occasional premature ventricular complexes  Possible Left atrial enlargement  Incomplete right bundle branch block  T wave abnormality, consider inferior ischemia  Abnormal ECG  No previous ECGs available  Confirmed by Saqib Hood ([id number]) on 6/9/2021 9:27:02 PM              02/08/19    NM [facility] MYOCARDIAL PERFUSION MULTIPLE  2/8/2019          XR Results (most recent):  Results from [place] encounter on 06/09/21    XR CHEST PORT    Narrative  AP CHEST, PORTABLE    INDICATION: Chest pain    COMPARISON: Chest x-ray 1/8/2019    FINDINGS:  EKG leads overlie the patient. The cardiac silhouette is normal in size. Central pulmonary arteries appear  slightly prominent, similar to prior exams. The pulmonary vasculature is  unremarkable. No focal consolidation, pleural effusion, or pneumothorax. No  acute osseous abnormalities are identified. Impression  1. No clearly acute finding.   2. Chronic prominence of the central pulmonary arteries, could indicate  pulmonary arterial hypertension        Signed By: Nemours FoundationLUCILLE supervised    Ally 10, 2021

## 2021-06-10 NOTE — PROGRESS NOTES
New OT orders received and chart reviewed. Unable to see pt for OT evaluation at this time due to: Attempt x1: Pt is off Unit to CT (10:22)  Attempt x2: Dr. Howard Smallwood via [employer] requests to hold OT evaluation for today (13:12). Will follow up later as pt's schedule allows.  Thank you for this referral.    Stef Saravia MS, OTR/L

## 2021-06-10 NOTE — PROGRESS NOTES
Admit Date: 6/9/2021  Date of Service: 6/10/2021    Reason for follow-up: Chest pain and shortness of breath      Assessment:         Extensive bilateral pulmonary emboli with near occlusion with RV strain: Dilation of the pulmonary trunk and proximal mainstem arteries. Hemodynamically stable   -6/9/2021 echocardiogram: Ejection fraction 50 to 55%, abnormal left ventricular septal wall motion with flattening of the interventricular septum consistent with right ventricular pressure overload. Posterior right lower lobe pulmonary infarct  Left popliteal and posterior tibial acute DVT  Unstable angina with associated nausea vomiting diaphoresis:    -Status post cardiac cath on 6/9/2021 revealing multivessel disease prompting referral for CABG  Moderate pulmonary hypertension  Hypertension  Dyslipidemia  Obesity  Active tobacco use  Plan:   Ongoing discussions regarding management of PE and DVTs. Currently on IV heparin drip on PE protocol   -Discussions with cardiology as well as pulmonary ongoing. For now CABG is on hold. PT and OT on hold for now  Follow CBC BMP monitor for bleeding  Continue amiodarone, aspirin, Lipitor, Lasix, magnesium, beta-blocker  Replace potassium  Titrate nitro drip as per cardiology  O2 nasal cannula maintain sats greater than 92%  As needed duo nebs    Current Antibtiocs:   None    Lines:   Peripheral    Case discussed with:  [x]Patient  []Family  [x]Nursing  [x]Case Management  DVT Prophylaxis:  []Lovenox  []Hep SQ  []SCDs  []Coumadin   [x]On Heparin gtt    I have independently examined the patient and reviewed all lab studies and imgaing as well as review of nursing notes and physican notes from the past 24 hours. Radha Solorio D.O.   Pager [phone]      Allergies   Allergen Reactions    Statins-Hmg-Coa Reductase Inhibitors Other (comments)     Other reaction(s): SEVERE PAIN PER PT    Gabapentin Other (comments)     \"Caused bad nightmares\"    Pcn [Penicillins] Hives Subjective:      Pt seen and examined. Feeling overall okay. Still has an intermittent cough. With mild shortness of breath with exertion. No current chest pain. Denies any fevers chills. No hemoptysis at the present time. Objective:        Visit Vitals  BP (!) 167/111   Pulse 78   Temp 97.8 °F (36.6 °C)   Resp 17   Ht 5' 10\" (1.778 m)   Wt 112.5 kg (248 lb)   SpO2 97%   BMI 35.58 kg/m²     Temp (24hrs), Av.2 °F (36.8 °C), Min:97.5 °F (36.4 °C), Max:99.2 °F (37.3 °C)        General:   awake alert and oriented, non-toxic   Skin:   no rashes or skin lesions noted on limited exam, dry and warm   HEENT:  No scleral icterus or pallor; oral mucosa moist, lips moist   Lymph Nodes:   not assessed today   Lungs:   non, labored; bilaterally clear to aspiration- no crackles wheezes rales or rhonchi   Heart:  RRR, s1 and s2; no murmurs rubs or gallops; no edema, + pedal pulses   Abdomen:  soft, non-distended, active bowel sounds, non-tender, ventral hernia reducible   Genitourinary:  deferred   Extremities:   average muscle tone; no contractures, no joint effusions, slight hematoma on the right wrist at the cath site; left lower extremity slightly more swollen than right   Neurologic:  No gross focal motor or sensory abnormalities; CN 2-12 intact; Follows commands. Psychiatric:   appropriate and interactive.        Labs: Results:   Chemistry Recent Labs     06/10/21  0530 21  1610 21  0915   *  --  133*     --  141   K 3.7  --  3.1*     --  106   CO2 27  --  24   BUN 17  --  13   CREA 1.03  --  0.94   CA 8.9  --  8.6   AGAP 5  --  11   BUCR 17  --  14   AP 85 90 91   TP 6.3* 6.5 6.5   ALB 3.2* 3.4 3.2*   GLOB 3.1 3.1 3.3   AGRAT 1.0 1.1 1.0      CBC w/Diff Recent Labs     06/10/21  0530 21  0915   WBC 8.2 10.8   RBC 4.27* 4.14*   HGB 14.1 14.0   HCT 42.6 39.1    162   GRANS  --  66   LYMPH  --  23   EOS  --  1        No results found for: SDES No results found for: CULT     Results     Procedure Component Value Units Date/Time    COVID-19 RAPID TEST [[id number]]     Order Status: Sent     COVID-19 RAPID TEST [[id number]]     Order Status: Canceled           Imaging:     CTA CHEST W OR W WO CONT    Result Date: 6/10/2021  1. Extensive bilateral pulmonary emboli from distal mainstem extending to all the lobar arteries and their branches with near occlusion. No occlusive emboli also noted in inferior right pulmonary vein and multiple joining branches. 2.  Moderate dilatation of pulmonary trunk and proximal mainstem arteries. 3. Pulmonary infarction at posterior right lower lobe. 4. No pleural effusion or adenopathy. 5. Several small lung nodules as incidental findings. Recommendation as provided below. ----------------------------------- [facility] SOCIETY RECOMMENDATIONS (2017): Less than 6 mm nodule: A. Any solitary nodule(solid, semisolid or groundglass) or multiple solid:      Low Risk: No follow-up; If suspicious morphology or upper lobe location consider 12 month follow-up. High Risk: Optional CT at 12 months. B. Multiple subsolid/groundglass:  CT in 3 to 6 months. If unchanged consider CT at 2 and 4 years. A wet read was provided to the floor nurse, Halbert Peabody, for the ordering physician Dr. Mary Quinones by me upon the interpretation at approximately  1150  hours. Thank you for your referral.     XR CHEST PORT    Result Date: 6/9/2021   1. No clearly acute finding. 2. Chronic prominence of the central pulmonary arteries, could indicate pulmonary arterial hypertension     ECHO ADULT COMPLETE    Addendum Date: 6/9/2021    · LV: Estimated LVEF is 50 - 55%. Normal cavity size, wall thickness and systolic function (ejection fraction normal). Abnormal left ventricular septal motion. Systolic flattening of the interventricular septum consistent with right ventricle pressure overload. Mild (grade 1) left ventricular diastolic dysfunction. · AO: Mild aortic root dilatation.  · TV: Non-specific thickening of the tricuspid valve. Mild to moderate tricuspid valve regurgitation is present. · RV: Dilated right ventricle. Reduced systolic function. Right ventricular findings are consistent with Powell's sign. · IVC: Moderately elevated central venous pressure (8 mmHg); IVC diameter is larger than 21 mm and collapses more than 50% with respiration. · PA: Moderate pulmonary hypertension. Pulmonary arterial systolic pressure is 60 mmHg. CARDIAC PROCEDURE    Addendum Date: 6/9/2021    Severe three-vessel coronary disease as mentioned in detail Normal LVEF and LVEDP. Echocardiogram will be performed We will have CT surgery evaluation for possible CABG    Addendum Date: 6/9/2021    Severe three-vessel coronary disease as mentioned in detail Normal LVEF and LVEDP.   Echocardiogram will be performed We will have CT surgery evaluation for possible CABG

## 2021-06-10 NOTE — PROGRESS NOTES
Reason for Admission:  Chest pain [R07.9]  Unstable angina ([address] [I20.0]                 RUR Score:    10%           Plan for utilizing home health: To be determined                    Likelihood of Readmission:   LOW                         Transition of Care Plan:              Initial assessment completed with patient. Cognitive status of patient: oriented to time, place, person and situation. Face sheet information confirmed:  yes. The patient designates Jeff Harding, spouse ([phone]) to participate in his discharge plan and to receive any needed information. This patient lives in a single family home with spouse. Patient is able to navigate steps as needed. Prior to hospitalization, patient was considered to be independent with ADLs/IADLS : yes . Patient has a current ACP document on file: no      Healthcare Decision Maker:  no      The patient and spouse will be available to transport patient home upon discharge. The patient already has  CPAP medical equipment available in the home. Patient is not currently active with home health. Patient has not stayed in a skilled nursing facility or rehab. This patient is on dialysis :no     Freedom of choice signed: no.    Currently, the discharge plan is Home. - Will monitor for PT/OT evaluations for needs. CM will continue to monitor and assist with transitional needs. The patient states that he can obtain his medications from the pharmacy, and take his medications as directed. Patient's current insurance is [employer]  PCP Verified by CM:  Yes  Mode of Transport at Discharge: Self  Transition of Care Consult (CM Consult): Discharge Planning  Discharge Durable Medical Equipment: No  Physical Therapy Consult: Yes  Occupational Therapy Consult: Yes  Speech Therapy Consult: No  Current Support Network: Lives with Spouse  Confirm Follow Up Transport: Self  Discharge Location  Discharge Placement: KEVIN PalomoN, RN  Pager # [phone]  Care Manager

## 2021-06-10 NOTE — CONSULTS
[facility]  Pulmonary, Critical Care, and Sleep Medicine    Initial Patient Consult    Name: Aixa James MRN: [id number]   : 1960 Hospital: [address] [place] [address] Dr   Date: 6/10/2021        IMPRESSION:   · Acute submassive PE with extensive bilateral large nonobstructing emboli noted in right distal pulmonary artery, left mainstem PA extending into the lobar, segmental and subsegmental arteries. In addition there is right inferior pulmonary vein clot. Evidence of RV dilatation with flattening of septum suggesting RV load and positive Powell sign with measured PA P of 60 mmHg. Patient has remained hemodynamically stable and requiring minimal supplemental oxygen. Patient is not aware of any hypercoagulable state-has had screening colonoscopies and no known malignancies. Denies having had Covid but cannot completely exclude as a causative etiology for hypercoagulability  · Pulmonary hypertension-acute moderate secondary to above  · Chest pain-likely related to pulmonary infarct-right lower lobe  · DVT-left popliteal and posterior tibial veins. Patient gives a history of lumbar radiculopathy L4-S1 disc disease and numbness in his left lower extremity  · Unstable angina-s/p cardiac cath 2021 revealing multivessel disease. Patient is s/p PCI in 2019 done at [facility] and has been on antiplatelet agents since   · Hypertension  · Active tobacco use-committed to quit  · Unvaccinated for Covid  · Obstructive sleep apnea on CPAP      RECOMMENDATIONS:   · Discussed with patient, treating team about options for acute and subsequent treatment for pulmonary embolism. Recommend continuing anticoagulation with heparin since patient is hemodynamically stable and would benefit from effect of heparin for clot stabilization prior to proceeding with thrombectomy.   Can consider subacute thrombectomy early next week-catheter guided mechanical thrombectomy to stabilize and improve pulmonary hypertension and RV function prior to considering coronary artery bypass grafting. Discussed with Dr. Adriane Boothe  · Oxygen-continue supplementation with SaO2 goal more than 92%  · Can consider IVC filter placement at time of thrombectomy  · Check Covid test for completion  · Aspiration precautions  · Need for further diagnostics- CT scan, echocardiogram, ILD serologies, hypercoagulable panel-can be ordered at stable state as outpatient  · Out patient testing- PFT, 6 min walk, Polysomnogram  · Assess home Oxygen needs at discharge  · OT, PT, OOB and ambulate  · Healthy weight  · Will Follow  · DVT, PUD prophylaxis     Subjective: This patient has been seen and evaluated at the request of Dr. Radha Sosa for pulmonary embolism    Patient is a 56 y.o. male with medical co-morbidities including HTN, CAD with stent, hyperlipidemia, active tobacco smoking, SHILPI on CPAP, morbid obesity, presented from home with chest pain. Accordingly, he has substernal chest pain associates with exertion over the last weeks. He has chest pain with radiation to his neck today when it woke him from his sleep. Patient states that the ambulance brought him to [address] [facility] [facility] route patient was given morphine 10 mg for severe chest pain. He has associated shortness of breath. No chest palpitation. No leg swelling. Complains of numbness of his left leg from L4-S1 radiculopathy  No cough. He is a every day smoker and occasionally has a smoker's cough  No recent sick contact. In the ER, cardiac enzyme was negative. EKG was non specific ST change. Cardiology consulted for concern of unstable angina. He was taken to OR for cardiac cath. Per cardiology, he has significant atherosclerosis disease but no obstructive finding. No PCI was done. Patient was referred for coronary artery bypass grafting surgical opinion.   He continued to complain of chest pain and D-dimer was noted to be elevated so CT of the chest was done which showed evidence of large nonobstructing pulmonary embolism and therefore pulmonary has been consulted  Currently patient is on IV heparin wearing oxygen at 2 L sitting up in bed in no distress. He states that intermittently hurts in the mid chest but overall significantly improved. On further questioning he is unaware of any previous blood clots  States he has been on Plavix since 1999  He smokes actively and is committed to quit now that he has multiple problems  He works as a  for a [employer]-fairly active  Denies any known Covid exposures  He has not received Covid vaccination  I have reviewed all of the available data including the patient's previous history external records and radiological imaging available for review. In addition applicable cardiology and other lab data were also reviewed. Past Medical History:   Diagnosis Date    CAD (coronary artery disease) 6/9/2021    Coronary arteriosclerosis     Dupuytren's disease of palm     Hyperlipidemia 6/9/2021    Hypertension     SHILPI (obstructive sleep apnea) 6/9/2021    Severe obesity ([address] 10/24/2018    Tobacco dependence 6/9/2021      Past Surgical History:   Procedure Laterality Date    HX CHOLECYSTECTOMY      HX CORONARY STENT PLACEMENT      HX HERNIA REPAIR        Prior to Admission medications    Medication Sig Start Date End Date Taking?  Authorizing Provider   isosorbide mononitrate ER (IMDUR) 60 mg CR tablet TAKE 1 TABLET BY MOUTH TWICE A DAY AS DIRECTED  6/4/21  Yes Giovanny Ruiz MD   nitroglycerin (NITROLINGUAL) 400 mcg/spray spray SPRAY ONE SPRAY UNDER THE TONGUE AS NEEDED MAY REPEAT UP TO 2 TIMES AS DIRECTED  5/23/21  Yes Giovanny Ruiz MD   metoprolol tartrate (LOPRESSOR) 100 mg IR tablet TAKE 1/2 TABLET (50MG) TWICE A DAY FOR  BLOOD PRESSURE  1/18/21  Yes Giovanny Ruiz MD   atorvastatin (LIPITOR) 20 mg tablet TAKE 1 TABLET BY MOUTH DAILY FOR 90 DAYS  12/24/20  Yes Giovanny Ruiz MD   clopidogreL (PLAVIX) 75 mg tab TAKE 1 TABLET BY MOUTH DAILY FOR 90 DAYS 11/25/20  Yes Bong Mccord MD   furosemide (LASIX) 20 mg tablet TAKE TWO (2) TABLETS BY MOUTH EVERY DAY AS NEEDED FOR SWELLING 10/26/20  Yes Bong Mccord MD   aspirin delayed-release 81 mg tablet  4/29/19  Yes Provider, Historical   co-enzyme Q-10 (COQ-10) 100 mg capsule Take 100 mg by mouth daily.    Yes Provider, Historical   fluocinoNIDE (LIDEX) 0.05 % topical cream  10/17/18   Provider, Historical     Allergies   Allergen Reactions    Statins-Hmg-Coa Reductase Inhibitors Other (comments)     Other reaction(s): SEVERE PAIN PER PT    Gabapentin Other (comments)     \"Caused bad nightmares\"    Pcn [Penicillins] Hives      Social History     Tobacco Use    Smoking status: Current Every Day Smoker     Types: Cigarettes    Smokeless tobacco: Never Used   Substance Use Topics    Alcohol use: Yes      Family History   Problem Relation Age of Onset    Hypertension Mother     Diabetes Mother       Current Facility-Administered Medications   Medication Dose Route Frequency    aspirin delayed-release tablet 81 mg  81 mg Oral DAILY    metoprolol tartrate (LOPRESSOR) tablet 50 mg  50 mg Oral BID    sodium chloride (NS) flush 5-40 mL  5-40 mL IntraVENous Q8H    atorvastatin (LIPITOR) tablet 40 mg  40 mg Oral QHS    magnesium oxide (MAG-OX) tablet 400 mg  400 mg Oral DAILY    famotidine (PEPCID) tablet 20 mg  20 mg Oral QPM    furosemide (LASIX) tablet 40 mg  40 mg Oral ACB&D    potassium chloride SR (KLOR-CON 10) tablet 20 mEq  20 mEq Oral DAILY    nitroglycerin (TRIDIL) 400 mcg/ml infusion  0-200 mcg/min IntraVENous TITRATE    heparin 25,000 units in D5W 250 ml infusion  18-36 Units/kg/hr IntraVENous TITRATE    amiodarone (CORDARONE) tablet 400 mg  400 mg Oral TID    mupirocin (BACTROBAN) 2 % ointment   Both Nostrils BID    docusate sodium (COLACE) capsule 100 mg  100 mg Oral BID    0.9% sodium chloride infusion  50 mL/hr IntraVENous CONTINUOUS Review of Systems:  A comprehensive review of systems was negative except for that written in the HPI. Objective:   Vital Signs:    Visit Vitals  BP (!) 167/111   Pulse 78   Temp 97.8 °F (36.6 °C)   Resp 17   Ht 5' 10\" (1.778 m)   Wt 112.5 kg (248 lb)   SpO2 97%   BMI 35.58 kg/m²       O2 Device: Nasal cannula   O2 Flow Rate (L/min): 4 l/min   Temp (24hrs), Av.2 °F (36.8 °C), Min:97.5 °F (36.4 °C), Max:99.2 °F (37.3 °C)       Intake/Output:   Last shift:      No intake/output data recorded. Last 3 shifts:  1901 - 06/10 0700  In: 1313.2 [P.O.:480; I.V.:833.2]  Out: 1150 [Urine:1150]    Intake/Output Summary (Last 24 hours) at 6/10/2021 1432  Last data filed at 6/10/2021 0600  Gross per 24 hour   Intake 1313.23 ml   Output 1150 ml   Net 163.23 ml      Physical Exam:   General:  Alert, cooperative, no distress, appears stated age. Head:  Normocephalic, without obvious abnormality, atraumatic. Eyes:  Conjunctivae/corneas clear. PERRL, EOMs intact. Nose: Nares normal. Septum midline. Mucosa normal. No drainage or sinus tenderness. Throat: Lips, mucosa, and tongue normal. Teeth and gums normal.   Neck: Supple, symmetrical, trachea midline, no adenopathy, thyroid: no enlargment/tenderness/nodules, no carotid bruit and no JVD. Back:   Symmetric, no curvature. ROM normal.   Lungs:   Clear to auscultation bilaterally. Chest wall:  No tenderness or deformity. Heart:  Regular rate and rhythm, S1, S2 normal, no murmur, click, rub or gallop. Abdomen:   Soft, non-tender. Bowel sounds normal. No masses,  No organomegaly. Extremities: Extremities normal, atraumatic, no cyanosis or edema. Pulses: 2+ and symmetric all extremities.    Skin: Skin color, texture, turgor normal. No rashes or lesions   Lymph nodes: Cervical, supraclavicular, and axillary nodes normal.   Neurologic: Grossly nonfocal     Data review:     Recent Results (from the past 24 hour(s))   PLATELET FUNCTION, VERIFY NOW P2Y12 Collection Time: 06/09/21  3:15 PM   Result Value Ref Range    P2Y12 ASSAY 216 194 - 418 PRU   HEPATIC FUNCTION PANEL    Collection Time: 06/09/21  4:10 PM   Result Value Ref Range    Protein, total 6.5 6.4 - 8.2 g/dL    Albumin 3.4 3.4 - 5.0 g/dL    Globulin 3.1 2.0 - 4.0 g/dL    A-G Ratio 1.1 0.8 - 1.7      Bilirubin, total 1.3 (H) 0.2 - 1.0 MG/DL    Bilirubin, direct 0.5 (H) 0.0 - 0.2 MG/DL    Alk.  phosphatase 90 45 - 117 U/L    AST (SGOT) 35 10 - 38 U/L    ALT (SGPT) 55 16 - 61 U/L   PROTHROMBIN TIME + INR    Collection Time: 06/09/21  4:10 PM   Result Value Ref Range    Prothrombin time 14.5 11.5 - 15.2 sec    INR 1.1 0.8 - 1.2     HEMOGLOBIN A1C W/O EAG    Collection Time: 06/09/21  4:10 PM   Result Value Ref Range    Hemoglobin A1c 5.3 4.2 - 5.6 %   URINALYSIS W/ RFLX MICROSCOPIC    Collection Time: 06/09/21  5:45 PM   Result Value Ref Range    Color DARK YELLOW      Appearance CLEAR      Specific gravity >1.030 (H) 1.005 - 1.030    pH (UA) 6.0 5.0 - 8.0      Protein TRACE (A) NEG mg/dL    Glucose Negative NEG mg/dL    Ketone TRACE (A) NEG mg/dL    Bilirubin Negative NEG      Blood MODERATE (A) NEG      Urobilinogen 1.0 0.2 - 1.0 EU/dL    Nitrites Negative NEG      Leukocyte Esterase Negative NEG     URINE MICROSCOPIC ONLY    Collection Time: 06/09/21  5:45 PM   Result Value Ref Range    WBC 0 to 3 0 - 5 /hpf    RBC 21 to 35 0 - 5 /hpf    Epithelial cells FEW 0 - 5 /lpf    Bacteria FEW (A) NEG /hpf   CARDIAC PANEL,(CK, CKMB & TROPONIN)    Collection Time: 06/09/21  7:45 PM   Result Value Ref Range    CK - MB <1.0 <3.6 ng/ml    CK-MB Index  0.0 - 4.0 %     CALCULATION NOT PERFORMED WHEN RESULT IS BELOW LINEAR LIMIT    CK 29 (L) 39 - 308 U/L    Troponin-I, QT 0.05 (H) 0.0 - 0.045 NG/ML   PTT    Collection Time: 06/09/21 10:30 PM   Result Value Ref Range    aPTT >180.0 (HH) 23.0 - 36.4 SEC   PTT    Collection Time: 06/10/21  5:30 AM   Result Value Ref Range    aPTT >180.0 (HH) 23.0 - 23.0 SEC   METABOLIC PANEL, BASIC    Collection Time: 06/10/21  5:30 AM   Result Value Ref Range    Sodium 140 136 - 145 mmol/L    Potassium 3.7 3.5 - 5.5 mmol/L    Chloride 108 100 - 111 mmol/L    CO2 27 21 - 32 mmol/L    Anion gap 5 3.0 - 18 mmol/L    Glucose 100 (H) 74 - 99 mg/dL    BUN 17 7.0 - 18 MG/DL    Creatinine 1.03 0.6 - 1.3 MG/DL    BUN/Creatinine ratio 17 12 - 20      GFR est AA >60 >60 ml/min/1.73m2    GFR est non-AA >60 >60 ml/min/1.73m2    Calcium 8.9 8.5 - 10.1 MG/DL   MAGNESIUM    Collection Time: 06/10/21  5:30 AM   Result Value Ref Range    Magnesium 2.1 1.6 - 2.6 mg/dL   CBC W/O DIFF    Collection Time: 06/10/21  5:30 AM   Result Value Ref Range    WBC 8.2 4.6 - 13.2 K/uL    RBC 4.27 (L) 4.35 - 5.65 M/uL    HGB 14.1 13.0 - 16.0 g/dL    HCT 42.6 36.0 - 48.0 %    MCV 99.8 (H) 74.0 - 97.0 FL    MCH 33.0 24.0 - 34.0 PG    MCHC 33.1 31.0 - 37.0 g/dL    RDW 12.0 11.6 - 14.5 %    PLATELET 044 845 - 204 K/uL    MPV 11.2 9.2 - 11.8 FL   SED RATE (ESR)    Collection Time: 06/10/21  5:30 AM   Result Value Ref Range    Sed rate, automated 9 0 - 20 mm/hr   LIPID PANEL    Collection Time: 06/10/21  5:30 AM   Result Value Ref Range    LIPID PROFILE          Cholesterol, total 120 <200 MG/DL    Triglyceride 87 <150 MG/DL    HDL Cholesterol 33 (L) 40 - 60 MG/DL    LDL, calculated 69.6 0 - 100 MG/DL    VLDL, calculated 17.4 MG/DL    CHOL/HDL Ratio 3.6 0 - 5.0     HEMOGLOBIN A1C WITH EAG    Collection Time: 06/10/21  5:30 AM   Result Value Ref Range    Hemoglobin A1c 5.3 4.2 - 5.6 %    Est. average glucose 105 mg/dL   HEPATIC FUNCTION PANEL    Collection Time: 06/10/21  5:30 AM   Result Value Ref Range    Protein, total 6.3 (L) 6.4 - 8.2 g/dL    Albumin 3.2 (L) 3.4 - 5.0 g/dL    Globulin 3.1 2.0 - 4.0 g/dL    A-G Ratio 1.0 0.8 - 1.7      Bilirubin, total 0.8 0.2 - 1.0 MG/DL    Bilirubin, direct 0.2 0.0 - 0.2 MG/DL    Alk.  phosphatase 85 45 - 117 U/L    AST (SGOT) 25 10 - 38 U/L    ALT (SGPT) 48 16 - 61 U/L   TYPE & SCREEN    Collection Time: 06/10/21  6:35 AM   Result Value Ref Range    Crossmatch Expiration 06/13/2021,2359     ABO/Rh(D) A POSITIVE     Antibody screen NEG    DUPLEX CAROTID BILATERAL    Collection Time: 06/10/21  8:30 AM   Result Value Ref Range    Right cca dist sys 54.0 cm/s    Right CCA dist recio 22.0 cm/s    RIGHT COMMON CAROTID ARTERY MID S 67.70 cm/s    RIGHT COMMON CAROTID ARTERY MID D 27.26 cm/s    Right CCA prox sys 72.9 cm/s    Right CCA prox recio 22.1 cm/s    Right ICA dist sys 73.7 cm/s    Right ICA dist recio 33.4 cm/s    Right ICA mid sys 53.4 cm/s    Right ICA mid recio 23.4 cm/s    Right ICA prox sys 53.4 cm/s    Right eca sys 75.0 cm/s    RIGHT EXTERNAL CAROTID ARTERY D 18.66 cm/s    Right vertebral sys 28.9 cm/s    RIGHT VERTEBRAL ARTERY D 12.25 cm/s    Right subclavian sys 60.2 cm/s    RIGHT SUBCLAVIAN ARTERY D 0.00 cm/s    Right ICA/CCA sys 1.4     Left CCA dist sys 63.6 cm/s    Left CCA dist recio 24.1 cm/s    LEFT COMMON CAROTID ARTERY MID S 64.73 cm/s    LEFT COMMON CAROTID ARTERY MID D 25.17 cm/s    Left CCA prox sys 71.3 cm/s    Left CCA prox recio 26.3 cm/s    Left ICA dist sys 53.2 cm/s    Left ICA dist recio 19.9 cm/s    Left ICA mid sys 74.7 cm/s    Left ICA mid recio 34.0 cm/s    Left ICA prox sys 45.0 cm/s    Left ICA prox recio 18.6 cm/s    Left ECA sys 50.5 cm/s    LEFT EXTERNAL CAROTID ARTERY D 14.20 cm/s    Left vertebral sys 33.1 cm/s    LEFT VERTEBRAL ARTERY D 13.87 cm/s    Left subclavian sys 62.2 cm/s    Left ICA/CCA sys 1.17    DUPLEX LOWER EXT VEIN MAP BILAT    Collection Time: 06/10/21  9:00 AM   Result Value Ref Range    Right GSV Thigh Dist Diam 0.61 cm    Right GSV Thigh Mid Diam 0.58 cm    Right GSV Thigh Prox Diam 0.77 cm    Right GSV at Knee Diam 0.53 cm    Right GSV BK Dist Diam 0.34 cm    Right GSV BK Mid Diam 0.38 cm    Right GSV BK Prox Diam 0.47 cm    Right GSV Ankle Diam 0.37 cm    Left GSV Thigh Dist Diam 0.59 cm    Left GSV Thigh Mid Diam 0.63 cm    Left GSV Thigh Prox Diam 0.75 cm    Left GSV at Knee Diam 0.54 cm    Left GSV BK Dist Diam 0.37 cm    Left GSV BK Mid Diam 0.41 cm    Left GSV BK Prox Diam 0.45 cm    Left GSV Ankle Diam 0.42 cm    Left SSV junction diameter 0.79 cm    Left GSV Junc Diam 0.77 cm    Right GSV Junc Diam 1.30 cm       Imaging:  I have personally reviewed the patients radiographs and have reviewed the reports:  XR Results (most recent):  Results from Hospital Encounter encounter on 06/09/21    XR CHEST PORT    Narrative  AP CHEST, PORTABLE    INDICATION: Chest pain    COMPARISON: Chest x-ray 1/8/2019    FINDINGS:  EKG leads overlie the patient. The cardiac silhouette is normal in size. Central pulmonary arteries appear  slightly prominent, similar to prior exams. The pulmonary vasculature is  unremarkable. No focal consolidation, pleural effusion, or pneumothorax. No  acute osseous abnormalities are identified. Impression  1. No clearly acute finding. 2. Chronic prominence of the central pulmonary arteries, could indicate  pulmonary arterial hypertension      CT Results (most recent):  Results from Hospital Encounter encounter on 06/09/21    CTA CHEST W OR W WO CONT    Narrative  CT chest with contrast for PE    HISTORY: Shortness of breath    COMPARISON: None. TECHNIQUE: Dynamic spiral scan through the chest is obtained from the thoracic  inlet to the diaphragm after dynamic nonionic IV contrast administration  per PE  protocol. Coronal and sagittal MIP computer reconstructions are also obtained  for better visualization of the integrity of pulmonary arteries in 3D dimension,  particularly for lobar/interlobar arterial branches and to minimize radiation  dose.     All CT scans at this facility performed using dose optimization techniques as  appreciated to a performed exam, to include automated exposure control,  adjustment of the mA and or KU according to patient size (including appropriate  matching for site specific examination), or use of iterative reconstruction  technique. FINDINGS:    PULMONARY ARTERIES: The contrast bolus is adequate. There are large  nonobstructing pulmonary emboli identified in the distal right and left mainstem  pulmonary arteries with extension to all the lobar, segmental and subsegmental  branches bilaterally. Moderate dilatation of pulmonary trunk and the proximal  mainstem arteries. Near occlusive intraluminal filling defects also seen in the right inferior  pulmonary vein and multiple adjoining venous branches. AORTA AND VASCULAR STRUCTURES: No aortic aneurysm or dissection. Unremarkable  great vessels. Heart: The heart is normal in size. No pericardial effusion. LUNG PARENCHYMA: Patchy opacities at the posterior basal segment of right lower  lobe, most likely pulmonary infarction. No other airspace infiltration or  consolidation seen. Several tiny lung nodules identified, 3 mm in left apex on  #11/3, 2 x 4 mm in lateral left upper lobe on #24/3 3 mm nodule in anterior  right upper lobe on #26/3. IMAGED THYROID: Unremarkable. MEDIASTINUM: No adenopathy. PLEURAL SPACES AND CHEST WALL: No significant pleural pathology. VISUALIZED UPPER ABDOMEN: Unremarkable. OSSEOUS STRUCTURES: Unremarkable. Impression  1. Extensive bilateral pulmonary emboli from distal mainstem extending to all  the lobar arteries and their branches with near occlusion. No occlusive emboli  also noted in inferior right pulmonary vein and multiple joining branches. 2.  Moderate dilatation of pulmonary trunk and proximal mainstem arteries. 3. Pulmonary infarction at posterior right lower lobe. 4. No pleural effusion or adenopathy. 5. Several small lung nodules as incidental findings. Recommendation as provided  below. -----------------------------------  [facility] SOCIETY RECOMMENDATIONS (2017):    Less than 6 mm nodule:  A.  Any solitary nodule(solid, semisolid or groundglass) or multiple solid:  Low Risk: No follow-up; If suspicious morphology or upper lobe location  consider 12 month follow-up. High Risk: Optional CT at 12 months. B. Multiple subsolid/groundglass:  CT in 3 to 6 months. If unchanged consider CT at 2 and 4 years. A wet read was provided to the floor nurse, Taylor Ahuja, for the ordering physician  Dr. Garcia Kim by me upon the interpretation at approximately  1150  hours. Thank you for your referral.         06/09/21    ECHO ADULT COMPLETE 06/09/2021 6/9/2021    Interpretation Summary  · LV: Estimated LVEF is 50 - 55%. Normal cavity size, wall thickness and systolic function (ejection fraction normal). Abnormal left ventricular septal motion. Systolic flattening of the interventricular septum consistent with right ventricle pressure overload. Mild (grade 1) left ventricular diastolic dysfunction. · AO: Mild aortic root dilatation. · TV: Non-specific thickening of the tricuspid valve. Mild to moderate tricuspid valve regurgitation is present. · RV: Dilated right ventricle. Reduced systolic function. Right ventricular findings are consistent with Powell's sign. · IVC: Moderately elevated central venous pressure (8 mmHg); IVC diameter is larger than 21 mm and collapses more than 50% with respiration. · PA: Moderate pulmonary hypertension. Pulmonary arterial systolic pressure is 60 mmHg. Addendum by: Merlinda Caras, MD on 6/9/2021  4:16 PM    Signed by: Merlinda Caras, MD on 6/9/2021  3:11 PM    Complex decision making was made in the evaluation and management plans during this consultation. More than 50% of time was spent in counseling and coordination of care including review of data and discussion with other team members.          Pau Stokes MD  Pulmonary & Critical Care

## 2021-06-10 NOTE — PROGRESS NOTES
PT order received and chart reviewed. Attempted to see patient for skilled PT though going  [facility] at this time. Will follow up as schedule permits.      Thjank you for this referral.   Hemanth Feldman PT, DPT

## 2021-06-10 NOTE — PROGRESS NOTES
[facility]      Follow up for CAD    Chief Complaint:  none    Interval History:  Chest discomfort prior to going to CT relieved with narcotics. CTA chest with extensive bilateral PE with dilated pulm arteries and pulmonary infarction at poster RLL. LE Duplex with acute occlusive DVT left popliteal and post tibial veins. Carotids <50% bilat. Continues on heparin and NTG infusions. Plavix on hold. ROS:    Denies pain or SOB. Exam:     Visit Vitals  BP (!) 167/111   Pulse 78   Temp 97.8 °F (36.6 °C)   Resp 17   Ht 5' 10\" (1.778 m)   Wt 112.5 kg (248 lb)   SpO2 97%   BMI 35.58 kg/m²        Const:  alert and oriented. NAD   CV:   RRR without murmur or rub. PMI not displaced. No peripheral edema   Respiratory:  Clear to ascultation without wheezes, rales or rhonchi. No accessory muscle use. EXT:  Warm and well perfused. Trace R pedal edema    Assessment:  Unstable angina due to severe 3 v CAD with acute LLE DVT and large bilat PE with RV compromise. PLAN:  Will need multidisciplinary approach to address subacute extensive PE with reduced RV function in the setting of severe CAD with 50% LM disease. Pulmonary consulted. ? IR catheter directed therapy. Not ready for CABG.

## 2021-06-11 LAB
ANION GAP SERPL CALC-SCNC: 4 MMOL/L (ref 3–18)
APTT PPP: 83.6 SEC (ref 23–36.4)
BUN SERPL-MCNC: 14 MG/DL (ref 7–18)
BUN/CREAT SERPL: 13 (ref 12–20)
CALCIUM SERPL-MCNC: 9 MG/DL (ref 8.5–10.1)
CHLORIDE SERPL-SCNC: 108 MMOL/L (ref 100–111)
CO2 SERPL-SCNC: 27 MMOL/L (ref 21–32)
CREAT SERPL-MCNC: 1.06 MG/DL (ref 0.6–1.3)
ERYTHROCYTE [DISTWIDTH] IN BLOOD BY AUTOMATED COUNT: 12 % (ref 11.6–14.5)
GLUCOSE SERPL-MCNC: 109 MG/DL (ref 74–99)
HCT VFR BLD AUTO: 39.1 % (ref 36–48)
HGB BLD-MCNC: 13.1 G/DL (ref 13–16)
LEFT GSV ANKLE DIAM: 0.42 CM
LEFT GSV AT KNEE DIAM: 0.54 CM
LEFT GSV BK DIST DIAM: 0.37 CM
LEFT GSV BK MID DIAM: 0.41 CM
LEFT GSV BK PROX DIAM: 0.45 CM
LEFT GSV JUNC DIAM: 0.77 CM
LEFT GSV THIGH DIST DIAM: 0.59 CM
LEFT GSV THIGH MID DIAM: 0.63 CM
LEFT GSV THIGH PROX DIAM: 0.75 CM
LEFT SSV JUNCTION DIAM: 0.79 CM
MAGNESIUM SERPL-MCNC: 2.1 MG/DL (ref 1.6–2.6)
MCH RBC QN AUTO: 33.2 PG (ref 24–34)
MCHC RBC AUTO-ENTMCNC: 33.5 G/DL (ref 31–37)
MCV RBC AUTO: 99 FL (ref 74–97)
PLATELET # BLD AUTO: 184 K/UL (ref 135–420)
PMV BLD AUTO: 10.9 FL (ref 9.2–11.8)
POTASSIUM SERPL-SCNC: 3.5 MMOL/L (ref 3.5–5.5)
RBC # BLD AUTO: 3.95 M/UL (ref 4.35–5.65)
RIGHT GSV ANKLE DIAM: 0.37 CM
RIGHT GSV AT KNEE DIAM: 0.53 CM
RIGHT GSV BK DIST DIAM: 0.34 CM
RIGHT GSV BK MID DIAM: 0.38 CM
RIGHT GSV BK PROX DIAM: 0.47 CM
RIGHT GSV JUNC DIAM: 1.3 CM
RIGHT GSV THIGH DIST DIAM: 0.61 CM
RIGHT GSV THIGH MID DIAM: 0.58 CM
RIGHT GSV THIGH PROX DIAM: 0.77 CM
SODIUM SERPL-SCNC: 139 MMOL/L (ref 136–145)
WBC # BLD AUTO: 8.9 K/UL (ref 4.6–13.2)

## 2021-06-11 PROCEDURE — 85730 THROMBOPLASTIN TIME PARTIAL: CPT

## 2021-06-11 PROCEDURE — 74011250636 HC RX REV CODE- 250/636: Performed by: INTERNAL MEDICINE

## 2021-06-11 PROCEDURE — 99233 SBSQ HOSP IP/OBS HIGH 50: CPT | Performed by: INTERNAL MEDICINE

## 2021-06-11 PROCEDURE — 83735 ASSAY OF MAGNESIUM: CPT

## 2021-06-11 PROCEDURE — 74011000250 HC RX REV CODE- 250: Performed by: INTERNAL MEDICINE

## 2021-06-11 PROCEDURE — 74011250637 HC RX REV CODE- 250/637: Performed by: PHYSICIAN ASSISTANT

## 2021-06-11 PROCEDURE — 74011250637 HC RX REV CODE- 250/637: Performed by: INTERNAL MEDICINE

## 2021-06-11 PROCEDURE — APPNB30 APP NON BILLABLE TIME 0-30 MINS: Performed by: PHYSICIAN ASSISTANT

## 2021-06-11 PROCEDURE — 77030027138 HC INCENT SPIROMETER -A

## 2021-06-11 PROCEDURE — 99232 SBSQ HOSP IP/OBS MODERATE 35: CPT | Performed by: INTERNAL MEDICINE

## 2021-06-11 PROCEDURE — 80048 BASIC METABOLIC PNL TOTAL CA: CPT

## 2021-06-11 PROCEDURE — 97162 PT EVAL MOD COMPLEX 30 MIN: CPT

## 2021-06-11 PROCEDURE — 36415 COLL VENOUS BLD VENIPUNCTURE: CPT

## 2021-06-11 PROCEDURE — 2709999900 HC NON-CHARGEABLE SUPPLY

## 2021-06-11 PROCEDURE — 97165 OT EVAL LOW COMPLEX 30 MIN: CPT

## 2021-06-11 PROCEDURE — 85027 COMPLETE CBC AUTOMATED: CPT

## 2021-06-11 PROCEDURE — 65660000004 HC RM CVT STEPDOWN

## 2021-06-11 PROCEDURE — 99232 SBSQ HOSP IP/OBS MODERATE 35: CPT | Performed by: PHYSICIAN ASSISTANT

## 2021-06-11 RX ORDER — ISOSORBIDE DINITRATE 10 MG/1
30 TABLET ORAL 3 TIMES DAILY
Status: DISCONTINUED | OUTPATIENT
Start: 2021-06-11 | End: 2021-06-16

## 2021-06-11 RX ORDER — NITROGLYCERIN 40 MG/100ML
40 INJECTION INTRAVENOUS CONTINUOUS
Status: DISCONTINUED | OUTPATIENT
Start: 2021-06-11 | End: 2021-06-16

## 2021-06-11 RX ADMIN — MORPHINE SULFATE 1 MG: 2 INJECTION, SOLUTION INTRAMUSCULAR; INTRAVENOUS at 20:07

## 2021-06-11 RX ADMIN — HEPARIN SODIUM 14 UNITS/KG/HR: 10000 INJECTION, SOLUTION INTRAVENOUS at 01:58

## 2021-06-11 RX ADMIN — ISOSORBIDE DINITRATE 30 MG: 10 TABLET ORAL at 20:07

## 2021-06-11 RX ADMIN — ISOSORBIDE DINITRATE 30 MG: 10 TABLET ORAL at 22:35

## 2021-06-11 RX ADMIN — Medication 400 MG: at 08:19

## 2021-06-11 RX ADMIN — Medication 10 ML: at 06:29

## 2021-06-11 RX ADMIN — AMIODARONE HYDROCHLORIDE 400 MG: 200 TABLET ORAL at 22:35

## 2021-06-11 RX ADMIN — ATORVASTATIN CALCIUM 40 MG: 40 TABLET, FILM COATED ORAL at 22:35

## 2021-06-11 RX ADMIN — MUPIROCIN: 20 OINTMENT TOPICAL at 08:19

## 2021-06-11 RX ADMIN — HEPARIN SODIUM 14 UNITS/KG/HR: 10000 INJECTION, SOLUTION INTRAVENOUS at 20:33

## 2021-06-11 RX ADMIN — DOCUSATE SODIUM 100 MG: 100 CAPSULE, LIQUID FILLED ORAL at 17:25

## 2021-06-11 RX ADMIN — AMIODARONE HYDROCHLORIDE 400 MG: 200 TABLET ORAL at 16:34

## 2021-06-11 RX ADMIN — MORPHINE SULFATE 1 MG: 2 INJECTION, SOLUTION INTRAMUSCULAR; INTRAVENOUS at 14:08

## 2021-06-11 RX ADMIN — DOCUSATE SODIUM 100 MG: 100 CAPSULE, LIQUID FILLED ORAL at 08:19

## 2021-06-11 RX ADMIN — METOPROLOL TARTRATE 50 MG: 50 TABLET, FILM COATED ORAL at 20:07

## 2021-06-11 RX ADMIN — FUROSEMIDE 40 MG: 40 TABLET ORAL at 08:19

## 2021-06-11 RX ADMIN — FAMOTIDINE 20 MG: 20 TABLET ORAL at 17:25

## 2021-06-11 RX ADMIN — MORPHINE SULFATE 1 MG: 2 INJECTION, SOLUTION INTRAMUSCULAR; INTRAVENOUS at 01:55

## 2021-06-11 RX ADMIN — POTASSIUM CHLORIDE 20 MEQ: 750 TABLET, FILM COATED, EXTENDED RELEASE ORAL at 08:19

## 2021-06-11 RX ADMIN — Medication 81 MG: at 08:19

## 2021-06-11 RX ADMIN — FUROSEMIDE 40 MG: 40 TABLET ORAL at 16:34

## 2021-06-11 RX ADMIN — MUPIROCIN: 20 OINTMENT TOPICAL at 17:54

## 2021-06-11 RX ADMIN — NITROGLYCERIN 40 MCG/MIN: 40 INJECTION INTRAVENOUS at 11:29

## 2021-06-11 RX ADMIN — METOPROLOL TARTRATE 50 MG: 50 TABLET, FILM COATED ORAL at 08:19

## 2021-06-11 RX ADMIN — AMIODARONE HYDROCHLORIDE 400 MG: 200 TABLET ORAL at 08:19

## 2021-06-11 RX ADMIN — Medication 10 ML: at 16:35

## 2021-06-11 RX ADMIN — SODIUM CHLORIDE 50 ML/HR: 900 INJECTION, SOLUTION INTRAVENOUS at 11:32

## 2021-06-11 RX ADMIN — MORPHINE SULFATE 1 MG: 2 INJECTION, SOLUTION INTRAMUSCULAR; INTRAVENOUS at 11:30

## 2021-06-11 NOTE — PROGRESS NOTES
Problem: Mobility Impaired (Adult and Pediatric)  Goal: *Acute Goals and Plan of Care (Insert Text)  Outcome: Resolved/Met   PHYSICAL THERAPY EVALUATION AND DISCHARGE    Patient: Andrew Powell (68 y.o. male)  Date: 6/11/2021  Primary Diagnosis: Chest pain [R07.9]  Unstable angina ([facility] [address] [I20.0]  Procedure(s) (LRB):  LEFT HEART CATH (N/A)  Coronary Angiography (N/A) 2 Days Post-Op   Precautions: Fall  ASSESSMENT :  Cleared by nursing for PT in room. Co-treated with OT to maximize patient safety and participation in functional mobility. Educated on use of activity pacing, breathing, and safe mobility. Mod I for supine to sit. Good seated balance. Mod I for sit to stand. Deferred amb d/t lines and medical status. Declines OOB in chair; up earlier with nursing. Educated on benefits of mobility and OOB/EOB for meals and toileting' verbalized understanding. Call bell in reach. No further skilled physical therapy needs at this time; however please re-order post cardiothoracic procedures as appropriate if change in mobility status. PLAN :  Discharge Recommendations: None  Further Equipment Recommendations for Discharge: None     SUBJECTIVE:   Patient stated I'm a  in a [employer].     OBJECTIVE DATA SUMMARY:     Past Medical History:   Diagnosis Date    CAD (coronary artery disease) 6/9/2021    Coronary arteriosclerosis     Dupuytren's disease of palm     Hyperlipidemia 6/9/2021    Hypertension     SHILPI (obstructive sleep apnea) 6/9/2021    Severe obesity ([facility] [address] 10/24/2018    Tobacco dependence 6/9/2021     Past Surgical History:   Procedure Laterality Date    HX CHOLECYSTECTOMY      HX CORONARY STENT PLACEMENT      HX HERNIA REPAIR       Barriers to Learning/Limitations: None  Compensate with: Visual Cues, Verbal Cues, Tactile Cues and Kinesthetic Cues  Home Situation:   Home Situation  Home Environment: Private residence  # Steps to Enter: 4  Rails to Enter: Yes  One/Two Story Residence: One story  Living Alone: No  Support Systems: Spouse/Significant Other/Partner  Patient Expects to be Discharged to[de-identified] House  Current DME Used/Available at Home: None  Critical Behavior:  Neurologic State: Alert  Orientation Level: Oriented X4  Cognition: Follows commands     Psychosocial  Patient Behaviors: Cooperative    Strength:    Manual Muscle Testing (LE)         R     L    Hip Flexion:   5/5 5/5  Knee EXT:   5/5 5/5  Knee FLEX:   5/5 5/5  Ankle DF:   5/5 5/5  _________________________________________________   Range Of Motion:  BLE AROM WFL  Functional Mobility:  Bed Mobility:  Supine to Sit: Modified independent  Sit to Supine: Modified independent  Transfers:  Sit to Stand: Modified independent  Stand to Sit: Modified independent  Balance:   Sitting: Intact  Standing: Intact    Pain:  Pain level pre-treatment: 0/10   Pain level post-treatment: 0/10    Activity Tolerance:   Good    After treatment:   []         Patient left in no apparent distress sitting up in chair  [x]         Patient left in no apparent distress in bed  [x]         Call bell left within reach  [x]         Nursing notified  []         Caregiver present  []         Bed alarm activated  []         SCDs applied    COMMUNICATION/EDUCATION:   [x]         Role of physical therapy in the acute care setting. [x]         Fall prevention education was provided and the patient/caregiver indicated understanding. [x]         Patient/family have participated as able in goal setting and plan of care. [x]         Patient/family agree to work toward stated goals and plan of care. []         Patient understands intent and goals of therapy, but is neutral about his/her participation. []         Patient is unable to participate in goal setting/plan of care: ongoing with therapy staff.     Thank you for this referral.  Chantale Lieberman, PT   Time Calculation: 12 mins    Eval Complexity: History: MEDIUM  Complexity : 1-2 comorbidities / personal factors will impact the outcome/ POC Exam:MEDIUM Complexity : 3 Standardized tests and measures addressing body structure, function, activity limitation and / or participation in recreation  Presentation: MEDIUM Complexity : Evolving with changing characteristics  Clinical Decision Making:Medium Complexity  Clinical judgement; ROM, MMT, functional mobility Overall Complexity:MEDIUM

## 2021-06-11 NOTE — PROGRESS NOTES
Cardiology Progress Note    Consultation request by Juany Woodard MD for advice/opinion related to evaluating CP    Date of  Admission: 6/9/2021  8:52 AM   Primary Care Physician:  Jeana Sprague MD    Attending Cardiologist: Dr. Misti Thompson MD      Assessment:     -Acute Submassive B/L PE, initially presented with symptoms concerning for unstable angina. CT chest showed extensive bilateral large nonobstructing emboli noted in right distal pulmonary artery, left mainstem PA extending into the lobar, segmental and subsegmental arteries. In addition there is right inferior pulmonary vein clot. Initial troponin normal. ECG SR with subtle ST changes. ECHO this admission showed Dilated right ventricle. Abnormal wall motion: free wall hypokinesis of the right ventricle. Reduced systolic function. The findings are consistent with Powell's sign   -CAD s/p [facility] (2019,2018) Widely patent Lcx stents, known occluded RCA, diffuse LM/LAD disease. · S/p C (06/09/21)   Left Main   Large-caliber vessel. Distal vessel approximately 50% stenosis with calcification that involves LAD and circumflex bifurcation   Left Anterior Descending   The vessel is calcified. The vessel is tortuous. Ostial, proximal and the mid LAD has diffuse calcified moderate disease with sequential eccentric 75% lesions. Mid-distal LAD has no significant obstructive disease Diagonal branch: Moderate disease. Medium caliber vessel   Left Circumflex   The vessel is calcified. The vessel is tortuous. Proximal LCx stent has mild in-stent restenosis followed by hazy calcified borderline 70% stenosis. OM1: Small caliber vessel OM 2: Large bifurcating vessel. Calcified hazy 70% stenosis before bifurcation. Superior branch distally distally has subtotal occlusion with faint homocollateral. Inferior branch without any significant obstructive disease   Right Coronary Artery   Proximal-mid 100% in-stent occlusion. Previously known by cardiac catheterization in 2019. Evidence of good collaterals from left to right supplying distal RCA and RPDA   -ECHO (06/09/21) EF 03-30%, Systolic flattening of the interventricular septum consistent with right ventricle pressure overload. Mild (grade 1) left ventricular diastolic dysfunction. -Mild-Moderate TR by echo   -Moderate pulmonary HTN by echo PASP 60 mmHg  -Hypertension, on lopressor, Imdur, Lasix as outpatient   -Hyperlipidemia  -Tobacco Abuse, 1 ppd, cessation advised  -Obesity, BMI 35.58 kg/m2    Primary Cardiologist: [address]:       I saw, evaluated, interviewed and examined the patient personally. I agree with the findings and plan of care as documented below with PA-C note  Denies any chest pain concerning for angina. Occasional rib cage pain. Hemodynamically stable. No significant fluid overload on exam.  Pertinent labs reviewed  Discussed with pulmonary attending. Plan for IR guided PE management Monday  Continue IV heparin  Patient with three-vessel CAD however not critical at this time. Continue with aspirin, beta-blocker. Starting Isordil 3 times daily with the hope to reduce and turn off IV nitroglycerin    Would recommend to hold doing CABG at this time in the setting of RV strain in RV dysfunction with new massive PE  Would like to have successful IR guided PE management followed by repeat echo with hope to improve RV function before considering CABG      Rodrigo Rosa MD       -Continued on Heparin gtt for clot stabilization prior to thrombectomy next week   -CABG currently on hold with acute RV failure in setting of large B/L PE. Subjective:     Continues to have intermittent chest pressure.        Past Medical History:     Past Medical History:   Diagnosis Date    CAD (coronary artery disease) 6/9/2021    Coronary arteriosclerosis     Dupuytren's disease of palm     Hyperlipidemia 6/9/2021    Hypertension     SHILPI (obstructive sleep apnea) 6/9/2021    Severe obesity ([address] 10/24/2018    Tobacco dependence 6/9/2021         Social History:     Social History     Socioeconomic History    Marital status:      Spouse name: Not on file    Number of children: Not on file    Years of education: Not on file    Highest education level: Not on file   Tobacco Use    Smoking status: Current Every Day Smoker     Types: Cigarettes    Smokeless tobacco: Never Used   Substance and Sexual Activity    Alcohol use: Yes    Drug use: No   Other Topics Concern     Social Determinants of Health     Financial Resource Strain:     Difficulty of Paying Living Expenses:    Food Insecurity:     Worried About Running Out of Food in the Last Year:     [address] in the Last Year:    Transportation Needs:     Lack of Transportation (Medical):  Lack of Transportation (Non-Medical):    Physical Activity:     Days of Exercise per Week:     Minutes of Exercise per Session:    Stress:     Feeling of Stress :    Social Connections:     Frequency of Communication with Friends and Family:     Frequency of Social Gatherings with Friends and Family:     Attends [employer]oration Services:     Active Member of Clubs or Organizations:     Attends Club or Organization Meetings:     Marital Status:         Family History:     Family History   Problem Relation Age of Onset    Hypertension Mother     Diabetes Mother         Medications:      Allergies   Allergen Reactions    Statins-Hmg-Coa Reductase Inhibitors Other (comments)     Other reaction(s): SEVERE PAIN PER PT    Gabapentin Other (comments)     \"Caused bad nightmares\"    Pcn [Penicillins] Hives        Current Facility-Administered Medications   Medication Dose Route Frequency    nitroglycerin (TRIDIL) 400 mcg/ml infusion  40 mcg/min IntraVENous CONTINUOUS    zolpidem (AMBIEN) tablet 5 mg  5 mg Oral QHS PRN    morphine injection 1 mg  1 mg IntraVENous Q3H PRN    aspirin delayed-release tablet 81 mg  81 mg Oral DAILY    metoprolol tartrate (LOPRESSOR) tablet 50 mg  50 mg Oral BID    sodium chloride (NS) flush 5-40 mL  5-40 mL IntraVENous Q8H    sodium chloride (NS) flush 5-40 mL  5-40 mL IntraVENous PRN    acetaminophen (TYLENOL) tablet 650 mg  650 mg Oral Q6H PRN    Or    acetaminophen (TYLENOL) suppository 650 mg  650 mg Rectal Q6H PRN    polyethylene glycol (MIRALAX) packet 17 g  17 g Oral DAILY PRN    promethazine (PHENERGAN) tablet 12.5 mg  12.5 mg Oral Q6H PRN    Or    ondansetron (ZOFRAN) injection 4 mg  4 mg IntraVENous Q6H PRN    atorvastatin (LIPITOR) tablet 40 mg  40 mg Oral QHS    albuterol-ipratropium (DUO-NEB) 2.5 MG-0.5 MG/3 ML  3 mL Nebulization Q6H PRN    famotidine (PEPCID) tablet 20 mg  20 mg Oral QPM    furosemide (LASIX) tablet 40 mg  40 mg Oral ACB&D    potassium chloride SR (KLOR-CON 10) tablet 20 mEq  20 mEq Oral DAILY    heparin 25,000 units in D5W 250 ml infusion  18-36 Units/kg/hr IntraVENous TITRATE    amiodarone (CORDARONE) tablet 400 mg  400 mg Oral TID    mupirocin (BACTROBAN) 2 % ointment   Both Nostrils BID    docusate sodium (COLACE) capsule 100 mg  100 mg Oral BID    0.9% sodium chloride infusion  50 mL/hr IntraVENous CONTINUOUS         Physical Exam:     Visit Vitals  /84   Pulse 67   Temp 98 °F (36.7 °C)   Resp 18   Ht 5' 10\" (1.778 m)   Wt 112.5 kg (248 lb)   SpO2 96%   BMI 35.58 kg/m²       TELE: normal sinus rhythm    BP Readings from Last 3 Encounters:   06/11/21 135/84   07/01/19 137/87   01/23/19 131/78     Pulse Readings from Last 3 Encounters:   06/11/21 67   07/01/19 83   01/23/19 71     Wt Readings from Last 3 Encounters:   06/09/21 112.5 kg (248 lb)   07/01/19 126.7 kg (279 lb 6.4 oz)   01/23/19 126.8 kg (279 lb 9.6 oz)       General:  alert, cooperative, no distress, appears stated age  Neck:  nontender, no JVD  Lungs:  clear to auscultation bilaterally  Heart:  regular rate and rhythm, S1, S2 normal, no murmur, click, rub or gallop  Abdomen:  abdomen is soft without significant tenderness, masses, organomegaly or guarding  Extremities:  extremities normal, atraumatic, no cyanosis or edema       Data Review:     Recent Labs     06/11/21  0540 06/10/21  0530 06/09/21  0915   WBC 8.9 8.2 10.8   HGB 13.1 14.1 14.0   HCT 39.1 42.6 39.1    172 162     Recent Labs     06/11/21  0540 06/10/21  0530 06/09/21  1610 06/09/21  0915    140  --  141   K 3.5 3.7  --  3.1*    108  --  106   CO2 27 27  --  24   * 100*  --  133*   BUN 14 17  --  13   CREA 1.06 1.03  --  0.94   CA 9.0 8.9  --  8.6   MG 2.1 2.1  --  1.6   ALB  --  3.2* 3.4 3.2*   ALT  --  48 55 60   INR  --   --  1.1  --        Results for orders placed or performed during the hospital encounter of 06/09/21   EKG, 12 LEAD, INITIAL   Result Value Ref Range    Ventricular Rate 86 BPM    Atrial Rate 86 BPM    P-R Interval 148 ms    QRS Duration 100 ms    Q-T Interval 408 ms    QTC Calculation (Bezet) 488 ms    Calculated P Axis 68 degrees    Calculated R Axis -3 degrees    Calculated T Axis -50 degrees    Diagnosis       Sinus rhythm with occasional premature ventricular complexes  Possible Left atrial enlargement  Incomplete right bundle branch block  T wave abnormality, consider inferior ischemia  Abnormal ECG  No previous ECGs available  Confirmed by Jimbo Blackwell ([id number]) on 6/9/2021 9:27:02 PM         All Cardiac Markers in the last 24 hours:    No results found for: CPK, CK, CKMMB, CKMB, RCK3, CKMBT, CKNDX, CKND1, PALMA, TROPT, TROIQ, SIL, TROPT, TNIPOC, BNP, BNPP    Last Lipid:    Lab Results   Component Value Date/Time    Cholesterol, total 120 06/10/2021 05:30 AM    HDL Cholesterol 33 (L) 06/10/2021 05:30 AM    LDL, calculated 69.6 06/10/2021 05:30 AM    Triglyceride 87 06/10/2021 05:30 AM    CHOL/HDL Ratio 3.6 06/10/2021 05:30 AM       Cardiographics:     EKG Results     Procedure 720 Value Units Date/Time    EKG, 12 LEAD, SUBSEQUENT [[id number]] Collected: 06/09/21 0953    Order Status: Completed Updated: 06/09/21 2129     Ventricular Rate 100 BPM      Atrial Rate 100 BPM      P-R Interval 146 ms      QRS Duration 96 ms      Q-T Interval 356 ms      QTC Calculation (Bezet) 459 ms      Calculated P Axis 63 degrees      Calculated R Axis -159 degrees      Calculated T Axis -34 degrees      Diagnosis --     Sinus rhythm with occasional premature ventricular complexes and premature   atrial complexes  Possible Left atrial enlargement  Indeterminate axis  Incomplete right bundle branch block  T wave abnormality, consider inferior ischemia  Abnormal ECG  When compared with ECG of 09-JUN-2021 09:31,  premature atrial complexes are now present  Nonspecific T wave abnormality has replaced inverted T waves in Anterior   leads  Confirmed by Dov Lung ([id number]) on 6/9/2021 9:28:53 PM      EKG, 12 LEAD, SUBSEQUENT [[id number]] Collected: 06/09/21 0931    Order Status: Completed Updated: 06/09/21 2127     Ventricular Rate 84 BPM      Atrial Rate 84 BPM      P-R Interval 148 ms      QRS Duration 98 ms      Q-T Interval 418 ms      QTC Calculation (Bezet) 493 ms      Calculated P Axis 64 degrees      Calculated R Axis -27 degrees      Calculated T Axis -46 degrees      Diagnosis --     Sinus rhythm with occasional premature ventricular complexes  Possible Left atrial enlargement  Incomplete right bundle branch block  T wave abnormality, consider inferior ischemia  Prolonged QT  Abnormal ECG  When compared with ECG of 09-JUN-2021 08:51,  Inverted T waves have replaced nonspecific T wave abnormality in Anterior   leads  Confirmed by Dov Lung ([id number]) on 6/9/2021 9:27:46 PM      EKG, 12 LEAD, INITIAL [[id number]] Collected: 06/09/21 0851    Order Status: Completed Updated: 06/09/21 2127     Ventricular Rate 86 BPM      Atrial Rate 86 BPM      P-R Interval 148 ms      QRS Duration 100 ms      Q-T Interval 408 ms      QTC Calculation (Bezet) 488 ms      Calculated P Axis 68 degrees      Calculated R Axis -3 degrees Calculated T Axis -50 degrees      Diagnosis --     Sinus rhythm with occasional premature ventricular complexes  Possible Left atrial enlargement  Incomplete right bundle branch block  T wave abnormality, consider inferior ischemia  Abnormal ECG  No previous ECGs available  Confirmed by Natalie Mattson ([id number]) on 6/9/2021 9:27:02 PM              02/08/19    NM [facility] MYOCARDIAL PERFUSION MULTIPLE  2/8/2019          XR Results (most recent):  Results from [place] encounter on 06/09/21    XR CHEST PORT    Narrative  AP CHEST, PORTABLE    INDICATION: Chest pain    COMPARISON: Chest x-ray 1/8/2019    FINDINGS:  EKG leads overlie the patient. The cardiac silhouette is normal in size. Central pulmonary arteries appear  slightly prominent, similar to prior exams. The pulmonary vasculature is  unremarkable. No focal consolidation, pleural effusion, or pneumothorax. No  acute osseous abnormalities are identified. Impression  1. No clearly acute finding.   2. Chronic prominence of the central pulmonary arteries, could indicate  pulmonary arterial hypertension        Signed By: Bryan Ingram PA-C supervised    June 11, 2021

## 2021-06-11 NOTE — PROGRESS NOTES
[facility]  Pulmonary, Critical Care, and Sleep Medicine    Follow up progress note    Name: Yoana Fay MRN: [id number]   : 1960 Hospital: [address] [place] [address]   Date: 2021        IMPRESSION:   · Acute submassive PE with extensive bilateral large nonobstructing emboli noted in right distal pulmonary artery, left mainstem PA extending into the lobar, segmental and subsegmental arteries. In addition there is right inferior pulmonary vein clot. Evidence of RV dilatation with flattening of septum suggesting RV load and positive Powell sign with measured PA P of 60 mmHg. Patient has remained hemodynamically stable and requiring minimal supplemental oxygen. Patient is not aware of any hypercoagulable state-has had screening colonoscopies and no known malignancies. Denies having Covid. [facility] including SARS[facility]2 is negative for Covid  · Pulmonary hypertension-acute moderate secondary to above  · Chest pain-likely related to pulmonary infarct-right lower lobe  · DVT-left popliteal and posterior tibial veins. Patient gives a history of lumbar radiculopathy L4-S1 disc disease and numbness in his left lower extremity  · Unstable angina-s/p cardiac cath 2021 revealing multivessel disease. Patient is s/p PCI in 2019 done at [place] and has been on antiplatelet agents since   · Hypertension  · Active tobacco use-committed to quit  · Unvaccinated for Covid  · Obstructive sleep apnea on CPAP      RECOMMENDATIONS:   · Discussed with patient, treating team about options for acute and subsequent treatment for pulmonary embolism. Recommend continuing anticoagulation with heparin since patient is hemodynamically stable and would benefit from effect of heparin for clot stabilization prior to proceeding with thrombectomy.   Can consider subacute thrombectomy early next week-catheter guided mechanical thrombectomy to stabilize and improve pulmonary hypertension and RV function prior to considering coronary artery bypass grafting. Discussed with patient, Dr. Shubham Mclaughlin and Dr. Lizette Campbell  · Oxygen-continue supplementation with SaO2 goal more than 92%  · Can consider IVC filter placement at time of thrombectomy  · Aspiration precautions  · Need for further diagnostics- CT scan, echocardiogram, ILD serologies, hypercoagulable panel-can be ordered at stable state as outpatient  · Out patient testing- PFT, 6 min walk, Polysomnogram  · Assess home Oxygen needs at discharge  · OT, PT, OOB and ambulate  · Healthy weight  · Will Follow  · DVT, PUD prophylaxis  · Patient can be downgraded to stepdown status-discussed with Dr. Alfonso Crawley     Subjective: This patient has been seen and evaluated at the request of Dr. Alfonso Crawley for pulmonary embolism    06/11/21   Denies any new complaints-chest pain is somewhat improved. He states that he has been getting less morphine today. Did get out of bed and sit in a chair  Used his home CPAP at night  Denies any cough, hemoptysis  Denies any new complaints    HPI  Patient is a 56 y.o. male with medical co-morbidities including HTN, CAD with stent, hyperlipidemia, active tobacco smoking, SHILPI on CPAP, morbid obesity, presented from home with chest pain. Accordingly, he has substernal chest pain associates with exertion over the last weeks. He has chest pain with radiation to his neck today when it woke him from his sleep. Patient states that the ambulance brought him to [address] [facility] route patient was given morphine 10 mg for severe chest pain. He has associated shortness of breath. No chest palpitation. No leg swelling. Complains of numbness of his left leg from L4-S1 radiculopathy  No cough. He is a every day smoker and occasionally has a smoker's cough  No recent sick contact. In the ER, cardiac enzyme was negative. EKG was non specific ST change. Cardiology consulted for concern of unstable angina.  He was taken to OR for cardiac cath. Per cardiology, he has significant atherosclerosis disease but no obstructive finding. No PCI was done. Patient was referred for coronary artery bypass grafting surgical opinion. He continued to complain of chest pain and D-dimer was noted to be elevated so CT of the chest was done which showed evidence of large nonobstructing pulmonary embolism and therefore pulmonary has been consulted  Currently patient is on IV heparin wearing oxygen at 2 L sitting up in bed in no distress. He states that intermittently hurts in the mid chest but overall significantly improved. On further questioning he is unaware of any previous blood clots  States he has been on Plavix since 1999  He smokes actively and is committed to quit now that he has multiple problems  He works as a  for a [employer]-fairly active  Denies any known Covid exposures  He has not received Covid vaccination  I have reviewed all of the available data including the patient's previous history external records and radiological imaging available for review. In addition applicable cardiology and other lab data were also reviewed.     Past Medical History:   Diagnosis Date    CAD (coronary artery disease) 6/9/2021    Coronary arteriosclerosis     Dupuytren's disease of palm     Hyperlipidemia 6/9/2021    Hypertension     SHILPI (obstructive sleep apnea) 6/9/2021    Severe obesity ([address] 10/24/2018    Tobacco dependence 6/9/2021      Past Surgical History:   Procedure Laterality Date    HX CHOLECYSTECTOMY      HX CORONARY STENT PLACEMENT      HX HERNIA REPAIR        Allergies   Allergen Reactions    Statins-Hmg-Coa Reductase Inhibitors Other (comments)     Other reaction(s): SEVERE PAIN PER PT    Gabapentin Other (comments)     \"Caused bad nightmares\"    Pcn [Penicillins] Hives       Current Facility-Administered Medications   Medication Dose Route Frequency    nitroglycerin (TRIDIL) 400 mcg/ml infusion  40 mcg/min IntraVENous CONTINUOUS    aspirin delayed-release tablet 81 mg  81 mg Oral DAILY    metoprolol tartrate (LOPRESSOR) tablet 50 mg  50 mg Oral BID    sodium chloride (NS) flush 5-40 mL  5-40 mL IntraVENous Q8H    atorvastatin (LIPITOR) tablet 40 mg  40 mg Oral QHS    famotidine (PEPCID) tablet 20 mg  20 mg Oral QPM    furosemide (LASIX) tablet 40 mg  40 mg Oral ACB&D    potassium chloride SR (KLOR-CON 10) tablet 20 mEq  20 mEq Oral DAILY    heparin 25,000 units in D5W 250 ml infusion  18-36 Units/kg/hr IntraVENous TITRATE    amiodarone (CORDARONE) tablet 400 mg  400 mg Oral TID    mupirocin (BACTROBAN) 2 % ointment   Both Nostrils BID    docusate sodium (COLACE) capsule 100 mg  100 mg Oral BID    0.9% sodium chloride infusion  50 mL/hr IntraVENous CONTINUOUS       Review of Systems:  A comprehensive review of systems was negative except for that written in the HPI. Objective:   Vital Signs:    Visit Vitals  /82   Pulse 66   Temp 98 °F (36.7 °C)   Resp 18   Ht 5' 10\" (1.778 m)   Wt 112.5 kg (248 lb)   SpO2 92%   BMI 35.58 kg/m²       O2 Device: Nasal cannula   O2 Flow Rate (L/min): 4 l/min   Temp (24hrs), Av.1 °F (36.7 °C), Min:97.9 °F (36.6 °C), Max:98.4 °F (36.9 °C)       Intake/Output:   Last shift:       07 -  1900  In: 423.3 [I.V.:423.3]  Out: 1450 [Urine:1450]  Last 3 shifts:  190 -  0700  In: 3709.5 [P.O.:1260; I.V.:2449.5]  Out: 2325 [Urine:2325]    Intake/Output Summary (Last 24 hours) at 2021 1405  Last data filed at 2021 1200  Gross per 24 hour   Intake 2083 ml   Output 2775 ml   Net -692 ml      Physical Exam:   General:  Alert, cooperative, no distress, appears stated age. Head:  Normocephalic, without obvious abnormality, atraumatic. Eyes:  Conjunctivae/corneas clear. PERRL, EOMs intact. Nose: Nares normal. Septum midline. Mucosa normal. No drainage or sinus tenderness.    Throat: Lips, mucosa, and tongue normal. Teeth and gums normal.   Neck: Supple, symmetrical, trachea midline, no adenopathy, thyroid: no enlargment/tenderness/nodules, no carotid bruit and no JVD. Back:   Symmetric, no curvature. ROM normal.   Lungs:   Clear to auscultation bilaterally. Chest wall:  No tenderness or deformity. Heart:  Regular rate and rhythm, S1, S2 normal, no murmur, click, rub or gallop. Abdomen:   Soft, non-tender. Bowel sounds normal. No masses,  No organomegaly. Extremities: Extremities normal, atraumatic, no cyanosis or edema. Pulses: 2+ and symmetric all extremities.    Skin: Skin color, texture, turgor normal. No rashes or lesions   Lymph nodes: Cervical, supraclavicular, and axillary nodes normal.   Neurologic: Grossly nonfocal     Data review:     Recent Results (from the past 24 hour(s))   RESPIRATORY VIRUS PANEL W/COVID-19, PCR    Collection Time: 06/10/21  2:51 PM    Specimen: Nasopharyngeal   Result Value Ref Range    Adenovirus Not detected NOTD      Coronavirus 229E Not detected NOTD      Coronavirus HKU1 Not detected NOTD      Coronavirus CVNL63 Not detected NOTD      Coronavirus OC43 Not detected NOTD      SARS-CoV-2, PCR Not detected NOTD      Metapneumovirus Not detected NOTD      Rhinovirus and Enterovirus Not detected NOTD      Influenza A Not detected NOTD      Influenza A, subtype H1 Not detected NOTD      Influenza A, subtype H3 Not detected NOTD      INFLUENZA A H1N1 PCR Not detected NOTD      Influenza B Not detected NOTD      Parainfluenza 1 Not detected NOTD      Parainfluenza 2 Not detected NOTD      Parainfluenza 3 Not detected NOTD      Parainfluenza virus 4 Not detected NOTD      RSV by PCR Not detected NOTD      B. parapertussis, PCR Not detected NOTD      Bordetella pertussis - PCR Not detected NOTD      Chlamydophila pneumoniae DNA, QL, PCR Not detected NOTD      Mycoplasma pneumoniae DNA, QL, PCR Not detected NOTD     PTT    Collection Time: 06/10/21  3:15 PM   Result Value Ref Range    aPTT 58.6 (H) 23.0 - 36.4 SEC   PTT Collection Time: 06/10/21  9:15 PM   Result Value Ref Range    aPTT 81.6 (H) 23.0 - 36.4 SEC   PTT    Collection Time: 06/11/21  5:40 AM   Result Value Ref Range    aPTT 83.6 (H) 23.0 - 38.1 SEC   METABOLIC PANEL, BASIC    Collection Time: 06/11/21  5:40 AM   Result Value Ref Range    Sodium 139 136 - 145 mmol/L    Potassium 3.5 3.5 - 5.5 mmol/L    Chloride 108 100 - 111 mmol/L    CO2 27 21 - 32 mmol/L    Anion gap 4 3.0 - 18 mmol/L    Glucose 109 (H) 74 - 99 mg/dL    BUN 14 7.0 - 18 MG/DL    Creatinine 1.06 0.6 - 1.3 MG/DL    BUN/Creatinine ratio 13 12 - 20      GFR est AA >60 >60 ml/min/1.73m2    GFR est non-AA >60 >60 ml/min/1.73m2    Calcium 9.0 8.5 - 10.1 MG/DL   MAGNESIUM    Collection Time: 06/11/21  5:40 AM   Result Value Ref Range    Magnesium 2.1 1.6 - 2.6 mg/dL   CBC W/O DIFF    Collection Time: 06/11/21  5:40 AM   Result Value Ref Range    WBC 8.9 4.6 - 13.2 K/uL    RBC 3.95 (L) 4.35 - 5.65 M/uL    HGB 13.1 13.0 - 16.0 g/dL    HCT 39.1 36.0 - 48.0 %    MCV 99.0 (H) 74.0 - 97.0 FL    MCH 33.2 24.0 - 34.0 PG    MCHC 33.5 31.0 - 37.0 g/dL    RDW 12.0 11.6 - 14.5 %    PLATELET 181 143 - 925 K/uL    MPV 10.9 9.2 - 11.8 FL       Imaging:  I have personally reviewed the patients radiographs and have reviewed the reports:  XR Results (most recent):  Results from Hospital Encounter encounter on 06/09/21    XR CHEST PORT    Narrative  AP CHEST, PORTABLE    INDICATION: Chest pain    COMPARISON: Chest x-ray 1/8/2019    FINDINGS:  EKG leads overlie the patient. The cardiac silhouette is normal in size. Central pulmonary arteries appear  slightly prominent, similar to prior exams. The pulmonary vasculature is  unremarkable. No focal consolidation, pleural effusion, or pneumothorax. No  acute osseous abnormalities are identified. Impression  1. No clearly acute finding.   2. Chronic prominence of the central pulmonary arteries, could indicate  pulmonary arterial hypertension      CT Results (most recent):  Results from Hospital Encounter encounter on 06/09/21    CTA CHEST W OR W WO CONT    Narrative  CT chest with contrast for PE    HISTORY: Shortness of breath    COMPARISON: None. TECHNIQUE: Dynamic spiral scan through the chest is obtained from the thoracic  inlet to the diaphragm after dynamic nonionic IV contrast administration  per PE  protocol. Coronal and sagittal MIP computer reconstructions are also obtained  for better visualization of the integrity of pulmonary arteries in 3D dimension,  particularly for lobar/interlobar arterial branches and to minimize radiation  dose. All CT scans at this facility performed using dose optimization techniques as  appreciated to a performed exam, to include automated exposure control,  adjustment of the mA and or KU according to patient size (including appropriate  matching for site specific examination), or use of iterative reconstruction  technique. FINDINGS:    PULMONARY ARTERIES: The contrast bolus is adequate. There are large  nonobstructing pulmonary emboli identified in the distal right and left mainstem  pulmonary arteries with extension to all the lobar, segmental and subsegmental  branches bilaterally. Moderate dilatation of pulmonary trunk and the proximal  mainstem arteries. Near occlusive intraluminal filling defects also seen in the right inferior  pulmonary vein and multiple adjoining venous branches. AORTA AND VASCULAR STRUCTURES: No aortic aneurysm or dissection. Unremarkable  great vessels. Heart: The heart is normal in size. No pericardial effusion. LUNG PARENCHYMA: Patchy opacities at the posterior basal segment of right lower  lobe, most likely pulmonary infarction. No other airspace infiltration or  consolidation seen. Several tiny lung nodules identified, 3 mm in left apex on  #11/3, 2 x 4 mm in lateral left upper lobe on #24/3 3 mm nodule in anterior  right upper lobe on #26/3.     IMAGED THYROID: Unremarkable. MEDIASTINUM: No adenopathy. PLEURAL SPACES AND CHEST WALL: No significant pleural pathology. VISUALIZED UPPER ABDOMEN: Unremarkable. OSSEOUS STRUCTURES: Unremarkable. Impression  1. Extensive bilateral pulmonary emboli from distal mainstem extending to all  the lobar arteries and their branches with near occlusion. No occlusive emboli  also noted in inferior right pulmonary vein and multiple joining branches. 2.  Moderate dilatation of pulmonary trunk and proximal mainstem arteries. 3. Pulmonary infarction at posterior right lower lobe. 4. No pleural effusion or adenopathy. 5. Several small lung nodules as incidental findings. Recommendation as provided  below. -----------------------------------  [facility] SOCIETY RECOMMENDATIONS (2017):    Less than 6 mm nodule:  A. Any solitary nodule(solid, semisolid or groundglass) or multiple solid:  Low Risk: No follow-up; If suspicious morphology or upper lobe location  consider 12 month follow-up. High Risk: Optional CT at 12 months. B. Multiple subsolid/groundglass:  CT in 3 to 6 months. If unchanged consider CT at 2 and 4 years. A wet read was provided to the floor nurse, Ada Urias, for the ordering physician  Dr. Pari Miller by me upon the interpretation at approximately  1150  hours. Thank you for your referral.         06/09/21    ECHO ADULT COMPLETE 06/09/2021 6/9/2021    Interpretation Summary  · LV: Estimated LVEF is 50 - 55%. Normal cavity size, wall thickness and systolic function (ejection fraction normal). Abnormal left ventricular septal motion. Systolic flattening of the interventricular septum consistent with right ventricle pressure overload. Mild (grade 1) left ventricular diastolic dysfunction. · AO: Mild aortic root dilatation. · TV: Non-specific thickening of the tricuspid valve. Mild to moderate tricuspid valve regurgitation is present. · RV: Dilated right ventricle. Reduced systolic function.  Right ventricular findings are consistent with Powell's sign. · IVC: Moderately elevated central venous pressure (8 mmHg); IVC diameter is larger than 21 mm and collapses more than 50% with respiration. · PA: Moderate pulmonary hypertension. Pulmonary arterial systolic pressure is 60 mmHg. Addendum by: Cynthia Bentley MD on 6/9/2021  4:16 PM    Signed by: Cynthia Bentley MD on 6/9/2021  3:11 PM    Complex decision making was made in the evaluation and management plans during this consultation. More than 50% of time was spent in counseling and coordination of care including review of data and discussion with other team members.          Arianna Bello MD  Pulmonary & Critical Care

## 2021-06-11 NOTE — PROGRESS NOTES
[facility]      Follow up for CAD    Chief Complaint:  Chest pain    Interval History:  C/o chest pain in bed on IV NTG and Heparin. No nausea, Vomiting, or SOB      Exam:     Visit Vitals  /82   Pulse 66   Temp 98 °F (36.7 °C)   Resp 18   Ht 5' 10\" (1.778 m)   Wt 112.5 kg (248 lb)   SpO2 92%   BMI 35.58 kg/m²        Const:  alert and oriented. NAD   CV:   RRR without murmur or rub. PMI not displaced. No peripheral edema   Respiratory:  Clear to ascultation without wheezes, rales or rhonchi.   No acc    Assessment:    CAD with 50% LM  DVT/PE with reduced RV function  Last Plavix 6/8/21  HTN  HLD  Active tobacco  SHILPI on CPAP        PLAN:    Needs IR consult next week for embolectomy  Will need repeat Echo  Cont to hold PLAVIX  D/w RN consider increasing IV NTG    D/w with Dr. Brayan Johnston

## 2021-06-11 NOTE — PROGRESS NOTES
13:10  Assumed care of pt from Prudence Vazquez RN  14:20  Pt. C/o 2-3/10 sharp, stabbing chest pain anterior, mid chest.  1 mg morphine administered and pain 0/10 ten minutes post administrations. 17:45  Report given to [address] and Paty Miller. Pt. Transferring to  [id number] [phone]  17:50  Pt. Introduced to incentive spirometer. Instructed on use. Pt. Able to inspire to 2200 mls with good technique. 18:12  Pt transferred to  2314. All belongings transferred with pt (phone, , clothing, CPAP and jug of water, magazine)  Bedside and Verbal shift change report given to [address] and Paty Miller (oncoming nurse) by Ibrahima Trivedi RN (offgoing nurse). Report included the following information SBAR, Kardex, ED Summary, Procedure Summary, Intake/Output, MAR, Recent Results and Cardiac Rhythm sinus rhythm with frequent PVCs. 20:00  Wife, Delia Mello notified of patient's transfer to room  via phone. .  Phone number to step-down unit relayed.

## 2021-06-11 NOTE — PROGRESS NOTES
OCCUPATIONAL THERAPY EVALUATION/DISCHARGE    Patient: Logan Yang (1 y.o. male)  Date: 6/11/2021  Primary Diagnosis: Chest pain [R07.9]  Unstable angina ([address] [I20.0]  Procedure(s) (LRB):  LEFT HEART CATH (N/A)  Coronary Angiography (N/A) 2 Days Post-Op   Precautions:  Fall  PLOF: Pt reports being independent with ADLs and functional mobility. Pt lives with spouse and works full time. ASSESSMENT AND RECOMMENDATIONS:  Pt cleared to participate in OT evaluation by RN. Upon entering room, pt received in bed, alert, and agreeable to OT eval/treatment. Per pt's nurse Sravanthi Figueredo, RN pt has been getting up to the BR and to the recliner chair. Pt seen in conjunction with PT to maximize safety of patient and staff members. Based on the objective data described below, the patient presents with ability to perform basic ADLs and functional transfers at baseline level of function. Pt performed/simulated UB/LB ADLs with Mod Ind for seated and std aspects. Pt demonstrates good safety awareness during all tasks, limited by decreased endurance. Educated on [facility] techniques to utilize while at the hospital and upon return home. Pt verbalized understanding. Skilled occupational therapy is not indicated at this time. Pt is pending possible cardiothoracic procedures. Please do not hesitate to reorder following any procedures if there is change in functional status and participation in ADLs. Discharge Recommendations: None at this time  Further Equipment Recommendations for Discharge: N/A      SUBJECTIVE:   Patient stated I will take my time.     OBJECTIVE DATA SUMMARY:     Past Medical History:   Diagnosis Date    CAD (coronary artery disease) 6/9/2021    Coronary arteriosclerosis     Dupuytren's disease of palm     Hyperlipidemia 6/9/2021    Hypertension     SHILPI (obstructive sleep apnea) 6/9/2021    Severe obesity ([address] 10/24/2018    Tobacco dependence 6/9/2021     Past Surgical History:   Procedure Laterality Date    HX CHOLECYSTECTOMY      HX CORONARY STENT PLACEMENT      HX HERNIA REPAIR       Barriers to Learning/Limitations: None  Compensate with: visual, verbal, tactile, kinesthetic cues/model    Home Situation:   Home Situation  Home Environment: Private residence  # Steps to Enter: 4  Rails to Enter: Yes  One/Two Story Residence: One story  Living Alone: No  Support Systems: Spouse/Significant Other/Partner  Patient Expects to be Discharged to[de-identified] House  Current DME Used/Available at Home: None  []     Right hand dominant   [x]     Left hand dominant    Cognitive/Behavioral Status:  Neurologic State: Alert  Orientation Level: Oriented X4  Cognition: Follows commands  Safety/Judgement: Awareness of environment    Skin: visible skin intact  Edema: none noted    Vision/Perceptual:       Acuity: Able to read clock/calendar on wall without difficulty    Corrective Lenses: Glasses  Coordination: BUE  Coordination: Within functional limits  Fine Motor Skills-Upper: Left Intact; Right Intact    Gross Motor Skills-Upper: Left Intact; Right Intact  Balance:  Sitting: Intact  Standing: Intact    Strength: BUE  Strength: Generally decreased, functional   Tone & Sensation: BUE  Tone: Normal  Sensation: Intact   Range of Motion: BUE  AROM: Generally decreased, functional; Dupuytren's contractures in bilateral hands  PROM: Within functional limits   Functional Mobility and Transfers for ADLs:  Bed Mobility:     Supine to Sit: Modified independent  Sit to Supine: Modified independent     Transfers:  Sit to Stand: Modified independent  Stand to Sit: Modified independent   ADL Assessment:  Feeding: Setup  Oral Facial Hygiene/Grooming: Setup  Bathing: Modified independent  Upper Body Dressing: Modified independent  Lower Body Dressing: Modified independent  Toileting: Modified independent   ADL Intervention:     Cognitive Retraining  Safety/Judgement: Awareness of environment  Pain:  Pain level pre-treatment: 0/10   Pain level post-treatment: 0/10 Activity Tolerance:   Fair  Please refer to the flowsheet for vital signs taken during this treatment. After treatment:   []  Patient left in no apparent distress sitting up in chair  [x]  Patient left in no apparent distress in bed  [x]  Call bell left within reach  [x]  Nursing notified  []  Caregiver present  []  Bed alarm activated    COMMUNICATION/EDUCATION:   [x]      Role of Occupational Therapy in the acute care setting  [x]      Home safety education was provided and the patient/caregiver indicated understanding. [x]      Patient/family have participated as able and agree with findings and recommendations. []      Patient is unable to participate in plan of care at this time. Thank you for this referral.  Myles Rai OTR/L  Time Calculation: 13 mins      Eval Complexity: History: LOW Complexity : Brief history review ; Examination: LOW Complexity : 1-3 performance deficits relating to physical, cognitive , or psychosocial skils that result in activity limitations and / or participation restrictions ;    Decision Making:LOW Complexity : No comorbidities that affect functional and no verbal or physical assistance needed to complete eval tasks

## 2021-06-11 NOTE — PROGRESS NOTES
Admit Date: 6/9/2021  Date of Service: 6/11/2021    Reason for follow-up: Chest pain and shortness of breath      Assessment:         Extensive bilateral pulmonary emboli with near occlusion with RV strain: Dilation of the pulmonary trunk and proximal mainstem arteries. Hemodynamically stable   -6/9/2021 echocardiogram: Ejection fraction 50 to 55%, abnormal left ventricular septal wall motion with flattening of the interventricular septum consistent with right ventricular pressure overload. Posterior right lower lobe pulmonary infarct  Left popliteal and posterior tibial acute DVT  Unstable angina with associated nausea vomiting diaphoresis:    -Status post cardiac cath on 6/9/2021 revealing multivessel disease prompting referral for CABG  Moderate pulmonary hypertension  Hypertension  Dyslipidemia  Obesity  Active tobacco use  Plan:   Ongoing discussions regarding management of PE and DVTs. Currently on IV heparin drip on PE protocol   -For now CABG is on hold. - Current plan to continue heparin to stabalize clots and discuss thrombectomy with IR early next week. PT and OT on hold for now  Follow CBC BMP monitor for bleeding  Continue amiodarone, aspirin, Lipitor, Lasix, magnesium, beta-blocker  Titrate nitro drip as per cardiology  O2 nasal cannula maintain sats greater than 92%  As needed duo nebs  ILD serologies, hypercoagulable panel-can be ordered at stable state as outpatient    [id number] [address] for transition to [employer]    Current Antibtiocs:   None    Lines:   Peripheral    Case discussed with:  [x]Patient  []Family  [x]Nursing  [x]Case Management  DVT Prophylaxis:  []Lovenox  []Hep SQ  []SCDs  []Coumadin   [x]On Heparin gtt    I have independently examined the patient and reviewed all lab studies and imgaing as well as review of nursing notes and physican notes from the past 24 hours. Nichole Valdivia D.O.   Pager [phone]      Allergies   Allergen Reactions    Statins-Hmg-Coa Reductase Inhibitors Other (comments)     Other reaction(s): SEVERE PAIN PER PT    Gabapentin Other (comments)     \"Caused bad nightmares\"    Pcn [Penicillins] Hives           Subjective:      Pt seen and examined. Feeling overall okay. Wife and sister-in-law at bedside. Overall he is feeling okay and has multiple questions. Still has some ongoing intermittent chest pain. Mild shortness of breath. Less cough than prior. Eating well. No other complaints at this time    Objective:        Visit Vitals  /84   Pulse 67   Temp 98 °F (36.7 °C)   Resp 18   Ht 5' 10\" (1.778 m)   Wt 112.5 kg (248 lb)   SpO2 96%   BMI 35.58 kg/m²     Temp (24hrs), Av.1 °F (36.7 °C), Min:97.9 °F (36.6 °C), Max:98.4 °F (36.9 °C)        General:   awake alert and oriented, non-toxic   Skin:   no rashes or skin lesions noted on limited exam, dry and warm   HEENT:  No scleral icterus or pallor; oral mucosa moist, lips moist   Lymph Nodes:   not assessed today   Lungs:   non, labored; bilaterally clear to aspiration- no crackles wheezes rales or rhonchi   Heart:  RRR, s1 and s2; no murmurs rubs or gallops; no edema, + pedal pulses   Abdomen:  soft, non-distended, active bowel sounds, non-tender, ventral hernia reducible   Genitourinary:  deferred   Extremities:   average muscle tone; no contractures, no joint effusions, slight hematoma on the right wrist at the cath site; left lower extremity slightly more swollen than right   Neurologic:  No gross focal motor or sensory abnormalities; CN 2-12 intact; Follows commands. Psychiatric:   appropriate and interactive.        Labs: Results:   Chemistry Recent Labs     21  0540 06/10/21  0530 21  1610 21  0915   * 100*  --  133*    140  --  141   K 3.5 3.7  --  3.1*    108  --  106   CO2 27 27  --  24   BUN 14 17  --  13   CREA 1.06 1.03  --  0.94   CA 9.0 8.9  --  8.6   AGAP 4 5  --  11   BUCR 13 17  --  14   AP  --  85 90 91   TP  --  6.3* 6.5 6.5   ALB  --  3.2* 3.4 3.2* GLOB  --  3.1 3.1 3.3   AGRAT  --  1.0 1.1 1.0      CBC w/Diff Recent Labs     06/11/21  0540 06/10/21  0530 06/09/21  0915   WBC 8.9 8.2 10.8   RBC 3.95* 4.27* 4.14*   HGB 13.1 14.1 14.0   HCT 39.1 42.6 39.1    172 162   GRANS  --   --  66   LYMPH  --   --  23   EOS  --   --  1        No results found for: SDES No results found for: CULT     Results     Procedure Component Value Units Date/Time    RESPIRATORY VIRUS PANEL W/COVID-19, PCR [[id number]] Collected: 06/10/21 1451    Order Status: Completed Specimen: Nasopharyngeal Updated: 06/10/21 1630     Adenovirus Not detected        Coronavirus 229E Not detected        Coronavirus HKU1 Not detected        Coronavirus CVNL63 Not detected        Coronavirus OC43 Not detected        SARS-CoV-2, PCR Not detected        Metapneumovirus Not detected        Rhinovirus and Enterovirus Not detected        Influenza A Not detected        Influenza A, subtype H1 Not detected        Influenza A, subtype H3 Not detected        INFLUENZA A H1N1 PCR Not detected        Influenza B Not detected        Parainfluenza 1 Not detected        Parainfluenza 2 Not detected        Parainfluenza 3 Not detected        Parainfluenza virus 4 Not detected        RSV by PCR Not detected        B. parapertussis, PCR Not detected        Bordetella pertussis - PCR Not detected        Chlamydophila pneumoniae DNA, QL, PCR Not detected        Mycoplasma pneumoniae DNA, QL, PCR Not detected       COVID-19 RAPID TEST [[id number]]     Order Status: Canceled     COVID-19 RAPID TEST [[id number]]     Order Status: Canceled           Imaging:     CTA CHEST W OR W WO CONT    Result Date: 6/10/2021  1. Extensive bilateral pulmonary emboli from distal mainstem extending to all the lobar arteries and their branches with near occlusion. No occlusive emboli also noted in inferior right pulmonary vein and multiple joining branches. 2.  Moderate dilatation of pulmonary trunk and proximal mainstem arteries.  3. Pulmonary infarction at posterior right lower lobe. 4. No pleural effusion or adenopathy. 5. Several small lung nodules as incidental findings. Recommendation as provided below. ----------------------------------- [facility] SOCIETY RECOMMENDATIONS (2017): Less than 6 mm nodule: A. Any solitary nodule(solid, semisolid or groundglass) or multiple solid:      Low Risk: No follow-up; If suspicious morphology or upper lobe location consider 12 month follow-up. High Risk: Optional CT at 12 months. B. Multiple subsolid/groundglass:  CT in 3 to 6 months. If unchanged consider CT at 2 and 4 years. A wet read was provided to the floor nurse, Lucio Ibrahim, for the ordering physician Dr. Kristi Copeland by me upon the interpretation at approximately  1150  hours. Thank you for your referral.     XR CHEST PORT    Result Date: 6/9/2021   1. No clearly acute finding. 2. Chronic prominence of the central pulmonary arteries, could indicate pulmonary arterial hypertension     ECHO ADULT COMPLETE    Addendum Date: 6/9/2021    · LV: Estimated LVEF is 50 - 55%. Normal cavity size, wall thickness and systolic function (ejection fraction normal). Abnormal left ventricular septal motion. Systolic flattening of the interventricular septum consistent with right ventricle pressure overload. Mild (grade 1) left ventricular diastolic dysfunction. · AO: Mild aortic root dilatation. · TV: Non-specific thickening of the tricuspid valve. Mild to moderate tricuspid valve regurgitation is present. · RV: Dilated right ventricle. Reduced systolic function. Right ventricular findings are consistent with Powell's sign. · IVC: Moderately elevated central venous pressure (8 mmHg); IVC diameter is larger than 21 mm and collapses more than 50% with respiration. · PA: Moderate pulmonary hypertension. Pulmonary arterial systolic pressure is 60 mmHg.       CARDIAC PROCEDURE    Addendum Date: 6/9/2021    Severe three-vessel coronary disease as mentioned in detail Normal LVEF and LVEDP. Echocardiogram will be performed We will have CT surgery evaluation for possible CABG    Addendum Date: 6/9/2021    Severe three-vessel coronary disease as mentioned in detail Normal LVEF and LVEDP.   Echocardiogram will be performed We will have CT surgery evaluation for possible CABG

## 2021-06-11 NOTE — PROGRESS NOTES
0700: Bedside and Verbal shift change report given to Early Kussmaul, RN and Jahaira Marcos RN (oncoming nurse) by Liu Crawley RN (offgoing nurse). Report included the following information SBAR, Kardex, ED Summary, Procedure Summary, Intake/Output, MAR, Recent Results, Cardiac Rhythm SR with 1st Degree AV Block, Alarm Parameters  and Quality Measures. Drips verified. 0800: Assessment completed. VSS. Denies pain. Up in recliner. 1000: Pt. resting comfortably in bed.     1200: Reassessment completed. No changes. 1300: Report given to [facility], [facility].

## 2021-06-11 NOTE — PROGRESS NOTES
Physician Progress Note      PATIENT:               Lynn Wooten  CSN #:                  [id number]  :                       1960  ADMIT DATE:       2021 8:52 AM  DISCH DATE:  RESPONDING  PROVIDER #:        Derrell Beatty DO          QUERY TEXT:    Dear Dr. Shama Morales or [facility] hospitalist,      Pt admitted with unstable angina . Pt noted to have a pulmonary embolism  with  RV strain. If possible, please document in the progress notes and discharge summary if you are evaluating and / or treating any of the following: The medical record reflects the following:    Risk Factors: smoker;   obesity ;  Pulm  HTN    Clinical Indicators: noted  in   PN  6/10-  Extensive bilateral pulmonary emboli with near occlusion with RV strain    Treatment: heparin gtt continues. Thank you,   Markos Zepeda  RN   CCDS  x [phone]  Options provided:  -- Pulmonary Embolism with Acute Cor Pulmonale  -- Pulmonary Embolism without Acute Cor Pulmonale  -- Other - I will add my own diagnosis  -- Disagree - Not applicable / Not valid  -- Disagree - Clinically unable to determine / Unknown  -- Refer to Clinical Documentation Reviewer    PROVIDER RESPONSE TEXT:    This patient has Pulmonary Embolism with acute cor pulmonale.     Query created by: Lloyd Gong on 2021 9:14 AM      Electronically signed by:  Derrell Beatty DO 2021 9:50 AM

## 2021-06-11 NOTE — PROGRESS NOTES
1700: TRANSFER - IN REPORT:    Verbal report received from [facility] (name) on Taylor Else  being received from ICU CVT (unit) for routine progression of care      Report consisted of patients Situation, Background, Assessment and   Recommendations(SBAR). Information from the following report(s) SBAR, Kardex, [facility] and Cardiac Rhythm NSR was reviewed with the receiving nurse. Opportunity for questions and clarification was provided. Assessment completed upon patients arrival to unit and care assumed. 1930: Bedside and Verbal shift change report given to Jo Hazel RN (oncoming nurse) by Arabella Houston RN (offgoing nurse). Report included the following information SBAR, Kardex, MAR and Cardiac Rhythm NSR.

## 2021-06-11 NOTE — PROGRESS NOTES
Problem: Pressure Injury - Risk of  Goal: *Prevention of pressure injury  Description: Document Ez Scale and appropriate interventions in the flowsheet. Outcome: Progressing Towards Goal  Note: Pressure Injury Interventions: Activity Interventions: Assess need for specialty bed, Pressure redistribution bed/mattress(bed type), Increase time out of bed    Mobility Interventions: Assess need for specialty bed, Pressure redistribution bed/mattress (bed type), HOB 30 degrees or less    Nutrition Interventions: Document food/fluid/supplement intake                     Problem: Falls - Risk of  Goal: *Absence of Falls  Description: Document Deon Fall Risk and appropriate interventions in the flowsheet.   Outcome: Progressing Towards Goal  Note: Fall Risk Interventions:  Mobility Interventions: Communicate number of staff needed for ambulation/transfer, Patient to call before getting OOB, Utilize walker, cane, or other assistive device         Medication Interventions: Assess postural VS orthostatic hypotension, Patient to call before getting OOB, Teach patient to arise slowly    Elimination Interventions: Call light in reach, Patient to call for help with toileting needs, Toilet paper/wipes in reach, Toileting schedule/hourly rounds, Urinal in reach

## 2021-06-12 LAB
ANION GAP SERPL CALC-SCNC: 6 MMOL/L (ref 3–18)
APTT PPP: 85.6 SEC (ref 23–36.4)
BUN SERPL-MCNC: 12 MG/DL (ref 7–18)
BUN/CREAT SERPL: 12 (ref 12–20)
CALCIUM SERPL-MCNC: 8.3 MG/DL (ref 8.5–10.1)
CHLORIDE SERPL-SCNC: 108 MMOL/L (ref 100–111)
CO2 SERPL-SCNC: 27 MMOL/L (ref 21–32)
CREAT SERPL-MCNC: 1.04 MG/DL (ref 0.6–1.3)
ERYTHROCYTE [DISTWIDTH] IN BLOOD BY AUTOMATED COUNT: 12.2 % (ref 11.6–14.5)
GLUCOSE SERPL-MCNC: 89 MG/DL (ref 74–99)
HCT VFR BLD AUTO: 37.6 % (ref 36–48)
HGB BLD-MCNC: 12.4 G/DL (ref 13–16)
MAGNESIUM SERPL-MCNC: 1.9 MG/DL (ref 1.6–2.6)
MCH RBC QN AUTO: 33.2 PG (ref 24–34)
MCHC RBC AUTO-ENTMCNC: 33 G/DL (ref 31–37)
MCV RBC AUTO: 100.5 FL (ref 74–97)
PLATELET # BLD AUTO: 190 K/UL (ref 135–420)
PMV BLD AUTO: 11 FL (ref 9.2–11.8)
POTASSIUM SERPL-SCNC: 3.7 MMOL/L (ref 3.5–5.5)
RBC # BLD AUTO: 3.74 M/UL (ref 4.35–5.65)
SODIUM SERPL-SCNC: 141 MMOL/L (ref 136–145)
WBC # BLD AUTO: 8.3 K/UL (ref 4.6–13.2)

## 2021-06-12 PROCEDURE — 74011250637 HC RX REV CODE- 250/637: Performed by: PHYSICIAN ASSISTANT

## 2021-06-12 PROCEDURE — 74011250637 HC RX REV CODE- 250/637: Performed by: INTERNAL MEDICINE

## 2021-06-12 PROCEDURE — 80048 BASIC METABOLIC PNL TOTAL CA: CPT

## 2021-06-12 PROCEDURE — 85027 COMPLETE CBC AUTOMATED: CPT

## 2021-06-12 PROCEDURE — 2709999900 HC NON-CHARGEABLE SUPPLY

## 2021-06-12 PROCEDURE — 99232 SBSQ HOSP IP/OBS MODERATE 35: CPT | Performed by: PHYSICIAN ASSISTANT

## 2021-06-12 PROCEDURE — 65660000004 HC RM CVT STEPDOWN

## 2021-06-12 PROCEDURE — 36415 COLL VENOUS BLD VENIPUNCTURE: CPT

## 2021-06-12 PROCEDURE — 74011250636 HC RX REV CODE- 250/636: Performed by: INTERNAL MEDICINE

## 2021-06-12 PROCEDURE — 99233 SBSQ HOSP IP/OBS HIGH 50: CPT | Performed by: INTERNAL MEDICINE

## 2021-06-12 PROCEDURE — 85730 THROMBOPLASTIN TIME PARTIAL: CPT

## 2021-06-12 PROCEDURE — 83735 ASSAY OF MAGNESIUM: CPT

## 2021-06-12 PROCEDURE — APPNB30 APP NON BILLABLE TIME 0-30 MINS: Performed by: PHYSICIAN ASSISTANT

## 2021-06-12 PROCEDURE — 99232 SBSQ HOSP IP/OBS MODERATE 35: CPT | Performed by: INTERNAL MEDICINE

## 2021-06-12 RX ADMIN — Medication 10 ML: at 16:30

## 2021-06-12 RX ADMIN — Medication 10 ML: at 09:16

## 2021-06-12 RX ADMIN — MUPIROCIN: 20 OINTMENT TOPICAL at 17:28

## 2021-06-12 RX ADMIN — Medication 10 ML: at 22:12

## 2021-06-12 RX ADMIN — ZOLPIDEM TARTRATE 5 MG: 5 TABLET ORAL at 00:13

## 2021-06-12 RX ADMIN — POTASSIUM CHLORIDE 20 MEQ: 750 TABLET, FILM COATED, EXTENDED RELEASE ORAL at 09:00

## 2021-06-12 RX ADMIN — FUROSEMIDE 40 MG: 40 TABLET ORAL at 17:23

## 2021-06-12 RX ADMIN — ISOSORBIDE DINITRATE 30 MG: 10 TABLET ORAL at 17:27

## 2021-06-12 RX ADMIN — METOPROLOL TARTRATE 50 MG: 50 TABLET, FILM COATED ORAL at 09:00

## 2021-06-12 RX ADMIN — AMIODARONE HYDROCHLORIDE 400 MG: 200 TABLET ORAL at 22:12

## 2021-06-12 RX ADMIN — MORPHINE SULFATE 1 MG: 2 INJECTION, SOLUTION INTRAMUSCULAR; INTRAVENOUS at 09:11

## 2021-06-12 RX ADMIN — METOPROLOL TARTRATE 50 MG: 50 TABLET, FILM COATED ORAL at 22:12

## 2021-06-12 RX ADMIN — MUPIROCIN: 20 OINTMENT TOPICAL at 12:24

## 2021-06-12 RX ADMIN — ISOSORBIDE DINITRATE 30 MG: 10 TABLET ORAL at 22:11

## 2021-06-12 RX ADMIN — AMIODARONE HYDROCHLORIDE 400 MG: 200 TABLET ORAL at 09:00

## 2021-06-12 RX ADMIN — MORPHINE SULFATE 1 MG: 2 INJECTION, SOLUTION INTRAMUSCULAR; INTRAVENOUS at 22:10

## 2021-06-12 RX ADMIN — MORPHINE SULFATE 1 MG: 2 INJECTION, SOLUTION INTRAMUSCULAR; INTRAVENOUS at 16:29

## 2021-06-12 RX ADMIN — DOCUSATE SODIUM 100 MG: 100 CAPSULE, LIQUID FILLED ORAL at 09:00

## 2021-06-12 RX ADMIN — ATORVASTATIN CALCIUM 40 MG: 40 TABLET, FILM COATED ORAL at 22:12

## 2021-06-12 RX ADMIN — DOCUSATE SODIUM 100 MG: 100 CAPSULE, LIQUID FILLED ORAL at 17:23

## 2021-06-12 RX ADMIN — FUROSEMIDE 40 MG: 40 TABLET ORAL at 09:00

## 2021-06-12 RX ADMIN — ISOSORBIDE DINITRATE 30 MG: 10 TABLET ORAL at 09:10

## 2021-06-12 RX ADMIN — FAMOTIDINE 20 MG: 20 TABLET ORAL at 17:23

## 2021-06-12 RX ADMIN — AMIODARONE HYDROCHLORIDE 400 MG: 200 TABLET ORAL at 17:23

## 2021-06-12 RX ADMIN — HEPARIN SODIUM 14 UNITS/KG/HR: 10000 INJECTION, SOLUTION INTRAVENOUS at 12:24

## 2021-06-12 RX ADMIN — ZOLPIDEM TARTRATE 5 MG: 5 TABLET ORAL at 22:15

## 2021-06-12 RX ADMIN — Medication 81 MG: at 09:00

## 2021-06-12 NOTE — PROGRESS NOTES
Cardiology Progress Note    Consultation request by Brianna Mehta MD for advice/opinion related to evaluating CP    Date of  Admission: 6/9/2021  8:52 AM   Primary Care Physician:  Summer Bishop MD    Attending Cardiologist: Dr. Anselmo Ness MD      Assessment:     -Acute Submassive B/L PE, initially presented with symptoms concerning for unstable angina. CT chest showed extensive bilateral large nonobstructing emboli noted in right distal pulmonary artery, left mainstem PA extending into the lobar, segmental and subsegmental arteries. In addition there is right inferior pulmonary vein clot. Initial troponin normal. ECG SR with subtle ST changes. ECHO this admission showed Dilated right ventricle. Abnormal wall motion: free wall hypokinesis of the right ventricle. Reduced systolic function. The findings are consistent with Powell's sign   -CAD s/p [facility] (2019,2018) Widely patent Lcx stents, known occluded RCA, diffuse LM/LAD disease. · S/p LHC (06/09/21)   Left Main   Large-caliber vessel. Distal vessel approximately 50% stenosis with calcification that involves LAD and circumflex bifurcation   Left Anterior Descending   The vessel is calcified. The vessel is tortuous. Ostial, proximal and the mid LAD has diffuse calcified moderate disease with sequential eccentric 75% lesions. Mid-distal LAD has no significant obstructive disease Diagonal branch: Moderate disease. Medium caliber vessel   Left Circumflex   The vessel is calcified. The vessel is tortuous. Proximal LCx stent has mild in-stent restenosis followed by hazy calcified borderline 70% stenosis. OM1: Small caliber vessel OM 2: Large bifurcating vessel. Calcified hazy 70% stenosis before bifurcation. Superior branch distally distally has subtotal occlusion with faint homocollateral. Inferior branch without any significant obstructive disease   Right Coronary Artery   Proximal-mid 100% in-stent occlusion. Previously known by cardiac catheterization in 2019. Evidence of good collaterals from left to right supplying distal RCA and RPDA   -ECHO (06/09/21) EF 54-04%, Systolic flattening of the interventricular septum consistent with right ventricle pressure overload. Mild (grade 1) left ventricular diastolic dysfunction. -Mild-Moderate TR by echo   -Moderate pulmonary HTN by echo PASP 60 mmHg  -Hypertension, on lopressor, Imdur, Lasix as outpatient   -Hyperlipidemia  -Tobacco Abuse, 1 ppd, cessation advised  -Obesity, BMI 35.58 kg/m2    Primary Cardiologist: [address]:       Patient denies any chest pain or chest tightness. Denies any shortness of breath. Hemodynamically stable. Continued on Heparin gtt for clot stabilization prior to thrombectomy on Monday by IR  Continue with aspirin, beta-blocker. Continue Isordil 3 times daily with the hope to reduce and turn off IV nitroglycerin  Would recommend to hold doing CABG at this time in the setting of RV strain in RV dysfunction with new massive PE                       Subjective:     Feels better no CP. No other c/o voiced     Past Medical History:     Past Medical History:   Diagnosis Date    CAD (coronary artery disease) 6/9/2021    Coronary arteriosclerosis     Dupuytren's disease of palm     Hyperlipidemia 6/9/2021    Hypertension     SHILPI (obstructive sleep apnea) 6/9/2021    Severe obesity ([address] 10/24/2018    Tobacco dependence 6/9/2021         Social History:     Social History     Socioeconomic History    Marital status:      Spouse name: Not on file    Number of children: Not on file    Years of education: Not on file    Highest education level: Not on file   Tobacco Use    Smoking status: Current Every Day Smoker     Types: Cigarettes    Smokeless tobacco: Never Used   Substance and Sexual Activity    Alcohol use:  Yes    Drug use: No   Other Topics Concern     Social Determinants of Health     Financial Resource Strain:     Difficulty of Paying Living Expenses: Food Insecurity:     Worried About Running Out of Food in the Last Year:     [address] in the Last Year:    Transportation Needs:     Lack of Transportation (Medical):  Lack of Transportation (Non-Medical):    Physical Activity:     Days of Exercise per Week:     Minutes of Exercise per Session:    Stress:     Feeling of Stress :    Social Connections:     Frequency of Communication with Friends and Family:     Frequency of Social Gatherings with Friends and Family:     Attends Jain Services:     Active Member of Clubs or Organizations:     Attends Club or Organization Meetings:     Marital Status:         Family History:     Family History   Problem Relation Age of Onset    Hypertension Mother     Diabetes Mother         Medications:      Allergies   Allergen Reactions    Statins-Hmg-Coa Reductase Inhibitors Other (comments)     Other reaction(s): SEVERE PAIN PER PT    Gabapentin Other (comments)     \"Caused bad nightmares\"    Pcn [Penicillins] Hives        Current Facility-Administered Medications   Medication Dose Route Frequency    nitroglycerin (TRIDIL) 400 mcg/ml infusion  40 mcg/min IntraVENous CONTINUOUS    isosorbide dinitrate (ISORDIL) tablet 30 mg  30 mg Oral TID    zolpidem (AMBIEN) tablet 5 mg  5 mg Oral QHS PRN    morphine injection 1 mg  1 mg IntraVENous Q3H PRN    aspirin delayed-release tablet 81 mg  81 mg Oral DAILY    metoprolol tartrate (LOPRESSOR) tablet 50 mg  50 mg Oral BID    sodium chloride (NS) flush 5-40 mL  5-40 mL IntraVENous Q8H    sodium chloride (NS) flush 5-40 mL  5-40 mL IntraVENous PRN    acetaminophen (TYLENOL) tablet 650 mg  650 mg Oral Q6H PRN    Or    acetaminophen (TYLENOL) suppository 650 mg  650 mg Rectal Q6H PRN    polyethylene glycol (MIRALAX) packet 17 g  17 g Oral DAILY PRN    promethazine (PHENERGAN) tablet 12.5 mg  12.5 mg Oral Q6H PRN    Or    ondansetron (ZOFRAN) injection 4 mg  4 mg IntraVENous Q6H PRN    atorvastatin (LIPITOR) tablet 40 mg  40 mg Oral QHS    albuterol-ipratropium (DUO-NEB) 2.5 MG-0.5 MG/3 ML  3 mL Nebulization Q6H PRN    famotidine (PEPCID) tablet 20 mg  20 mg Oral QPM    furosemide (LASIX) tablet 40 mg  40 mg Oral ACB&D    heparin 25,000 units in D5W 250 ml infusion  18-36 Units/kg/hr IntraVENous TITRATE    amiodarone (CORDARONE) tablet 400 mg  400 mg Oral TID    mupirocin (BACTROBAN) 2 % ointment   Both Nostrils BID    docusate sodium (COLACE) capsule 100 mg  100 mg Oral BID    0.9% sodium chloride infusion  50 mL/hr IntraVENous CONTINUOUS         Physical Exam:     Visit Vitals  BP (!) 143/86   Pulse 74   Temp 98.4 °F (36.9 °C)   Resp 17   Ht 5' 10\" (1.778 m)   Wt 112.5 kg (248 lb)   SpO2 92%   BMI 35.58 kg/m²       TELE: normal sinus rhythm    BP Readings from Last 3 Encounters:   06/12/21 (!) 143/86   07/01/19 137/87   01/23/19 131/78     Pulse Readings from Last 3 Encounters:   06/12/21 74   07/01/19 83   01/23/19 71     Wt Readings from Last 3 Encounters:   06/09/21 112.5 kg (248 lb)   07/01/19 126.7 kg (279 lb 6.4 oz)   01/23/19 126.8 kg (279 lb 9.6 oz)       General:  alert, cooperative, no distress, appears stated age  Neck:  nontender, no JVD  Lungs:  clear to auscultation bilaterally  Heart:  regular rate and rhythm, S1, S2 normal, no murmur, click, rub or gallop  Abdomen:  abdomen is soft without significant tenderness, masses, organomegaly or guarding  Extremities:  extremities normal, atraumatic, no cyanosis or edema       Data Review:     Recent Labs     06/12/21  0620 06/11/21  0540 06/10/21  0530   WBC 8.3 8.9 8.2   HGB 12.4* 13.1 14.1   HCT 37.6 39.1 42.6    184 172     Recent Labs     06/12/21  0620 06/11/21  0540 06/10/21  0530 06/09/21  1610    139 140  --    K 3.7 3.5 3.7  --     108 108  --    CO2 27 27 27  --    GLU 89 109* 100*  --    BUN 12 14 17  --    CREA 1.04 1.06 1.03  --    CA 8.3* 9.0 8.9  --    MG 1.9 2.1 2.1  --    ALB  --   --  3.2* 3.4   ALT  -- --  48 55   INR  --   --   --  1.1       Results for orders placed or performed during the hospital encounter of 06/09/21   EKG, 12 LEAD, INITIAL   Result Value Ref Range    Ventricular Rate 86 BPM    Atrial Rate 86 BPM    P-R Interval 148 ms    QRS Duration 100 ms    Q-T Interval 408 ms    QTC Calculation (Bezet) 488 ms    Calculated P Axis 68 degrees    Calculated R Axis -3 degrees    Calculated T Axis -50 degrees    Diagnosis       Sinus rhythm with occasional premature ventricular complexes  Possible Left atrial enlargement  Incomplete right bundle branch block  T wave abnormality, consider inferior ischemia  Abnormal ECG  No previous ECGs available  Confirmed by Myke Rodriguez ([id number]) on 6/9/2021 9:27:02 PM         All Cardiac Markers in the last 24 hours:    No results found for: CPK, CK, CKMMB, CKMB, RCK3, CKMBT, CKNDX, CKND1, PALMA, TROPT, TROIQ, SIL, TROPT, TNIPOC, BNP, BNPP    Last Lipid:    Lab Results   Component Value Date/Time    Cholesterol, total 120 06/10/2021 05:30 AM    HDL Cholesterol 33 (L) 06/10/2021 05:30 AM    LDL, calculated 69.6 06/10/2021 05:30 AM    Triglyceride 87 06/10/2021 05:30 AM    CHOL/HDL Ratio 3.6 06/10/2021 05:30 AM       Cardiographics:     EKG Results     Procedure 720 Value Units Date/Time    EKG, 12 LEAD, SUBSEQUENT [[id number]] Collected: 06/09/21 0953    Order Status: Completed Updated: 06/09/21 2129     Ventricular Rate 100 BPM      Atrial Rate 100 BPM      P-R Interval 146 ms      QRS Duration 96 ms      Q-T Interval 356 ms      QTC Calculation (Bezet) 459 ms      Calculated P Axis 63 degrees      Calculated R Axis -159 degrees      Calculated T Axis -34 degrees      Diagnosis --     Sinus rhythm with occasional premature ventricular complexes and premature   atrial complexes  Possible Left atrial enlargement  Indeterminate axis  Incomplete right bundle branch block  T wave abnormality, consider inferior ischemia  Abnormal ECG  When compared with ECG of 09-JUN-2021 09:31,  premature atrial complexes are now present  Nonspecific T wave abnormality has replaced inverted T waves in Anterior   leads  Confirmed by Anny Arana ([id number]) on 6/9/2021 9:28:53 PM      EKG, 12 LEAD, SUBSEQUENT [[id number]] Collected: 06/09/21 0931    Order Status: Completed Updated: 06/09/21 2127     Ventricular Rate 84 BPM      Atrial Rate 84 BPM      P-R Interval 148 ms      QRS Duration 98 ms      Q-T Interval 418 ms      QTC Calculation (Bezet) 493 ms      Calculated P Axis 64 degrees      Calculated R Axis -27 degrees      Calculated T Axis -46 degrees      Diagnosis --     Sinus rhythm with occasional premature ventricular complexes  Possible Left atrial enlargement  Incomplete right bundle branch block  T wave abnormality, consider inferior ischemia  Prolonged QT  Abnormal ECG  When compared with ECG of 09-JUN-2021 08:51,  Inverted T waves have replaced nonspecific T wave abnormality in Anterior   leads  Confirmed by Anny Arana ([id number]) on 6/9/2021 9:27:46 PM      EKG, 12 LEAD, INITIAL [[id number]] Collected: 06/09/21 0851    Order Status: Completed Updated: 06/09/21 2127     Ventricular Rate 86 BPM      Atrial Rate 86 BPM      P-R Interval 148 ms      QRS Duration 100 ms      Q-T Interval 408 ms      QTC Calculation (Bezet) 488 ms      Calculated P Axis 68 degrees      Calculated R Axis -3 degrees      Calculated T Axis -50 degrees      Diagnosis --     Sinus rhythm with occasional premature ventricular complexes  Possible Left atrial enlargement  Incomplete right bundle branch block  T wave abnormality, consider inferior ischemia  Abnormal ECG  No previous ECGs available  Confirmed by Anny Arana ([id number]) on 6/9/2021 9:27:02 PM              02/08/19    NM [facility] MYOCARDIAL PERFUSION MULTIPLE  2/8/2019          XR Results (most recent):  Results from Hospital Encounter encounter on 06/09/21    XR CHEST PORT    Narrative  AP CHEST, PORTABLE    INDICATION: Chest pain    COMPARISON: Chest x-ray 1/8/2019    FINDINGS:  EKG leads overlie the patient. The cardiac silhouette is normal in size. Central pulmonary arteries appear  slightly prominent, similar to prior exams. The pulmonary vasculature is  unremarkable. No focal consolidation, pleural effusion, or pneumothorax. No  acute osseous abnormalities are identified. Impression  1. No clearly acute finding.   2. Chronic prominence of the central pulmonary arteries, could indicate  pulmonary arterial hypertension        Signed By: ALVINO Redmond    June 12, 2021

## 2021-06-12 NOTE — PROGRESS NOTES
0732:Bedside and Verbal shift change report given to [address] [place] [address] (oncoming nurse) by Martha Garcia (offgoing nurse). Report included the following information SBAR, Kardex and Cardiac Rhythm NSR. Patient asleep, personal CPAP in place, Nitro, NS, and Heparin gtts running, doses verified. 0830:Patient called  for help with lines to ambulate to toilet. Patient ambulated with standby assistance to the restroom. 0900:Patient pulled help line in bathroom. Ambulated with stand by to recliner, reported 6/10 bilateral ribcage pain, gave PRN dose of Morphine 0.5mg. patient left in recliner asleep, call bell in hand, on RA.    1230: Emptied 300cc yellow urine from urinal    1313: Emptied 290cc yellow urine from urinal    1630:Patient ambulated to sink for bath with basin, soap, and water. Returned to recliner, gown and bedding changed    1900:Bedside and Verbal shift change report given to [id number] [place] [address] (oncoming nurse) by Felecia Grijalva RN (offgoing nurse).  Report included the following information SBAR, Kardex and Cardiac Rhythm SR.

## 2021-06-12 NOTE — PROGRESS NOTES
Problem: Pressure Injury - Risk of  Goal: *Prevention of pressure injury  Description: Document Ez Scale and appropriate interventions in the flowsheet. Outcome: Progressing Towards Goal  Note: Pressure Injury Interventions:       Moisture Interventions: Absorbent underpads    Activity Interventions: Pressure redistribution bed/mattress(bed type), Increase time out of bed    Mobility Interventions: Pressure redistribution bed/mattress (bed type)    Nutrition Interventions: Document food/fluid/supplement intake                     Problem: Falls - Risk of  Goal: *Absence of Falls  Description: Document Deon Fall Risk and appropriate interventions in the flowsheet.   Outcome: Progressing Towards Goal  Note: Fall Risk Interventions:  Mobility Interventions: Bed/chair exit alarm, Patient to call before getting OOB         Medication Interventions: Bed/chair exit alarm, Patient to call before getting OOB    Elimination Interventions: Call light in reach, Urinal in reach, Patient to call for help with toileting needs              Problem: Unstable angina/NSTEMI: Day of Admission/Day 1  Goal: Off Pathway (Use only if patient is Off Pathway)  Outcome: Resolved/Met  Goal: Activity/Safety  Outcome: Resolved/Met  Goal: Consults, if ordered  Outcome: Resolved/Met  Goal: Diagnostic Test/Procedures  Outcome: Resolved/Met  Goal: Nutrition/Diet  Outcome: Resolved/Met  Goal: Discharge Planning  Outcome: Resolved/Met  Goal: Medications  Outcome: Resolved/Met  Goal: Respiratory  Outcome: Resolved/Met  Goal: Treatments/Interventions/Procedures  Outcome: Resolved/Met  Goal: Psychosocial  Outcome: Resolved/Met  Goal: *Hemodynamically stable  Outcome: Resolved/Met  Goal: *Optimal pain control at patient's stated goal  Outcome: Resolved/Met  Goal: *Lungs clear or at baseline  Outcome: Resolved/Met     Problem: Unstable angina/NSTEMI: Day 2  Goal: Off Pathway (Use only if patient is Off Pathway)  Outcome: Resolved/Met  Goal: Activity/Safety  Outcome: Resolved/Met  Goal: Consults, if ordered  Outcome: Resolved/Met  Goal: Diagnostic Test/Procedures  Outcome: Resolved/Met  Goal: Nutrition/Diet  Outcome: Resolved/Met  Goal: Discharge Planning  Outcome: Resolved/Met  Goal: Medications  Outcome: Resolved/Met  Goal: Respiratory  Outcome: Resolved/Met  Goal: Treatments/Interventions/Procedures  Outcome: Resolved/Met  Goal: Psychosocial  Outcome: Resolved/Met  Goal: *Hemodynamically stable  Outcome: Resolved/Met  Goal: *Optimal pain control at patient's stated goal  Outcome: Resolved/Met  Goal: *Lungs clear or at baseline  Outcome: Resolved/Met     Problem: Unstable Angina/NSTEMI: Discharge Outcomes  Goal: *Hemodynamically stable  Outcome: Progressing Towards Goal  Goal: *Stable cardiac rhythm  Outcome: Progressing Towards Goal  Goal: *Lungs clear or at baseline  Outcome: Progressing Towards Goal  Goal: *Optimal pain control at patient's stated goal  Outcome: Progressing Towards Goal  Goal: *Identifies cardiac risk factors  Outcome: Progressing Towards Goal  Goal: *Verbalizes home exercise program, activity guidelines, cardiac precautions  Outcome: Progressing Towards Goal  Goal: *Verbalizes understanding and describes prescribed diet  Outcome: Progressing Towards Goal  Goal: *Verbalizes name, dosage, time, side effects, and number of days to continue medications  Outcome: Progressing Towards Goal  Goal: *Anxiety reduced or absent  Outcome: Progressing Towards Goal  Goal: *Understands and describes signs and symptoms to report to providers(Stroke Metric)  Outcome: Progressing Towards Goal  Goal: *Describes follow-up/return visits to physicians  Outcome: Progressing Towards Goal  Goal: *Describes available resources and support systems  Outcome: Progressing Towards Goal  Goal: *Influenza immunization  Outcome: Progressing Towards Goal  Goal: *Pneumococcal immunization  Outcome: Progressing Towards Goal  Goal: *Describes smoking cessation resources  Outcome: Progressing Towards Goal

## 2021-06-12 NOTE — PROGRESS NOTES
1940 - Bedside shift change report given to [facility] RN  (oncoming nurse) by Erin Sanchez RN (offgoing nurse). Report included the following information SBAR, Kardex, ED Summary, Procedure Summary, Intake/Output, MAR, Recent Results and Cardiac Rhythm SR. Healthy choice meal warmed and given to patient     0720 - Bedside shift change report given to [address] [place] [address]  (oncoming nurse) [facility]  [facility]  Report included the following information SBAR, ED Summary, OR Summary, Procedure Summary, Intake/Output, MAR, Recent Results and Cardiac Rhythm SR.

## 2021-06-12 NOTE — PROGRESS NOTES
[facility]      Follow up for CAD    Chief Complaint:  none    Interval History:  Stable on IV Hep and NTG. Denies chest pain nausea, Vomiting, or SOB      Exam:     Visit Vitals  BP (!) 143/86   Pulse 74   Temp 98.4 °F (36.9 °C)   Resp 17   Ht 5' 10\" (1.778 m)   Wt 112.5 kg (248 lb)   SpO2 92%   BMI 35.58 kg/m²        Const:  alert and oriented. NAD   CV:   RRR without murmur or rub. PMI not displaced. No peripheral edema   Respiratory:  Clear to ascultation without wheezes, rales or rhonchi.   No acc    Assessment:    CAD with 50% LM  DVT/PE with reduced RV function  Last Plavix 6/8/21  HTN  HLD  Active tobacco  SHILPI on CPAP        PLAN:    Needs IR consult next week for embolectomy  Will need repeat Echo to demonstrate better RV function  Cont to hold PLAVIX  CABG timing in evolution after above done - cont PO amio load and [address], PA-C  [facility]  [phone]

## 2021-06-12 NOTE — PROGRESS NOTES
[facility]  Pulmonary, Critical Care, and Sleep Medicine    Follow up progress note    Name: Miki Shahid MRN: [id number]   : 1960 Hospital: [address] [place] [address] Dr   Date: 2021        IMPRESSION:   · Acute submassive PE with extensive bilateral large nonobstructing emboli noted in right distal pulmonary artery, left mainstem PA extending into the lobar, segmental and subsegmental arteries. In addition there is right inferior pulmonary vein clot. Evidence of RV dilatation with flattening of septum suggesting RV load and positive Powell sign with measured PA P of 60 mmHg. Patient has remained hemodynamically stable and requiring minimal supplemental oxygen. Patient is not aware of any hypercoagulable state-has had screening colonoscopies and no known malignancies. Denies having Covid. Lesle Gulling Respiratory virus panel including SARS-CoV-2 is negative for Covid  · Pulmonary hypertension-acute moderate secondary to above  · Chest pain-likely related to pulmonary infarct-right lower lobe  · DVT-left popliteal and posterior tibial veins. Patient gives a history of lumbar radiculopathy L4-S1 disc disease and numbness in his left lower extremity  · Unstable angina-s/p cardiac cath 2021 revealing multivessel disease. Patient is s/p PCI in 2019 done at [facility] and has been on antiplatelet agents since   · Hypertension  · Active tobacco use-committed to quit  · Unvaccinated for Covid  · Obstructive sleep apnea on CPAP      RECOMMENDATIONS:   · Discussed with patient, treating team about options for acute and subsequent treatment for pulmonary embolism. Recommend continuing anticoagulation with heparin since patient is hemodynamically stable and would benefit from effect of heparin for clot stabilization prior to proceeding with thrombectomy.   Can consider subacute thrombectomy early next week-catheter guided mechanical thrombectomy to stabilize and improve pulmonary hypertension and RV function prior to considering coronary artery bypass grafting  · Oxygen-continue supplementation with SaO2 goal more than 92%  · Can consider IVC filter placement at time of thrombectomy  · Aspiration precautions  · Need for further diagnostics- CT scan, echocardiogram, ILD serologies, hypercoagulable panel-can be ordered at stable state as outpatient  · Out patient testing- PFT, 6 min walk, Polysomnogram  · Assess home Oxygen needs at discharge  · OT, PT, OOB and ambulate  · Healthy weight  · Will Follow  · DVT, PUD prophylaxis       Subjective: This patient has been seen and evaluated at the request of Dr. Rachell Menjivar for pulmonary embolism    06/12/21     Denies any new complaints-chest pain is somewhat improved. He states that he has been getting less morphine today. Did get out of bed and sit in a chair  Used his home CPAP at night  Denies any cough, hemoptysis  Denies any new complaints    HPI  Patient is a 56 y.o. male with medical co-morbidities including HTN, CAD with stent, hyperlipidemia, active tobacco smoking, SHILPI on CPAP, morbid obesity, presented from home with chest pain. Accordingly, he has substernal chest pain associates with exertion over the last weeks. He has chest pain with radiation to his neck today when it woke him from his sleep. Patient states that the ambulance brought him to [address] [facility] route patient was given morphine 10 mg for severe chest pain. He has associated shortness of breath. No chest palpitation. No leg swelling. Complains of numbness of his left leg from L4-S1 radiculopathy  No cough. He is a every day smoker and occasionally has a smoker's cough  No recent sick contact. In the ER, cardiac enzyme was negative. EKG was non specific ST change. Cardiology consulted for concern of unstable angina. He was taken to OR for cardiac cath. Per cardiology, he has significant atherosclerosis disease but no obstructive finding. No PCI was done. Patient was referred for coronary artery bypass grafting surgical opinion. He continued to complain of chest pain and D-dimer was noted to be elevated so CT of the chest was done which showed evidence of large nonobstructing pulmonary embolism and therefore pulmonary has been consulted  Currently patient is on IV heparin wearing oxygen at 2 L sitting up in bed in no distress. He states that intermittently hurts in the mid chest but overall significantly improved. On further questioning he is unaware of any previous blood clots  States he has been on Plavix since 1999  He smokes actively and is committed to quit now that he has multiple problems  He works as a  for a [employer]-fairly active  Denies any known Covid exposures  He has not received Covid vaccination  I have reviewed all of the available data including the patient's previous history external records and radiological imaging available for review. In addition applicable cardiology and other lab data were also reviewed.     Past Medical History:   Diagnosis Date    CAD (coronary artery disease) 6/9/2021    Coronary arteriosclerosis     Dupuytren's disease of palm     Hyperlipidemia 6/9/2021    Hypertension     SHILPI (obstructive sleep apnea) 6/9/2021    Severe obesity ([address] 10/24/2018    Tobacco dependence 6/9/2021      Past Surgical History:   Procedure Laterality Date    HX CHOLECYSTECTOMY      HX CORONARY STENT PLACEMENT      HX HERNIA REPAIR        Allergies   Allergen Reactions    Statins-Hmg-Coa Reductase Inhibitors Other (comments)     Other reaction(s): SEVERE PAIN PER PT    Gabapentin Other (comments)     \"Caused bad nightmares\"    Pcn [Penicillins] Hives       Current Facility-Administered Medications   Medication Dose Route Frequency    nitroglycerin (TRIDIL) 400 mcg/ml infusion  40 mcg/min IntraVENous CONTINUOUS    isosorbide dinitrate (ISORDIL) tablet 30 mg  30 mg Oral TID    aspirin delayed-release tablet 81 mg  81 mg Oral DAILY    metoprolol tartrate (LOPRESSOR) tablet 50 mg  50 mg Oral BID    sodium chloride (NS) flush 5-40 mL  5-40 mL IntraVENous Q8H    atorvastatin (LIPITOR) tablet 40 mg  40 mg Oral QHS    famotidine (PEPCID) tablet 20 mg  20 mg Oral QPM    furosemide (LASIX) tablet 40 mg  40 mg Oral ACB&D    heparin 25,000 units in D5W 250 ml infusion  18-36 Units/kg/hr IntraVENous TITRATE    amiodarone (CORDARONE) tablet 400 mg  400 mg Oral TID    mupirocin (BACTROBAN) 2 % ointment   Both Nostrils BID    docusate sodium (COLACE) capsule 100 mg  100 mg Oral BID    0.9% sodium chloride infusion  50 mL/hr IntraVENous CONTINUOUS       Review of Systems:  A comprehensive review of systems was negative except for that written in the HPI. Objective:   Vital Signs:    Visit Vitals  BP (!) 143/86   Pulse 74   Temp 98.4 °F (36.9 °C)   Resp 17   Ht 5' 10\" (1.778 m)   Wt 112.5 kg (248 lb)   SpO2 92%   BMI 35.58 kg/m²       O2 Device: CPAP nasal   O2 Flow Rate (L/min): 3 l/min   Temp (24hrs), Av.3 °F (36.8 °C), Min:97.7 °F (36.5 °C), Max:98.7 °F (37.1 °C)       Intake/Output:   Last shift:       07 - 1900  In: 424 [P.O.:593]  Out: -   Last 3 shifts: 06/10 1901 -  0700  In: 2394.7 [P.O.:775; I.V.:1619.7]  Out: 5505 [Urine:5505]    Intake/Output Summary (Last 24 hours) at 2021 1154  Last data filed at 2021 0916  Gross per 24 hour   Intake 1450.6 ml   Output 3330 ml   Net -1879.4 ml      Physical Exam:   General:  Alert, cooperative, no distress, appears stated age. Head:  Normocephalic, without obvious abnormality, atraumatic. Eyes:  Conjunctivae/corneas clear. PERRL, EOMs intact. Nose: Nares normal. Septum midline. Mucosa normal. No drainage or sinus tenderness. Throat: Lips, mucosa, and tongue normal. Teeth and gums normal.   Neck: Supple, symmetrical, trachea midline, no adenopathy, thyroid: no enlargment/tenderness/nodules, no carotid bruit and no JVD.    Back: Symmetric, no curvature. ROM normal.   Lungs:   Clear to auscultation bilaterally. Chest wall:  No tenderness or deformity. Heart:  Regular rate and rhythm, S1, S2 normal, no murmur, click, rub or gallop. Abdomen:   Soft, non-tender. Bowel sounds normal. No masses,  No organomegaly. Extremities: Extremities normal, atraumatic, no cyanosis or edema. Pulses: 2+ and symmetric all extremities.    Skin: Skin color, texture, turgor normal. No rashes or lesions   Lymph nodes: Cervical, supraclavicular, and axillary nodes normal.   Neurologic: Grossly nonfocal     Data review:     Recent Results (from the past 24 hour(s))   PTT    Collection Time: 06/12/21  6:20 AM   Result Value Ref Range    aPTT 85.6 (H) 23.0 - 36.4 SEC   MAGNESIUM    Collection Time: 06/12/21  6:20 AM   Result Value Ref Range    Magnesium 1.9 1.6 - 2.6 mg/dL   CBC W/O DIFF    Collection Time: 06/12/21  6:20 AM   Result Value Ref Range    WBC 8.3 4.6 - 13.2 K/uL    RBC 3.74 (L) 4.35 - 5.65 M/uL    HGB 12.4 (L) 13.0 - 16.0 g/dL    HCT 37.6 36.0 - 48.0 %    .5 (H) 74.0 - 97.0 FL    MCH 33.2 24.0 - 34.0 PG    MCHC 33.0 31.0 - 37.0 g/dL    RDW 12.2 11.6 - 14.5 %    PLATELET 293 604 - 452 K/uL    MPV 11.0 9.2 - 34.0 FL   METABOLIC PANEL, BASIC    Collection Time: 06/12/21  6:20 AM   Result Value Ref Range    Sodium 141 136 - 145 mmol/L    Potassium 3.7 3.5 - 5.5 mmol/L    Chloride 108 100 - 111 mmol/L    CO2 27 21 - 32 mmol/L    Anion gap 6 3.0 - 18 mmol/L    Glucose 89 74 - 99 mg/dL    BUN 12 7.0 - 18 MG/DL    Creatinine 1.04 0.6 - 1.3 MG/DL    BUN/Creatinine ratio 12 12 - 20      GFR est AA >60 >60 ml/min/1.73m2    GFR est non-AA >60 >60 ml/min/1.73m2    Calcium 8.3 (L) 8.5 - 10.1 MG/DL       Imaging:  I have personally reviewed the patients radiographs and have reviewed the reports:  XR Results (most recent):  Results from Hospital Encounter encounter on 06/09/21    XR CHEST PORT    Narrative  AP CHEST, PORTABLE    INDICATION: Chest pain    COMPARISON: Chest x-ray 1/8/2019    FINDINGS:  EKG leads overlie the patient. The cardiac silhouette is normal in size. Central pulmonary arteries appear  slightly prominent, similar to prior exams. The pulmonary vasculature is  unremarkable. No focal consolidation, pleural effusion, or pneumothorax. No  acute osseous abnormalities are identified. Impression  1. No clearly acute finding. 2. Chronic prominence of the central pulmonary arteries, could indicate  pulmonary arterial hypertension      CT Results (most recent):  Results from Hospital Encounter encounter on 06/09/21    CTA CHEST W OR W WO CONT    Narrative  CT chest with contrast for PE    HISTORY: Shortness of breath    COMPARISON: None. TECHNIQUE: Dynamic spiral scan through the chest is obtained from the thoracic  inlet to the diaphragm after dynamic nonionic IV contrast administration  per PE  protocol. Coronal and sagittal MIP computer reconstructions are also obtained  for better visualization of the integrity of pulmonary arteries in 3D dimension,  particularly for lobar/interlobar arterial branches and to minimize radiation  dose. All CT scans at this facility performed using dose optimization techniques as  appreciated to a performed exam, to include automated exposure control,  adjustment of the mA and or KU according to patient size (including appropriate  matching for site specific examination), or use of iterative reconstruction  technique. FINDINGS:    PULMONARY ARTERIES: The contrast bolus is adequate. There are large  nonobstructing pulmonary emboli identified in the distal right and left mainstem  pulmonary arteries with extension to all the lobar, segmental and subsegmental  branches bilaterally. Moderate dilatation of pulmonary trunk and the proximal  mainstem arteries. Near occlusive intraluminal filling defects also seen in the right inferior  pulmonary vein and multiple adjoining venous branches.     AORTA AND VASCULAR STRUCTURES: No aortic aneurysm or dissection. Unremarkable  great vessels. Heart: The heart is normal in size. No pericardial effusion. LUNG PARENCHYMA: Patchy opacities at the posterior basal segment of right lower  lobe, most likely pulmonary infarction. No other airspace infiltration or  consolidation seen. Several tiny lung nodules identified, 3 mm in left apex on  #11/3, 2 x 4 mm in lateral left upper lobe on #24/3 3 mm nodule in anterior  right upper lobe on #26/3. IMAGED THYROID: Unremarkable. MEDIASTINUM: No adenopathy. PLEURAL SPACES AND CHEST WALL: No significant pleural pathology. VISUALIZED UPPER ABDOMEN: Unremarkable. OSSEOUS STRUCTURES: Unremarkable. Impression  1. Extensive bilateral pulmonary emboli from distal mainstem extending to all  the lobar arteries and their branches with near occlusion. No occlusive emboli  also noted in inferior right pulmonary vein and multiple joining branches. 2.  Moderate dilatation of pulmonary trunk and proximal mainstem arteries. 3. Pulmonary infarction at posterior right lower lobe. 4. No pleural effusion or adenopathy. 5. Several small lung nodules as incidental findings. Recommendation as provided  below. -----------------------------------  [facility] SOCIETY RECOMMENDATIONS (2017):    Less than 6 mm nodule:  A. Any solitary nodule(solid, semisolid or groundglass) or multiple solid:  Low Risk: No follow-up; If suspicious morphology or upper lobe location  consider 12 month follow-up. High Risk: Optional CT at 12 months. B. Multiple subsolid/groundglass:  CT in 3 to 6 months. If unchanged consider CT at 2 and 4 years. A wet read was provided to the floor nurse, Latoya Herrera, for the ordering physician  Dr. Jun Worthington by me upon the interpretation at approximately  1150  hours.     Thank you for your referral.         06/09/21    ECHO ADULT COMPLETE 06/09/2021 6/9/2021    Interpretation Summary  · LV: Estimated LVEF is 50 - 55%. Normal cavity size, wall thickness and systolic function (ejection fraction normal). Abnormal left ventricular septal motion. Systolic flattening of the interventricular septum consistent with right ventricle pressure overload. Mild (grade 1) left ventricular diastolic dysfunction. · AO: Mild aortic root dilatation. · TV: Non-specific thickening of the tricuspid valve. Mild to moderate tricuspid valve regurgitation is present. · RV: Dilated right ventricle. Reduced systolic function. Right ventricular findings are consistent with Powell's sign. · IVC: Moderately elevated central venous pressure (8 mmHg); IVC diameter is larger than 21 mm and collapses more than 50% with respiration. · PA: Moderate pulmonary hypertension. Pulmonary arterial systolic pressure is 60 mmHg. Addendum by: Suzi Garcia MD on 6/9/2021  4:16 PM    Signed by: Suzi Garcia MD on 6/9/2021  3:11 PM    Complex decision making was made in the evaluation and management plans during this consultation. More than 50% of time was spent in counseling and coordination of care including review of data and discussion with other team members.          Amira Nieves MD  Pulmonary & Critical Care

## 2021-06-13 LAB
ANION GAP SERPL CALC-SCNC: 5 MMOL/L (ref 3–18)
APTT PPP: 32.7 SEC (ref 23–36.4)
APTT PPP: >180 SEC (ref 23–36.4)
BUN SERPL-MCNC: 12 MG/DL (ref 7–18)
BUN/CREAT SERPL: 11 (ref 12–20)
CALCIUM SERPL-MCNC: 9 MG/DL (ref 8.5–10.1)
CHLORIDE SERPL-SCNC: 108 MMOL/L (ref 100–111)
CO2 SERPL-SCNC: 28 MMOL/L (ref 21–32)
CREAT SERPL-MCNC: 1.12 MG/DL (ref 0.6–1.3)
ERYTHROCYTE [DISTWIDTH] IN BLOOD BY AUTOMATED COUNT: 12.1 % (ref 11.6–14.5)
GLUCOSE SERPL-MCNC: 78 MG/DL (ref 74–99)
HCT VFR BLD AUTO: 38.8 % (ref 36–48)
HGB BLD-MCNC: 13 G/DL (ref 13–16)
MCH RBC QN AUTO: 33 PG (ref 24–34)
MCHC RBC AUTO-ENTMCNC: 33.5 G/DL (ref 31–37)
MCV RBC AUTO: 98.5 FL (ref 74–97)
PLATELET # BLD AUTO: 207 K/UL (ref 135–420)
PMV BLD AUTO: 10.7 FL (ref 9.2–11.8)
POTASSIUM SERPL-SCNC: 3.8 MMOL/L (ref 3.5–5.5)
RBC # BLD AUTO: 3.94 M/UL (ref 4.35–5.65)
SODIUM SERPL-SCNC: 141 MMOL/L (ref 136–145)
WBC # BLD AUTO: 8.9 K/UL (ref 4.6–13.2)

## 2021-06-13 PROCEDURE — 74011250637 HC RX REV CODE- 250/637: Performed by: INTERNAL MEDICINE

## 2021-06-13 PROCEDURE — 99233 SBSQ HOSP IP/OBS HIGH 50: CPT | Performed by: INTERNAL MEDICINE

## 2021-06-13 PROCEDURE — 74011250636 HC RX REV CODE- 250/636: Performed by: INTERNAL MEDICINE

## 2021-06-13 PROCEDURE — 99232 SBSQ HOSP IP/OBS MODERATE 35: CPT | Performed by: PHYSICIAN ASSISTANT

## 2021-06-13 PROCEDURE — APPNB30 APP NON BILLABLE TIME 0-30 MINS: Performed by: PHYSICIAN ASSISTANT

## 2021-06-13 PROCEDURE — 85027 COMPLETE CBC AUTOMATED: CPT

## 2021-06-13 PROCEDURE — 99232 SBSQ HOSP IP/OBS MODERATE 35: CPT | Performed by: INTERNAL MEDICINE

## 2021-06-13 PROCEDURE — 80048 BASIC METABOLIC PNL TOTAL CA: CPT

## 2021-06-13 PROCEDURE — 85730 THROMBOPLASTIN TIME PARTIAL: CPT

## 2021-06-13 PROCEDURE — 36415 COLL VENOUS BLD VENIPUNCTURE: CPT

## 2021-06-13 PROCEDURE — 74011250637 HC RX REV CODE- 250/637: Performed by: PHYSICIAN ASSISTANT

## 2021-06-13 PROCEDURE — 65660000004 HC RM CVT STEPDOWN

## 2021-06-13 RX ORDER — HEPARIN SODIUM 1000 [USP'U]/ML
80 INJECTION, SOLUTION INTRAVENOUS; SUBCUTANEOUS ONCE
Status: COMPLETED | OUTPATIENT
Start: 2021-06-13 | End: 2021-06-13

## 2021-06-13 RX ADMIN — MUPIROCIN: 20 OINTMENT TOPICAL at 17:26

## 2021-06-13 RX ADMIN — DOCUSATE SODIUM 100 MG: 100 CAPSULE, LIQUID FILLED ORAL at 17:22

## 2021-06-13 RX ADMIN — ISOSORBIDE DINITRATE 30 MG: 10 TABLET ORAL at 10:04

## 2021-06-13 RX ADMIN — Medication 81 MG: at 10:01

## 2021-06-13 RX ADMIN — FUROSEMIDE 40 MG: 40 TABLET ORAL at 10:01

## 2021-06-13 RX ADMIN — METOPROLOL TARTRATE 50 MG: 50 TABLET, FILM COATED ORAL at 21:59

## 2021-06-13 RX ADMIN — Medication 10 ML: at 17:25

## 2021-06-13 RX ADMIN — ISOSORBIDE DINITRATE 30 MG: 10 TABLET ORAL at 21:59

## 2021-06-13 RX ADMIN — AMIODARONE HYDROCHLORIDE 400 MG: 200 TABLET ORAL at 21:59

## 2021-06-13 RX ADMIN — ISOSORBIDE DINITRATE 30 MG: 10 TABLET ORAL at 17:22

## 2021-06-13 RX ADMIN — FAMOTIDINE 20 MG: 20 TABLET ORAL at 17:22

## 2021-06-13 RX ADMIN — AMIODARONE HYDROCHLORIDE 400 MG: 200 TABLET ORAL at 17:22

## 2021-06-13 RX ADMIN — MORPHINE SULFATE 1 MG: 2 INJECTION, SOLUTION INTRAMUSCULAR; INTRAVENOUS at 12:58

## 2021-06-13 RX ADMIN — MORPHINE SULFATE 1 MG: 2 INJECTION, SOLUTION INTRAMUSCULAR; INTRAVENOUS at 18:30

## 2021-06-13 RX ADMIN — FUROSEMIDE 40 MG: 40 TABLET ORAL at 17:22

## 2021-06-13 RX ADMIN — ZOLPIDEM TARTRATE 5 MG: 5 TABLET ORAL at 21:59

## 2021-06-13 RX ADMIN — Medication 10 ML: at 22:00

## 2021-06-13 RX ADMIN — HEPARIN SODIUM 15 UNITS/KG/HR: 10000 INJECTION, SOLUTION INTRAVENOUS at 22:00

## 2021-06-13 RX ADMIN — MUPIROCIN: 20 OINTMENT TOPICAL at 10:13

## 2021-06-13 RX ADMIN — MORPHINE SULFATE 1 MG: 2 INJECTION, SOLUTION INTRAMUSCULAR; INTRAVENOUS at 22:03

## 2021-06-13 RX ADMIN — DOCUSATE SODIUM 100 MG: 100 CAPSULE, LIQUID FILLED ORAL at 10:01

## 2021-06-13 RX ADMIN — MORPHINE SULFATE 1 MG: 2 INJECTION, SOLUTION INTRAMUSCULAR; INTRAVENOUS at 04:00

## 2021-06-13 RX ADMIN — AMIODARONE HYDROCHLORIDE 400 MG: 200 TABLET ORAL at 10:01

## 2021-06-13 RX ADMIN — METOPROLOL TARTRATE 50 MG: 50 TABLET, FILM COATED ORAL at 10:01

## 2021-06-13 RX ADMIN — HEPARIN SODIUM 9000 UNITS: 1000 INJECTION, SOLUTION INTRAVENOUS; SUBCUTANEOUS at 10:01

## 2021-06-13 RX ADMIN — ATORVASTATIN CALCIUM 40 MG: 40 TABLET, FILM COATED ORAL at 22:00

## 2021-06-13 RX ADMIN — SODIUM CHLORIDE 50 ML/HR: 900 INJECTION, SOLUTION INTRAVENOUS at 21:57

## 2021-06-13 NOTE — PROGRESS NOTES
Problem: Pressure Injury - Risk of  Goal: *Prevention of pressure injury  Description: Document Ez Scale and appropriate interventions in the flowsheet. 6/13/2021 1608 by Madhu Moctezuma  Outcome: Progressing Towards Goal  Note: Pressure Injury Interventions:       Moisture Interventions: Absorbent underpads    Activity Interventions: Pressure redistribution bed/mattress(bed type), Increase time out of bed    Mobility Interventions: Pressure redistribution bed/mattress (bed type)    Nutrition Interventions: Document food/fluid/supplement intake                  6/13/2021 1608 by Madhu Moctezuma  Outcome: Progressing Towards Goal  Note: Pressure Injury Interventions:       Moisture Interventions: Absorbent underpads    Activity Interventions: Pressure redistribution bed/mattress(bed type), Increase time out of bed    Mobility Interventions: Pressure redistribution bed/mattress (bed type)    Nutrition Interventions: Document food/fluid/supplement intake                     Problem: Patient Education: Go to Patient Education Activity  Goal: Patient/Family Education  6/13/2021 1608 by Madhu Moctezuma  Outcome: Progressing Towards Goal  6/13/2021 1608 by Madhu Moctezuma  Outcome: Progressing Towards Goal     Problem: Falls - Risk of  Goal: *Absence of Falls  Description: Document Deon Fall Risk and appropriate interventions in the flowsheet.   6/13/2021 1608 by Madhu Moctezuma  Outcome: Progressing Towards Goal  Note: Fall Risk Interventions:  Mobility Interventions: Bed/chair exit alarm, Patient to call before getting OOB         Medication Interventions: Bed/chair exit alarm, Patient to call before getting OOB    Elimination Interventions: Call light in reach, Urinal in reach, Patient to call for help with toileting needs           6/13/2021 1608 by Madhu Moctezuma  Outcome: Progressing Towards Goal  Note: Fall Risk Interventions:  Mobility Interventions: Bed/chair exit alarm, Patient to call before getting OOB         Medication Interventions: Bed/chair exit alarm, Patient to call before getting OOB    Elimination Interventions: Call light in reach, Urinal in reach, Patient to call for help with toileting needs              Problem: Pain  Goal: *Control of Pain  6/13/2021 1608 by Hanna Pickup  Outcome: Progressing Towards Goal  6/13/2021 1608 by Hanna Pickup  Outcome: Progressing Towards Goal  Goal: *PALLIATIVE CARE:  Alleviation of Pain  6/13/2021 1608 by Hanna Pickup  Outcome: Progressing Towards Goal  6/13/2021 1608 by Hanna Pickup  Outcome: Progressing Towards Goal     Problem: Pulmonary Embolism Care Plan (Adult)  Goal: *Improvement of existing pulmonary embolism  6/13/2021 1608 by Hanna Pickup  Outcome: Progressing Towards Goal  6/13/2021 1608 by Hanna Pickup  Outcome: Progressing Towards Goal  Goal: *Absence of bleeding  6/13/2021 1608 by Hanna Pickup  Outcome: Progressing Towards Goal  6/13/2021 1608 by Hanna Pickup  Outcome: Progressing Towards Goal  Goal: *Labs within defined limits  6/13/2021 1608 by Hanna Pickup  Outcome: Progressing Towards Goal  6/13/2021 1608 by Hanna Pickup  Outcome: Progressing Towards Goal     Problem: Unstable Angina/NSTEMI: Discharge Outcomes  Goal: *Hemodynamically stable  6/13/2021 1608 by Hanna Pickup  Outcome: Progressing Towards Goal  6/13/2021 1608 by Hanna Pickup  Outcome: Progressing Towards Goal  Goal: *Stable cardiac rhythm  6/13/2021 1608 by Hanna Pickup  Outcome: Progressing Towards Goal  6/13/2021 1608 by Hanna Pickup  Outcome: Progressing Towards Goal  Goal: *Lungs clear or at baseline  6/13/2021 1608 by Hanna Pickup  Outcome: Progressing Towards Goal  6/13/2021 1608 by Hanna Pickup  Outcome: Progressing Towards Goal  Goal: *Optimal pain control at patient's stated goal  6/13/2021 1608 by Hanna Pickup  Outcome: Progressing Towards Goal  6/13/2021 1608 by Hanna Pickup  Outcome: Progressing Towards Goal  Goal: *Identifies cardiac risk factors  6/13/2021 1608 by Hanna Pickup  Outcome: Progressing Towards Goal  6/13/2021 1608 by Brunilda Mays  Outcome: Progressing Towards Goal  Goal: *Verbalizes home exercise program, activity guidelines, cardiac precautions  6/13/2021 1608 by Brunilda Mays  Outcome: Progressing Towards Goal  6/13/2021 1608 by Brunilda Mays  Outcome: Progressing Towards Goal  Goal: *Verbalizes understanding and describes prescribed diet  6/13/2021 1608 by Brunilda Mays  Outcome: Progressing Towards Goal  6/13/2021 1608 by Brunilda Mays  Outcome: Progressing Towards Goal  Goal: *Verbalizes name, dosage, time, side effects, and number of days to continue medications  6/13/2021 1608 by Brunilda Mays  Outcome: Progressing Towards Goal  6/13/2021 1608 by Brunilda Mays  Outcome: Progressing Towards Goal  Goal: *Anxiety reduced or absent  6/13/2021 1608 by Brunilda Mays  Outcome: Progressing Towards Goal  6/13/2021 1608 by Brunilda Mays  Outcome: Progressing Towards Goal  Goal: *Understands and describes signs and symptoms to report to providers(Stroke Metric)  6/13/2021 1608 by Brunilda Mays  Outcome: Progressing Towards Goal  6/13/2021 1608 by Brunilda Mays  Outcome: Progressing Towards Goal  Goal: *Describes follow-up/return visits to physicians  6/13/2021 1608 by Brunilda Mays  Outcome: Progressing Towards Goal  6/13/2021 1608 by Brunilda Mays  Outcome: Progressing Towards Goal  Goal: *Describes available resources and support systems  6/13/2021 1608 by Brunilda Mays  Outcome: Progressing Towards Goal  6/13/2021 1608 by Brunilda Mays  Outcome: Progressing Towards Goal  Goal: *Influenza immunization  6/13/2021 1608 by Brunilda Mays  Outcome: Progressing Towards Goal  6/13/2021 1608 by Brunilda Mays  Outcome: Progressing Towards Goal  Goal: *Pneumococcal immunization  6/13/2021 1608 by Brunilda Mays  Outcome: Progressing Towards Goal  6/13/2021 1608 by Brunilda Mays  Outcome: Progressing Towards Goal  Goal: *Describes smoking cessation resources  6/13/2021 1608 by Brunilda Mays  Outcome: Progressing Towards Goal  6/13/2021 1608 by Radha Bettencourt Miranda  Outcome: Progressing Towards Goal     Problem: Tobacco Use  Goal: *Tobacco use abstinence  Outcome: Progressing Towards Goal  Goal: *Knowledge of tobacco use cessation methods  Outcome: Progressing Towards Goal

## 2021-06-13 NOTE — PROGRESS NOTES
Admit Date: 6/9/2021  Date of Service: 6/13/2021    Reason for follow-up: Chest pain and shortness of breath      Assessment:         Extensive bilateral pulmonary emboli with near occlusion with RV strain: Dilation of the pulmonary trunk and proximal mainstem arteries. Hemodynamically stable   -6/9/2021 echocardiogram: Ejection fraction 50 to 55%, abnormal left ventricular septal wall motion with flattening of the interventricular septum consistent with right ventricular pressure overload. Posterior right lower lobe pulmonary infarct  Left popliteal and posterior tibial acute DVT  Unstable angina with associated nausea vomiting diaphoresis:    -Status post cardiac cath on 6/9/2021 revealing multivessel disease prompting referral for CABG  Moderate pulmonary hypertension  Hypertension  Dyslipidemia  Obesity  Active tobacco use  Plan:   Ongoing discussions regarding management of PE and DVTs. Currently on IV heparin drip on PE protocol   -For now CABG is on hold. - Current plan to continue heparin to stabilize clots. Discussed potential for thrombectomy with IR  PT and OT on hold for now  Follow CBC BMP monitor for bleeding  Continue amiodarone, aspirin, Lipitor, Lasix, magnesium, beta-blocker  Titrate nitro drip as per cardiology  O2 nasal cannula maintain sats greater than 92%  As needed duo nebs  ILD serologies, hypercoagulable panel-can be ordered at stable state as outpatient      Current Antibtiocs:   None    Lines:   Peripheral    Case discussed with:  [x]Patient  []Family  [x]Nursing  [x]Case Management  DVT Prophylaxis:  []Lovenox  []Hep SQ  []SCDs  []Coumadin   [x]On Heparin gtt    I have independently examined the patient and reviewed all lab studies and imgaing as well as review of nursing notes and physican notes from the past 24 hours. Svitlana Larson D.O.   Pager [phone]      Allergies   Allergen Reactions    Statins-Hmg-Coa Reductase Inhibitors Other (comments)     Other reaction(s): SEVERE PAIN PER PT    Gabapentin Other (comments)     \"Caused bad nightmares\"    Pcn [Penicillins] Hives           Subjective:      Pt seen and examined. Feeling overall okay. No new changes. Resting comfortably in the chair. Eating well. No new questions or concerns today. Objective:        Visit Vitals  BP (!) 146/86   Pulse 64   Temp 97.5 °F (36.4 °C)   Resp 17   Ht 5' 10\" (1.778 m)   Wt 112.5 kg (248 lb)   SpO2 92%   BMI 35.58 kg/m²     Temp (24hrs), Av.7 °F (36.5 °C), Min:97.5 °F (36.4 °C), Max:98 °F (36.7 °C)        General:   awake alert and oriented, non-toxic   Skin:   no rashes or skin lesions noted on limited exam, dry and warm   HEENT:  No scleral icterus or pallor; oral mucosa moist, lips moist   Lymph Nodes:   not assessed today   Lungs:   non, labored; bilaterally clear to aspiration- no crackles wheezes rales or rhonchi   Heart:  RRR, s1 and s2; no murmurs rubs or gallops; no edema, + pedal pulses   Abdomen:  soft, non-distended, active bowel sounds, non-tender, ventral hernia reducible   Genitourinary:  deferred   Extremities:   average muscle tone; no contractures, no joint effusions, slight hematoma on the right wrist at the cath site; left lower extremity slightly more swollen than right   Neurologic:  No gross focal motor or sensory abnormalities; CN 2-12 intact; Follows commands. Psychiatric:   appropriate and interactive.        Labs: Results:   Chemistry Recent Labs     21  0700 21  0620 21  0540   GLU 78 89 109*    141 139   K 3.8 3.7 3.5    108 108   CO2 28 27 27   BUN 12 12 14   CREA 1.12 1.04 1.06   CA 9.0 8.3* 9.0   AGAP 5 6 4   BUCR 11* 12 13      CBC w/Diff Recent Labs     21  0700 21  0620 21  0540   WBC 8.9 8.3 8.9   RBC 3.94* 3.74* 3.95*   HGB 13.0 12.4* 13.1   HCT 38.8 37.6 39.1    190 184        No results found for: SDES No results found for: CULT     Results     Procedure Component Value Units Date/Time RESPIRATORY VIRUS PANEL W/COVID-19, PCR [[id number]] Collected: 06/10/21 1451    Order Status: Completed Specimen: Nasopharyngeal Updated: 06/10/21 1630     Adenovirus Not detected        Coronavirus 229E Not detected        Coronavirus HKU1 Not detected        Coronavirus CVNL63 Not detected        Coronavirus OC43 Not detected        SARS-CoV-2, PCR Not detected        Metapneumovirus Not detected        Rhinovirus and Enterovirus Not detected        Influenza A Not detected        Influenza A, subtype H1 Not detected        Influenza A, subtype H3 Not detected        INFLUENZA A H1N1 PCR Not detected        Influenza B Not detected        Parainfluenza 1 Not detected        Parainfluenza 2 Not detected        Parainfluenza 3 Not detected        Parainfluenza virus 4 Not detected        RSV by PCR Not detected        B. parapertussis, PCR Not detected        Bordetella pertussis - PCR Not detected        Chlamydophila pneumoniae DNA, QL, PCR Not detected        Mycoplasma pneumoniae DNA, QL, PCR Not detected       COVID-19 RAPID TEST [[id number]]     Order Status: Canceled     COVID-19 RAPID TEST [[id number]]     Order Status: Canceled           Imaging:     CTA CHEST W OR W WO CONT    Result Date: 6/10/2021  1. Extensive bilateral pulmonary emboli from distal mainstem extending to all the lobar arteries and their branches with near occlusion. No occlusive emboli also noted in inferior right pulmonary vein and multiple joining branches. 2.  Moderate dilatation of pulmonary trunk and proximal mainstem arteries. 3. Pulmonary infarction at posterior right lower lobe. 4. No pleural effusion or adenopathy. 5. Several small lung nodules as incidental findings. Recommendation as provided below. ----------------------------------- [facility] - [place] [employer] RECOMMENDATIONS (2017): Less than 6 mm nodule: A. Any solitary nodule(solid, semisolid or groundglass) or multiple solid:      Low Risk: No follow-up;  If suspicious morphology or upper lobe location consider 12 month follow-up. High Risk: Optional CT at 12 months. B. Multiple subsolid/groundglass:  CT in 3 to 6 months. If unchanged consider CT at 2 and 4 years. A wet read was provided to the floor nurse, Delia Shane, for the ordering physician Dr. Shy Peters by me upon the interpretation at approximately  1150  hours. Thank you for your referral.     XR CHEST PORT    Result Date: 6/9/2021   1. No clearly acute finding. 2. Chronic prominence of the central pulmonary arteries, could indicate pulmonary arterial hypertension     ECHO ADULT COMPLETE    Addendum Date: 6/9/2021    · LV: Estimated LVEF is 50 - 55%. Normal cavity size, wall thickness and systolic function (ejection fraction normal). Abnormal left ventricular septal motion. Systolic flattening of the interventricular septum consistent with right ventricle pressure overload. Mild (grade 1) left ventricular diastolic dysfunction. · AO: Mild aortic root dilatation. · TV: Non-specific thickening of the tricuspid valve. Mild to moderate tricuspid valve regurgitation is present. · RV: Dilated right ventricle. Reduced systolic function. Right ventricular findings are consistent with Powell's sign. · IVC: Moderately elevated central venous pressure (8 mmHg); IVC diameter is larger than 21 mm and collapses more than 50% with respiration. · PA: Moderate pulmonary hypertension. Pulmonary arterial systolic pressure is 60 mmHg. CARDIAC PROCEDURE    Addendum Date: 6/9/2021    Severe three-vessel coronary disease as mentioned in detail Normal LVEF and LVEDP. Echocardiogram will be performed We will have CT surgery evaluation for possible CABG    Addendum Date: 6/9/2021    Severe three-vessel coronary disease as mentioned in detail Normal LVEF and LVEDP.   Echocardiogram will be performed We will have CT surgery evaluation for possible CABG

## 2021-06-13 NOTE — PROGRESS NOTES
Admit Date: 6/9/2021  Date of Service: 6/12/2021  Reason for follow-up: Chest pain and shortness of breath      Assessment:         Extensive bilateral pulmonary emboli with near occlusion with RV strain: Dilation of the pulmonary trunk and proximal mainstem arteries. Hemodynamically stable   -6/9/2021 echocardiogram: Ejection fraction 50 to 55%, abnormal left ventricular septal wall motion with flattening of the interventricular septum consistent with right ventricular pressure overload. Posterior right lower lobe pulmonary infarct  Left popliteal and posterior tibial acute DVT  Unstable angina with associated nausea vomiting diaphoresis:    -Status post cardiac cath on 6/9/2021 revealing multivessel disease prompting referral for CABG  Moderate pulmonary hypertension  Hypertension  Dyslipidemia  Obesity  Active tobacco use  Plan:   Ongoing discussions regarding management of PE and DVTs. Currently on IV heparin drip on PE protocol   -For now CABG is on hold. - Current plan to continue heparin to stabilize clots with possible thrombectomy Monday or Tuesday  PT and OT on hold for now  Follow CBC BMP monitor for bleeding  Continue amiodarone, aspirin, Lipitor, Lasix, magnesium, beta-blocker  Titrate nitro drip as per cardiology  O2 nasal cannula maintain sats greater than 92%  As needed duo nebs  ILD serologies, hypercoagulable panel-can be ordered at stable state as outpatient      Current Antibtiocs:   None    Lines:   Peripheral    Case discussed with:  [x]Patient  []Family  [x]Nursing  [x]Case Management  DVT Prophylaxis:  []Lovenox  []Hep SQ  []SCDs  []Coumadin   [x]On Heparin gtt    I have independently examined the patient and reviewed all lab studies and imgaing as well as review of nursing notes and physican notes from the past 24 hours. Kiley Baldwin D.O.   Pager [phone]      Allergies   Allergen Reactions    Statins-Hmg-Coa Reductase Inhibitors Other (comments)     Other reaction(s): SEVERE PAIN PER PT    Gabapentin Other (comments)     \"Caused bad nightmares\"    Pcn [Penicillins] Hives           Subjective:      Pt seen and examined. Feeling overall okay. No new changes. Has some occasional right sided chest pain. No SOB. Eating well. No new questions or concerns today. Objective:   Vital Signs:    Visit Vitals  BP (!) 143/86   Pulse 74   Temp 98.4 °F (36.9 °C)   Resp 17   Ht 5' 10\" (1.778 m)   Wt 112.5 kg (248 lb)   SpO2 92%   BMI 35.58 kg/m²       O2 Device: CPAP nasal   O2 Flow Rate (L/min): 3 l/min   Temp (24hrs), Av.3 °F (36.8 °C), Min:97.7 °F (36.5 °C), Max:98.7 °F (37.1 °C)            General:   awake alert and oriented, non-toxic   Skin:   no rashes or skin lesions noted on limited exam, dry and warm   HEENT:  No scleral icterus or pallor; oral mucosa moist, lips moist   Lymph Nodes:   not assessed today   Lungs:   non, labored; bilaterally clear to aspiration- no crackles wheezes rales or rhonchi   Heart:  RRR, s1 and s2; no murmurs rubs or gallops; no edema, + pedal pulses   Abdomen:  soft, non-distended, active bowel sounds, non-tender, ventral hernia reducible   Genitourinary:  deferred   Extremities:   average muscle tone; no contractures, no joint effusions, slight hematoma on the right wrist at the cath site; left lower extremity slightly more swollen than right   Neurologic:  No gross focal motor or sensory abnormalities; CN 2-12 intact; Follows commands. Psychiatric:   appropriate and interactive.      Recent Labs     21  0621  0540 06/10/21  0530   WBC 8.3 8.9 8.2   HGB 12.4* 13.1 14.1   HCT 37.6 39.1 42.6    184 172             Recent Labs     21  0620 21  0540 06/10/21  0530 21  1610    139 140  --    K 3.7 3.5 3.7  --     108 108  --    CO2 27 27 27  --    GLU 89 109* 100*  --    BUN 12 14 17  --    CREA 1.04 1.06 1.03  --    CA 8.3* 9.0 8.9  --    MG 1.9 2.1 2.1  --    ALB  --   --  3.2* 3.4   ALT  --   --  48 55   INR  --   --   --  1.1           Results     Procedure Component Value Units Date/Time    RESPIRATORY VIRUS PANEL W/COVID-19, PCR [[id number]] Collected: 06/10/21 1451    Order Status: Completed Specimen: Nasopharyngeal Updated: 06/10/21 1630     Adenovirus Not detected        Coronavirus 229E Not detected        Coronavirus HKU1 Not detected        Coronavirus CVNL63 Not detected        Coronavirus OC43 Not detected        SARS-CoV-2, PCR Not detected        Metapneumovirus Not detected        Rhinovirus and Enterovirus Not detected        Influenza A Not detected        Influenza A, subtype H1 Not detected        Influenza A, subtype H3 Not detected        INFLUENZA A H1N1 PCR Not detected        Influenza B Not detected        Parainfluenza 1 Not detected        Parainfluenza 2 Not detected        Parainfluenza 3 Not detected        Parainfluenza virus 4 Not detected        RSV by PCR Not detected        B. parapertussis, PCR Not detected        Bordetella pertussis - PCR Not detected        Chlamydophila pneumoniae DNA, QL, PCR Not detected        Mycoplasma pneumoniae DNA, QL, PCR Not detected       COVID-19 RAPID TEST [[id number]]     Order Status: Canceled     COVID-19 RAPID TEST [[id number]]     Order Status: Canceled           Imaging:     CTA CHEST W OR W WO CONT    Result Date: 6/10/2021  1. Extensive bilateral pulmonary emboli from distal mainstem extending to all the lobar arteries and their branches with near occlusion. No occlusive emboli also noted in inferior right pulmonary vein and multiple joining branches. 2.  Moderate dilatation of pulmonary trunk and proximal mainstem arteries. 3. Pulmonary infarction at posterior right lower lobe. 4. No pleural effusion or adenopathy. 5. Several small lung nodules as incidental findings. Recommendation as provided below. ----------------------------------- [facility] - [place] SOC[facility]ETY RECOMMENDATIONS (2017): Less than 6 mm nodule: A.  Any solitary nodule(solid, semisolid or groundglass) or multiple solid:      Low Risk: No follow-up; If suspicious morphology or upper lobe location consider 12 month follow-up. High Risk: Optional CT at 12 months. B. Multiple subsolid/groundglass:  CT in 3 to 6 months. If unchanged consider CT at 2 and 4 years. A wet read was provided to the floor nurse, Stewart Arguelles, for the ordering physician Dr. Sri Benton by me upon the interpretation at approximately  1150  hours. Thank you for your referral.     XR CHEST PORT    Result Date: 6/9/2021   1. No clearly acute finding. 2. Chronic prominence of the central pulmonary arteries, could indicate pulmonary arterial hypertension     ECHO ADULT COMPLETE    Addendum Date: 6/9/2021    · LV: Estimated LVEF is 50 - 55%. Normal cavity size, wall thickness and systolic function (ejection fraction normal). Abnormal left ventricular septal motion. Systolic flattening of the interventricular septum consistent with right ventricle pressure overload. Mild (grade 1) left ventricular diastolic dysfunction. · AO: Mild aortic root dilatation. · TV: Non-specific thickening of the tricuspid valve. Mild to moderate tricuspid valve regurgitation is present. · RV: Dilated right ventricle. Reduced systolic function. Right ventricular findings are consistent with Powell's sign. · IVC: Moderately elevated central venous pressure (8 mmHg); IVC diameter is larger than 21 mm and collapses more than 50% with respiration. · PA: Moderate pulmonary hypertension. Pulmonary arterial systolic pressure is 60 mmHg. CARDIAC PROCEDURE    Addendum Date: 6/9/2021    Severe three-vessel coronary disease as mentioned in detail Normal LVEF and LVEDP. Echocardiogram will be performed We will have CT surgery evaluation for possible CABG    Addendum Date: 6/9/2021    Severe three-vessel coronary disease as mentioned in detail Normal LVEF and LVEDP.   Echocardiogram will be performed We will have CT surgery evaluation for possible CABG

## 2021-06-13 NOTE — PROGRESS NOTES
Cardiology Progress Note    Consultation request by Carl Fay MD for advice/opinion related to evaluating CP    Date of  Admission: 6/9/2021  8:52 AM   Primary Care Physician:  Andrew Moyer MD    Attending Cardiologist: Dr. Maureen Wade      Assessment:     -Acute Submassive B/L PE, initially presented with symptoms concerning for unstable angina. CT chest showed extensive bilateral large nonobstructing emboli noted in right distal pulmonary artery, left mainstem PA extending into the lobar, segmental and subsegmental arteries. In addition there is right inferior pulmonary vein clot. Initial troponin normal. ECG SR with subtle ST changes. ECHO this admission showed Dilated right ventricle. Abnormal wall motion: free wall hypokinesis of the right ventricle. Reduced systolic function. The findings are consistent with Powell's sign   -CAD s/p [facility] (2019,2018) Widely patent Lcx stents, known occluded RCA, diffuse LM/LAD disease. · S/p C (06/09/21)   Left Main   Large-caliber vessel. Distal vessel approximately 50% stenosis with calcification that involves LAD and circumflex bifurcation   Left Anterior Descending   The vessel is calcified. The vessel is tortuous. Ostial, proximal and the mid LAD has diffuse calcified moderate disease with sequential eccentric 75% lesions. Mid-distal LAD has no significant obstructive disease Diagonal branch: Moderate disease. Medium caliber vessel   Left Circumflex   The vessel is calcified. The vessel is tortuous. Proximal LCx stent has mild in-stent restenosis followed by hazy calcified borderline 70% stenosis. OM1: Small caliber vessel OM 2: Large bifurcating vessel. Calcified hazy 70% stenosis before bifurcation. Superior branch distally distally has subtotal occlusion with faint homocollateral. Inferior branch without any significant obstructive disease   Right Coronary Artery   Proximal-mid 100% in-stent occlusion. Previously known by cardiac catheterization in 2019. Evidence of good collaterals from left to right supplying distal RCA and RPDA   -ECHO (06/09/21) EF 67-78%, Systolic flattening of the interventricular septum consistent with right ventricle pressure overload. Mild (grade 1) left ventricular diastolic dysfunction. -Mild-Moderate TR by echo   -Moderate pulmonary HTN by echo PASP 60 mmHg  -Hypertension, on lopressor, Imdur, Lasix as outpatient   -Hyperlipidemia  -Tobacco Abuse, 1 ppd, cessation advised  -Obesity, BMI 35.58 kg/m2    Primary Cardiologist: [address]:       Patient denies any chest pain or chest tightness. Denies any shortness of breath. Hemodynamically stable. Continued on Heparin gtt for clot stabilization prior to thrombectomy on Monday by IR. Keep patient NPO after midnight   Continue with aspirin, beta-blocker. Continue Isordil 3 times daily with the hope to reduce and turn off IV nitroglycerin. Patient tolerating present regimen. Patient BP is stable   Would recommend to hold doing CABG at this time in the setting of RV strain in RV dysfunction with new massive PE    . Subjective:     Feels better no CP. No other c/o voiced. Past Medical History:     Past Medical History:   Diagnosis Date    CAD (coronary artery disease) 6/9/2021    Coronary arteriosclerosis     Dupuytren's disease of palm     Hyperlipidemia 6/9/2021    Hypertension     SHILPI (obstructive sleep apnea) 6/9/2021    Severe obesity ([address] 10/24/2018    Tobacco dependence 6/9/2021         Social History:     Social History     Socioeconomic History    Marital status:      Spouse name: Not on file    Number of children: Not on file    Years of education: Not on file    Highest education level: Not on file   Tobacco Use    Smoking status: Current Every Day Smoker     Types: Cigarettes    Smokeless tobacco: Never Used   Substance and Sexual Activity    Alcohol use:  Yes    Drug use: No   Other Topics Concern     Social Determinants of Health     Financial Resource Strain:     Difficulty of Paying Living Expenses:    Food Insecurity:     Worried About Running Out of Food in the Last Year:     [address] in the Last Year:    Transportation Needs:     Lack of Transportation (Medical):  Lack of Transportation (Non-Medical):    Physical Activity:     Days of Exercise per Week:     Minutes of Exercise per Session:    Stress:     Feeling of Stress :    Social Connections:     Frequency of Communication with Friends and Family:     Frequency of Social Gatherings with Friends and Family:     Attends Sikhism Services:     Active Member of Clubs or Organizations:     Attends Club or Organization Meetings:     Marital Status:         Family History:     Family History   Problem Relation Age of Onset    Hypertension Mother     Diabetes Mother         Medications:      Allergies   Allergen Reactions    Statins-Hmg-Coa Reductase Inhibitors Other (comments)     Other reaction(s): SEVERE PAIN PER PT    Gabapentin Other (comments)     \"Caused bad nightmares\"    Pcn [Penicillins] Hives        Current Facility-Administered Medications   Medication Dose Route Frequency    nitroglycerin (TRIDIL) 400 mcg/ml infusion  40 mcg/min IntraVENous CONTINUOUS    isosorbide dinitrate (ISORDIL) tablet 30 mg  30 mg Oral TID    zolpidem (AMBIEN) tablet 5 mg  5 mg Oral QHS PRN    morphine injection 1 mg  1 mg IntraVENous Q3H PRN    aspirin delayed-release tablet 81 mg  81 mg Oral DAILY    metoprolol tartrate (LOPRESSOR) tablet 50 mg  50 mg Oral BID    sodium chloride (NS) flush 5-40 mL  5-40 mL IntraVENous Q8H    sodium chloride (NS) flush 5-40 mL  5-40 mL IntraVENous PRN    acetaminophen (TYLENOL) tablet 650 mg  650 mg Oral Q6H PRN    Or    acetaminophen (TYLENOL) suppository 650 mg  650 mg Rectal Q6H PRN    polyethylene glycol (MIRALAX) packet 17 g  17 g Oral DAILY PRN    promethazine (PHENERGAN) tablet 12.5 mg  12.5 mg Oral Q6H PRN    Or  ondansetron (ZOFRAN) injection 4 mg  4 mg IntraVENous Q6H PRN    atorvastatin (LIPITOR) tablet 40 mg  40 mg Oral QHS    albuterol-ipratropium (DUO-NEB) 2.5 MG-0.5 MG/3 ML  3 mL Nebulization Q6H PRN    famotidine (PEPCID) tablet 20 mg  20 mg Oral QPM    furosemide (LASIX) tablet 40 mg  40 mg Oral ACB&D    heparin 25,000 units in D5W 250 ml infusion  18-36 Units/kg/hr IntraVENous TITRATE    amiodarone (CORDARONE) tablet 400 mg  400 mg Oral TID    mupirocin (BACTROBAN) 2 % ointment   Both Nostrils BID    docusate sodium (COLACE) capsule 100 mg  100 mg Oral BID    0.9% sodium chloride infusion  50 mL/hr IntraVENous CONTINUOUS         Physical Exam:     Visit Vitals  BP (!) 145/92   Pulse 61   Temp 98 °F (36.7 °C)   Resp 19   Ht 5' 10\" (1.778 m)   Wt 112.5 kg (248 lb)   SpO2 95%   BMI 35.58 kg/m²       TELE: normal sinus rhythm    BP Readings from Last 3 Encounters:   06/13/21 (!) 145/92   07/01/19 137/87   01/23/19 131/78     Pulse Readings from Last 3 Encounters:   06/13/21 61   07/01/19 83   01/23/19 71     Wt Readings from Last 3 Encounters:   06/09/21 112.5 kg (248 lb)   07/01/19 126.7 kg (279 lb 6.4 oz)   01/23/19 126.8 kg (279 lb 9.6 oz)       General:  alert, cooperative, no distress, appears stated age  Neck:  nontender, no JVD  Lungs:  clear to auscultation bilaterally  Heart:  regular rate and rhythm, S1, S2 normal, no murmur, click, rub or gallop  Abdomen:  abdomen is soft without significant tenderness, masses, organomegaly or guarding  Extremities:  extremities normal, atraumatic, no cyanosis or edema       Data Review:     Recent Labs     06/13/21  0700 06/12/21  0620 06/11/21  0540   WBC 8.9 8.3 8.9   HGB 13.0 12.4* 13.1   HCT 38.8 37.6 39.1    190 184     Recent Labs     06/13/21  0700 06/12/21  0620 06/11/21  0540    141 139   K 3.8 3.7 3.5    108 108   CO2 28 27 27   GLU 78 89 109*   BUN 12 12 14   CREA 1.12 1.04 1.06   CA 9.0 8.3* 9.0   MG  --  1.9 2.1       Results for orders placed or performed during the hospital encounter of 06/09/21   EKG, 12 LEAD, INITIAL   Result Value Ref Range    Ventricular Rate 86 BPM    Atrial Rate 86 BPM    P-R Interval 148 ms    QRS Duration 100 ms    Q-T Interval 408 ms    QTC Calculation (Bezet) 488 ms    Calculated P Axis 68 degrees    Calculated R Axis -3 degrees    Calculated T Axis -50 degrees    Diagnosis       Sinus rhythm with occasional premature ventricular complexes  Possible Left atrial enlargement  Incomplete right bundle branch block  T wave abnormality, consider inferior ischemia  Abnormal ECG  No previous ECGs available  Confirmed by Deneen Nur ([id number]) on 6/9/2021 9:27:02 PM         All Cardiac Markers in the last 24 hours:    No results found for: CPK, CK, CKMMB, CKMB, RCK3, CKMBT, CKNDX, CKND1, PALMA, TROPT, TROIQ, SIL, TROPT, TNIPOC, BNP, BNPP    Last Lipid:    Lab Results   Component Value Date/Time    Cholesterol, total 120 06/10/2021 05:30 AM    HDL Cholesterol 33 (L) 06/10/2021 05:30 AM    LDL, calculated 69.6 06/10/2021 05:30 AM    Triglyceride 87 06/10/2021 05:30 AM    CHOL/HDL Ratio 3.6 06/10/2021 05:30 AM       Cardiographics:     EKG Results     Procedure 720 Value Units Date/Time    EKG, 12 LEAD, SUBSEQUENT [[id number]] Collected: 06/09/21 0953    Order Status: Completed Updated: 06/09/21 2129     Ventricular Rate 100 BPM      Atrial Rate 100 BPM      P-R Interval 146 ms      QRS Duration 96 ms      Q-T Interval 356 ms      QTC Calculation (Bezet) 459 ms      Calculated P Axis 63 degrees      Calculated R Axis -159 degrees      Calculated T Axis -34 degrees      Diagnosis --     Sinus rhythm with occasional premature ventricular complexes and premature   atrial complexes  Possible Left atrial enlargement  Indeterminate axis  Incomplete right bundle branch block  T wave abnormality, consider inferior ischemia  Abnormal ECG  When compared with ECG of 09-JUN-2021 09:31,  premature atrial complexes are now present  Nonspecific T wave abnormality has replaced inverted T waves in Anterior   leads  Confirmed by Margarita Barnes [id number]) on 6/9/2021 9:28:53 PM      EKG, 12 LEAD, SUBSEQUENT [[id number]] Collected: 06/09/21 0931    Order Status: Completed Updated: 06/09/21 2127     Ventricular Rate 84 BPM      Atrial Rate 84 BPM      P-R Interval 148 ms      QRS Duration 98 ms      Q-T Interval 418 ms      QTC Calculation (Bezet) 493 ms      Calculated P Axis 64 degrees      Calculated R Axis -27 degrees      Calculated T Axis -46 degrees      Diagnosis --     Sinus rhythm with occasional premature ventricular complexes  Possible Left atrial enlargement  Incomplete right bundle branch block  T wave abnormality, consider inferior ischemia  Prolonged QT  Abnormal ECG  When compared with ECG of 09-JUN-2021 08:51,  Inverted T waves have replaced nonspecific T wave abnormality in Anterior   leads  Confirmed by Margarita Barnes ([id number]) on 6/9/2021 9:27:46 PM      EKG, 12 LEAD, INITIAL [[id number]] Collected: 06/09/21 0851    Order Status: Completed Updated: 06/09/21 2127     Ventricular Rate 86 BPM      Atrial Rate 86 BPM      P-R Interval 148 ms      QRS Duration 100 ms      Q-T Interval 408 ms      QTC Calculation (Bezet) 488 ms      Calculated P Axis 68 degrees      Calculated R Axis -3 degrees      Calculated T Axis -50 degrees      Diagnosis --     Sinus rhythm with occasional premature ventricular complexes  Possible Left atrial enlargement  Incomplete right bundle branch block  T wave abnormality, consider inferior ischemia  Abnormal ECG  No previous ECGs available  Confirmed by Margarita Barnes [id number]) on 6/9/2021 9:27:02 PM              02/08/19    NM [facility] MYOCARDIAL PERFUSION MULTIPLE  2/8/2019          XR Results (most recent):  Results from [place] encounter on 06/09/21    XR CHEST PORT    Narrative  AP CHEST, PORTABLE    INDICATION: Chest pain    COMPARISON: Chest x-ray 1/8/2019    FINDINGS:  EKG leads overlie the patient. The cardiac silhouette is normal in size. Central pulmonary arteries appear  slightly prominent, similar to prior exams. The pulmonary vasculature is  unremarkable. No focal consolidation, pleural effusion, or pneumothorax. No  acute osseous abnormalities are identified. Impression  1. No clearly acute finding.   2. Chronic prominence of the central pulmonary arteries, could indicate  pulmonary arterial hypertension        Signed By: Ricky Fleischer, NP-C    June 13, 2021

## 2021-06-13 NOTE — PROGRESS NOTES
[facility]  Pulmonary, Critical Care, and Sleep Medicine    Follow up progress note    Name: Stephen Marie MRN: [id number]   : 1960 Hospital: [address] [place] [address] Dr   Date: 2021        IMPRESSION:   · Acute submassive PE with extensive bilateral large nonobstructing emboli noted in right distal pulmonary artery, left mainstem PA extending into the lobar, segmental and subsegmental arteries. In addition there is right inferior pulmonary vein clot. Evidence of RV dilatation with flattening of septum suggesting RV load and positive Powell sign with measured PA P of 60 mmHg. Patient has remained hemodynamically stable and requiring minimal supplemental oxygen. Patient is not aware of any hypercoagulable state-has had screening colonoscopies and no known malignancies. Denies having Covid. [facility] Respiratory virus panel including SARS-CoV-2 is negative for Covid  · Pulmonary hypertension-acute moderate secondary to above  · Chest pain-likely related to pulmonary infarct-right lower lobe  · DVT-left popliteal and posterior tibial veins. Patient gives a history of lumbar radiculopathy L4-S1 disc disease and numbness in his left lower extremity  · Unstable angina-s/p cardiac cath 2021 revealing multivessel disease. Patient is s/p PCI in 2019 done at [address] and has been on antiplatelet agents since   · Hypertension  · Active tobacco use-committed to quit  · Unvaccinated for Covid  · Obstructive sleep apnea on CPAP      RECOMMENDATIONS:   · Discussed with patient, treating team about options for acute and subsequent treatment for pulmonary embolism. Recommend continuing anticoagulation with heparin since patient is hemodynamically stable and would benefit from effect of heparin for clot stabilization prior to proceeding with thrombectomy.   Can consider subacute thrombectomy early next week-catheter guided mechanical thrombectomy to stabilize and improve pulmonary hypertension and RV function prior to considering coronary artery bypass grafting  · Oxygen-continue supplementation with SaO2 goal more than 92%  · Can consider IVC filter placement at time of thrombectomy  · Aspiration precautions  · Need for further diagnostics- CT scan, echocardiogram, ILD serologies, hypercoagulable panel-can be ordered at stable state as outpatient  · Out patient testing- PFT, 6 min walk, Polysomnogram  · Assess home Oxygen needs at discharge  · OT, PT, OOB and ambulate  · Healthy weight  · Will Follow  · DVT, PUD prophylaxis       Subjective: This patient has been seen and evaluated at the request of Dr. Campos Luong for pulmonary embolism    06/13/21     Denies any new complaints-chest pain is somewhat improved. Did get out of bed and sit in a chair  Used his home CPAP at night  Denies any cough, hemoptysis  Denies any new complaints    HPI  Patient is a 56 y.o. male with medical co-morbidities including HTN, CAD with stent, hyperlipidemia, active tobacco smoking, SHILPI on CPAP, morbid obesity, presented from home with chest pain. Accordingly, he has substernal chest pain associates with exertion over the last weeks. He has chest pain with radiation to his neck today when it woke him from his sleep. Patient states that the ambulance brought him to [facility] route patient was given morphine 10 mg for severe chest pain. He has associated shortness of breath. No chest palpitation. No leg swelling. Complains of numbness of his left leg from L4-S1 radiculopathy  No cough. He is a every day smoker and occasionally has a smoker's cough  No recent sick contact. In the ER, cardiac enzyme was negative. EKG was non specific ST change. Cardiology consulted for concern of unstable angina. He was taken to OR for cardiac cath. Per cardiology, he has significant atherosclerosis disease but no obstructive finding. No PCI was done.   Patient was referred for coronary artery bypass grafting surgical opinion. He continued to complain of chest pain and D-dimer was noted to be elevated so CT of the chest was done which showed evidence of large nonobstructing pulmonary embolism and therefore pulmonary has been consulted  Currently patient is on IV heparin wearing oxygen at 2 L sitting up in bed in no distress. He states that intermittently hurts in the mid chest but overall significantly improved. On further questioning he is unaware of any previous blood clots  States he has been on Plavix since 1999  He smokes actively and is committed to quit now that he has multiple problems  He works as a  for a [employer]-fairly active  Denies any known Covid exposures  He has not received Covid vaccination  I have reviewed all of the available data including the patient's previous history external records and radiological imaging available for review. In addition applicable cardiology and other lab data were also reviewed.     Past Medical History:   Diagnosis Date    CAD (coronary artery disease) 6/9/2021    Coronary arteriosclerosis     Dupuytren's disease of palm     Hyperlipidemia 6/9/2021    Hypertension     SHILPI (obstructive sleep apnea) 6/9/2021    Severe obesity ([address] 10/24/2018    Tobacco dependence 6/9/2021      Past Surgical History:   Procedure Laterality Date    HX CHOLECYSTECTOMY      HX CORONARY STENT PLACEMENT      HX HERNIA REPAIR        Allergies   Allergen Reactions    Statins-Hmg-Coa Reductase Inhibitors Other (comments)     Other reaction(s): SEVERE PAIN PER PT    Gabapentin Other (comments)     \"Caused bad nightmares\"    Pcn [Penicillins] Hives       Current Facility-Administered Medications   Medication Dose Route Frequency    nitroglycerin (TRIDIL) 400 mcg/ml infusion  40 mcg/min IntraVENous CONTINUOUS    isosorbide dinitrate (ISORDIL) tablet 30 mg  30 mg Oral TID    aspirin delayed-release tablet 81 mg  81 mg Oral DAILY    metoprolol tartrate (LOPRESSOR) tablet 50 mg  50 mg Oral BID    sodium chloride (NS) flush 5-40 mL  5-40 mL IntraVENous Q8H    atorvastatin (LIPITOR) tablet 40 mg  40 mg Oral QHS    famotidine (PEPCID) tablet 20 mg  20 mg Oral QPM    furosemide (LASIX) tablet 40 mg  40 mg Oral ACB&D    heparin 25,000 units in D5W 250 ml infusion  18-36 Units/kg/hr IntraVENous TITRATE    amiodarone (CORDARONE) tablet 400 mg  400 mg Oral TID    mupirocin (BACTROBAN) 2 % ointment   Both Nostrils BID    docusate sodium (COLACE) capsule 100 mg  100 mg Oral BID    0.9% sodium chloride infusion  50 mL/hr IntraVENous CONTINUOUS       Review of Systems:  A comprehensive review of systems was negative except for that written in the HPI. Objective:   Vital Signs:    Visit Vitals  BP (!) 145/92   Pulse 61   Temp 98 °F (36.7 °C)   Resp 19   Ht 5' 10\" (1.778 m)   Wt 112.5 kg (248 lb)   SpO2 95%   BMI 35.58 kg/m²       O2 Device: None (Room air), CPAP nasal   O2 Flow Rate (L/min): 3 l/min   Temp (24hrs), Av.6 °F (36.4 °C), Min:97.2 °F (36.2 °C), Max:98 °F (36.7 °C)       Intake/Output:   Last shift:       0701 -  1900  In: 240 [P.O.:240]  Out: -   Last 3 shifts:  190 -  0700  In: 2489.4 [P.O.:1426; I.V.:1063.4]  Out: 4065 [Urine:4065]    Intake/Output Summary (Last 24 hours) at 2021 1124  Last data filed at 2021 0915  Gross per 24 hour   Intake 1878.82 ml   Output 2790 ml   Net -911.18 ml      Physical Exam:   General:  Alert, cooperative, no distress, appears stated age. Head:  Normocephalic, without obvious abnormality, atraumatic. Eyes:  Conjunctivae/corneas clear. PERRL, EOMs intact. Nose: Nares normal. Septum midline. Mucosa normal. No drainage or sinus tenderness. Throat: Lips, mucosa, and tongue normal. Teeth and gums normal.   Neck: Supple, symmetrical, trachea midline, no adenopathy, thyroid: no enlargment/tenderness/nodules, no carotid bruit and no JVD. Back:   Symmetric, no curvature.  ROM normal. Lungs:   Clear to auscultation bilaterally. Chest wall:  No tenderness or deformity. Heart:  Regular rate and rhythm, S1, S2 normal, no murmur, click, rub or gallop. Abdomen:   Soft, non-tender. Bowel sounds normal. No masses,  No organomegaly. Extremities: Extremities normal, atraumatic, no cyanosis or edema. Pulses: 2+ and symmetric all extremities. Skin: Skin color, texture, turgor normal. No rashes or lesions   Lymph nodes: Cervical, supraclavicular, and axillary nodes normal.   Neurologic: Grossly nonfocal     Data review:     Recent Results (from the past 24 hour(s))   PTT    Collection Time: 06/13/21  7:00 AM   Result Value Ref Range    aPTT 32.7 23.0 - 36.4 SEC   CBC W/O DIFF    Collection Time: 06/13/21  7:00 AM   Result Value Ref Range    WBC 8.9 4.6 - 13.2 K/uL    RBC 3.94 (L) 4.35 - 5.65 M/uL    HGB 13.0 13.0 - 16.0 g/dL    HCT 38.8 36.0 - 48.0 %    MCV 98.5 (H) 74.0 - 97.0 FL    MCH 33.0 24.0 - 34.0 PG    MCHC 33.5 31.0 - 37.0 g/dL    RDW 12.1 11.6 - 14.5 %    PLATELET 159 466 - 786 K/uL    MPV 10.7 9.2 - 74.8 FL   METABOLIC PANEL, BASIC    Collection Time: 06/13/21  7:00 AM   Result Value Ref Range    Sodium 141 136 - 145 mmol/L    Potassium 3.8 3.5 - 5.5 mmol/L    Chloride 108 100 - 111 mmol/L    CO2 28 21 - 32 mmol/L    Anion gap 5 3.0 - 18 mmol/L    Glucose 78 74 - 99 mg/dL    BUN 12 7.0 - 18 MG/DL    Creatinine 1.12 0.6 - 1.3 MG/DL    BUN/Creatinine ratio 11 (L) 12 - 20      GFR est AA >60 >60 ml/min/1.73m2    GFR est non-AA >60 >60 ml/min/1.73m2    Calcium 9.0 8.5 - 10.1 MG/DL       Imaging:  I have personally reviewed the patients radiographs and have reviewed the reports:  XR Results (most recent):  Results from Hospital Encounter encounter on 06/09/21    XR CHEST PORT    Narrative  AP CHEST, PORTABLE    INDICATION: Chest pain    COMPARISON: Chest x-ray 1/8/2019    FINDINGS:  EKG leads overlie the patient. The cardiac silhouette is normal in size.  Central pulmonary arteries appear  slightly prominent, similar to prior exams. The pulmonary vasculature is  unremarkable. No focal consolidation, pleural effusion, or pneumothorax. No  acute osseous abnormalities are identified. Impression  1. No clearly acute finding. 2. Chronic prominence of the central pulmonary arteries, could indicate  pulmonary arterial hypertension      CT Results (most recent):  Results from Hospital Encounter encounter on 06/09/21    CTA CHEST W OR W WO CONT    Narrative  CT chest with contrast for PE    HISTORY: Shortness of breath    COMPARISON: None. TECHNIQUE: Dynamic spiral scan through the chest is obtained from the thoracic  inlet to the diaphragm after dynamic nonionic IV contrast administration  per PE  protocol. Coronal and sagittal MIP computer reconstructions are also obtained  for better visualization of the integrity of pulmonary arteries in 3D dimension,  particularly for lobar/interlobar arterial branches and to minimize radiation  dose. All CT scans at this facility performed using dose optimization techniques as  appreciated to a performed exam, to include automated exposure control,  adjustment of the mA and or KU according to patient size (including appropriate  matching for site specific examination), or use of iterative reconstruction  technique. FINDINGS:    PULMONARY ARTERIES: The contrast bolus is adequate. There are large  nonobstructing pulmonary emboli identified in the distal right and left mainstem  pulmonary arteries with extension to all the lobar, segmental and subsegmental  branches bilaterally. Moderate dilatation of pulmonary trunk and the proximal  mainstem arteries. Near occlusive intraluminal filling defects also seen in the right inferior  pulmonary vein and multiple adjoining venous branches. AORTA AND VASCULAR STRUCTURES: No aortic aneurysm or dissection. Unremarkable  great vessels. Heart: The heart is normal in size. No pericardial effusion.     LUNG PARENCHYMA: Patchy opacities at the posterior basal segment of right lower  lobe, most likely pulmonary infarction. No other airspace infiltration or  consolidation seen. Several tiny lung nodules identified, 3 mm in left apex on  #11/3, 2 x 4 mm in lateral left upper lobe on #24/3 3 mm nodule in anterior  right upper lobe on #26/3. IMAGED THYROID: Unremarkable. MEDIASTINUM: No adenopathy. PLEURAL SPACES AND CHEST WALL: No significant pleural pathology. VISUALIZED UPPER ABDOMEN: Unremarkable. OSSEOUS STRUCTURES: Unremarkable. Impression  1. Extensive bilateral pulmonary emboli from distal mainstem extending to all  the lobar arteries and their branches with near occlusion. No occlusive emboli  also noted in inferior right pulmonary vein and multiple joining branches. 2.  Moderate dilatation of pulmonary trunk and proximal mainstem arteries. 3. Pulmonary infarction at posterior right lower lobe. 4. No pleural effusion or adenopathy. 5. Several small lung nodules as incidental findings. Recommendation as provided  below. -----------------------------------  [facility] [place] [facility] SOCIETY RECOMMENDATIONS (2017):    Less than 6 mm nodule:  A. Any solitary nodule(solid, semisolid or groundglass) or multiple solid:  Low Risk: No follow-up; If suspicious morphology or upper lobe location  consider 12 month follow-up. High Risk: Optional CT at 12 months. B. Multiple subsolid/groundglass:  CT in 3 to 6 months. If unchanged consider CT at 2 and 4 years. A wet read was provided to the floor nurse, Anika Wilson, for the ordering physician  Dr. Nadine Crawley by me upon the interpretation at approximately  1150  hours. Thank you for your referral.         06/09/21    ECHO ADULT COMPLETE 06/09/2021 6/9/2021    Interpretation Summary  · LV: Estimated LVEF is 50 - 55%. Normal cavity size, wall thickness and systolic function (ejection fraction normal). Abnormal left ventricular septal motion.  Systolic flattening of the interventricular septum consistent with right ventricle pressure overload. Mild (grade 1) left ventricular diastolic dysfunction. · AO: Mild aortic root dilatation. · TV: Non-specific thickening of the tricuspid valve. Mild to moderate tricuspid valve regurgitation is present. · RV: Dilated right ventricle. Reduced systolic function. Right ventricular findings are consistent with Powell's sign. · IVC: Moderately elevated central venous pressure (8 mmHg); IVC diameter is larger than 21 mm and collapses more than 50% with respiration. · PA: Moderate pulmonary hypertension. Pulmonary arterial systolic pressure is 60 mmHg. Addendum by: Whit Parks MD on 6/9/2021  4:16 PM    Signed by: Whit Parks MD on 6/9/2021  3:11 PM    Complex decision making was made in the evaluation and management plans during this consultation. More than 50% of time was spent in counseling and coordination of care including review of data and discussion with other team members.          Steven Dixon MD  Pulmonary & Critical Care

## 2021-06-13 NOTE — PROGRESS NOTES
[facility]      Follow up for CAD    Chief Complaint:  none    Interval History:  Stable on IV Hep and NTG. Denies chest pain nausea, Vomiting, or SOB      Exam:     Visit Vitals  BP (!) 145/92   Pulse 61   Temp 98 °F (36.7 °C)   Resp 19   Ht 5' 10\" (1.778 m)   Wt 112.5 kg (248 lb)   SpO2 95%   BMI 35.58 kg/m²        Const:  alert and oriented. NAD   CV:   RRR without murmur or rub. PMI not displaced. No peripheral edema   Respiratory:  Clear to ascultation without wheezes, rales or rhonchi. No acc    Assessment:    CAD with 50% LM  DVT/PE with reduced RV function  Last Plavix 6/8/21  HTN  HLD  Active tobacco  SHILPI on CPAP        PLAN:    Needs IR consult next week for embolectomy d/w Karol Garcia and George Casas  Will need repeat Echo to demonstrate better RV function  Cont to hold PLAVIX  CABG timing in evolution after above done - cont PO amio load and [address], PA-C  [facility]  [phone]

## 2021-06-13 NOTE — PROGRESS NOTES
0700:Bedside and Verbal shift change report given to [address] [place] [address] (oncoming nurse) by Chino Harris RN (offgoing nurse). Report included the following information SBAR, Kardex and Cardiac Rhythm SR.     0820:Patient in bed eating breakfast, NAD, VSS, reports mild bilateral ribcage area pain. PTT sub-therapeutic, ordered 80units/kg heparin bolus, increased gtt rate to 18units/kg, and ordered aptt lab for 1500. Will continue to monitor. 1001: Patient able to bathe self with basin at sink. Reported 3/10 pain. No pain meds requested. Patient in bed,call bell in hand, bed low and locked. 1715:Aptt >180, per protocol, held heparin gtt for an hour. Will continue to monitor. 1820: Re-started heparin gtt at 15units/kg and aptt ordered for midnight. 1909:Bedside and Verbal shift change report given to [id number] [place] [address] (oncoming nurse) by Christine Victor RN (offgoing nurse). Report included the following information SBAR, Kardex and Cardiac Rhythm NSR.

## 2021-06-14 PROBLEM — I26.99 BILATERAL PULMONARY EMBOLISM (HCC): Status: ACTIVE | Noted: 2021-06-14

## 2021-06-14 LAB
ANION GAP SERPL CALC-SCNC: 4 MMOL/L (ref 3–18)
APTT PPP: 86.1 SEC (ref 23–36.4)
BUN SERPL-MCNC: 15 MG/DL (ref 7–18)
BUN/CREAT SERPL: 11 (ref 12–20)
CALCIUM SERPL-MCNC: 8.5 MG/DL (ref 8.5–10.1)
CHLORIDE SERPL-SCNC: 105 MMOL/L (ref 100–111)
CO2 SERPL-SCNC: 30 MMOL/L (ref 21–32)
CREAT SERPL-MCNC: 1.38 MG/DL (ref 0.6–1.3)
ERYTHROCYTE [DISTWIDTH] IN BLOOD BY AUTOMATED COUNT: 12.1 % (ref 11.6–14.5)
GLUCOSE SERPL-MCNC: 118 MG/DL (ref 74–99)
HCT VFR BLD AUTO: 38.9 % (ref 36–48)
HGB BLD-MCNC: 13.3 G/DL (ref 13–16)
MCH RBC QN AUTO: 33.7 PG (ref 24–34)
MCHC RBC AUTO-ENTMCNC: 34.2 G/DL (ref 31–37)
MCV RBC AUTO: 98.5 FL (ref 74–97)
PLATELET # BLD AUTO: 210 K/UL (ref 135–420)
PMV BLD AUTO: 10.1 FL (ref 9.2–11.8)
POTASSIUM SERPL-SCNC: 3.4 MMOL/L (ref 3.5–5.5)
RBC # BLD AUTO: 3.95 M/UL (ref 4.35–5.65)
SODIUM SERPL-SCNC: 139 MMOL/L (ref 136–145)
WBC # BLD AUTO: 9.8 K/UL (ref 4.6–13.2)

## 2021-06-14 PROCEDURE — 74011250637 HC RX REV CODE- 250/637: Performed by: PHYSICIAN ASSISTANT

## 2021-06-14 PROCEDURE — 99407 BEHAV CHNG SMOKING > 10 MIN: CPT | Performed by: INTERNAL MEDICINE

## 2021-06-14 PROCEDURE — 74011250636 HC RX REV CODE- 250/636: Performed by: INTERNAL MEDICINE

## 2021-06-14 PROCEDURE — 36415 COLL VENOUS BLD VENIPUNCTURE: CPT

## 2021-06-14 PROCEDURE — 85027 COMPLETE CBC AUTOMATED: CPT

## 2021-06-14 PROCEDURE — 2709999900 HC NON-CHARGEABLE SUPPLY

## 2021-06-14 PROCEDURE — 99232 SBSQ HOSP IP/OBS MODERATE 35: CPT | Performed by: INTERNAL MEDICINE

## 2021-06-14 PROCEDURE — 74011250637 HC RX REV CODE- 250/637: Performed by: INTERNAL MEDICINE

## 2021-06-14 PROCEDURE — 99233 SBSQ HOSP IP/OBS HIGH 50: CPT | Performed by: INTERNAL MEDICINE

## 2021-06-14 PROCEDURE — 65660000004 HC RM CVT STEPDOWN

## 2021-06-14 PROCEDURE — 99233 SBSQ HOSP IP/OBS HIGH 50: CPT | Performed by: FAMILY MEDICINE

## 2021-06-14 PROCEDURE — 85730 THROMBOPLASTIN TIME PARTIAL: CPT

## 2021-06-14 PROCEDURE — 80048 BASIC METABOLIC PNL TOTAL CA: CPT

## 2021-06-14 RX ADMIN — MORPHINE SULFATE 1 MG: 2 INJECTION, SOLUTION INTRAMUSCULAR; INTRAVENOUS at 13:51

## 2021-06-14 RX ADMIN — ATORVASTATIN CALCIUM 40 MG: 40 TABLET, FILM COATED ORAL at 22:29

## 2021-06-14 RX ADMIN — ISOSORBIDE DINITRATE 30 MG: 10 TABLET ORAL at 10:16

## 2021-06-14 RX ADMIN — ISOSORBIDE DINITRATE 30 MG: 10 TABLET ORAL at 22:37

## 2021-06-14 RX ADMIN — FAMOTIDINE 20 MG: 20 TABLET ORAL at 18:07

## 2021-06-14 RX ADMIN — SODIUM CHLORIDE 50 ML/HR: 900 INJECTION, SOLUTION INTRAVENOUS at 18:45

## 2021-06-14 RX ADMIN — FUROSEMIDE 40 MG: 40 TABLET ORAL at 15:44

## 2021-06-14 RX ADMIN — AMIODARONE HYDROCHLORIDE 400 MG: 200 TABLET ORAL at 10:16

## 2021-06-14 RX ADMIN — METOPROLOL TARTRATE 50 MG: 50 TABLET, FILM COATED ORAL at 10:16

## 2021-06-14 RX ADMIN — MUPIROCIN: 20 OINTMENT TOPICAL at 18:08

## 2021-06-14 RX ADMIN — MORPHINE SULFATE 1 MG: 2 INJECTION, SOLUTION INTRAMUSCULAR; INTRAVENOUS at 18:08

## 2021-06-14 RX ADMIN — METOPROLOL TARTRATE 50 MG: 50 TABLET, FILM COATED ORAL at 22:29

## 2021-06-14 RX ADMIN — HEPARIN SODIUM 15 UNITS/KG/HR: 10000 INJECTION, SOLUTION INTRAVENOUS at 13:03

## 2021-06-14 RX ADMIN — Medication 10 ML: at 10:19

## 2021-06-14 RX ADMIN — MUPIROCIN: 20 OINTMENT TOPICAL at 10:17

## 2021-06-14 RX ADMIN — DOCUSATE SODIUM 100 MG: 100 CAPSULE, LIQUID FILLED ORAL at 18:07

## 2021-06-14 RX ADMIN — Medication 10 ML: at 22:37

## 2021-06-14 RX ADMIN — Medication 10 ML: at 13:06

## 2021-06-14 RX ADMIN — Medication 81 MG: at 10:16

## 2021-06-14 RX ADMIN — FUROSEMIDE 40 MG: 40 TABLET ORAL at 10:16

## 2021-06-14 RX ADMIN — DOCUSATE SODIUM 100 MG: 100 CAPSULE, LIQUID FILLED ORAL at 10:16

## 2021-06-14 RX ADMIN — ISOSORBIDE DINITRATE 30 MG: 10 TABLET ORAL at 15:44

## 2021-06-14 RX ADMIN — MORPHINE SULFATE 1 MG: 2 INJECTION, SOLUTION INTRAMUSCULAR; INTRAVENOUS at 22:29

## 2021-06-14 NOTE — PROGRESS NOTES
[facility]  Pulmonary, Critical Care, and Sleep Medicine    Follow up progress note    Name: Tomi Melara MRN: [id number]   : 1960 Hospital: [facility]   Date: 2021        IMPRESSION:   · Acute submassive PE with extensive bilateral large nonobstructing emboli noted in right distal pulmonary artery, left mainstem PA extending into the lobar, segmental and subsegmental arteries. In addition there is right inferior pulmonary vein clot. Evidence of RV dilatation with flattening of septum suggesting RV load and positive Powell sign with measured PA P of 60 mmHg. Patient has remained hemodynamically stable and requiring minimal supplemental oxygen. Patient is not aware of any hypercoagulable state-has had screening colonoscopies and no known malignancies. Denies having Covid. [employer] Respiratory virus panel including SARS-CoV-2 is negative for Covid  · Pulmonary hypertension-acute moderate secondary to above  · Chest pain-likely related to pulmonary infarct-right lower lobe  · DVT-left popliteal and posterior tibial veins. Patient gives a history of lumbar radiculopathy L4-S1 disc disease and numbness in his left lower extremity  · Unstable angina-s/p cardiac cath 2021 revealing multivessel disease. Patient is s/p PCI in 2019 done at [facility] and has been on antiplatelet agents since   · Hypertension  · Active tobacco use: Active 1 pack/day smoker cigarettes  · Unvaccinated for Covid  · Obstructive sleep apnea on CPAP  · ETOH Dependence:  Pt drinks 500cc of bourbon daily      RECOMMENDATIONS:   · Again, discussed with patient, treating team about options for acute and subsequent treatment for pulmonary embolism. Recommend continuing anticoagulation with heparin and proceed with mechanical suction thrombectomy per IR with IVC filter placement when possible -- discussed with IR, Dr. Aroldo Proctor.   Dicussed with Cardiology, will defer as to proper timing of CABG to their service -- pt has ongoing hx of worsening angina refractory SL NTG using multiple times per day for the last few months  · Repeat TTE after thrombectomy to assess RV function/PA pressures  · Pt will need full dose for minimum of 6 months, but may need lifelong anticoagulation pending repeat imaging at that time. If patient is able to tolerate full dose anticoagulation, may have IVC filter removed a few months after successful CABG  · Consider transitioning off of NTG IV given risk for tachyphylaxis, will defer to cardiology  · Management of CAD per cardiology and CT surgery  · Oxygen-continue supplementation with SaO2 goal more than 89%  · Continue CPAP nightly  · Aspiration precautions  · Continue to monitor for acute EtOH withdrawal  · Need for further diagnostics- CT scan, echocardiogram, ILD serologies, hypercoagulable panel-can be ordered at stable state as outpatient  · Out patient testing- PFT, 6 min walk  · Assess home Oxygen needs at discharge  · OT, PT, OOB and ambulate  · Healthy weight  · Will Follow  · DVTppx as noted above  · Smoking cessation counseling:   I spent 13 minutes with patient regarding cessation of smoking cigarettes. I reviewed health risks of tobacco use including increased risk of MI, stroke, cancer, etc.  We reviewed various approaches to cessation including pills, patches, inhaler, gum, weaning self, \"cold turkey\", etc.  Pt was agreeable to cessation -- I advise pt get PRN nicotine lozenges at discharge           Subjective: This patient has been seen and evaluated at the request of Dr. Alexx Finn for pulmonary embolism    06/14/21   Patient seen and examined at bedside. No acute events overnight. Patient remains on nitroglycerin drip. Patient reports no new issues with chest pain. Patient does report chest pain with deep inspiration. Patient reports he is compliant with CPAP. Denies any nausea, vomiting, diarrhea, chest pain.   Patient is tolerating his home CPAP well, nasal interface      HPI  Patient is a 56 y.o. male with medical co-morbidities including HTN, CAD with stent, hyperlipidemia, active tobacco smoking, SHILPI on CPAP, morbid obesity, presented from home with chest pain. Accordingly, he has substernal chest pain associates with exertion over the last weeks. He has chest pain with radiation to his neck today when it woke him from his sleep. Patient states that the ambulance brought him to [address] [facility] route patient was given morphine 10 mg for severe chest pain. He has associated shortness of breath. No chest palpitation. No leg swelling. Complains of numbness of his left leg from L4-S1 radiculopathy  No cough. He is a every day smoker and occasionally has a smoker's cough  No recent sick contact. In the ER, cardiac enzyme was negative. EKG was non specific ST change. Cardiology consulted for concern of unstable angina. He was taken to OR for cardiac cath. Per cardiology, he has significant atherosclerosis disease but no obstructive finding. No PCI was done. Patient was referred for coronary artery bypass grafting surgical opinion. He continued to complain of chest pain and D-dimer was noted to be elevated so CT of the chest was done which showed evidence of large nonobstructing pulmonary embolism and therefore pulmonary has been consulted  Currently patient is on IV heparin wearing oxygen at 2 L sitting up in bed in no distress. He states that intermittently hurts in the mid chest but overall significantly improved.   On further questioning he is unaware of any previous blood clots  States he has been on Plavix since 1999  He smokes actively and is committed to quit now that he has multiple problems  He works as a  for a [employer]-fairly active  Denies any known Covid exposures  He has not received Covid vaccination  I have reviewed all of the available data including the patient's previous history external records and radiological imaging available for review. In addition applicable cardiology and other lab data were also reviewed. Past Medical History:   Diagnosis Date    CAD (coronary artery disease) 6/9/2021    Coronary arteriosclerosis     Dupuytren's disease of palm     Hyperlipidemia 6/9/2021    Hypertension     SHILPI (obstructive sleep apnea) 6/9/2021    Severe obesity ([address] 10/24/2018    Tobacco dependence 6/9/2021      Past Surgical History:   Procedure Laterality Date    HX CHOLECYSTECTOMY      HX CORONARY STENT PLACEMENT      HX HERNIA REPAIR        Allergies   Allergen Reactions    Statins-Hmg-Coa Reductase Inhibitors Other (comments)     Other reaction(s): SEVERE PAIN PER PT    Gabapentin Other (comments)     \"Caused bad nightmares\"    Pcn [Penicillins] Hives       Current Facility-Administered Medications   Medication Dose Route Frequency    nitroglycerin (TRIDIL) 400 mcg/ml infusion  40 mcg/min IntraVENous CONTINUOUS    isosorbide dinitrate (ISORDIL) tablet 30 mg  30 mg Oral TID    aspirin delayed-release tablet 81 mg  81 mg Oral DAILY    metoprolol tartrate (LOPRESSOR) tablet 50 mg  50 mg Oral BID    sodium chloride (NS) flush 5-40 mL  5-40 mL IntraVENous Q8H    atorvastatin (LIPITOR) tablet 40 mg  40 mg Oral QHS    famotidine (PEPCID) tablet 20 mg  20 mg Oral QPM    furosemide (LASIX) tablet 40 mg  40 mg Oral ACB&D    heparin 25,000 units in D5W 250 ml infusion  18-36 Units/kg/hr IntraVENous TITRATE    amiodarone (CORDARONE) tablet 400 mg  400 mg Oral TID    mupirocin (BACTROBAN) 2 % ointment   Both Nostrils BID    docusate sodium (COLACE) capsule 100 mg  100 mg Oral BID    0.9% sodium chloride infusion  50 mL/hr IntraVENous CONTINUOUS       Review of Systems:  A comprehensive review of systems was negative except for that written in the HPI.     Objective:   Vital Signs:    Visit Vitals  BP (!) 143/79   Pulse 64   Temp 98.8 °F (37.1 °C)   Resp 20   Ht 5' 10\" (1.778 m)   Wt 112.5 kg (248 lb)   SpO2 94%   BMI 35.58 kg/m²       O2 Device: None (Room air)   O2 Flow Rate (L/min): 3 l/min   Temp (24hrs), Av.1 °F (36.7 °C), Min:97.5 °F (36.4 °C), Max:98.8 °F (37.1 °C)       Intake/Output:   Last shift:      701 -  1900  In: 509 [I.V.:509]  Out: 450 [Urine:450]  Last 3 shifts: 1901 -  0700  In: 2263.5 [P.O.:980; I.V.:1283.5]  Out: 5510 [Urine:5510]    Intake/Output Summary (Last 24 hours) at 2021 1035  Last data filed at 2021 0701  Gross per 24 hour   Intake 2362.67 ml   Output 4670 ml   Net -2307.33 ml      Physical Exam:   General:  Alert, cooperative, no distress, appears stated age, sitting upright in bed, wearing nasal cannula   Head:  Normocephalic, without obvious abnormality, atraumatic. Eyes:  Conjunctivae/corneas clear. PERRL, EOMs intact. Nose: Nares normal. Septum midline. Mucosa normal. No drainage or sinus tenderness. Throat: Lips, mucosa, and tongue normal.  Fair dentition, no oral lesions   Neck: Supple, symmetrical, trachea midline, no adenopathy, no carotid bruit and no JVD. Back:   Symmetric, no curvature. ROM normal.   Lungs:    Distant breath sounds bilaterally, CTA BL, no wheezes/rales/rhonchi throughout all lung fields   Heart:  Regular rate and rhythm, S1, S2 normal, no murmur, click, rub or gallop. Abdomen:   Soft, non-tender. Bowel sounds normal. No masses,  No organomegaly. Extremities: Extremities normal, atraumatic, no cyanosis or edema. No clubbing   Pulses: 2+ and symmetric all extremities.    Skin: Skin color, texture, turgor normal. No rashes or lesions   Lymph nodes: Cervical, supraclavicular, and axillary nodes normal.   Neurologic: Grossly nonfocal, strength and coordination grossly intact throughout all extremities     Data review:     Recent Results (from the past 24 hour(s))   PTT    Collection Time: 21  3:34 PM   Result Value Ref Range    aPTT >180.0 (HH) 23.0 - 36.4 SEC   CBC W/O DIFF    Collection Time: 06/14/21  1:09 AM   Result Value Ref Range    WBC 9.8 4.6 - 13.2 K/uL    RBC 3.95 (L) 4.35 - 5.65 M/uL    HGB 13.3 13.0 - 16.0 g/dL    HCT 38.9 36.0 - 48.0 %    MCV 98.5 (H) 74.0 - 97.0 FL    MCH 33.7 24.0 - 34.0 PG    MCHC 34.2 31.0 - 37.0 g/dL    RDW 12.1 11.6 - 14.5 %    PLATELET 788 888 - 143 K/uL    MPV 10.1 9.2 - 54.8 FL   METABOLIC PANEL, BASIC    Collection Time: 06/14/21  1:09 AM   Result Value Ref Range    Sodium 139 136 - 145 mmol/L    Potassium 3.4 (L) 3.5 - 5.5 mmol/L    Chloride 105 100 - 111 mmol/L    CO2 30 21 - 32 mmol/L    Anion gap 4 3.0 - 18 mmol/L    Glucose 118 (H) 74 - 99 mg/dL    BUN 15 7.0 - 18 MG/DL    Creatinine 1.38 (H) 0.6 - 1.3 MG/DL    BUN/Creatinine ratio 11 (L) 12 - 20      GFR est AA >60 >60 ml/min/1.73m2    GFR est non-AA 52 (L) >60 ml/min/1.73m2    Calcium 8.5 8.5 - 10.1 MG/DL   PTT    Collection Time: 06/14/21  1:09 AM   Result Value Ref Range    aPTT 86.1 (H) 23.0 - 36.4 SEC       Imaging:  I have personally reviewed the patients radiographs and have reviewed the reports:  XR Results (most recent):  Results from Hospital Encounter encounter on 06/09/21    XR CHEST PORT    Narrative  AP CHEST, PORTABLE    INDICATION: Chest pain    COMPARISON: Chest x-ray 1/8/2019    FINDINGS:  EKG leads overlie the patient. The cardiac silhouette is normal in size. Central pulmonary arteries appear  slightly prominent, similar to prior exams. The pulmonary vasculature is  unremarkable. No focal consolidation, pleural effusion, or pneumothorax. No  acute osseous abnormalities are identified. Impression  1. No clearly acute finding. 2. Chronic prominence of the central pulmonary arteries, could indicate  pulmonary arterial hypertension      CT Results (most recent):  Results from Hospital Encounter encounter on 06/09/21    CTA CHEST W OR W WO CONT    Narrative  CT chest with contrast for PE    HISTORY: Shortness of breath    COMPARISON: None.     TECHNIQUE: Dynamic spiral scan through the chest is obtained from the thoracic  inlet to the diaphragm after dynamic nonionic IV contrast administration  per PE  protocol. Coronal and sagittal MIP computer reconstructions are also obtained  for better visualization of the integrity of pulmonary arteries in 3D dimension,  particularly for lobar/interlobar arterial branches and to minimize radiation  dose. All CT scans at this facility performed using dose optimization techniques as  appreciated to a performed exam, to include automated exposure control,  adjustment of the mA and or KU according to patient size (including appropriate  matching for site specific examination), or use of iterative reconstruction  technique. FINDINGS:    PULMONARY ARTERIES: The contrast bolus is adequate. There are large  nonobstructing pulmonary emboli identified in the distal right and left mainstem  pulmonary arteries with extension to all the lobar, segmental and subsegmental  branches bilaterally. Moderate dilatation of pulmonary trunk and the proximal  mainstem arteries. Near occlusive intraluminal filling defects also seen in the right inferior  pulmonary vein and multiple adjoining venous branches. AORTA AND VASCULAR STRUCTURES: No aortic aneurysm or dissection. Unremarkable  great vessels. Heart: The heart is normal in size. No pericardial effusion. LUNG PARENCHYMA: Patchy opacities at the posterior basal segment of right lower  lobe, most likely pulmonary infarction. No other airspace infiltration or  consolidation seen. Several tiny lung nodules identified, 3 mm in left apex on  #11/3, 2 x 4 mm in lateral left upper lobe on #24/3 3 mm nodule in anterior  right upper lobe on #26/3. IMAGED THYROID: Unremarkable. MEDIASTINUM: No adenopathy. PLEURAL SPACES AND CHEST WALL: No significant pleural pathology. VISUALIZED UPPER ABDOMEN: Unremarkable. OSSEOUS STRUCTURES: Unremarkable. Impression  1.   Extensive bilateral pulmonary emboli from distal mainstem extending to all  the lobar arteries and their branches with near occlusion. No occlusive emboli  also noted in inferior right pulmonary vein and multiple joining branches. 2.  Moderate dilatation of pulmonary trunk and proximal mainstem arteries. 3. Pulmonary infarction at posterior right lower lobe. 4. No pleural effusion or adenopathy. 5. Several small lung nodules as incidental findings. Recommendation as provided  below. -----------------------------------  [facility] SOCIETY RECOMMENDATIONS (2017):    Less than 6 mm nodule:  A. Any solitary nodule(solid, semisolid or groundglass) or multiple solid:  Low Risk: No follow-up; If suspicious morphology or upper lobe location  consider 12 month follow-up. High Risk: Optional CT at 12 months. B. Multiple subsolid/groundglass:  CT in 3 to 6 months. If unchanged consider CT at 2 and 4 years. A wet read was provided to the floor nurse, Karo Adam, for the ordering physician  Dr. Jennifer Isbell by me upon the interpretation at approximately  1150  hours. Thank you for your referral.         06/09/21    ECHO ADULT COMPLETE 06/09/2021 6/9/2021    Interpretation Summary  · LV: Estimated LVEF is 50 - 55%. Normal cavity size, wall thickness and systolic function (ejection fraction normal). Abnormal left ventricular septal motion. Systolic flattening of the interventricular septum consistent with right ventricle pressure overload. Mild (grade 1) left ventricular diastolic dysfunction. · AO: Mild aortic root dilatation. · TV: Non-specific thickening of the tricuspid valve. Mild to moderate tricuspid valve regurgitation is present. · RV: Dilated right ventricle. Reduced systolic function. Right ventricular findings are consistent with Powell's sign. · IVC: Moderately elevated central venous pressure (8 mmHg); IVC diameter is larger than 21 mm and collapses more than 50% with respiration. · PA: Moderate pulmonary hypertension.  Pulmonary arterial systolic pressure is 60 mmHg. Addendum by: Rob Bravo MD on 6/9/2021  4:16 PM    Signed by: Rob Bravo MD on 6/9/2021  3:11 PM    Complex decision making was made in the evaluation and management plans during this consultation. More than 50% of time was spent in counseling and coordination of care including review of data and discussion with other team members.                Liliya Chow MD/MPH     Pulmonary, Critical Care Medicine  [address] [place] [address] [facility]

## 2021-06-14 NOTE — PROGRESS NOTES
Problem: Pressure Injury - Risk of  Goal: *Prevention of pressure injury  Description: Document Ez Scale and appropriate interventions in the flowsheet. Outcome: Progressing Towards Goal  Note: Pressure Injury Interventions:       Moisture Interventions: Absorbent underpads    Activity Interventions: Pressure redistribution bed/mattress(bed type)    Mobility Interventions: Pressure redistribution bed/mattress (bed type)    Nutrition Interventions: Document food/fluid/supplement intake                     Problem: Patient Education: Go to Patient Education Activity  Goal: Patient/Family Education  Outcome: Progressing Towards Goal     Problem: Falls - Risk of  Goal: *Absence of Falls  Description: Document Deon Fall Risk and appropriate interventions in the flowsheet.   Outcome: Progressing Towards Goal  Note: Fall Risk Interventions:  Mobility Interventions: Communicate number of staff needed for ambulation/transfer, OT consult for ADLs, PT Consult for mobility concerns         Medication Interventions: Patient to call before getting OOB, Teach patient to arise slowly    Elimination Interventions: Call light in reach              Problem: Patient Education: Go to Patient Education Activity  Goal: Patient/Family Education  Outcome: Progressing Towards Goal     Problem: Pain  Goal: *Control of Pain  Outcome: Progressing Towards Goal  Goal: *PALLIATIVE CARE:  Alleviation of Pain  Outcome: Progressing Towards Goal     Problem: Patient Education: Go to Patient Education Activity  Goal: Patient/Family Education  Outcome: Progressing Towards Goal     Problem: Pulmonary Embolism Care Plan (Adult)  Goal: *Improvement of existing pulmonary embolism  Outcome: Progressing Towards Goal  Goal: *Absence of bleeding  Outcome: Progressing Towards Goal  Goal: *Labs within defined limits  Outcome: Progressing Towards Goal     Problem: Patient Education: Go to Patient Education Activity  Goal: Patient/Family Education  Outcome: Progressing Towards Goal     Problem: Patient Education: Go to Patient Education Activity  Goal: Patient/Family Education  Outcome: Progressing Towards Goal     Problem: Unstable Angina/NSTEMI: Discharge Outcomes  Goal: *Hemodynamically stable  Outcome: Progressing Towards Goal  Goal: *Stable cardiac rhythm  Outcome: Progressing Towards Goal  Goal: *Lungs clear or at baseline  Outcome: Progressing Towards Goal  Goal: *Optimal pain control at patient's stated goal  Outcome: Progressing Towards Goal  Goal: *Identifies cardiac risk factors  Outcome: Progressing Towards Goal  Goal: *Verbalizes home exercise program, activity guidelines, cardiac precautions  Outcome: Progressing Towards Goal  Goal: *Verbalizes understanding and describes prescribed diet  Outcome: Progressing Towards Goal  Goal: *Verbalizes name, dosage, time, side effects, and number of days to continue medications  Outcome: Progressing Towards Goal  Goal: *Anxiety reduced or absent  Outcome: Progressing Towards Goal  Goal: *Understands and describes signs and symptoms to report to providers(Stroke Metric)  Outcome: Progressing Towards Goal  Goal: *Describes follow-up/return visits to physicians  Outcome: Progressing Towards Goal  Goal: *Describes available resources and support systems  Outcome: Progressing Towards Goal  Goal: *Influenza immunization  Outcome: Progressing Towards Goal  Goal: *Pneumococcal immunization  Outcome: Progressing Towards Goal  Goal: *Describes smoking cessation resources  Outcome: Progressing Towards Goal     Problem: Patient Education: Go to Patient Education Activity  Goal: Patient/Family Education  Outcome: Progressing Towards Goal     Problem: Patient Education: Go to Patient Education Activity  Goal: Patient/Family Education  Outcome: Progressing Towards Goal     Problem: Patient Education: Go to Patient Education Activity  Goal: Patient/Family Education  Outcome: Progressing Towards Goal     Problem: Tobacco Use  Goal: *Tobacco use abstinence  Outcome: Progressing Towards Goal  Goal: *Knowledge of tobacco use cessation methods  Outcome: Progressing Towards Goal

## 2021-06-14 NOTE — PROGRESS NOTES
Cardiology Progress Note    Consultation request by Manuel Choudhary MD for advice/opinion related to evaluating CP    Date of  Admission: 6/9/2021  8:52 AM   Primary Care Physician:  Duyen Nelson MD    Attending Cardiologist: Dr. [place]:     -Acute Submassive B/L PE, initially presented with symptoms concerning for unstable angina. Pain out of proportion to [facility] findings. CT chest showed extensive bilateral large nonobstructing emboli noted in right distal pulmonary artery, left mainstem PA extending into the lobar, segmental and subsegmental arteries. In addition there is right inferior pulmonary vein clot. Initial troponin normal. ECG SR with subtle ST changes. ECHO this admission showed Dilated right ventricle. Abnormal wall motion: free wall hypokinesis of the right ventricle. Reduced systolic function. The findings are consistent with Powell's sign   -CAD s/p [facility] (2019,2018) Widely patent Lcx stents, known occluded RCA, diffuse LM/LAD disease. · S/p [facility] (06/09/21)   Left Main   Large-caliber vessel. Distal vessel approximately 50% stenosis with calcification that involves LAD and circumflex bifurcation   Left Anterior Descending   The vessel is calcified. The vessel is tortuous. Ostial, proximal and the mid LAD has diffuse calcified moderate disease with sequential eccentric 75% lesions. Mid-distal LAD has no significant obstructive disease Diagonal branch: Moderate disease. Medium caliber vessel   Left Circumflex   The vessel is calcified. The vessel is tortuous. Proximal LCx stent has mild in-stent restenosis followed by hazy calcified borderline 70% stenosis. OM1: Small caliber vessel OM 2: Large bifurcating vessel. Calcified hazy 70% stenosis before bifurcation. Superior branch distally distally has subtotal occlusion with faint homocollateral. Inferior branch without any significant obstructive disease   Right Coronary Artery   Proximal-mid 100% in-stent occlusion.  Previously known by cardiac catheterization in 2019. Evidence of good collaterals from left to right supplying distal RCA and RPDA   -ECHO (06/09/21) EF 90-76%, Systolic flattening of the interventricular septum consistent with right ventricle pressure overload. Mild (grade 1) left ventricular diastolic dysfunction. -Mild-Moderate TR by echo   -Moderate pulmonary HTN by echo PASP 60 mmHg  -Hypertension, on lopressor, Imdur, Lasix as outpatient   -Hyperlipidemia  -Tobacco Abuse, 1 ppd, cessation advised  -Obesity, BMI 35.58 kg/m2    Primary Cardiologist: [facility]      Plan:     Independently seen and evaluated. Chart reviewed at length. Discussed issues with patient over 30 minutes. Patient has RV failure with significant pulmonary embolism. He has occluded but well collateralized large RCA. He has modest disease in his left main, LAD, and circumflex with normal ALYCIA-3 flow. There is no emergency or urgency to proceed with coronary bypass surgery. Matter fact, if he goes under bypass, he likely would not get off with his RV failure. I would not to open heart surgery at this time. If he has thrombectomy performed for his PE, he should at least have 3 months of anticoagulation. Given his severe RV failure, this may improve over this period of time. In the meantime, I would continue beta-blockers as tolerated. Would optimize statin therapy. Would continue aspirin. Obviously, he needs to discontinue tobacco use completely.    -Patient scheduled for pulmonary artery thrombectomy and IVC filter placement 6/15. Heparin held. -CABG on hold at this time given RV strain, with RV dysfunction in setting of new massive PE.   -Continue aggressive medical therapy for CAD. -Continue BB and po lasix   -Lifestyle changes, including: Smoking and drinking cessation strongly encouraged. .   Subjective:     CP free, denies SOB.        Past Medical History:     Past Medical History:   Diagnosis Date    CAD (coronary artery disease) 6/9/2021    Coronary arteriosclerosis     Dupuytren's disease of palm     Hyperlipidemia 6/9/2021    Hypertension     SHILPI (obstructive sleep apnea) 6/9/2021    Severe obesity ([address] 10/24/2018    Tobacco dependence 6/9/2021         Social History:     Social History     Socioeconomic History    Marital status:      Spouse name: Not on file    Number of children: Not on file    Years of education: Not on file    Highest education level: Not on file   Tobacco Use    Smoking status: Current Every Day Smoker     Types: Cigarettes    Smokeless tobacco: Never Used   Substance and Sexual Activity    Alcohol use: Yes    Drug use: No   Other Topics Concern     Social Determinants of Health     Financial Resource Strain:     Difficulty of Paying Living Expenses:    Food Insecurity:     Worried About Running Out of Food in the Last Year:     [address] in the Last Year:    Transportation Needs:     Lack of Transportation (Medical):  Lack of Transportation (Non-Medical):    Physical Activity:     Days of Exercise per Week:     Minutes of Exercise per Session:    Stress:     Feeling of Stress :    Social Connections:     Frequency of Communication with Friends and Family:     Frequency of Social Gatherings with Friends and Family:     Attends [employer] Services:     Active Member of Clubs or Organizations:     Attends Club or Organization Meetings:     Marital Status:         Family History:     Family History   Problem Relation Age of Onset    Hypertension Mother     Diabetes Mother         Medications:      Allergies   Allergen Reactions    Statins-Hmg-Coa Reductase Inhibitors Other (comments)     Other reaction(s): SEVERE PAIN PER PT    Gabapentin Other (comments)     \"Caused bad nightmares\"    Pcn [Penicillins] Hives        Current Facility-Administered Medications   Medication Dose Route Frequency    nitroglycerin (TRIDIL) 400 mcg/ml infusion  40 mcg/min IntraVENous CONTINUOUS    isosorbide dinitrate (ISORDIL) tablet 30 mg  30 mg Oral TID    zolpidem (AMBIEN) tablet 5 mg  5 mg Oral QHS PRN    morphine injection 1 mg  1 mg IntraVENous Q3H PRN    aspirin delayed-release tablet 81 mg  81 mg Oral DAILY    metoprolol tartrate (LOPRESSOR) tablet 50 mg  50 mg Oral BID    sodium chloride (NS) flush 5-40 mL  5-40 mL IntraVENous Q8H    sodium chloride (NS) flush 5-40 mL  5-40 mL IntraVENous PRN    acetaminophen (TYLENOL) tablet 650 mg  650 mg Oral Q6H PRN    Or    acetaminophen (TYLENOL) suppository 650 mg  650 mg Rectal Q6H PRN    polyethylene glycol (MIRALAX) packet 17 g  17 g Oral DAILY PRN    promethazine (PHENERGAN) tablet 12.5 mg  12.5 mg Oral Q6H PRN    Or    ondansetron (ZOFRAN) injection 4 mg  4 mg IntraVENous Q6H PRN    atorvastatin (LIPITOR) tablet 40 mg  40 mg Oral QHS    albuterol-ipratropium (DUO-NEB) 2.5 MG-0.5 MG/3 ML  3 mL Nebulization Q6H PRN    famotidine (PEPCID) tablet 20 mg  20 mg Oral QPM    furosemide (LASIX) tablet 40 mg  40 mg Oral ACB&D    heparin 25,000 units in D5W 250 ml infusion  18-36 Units/kg/hr IntraVENous TITRATE    amiodarone (CORDARONE) tablet 400 mg  400 mg Oral TID    mupirocin (BACTROBAN) 2 % ointment   Both Nostrils BID    docusate sodium (COLACE) capsule 100 mg  100 mg Oral BID    0.9% sodium chloride infusion  50 mL/hr IntraVENous CONTINUOUS         Physical Exam:     Visit Vitals  BP (!) 134/93   Pulse 61   Temp 97.8 °F (36.6 °C)   Resp 18   Ht 5' 10\" (1.778 m)   Wt 112.5 kg (248 lb)   SpO2 96%   BMI 35.58 kg/m²       TELE: normal sinus rhythm    BP Readings from Last 3 Encounters:   06/14/21 (!) 134/93   07/01/19 137/87   01/23/19 131/78     Pulse Readings from Last 3 Encounters:   06/14/21 61   07/01/19 83   01/23/19 71     Wt Readings from Last 3 Encounters:   06/09/21 112.5 kg (248 lb)   07/01/19 126.7 kg (279 lb 6.4 oz)   01/23/19 126.8 kg (279 lb 9.6 oz)       General:  alert, cooperative, no distress, appears stated age  Neck:  nontender, no JVD  Lungs:  clear to auscultation bilaterally  Heart:  regular rate and rhythm, S1, S2 normal, no murmur, click, rub or gallop  Abdomen:  abdomen is soft without significant tenderness, masses, organomegaly or guarding  Extremities:  extremities normal, atraumatic, no cyanosis or edema       Data Review:     Recent Labs     06/14/21  0109 06/13/21  0700 06/12/21  0620   WBC 9.8 8.9 8.3   HGB 13.3 13.0 12.4*   HCT 38.9 38.8 37.6    207 190     Recent Labs     06/14/21  0109 06/13/21  0700 06/12/21  0620    141 141   K 3.4* 3.8 3.7    108 108   CO2 30 28 27   * 78 89   BUN 15 12 12   CREA 1.38* 1.12 1.04   CA 8.5 9.0 8.3*   MG  --   --  1.9       Results for orders placed or performed during the hospital encounter of 06/09/21   EKG, 12 LEAD, INITIAL   Result Value Ref Range    Ventricular Rate 86 BPM    Atrial Rate 86 BPM    P-R Interval 148 ms    QRS Duration 100 ms    Q-T Interval 408 ms    QTC Calculation (Bezet) 488 ms    Calculated P Axis 68 degrees    Calculated R Axis -3 degrees    Calculated T Axis -50 degrees    Diagnosis       Sinus rhythm with occasional premature ventricular complexes  Possible Left atrial enlargement  Incomplete right bundle branch block  T wave abnormality, consider inferior ischemia  Abnormal ECG  No previous ECGs available  Confirmed by Myke Rodriguez ([id number]) on 6/9/2021 9:27:02 PM         All Cardiac Markers in the last 24 hours:    No results found for: CPK, CK, CKMMB, CKMB, RCK3, CKMBT, CKNDX, CKND1, PALMA, TROPT, TROIQ, SIL, TROPT, TNIPOC, BNP, BNPP    Last Lipid:    Lab Results   Component Value Date/Time    Cholesterol, total 120 06/10/2021 05:30 AM    HDL Cholesterol 33 (L) 06/10/2021 05:30 AM    LDL, calculated 69.6 06/10/2021 05:30 AM    Triglyceride 87 06/10/2021 05:30 AM    CHOL/HDL Ratio 3.6 06/10/2021 05:30 AM       Cardiographics:     EKG Results     Procedure 720 Value Units Date/Time    EKG, 12 LEAD, SUBSEQUENT [[id number]] Collected: 06/09/21 0953    Order Status: Completed Updated: 06/09/21 2129     Ventricular Rate 100 BPM      Atrial Rate 100 BPM      P-R Interval 146 ms      QRS Duration 96 ms      Q-T Interval 356 ms      QTC Calculation (Bezet) 459 ms      Calculated P Axis 63 degrees      Calculated R Axis -159 degrees      Calculated T Axis -34 degrees      Diagnosis --     Sinus rhythm with occasional premature ventricular complexes and premature   atrial complexes  Possible Left atrial enlargement  Indeterminate axis  Incomplete right bundle branch block  T wave abnormality, consider inferior ischemia  Abnormal ECG  When compared with ECG of 09-JUN-2021 09:31,  premature atrial complexes are now present  Nonspecific T wave abnormality has replaced inverted T waves in Anterior   leads  Confirmed by Baldwin Letters ([id number]) on 6/9/2021 9:28:53 PM      EKG, 12 LEAD, SUBSEQUENT [[id number]] Collected: 06/09/21 0931    Order Status: Completed Updated: 06/09/21 2127     Ventricular Rate 84 BPM      Atrial Rate 84 BPM      P-R Interval 148 ms      QRS Duration 98 ms      Q-T Interval 418 ms      QTC Calculation (Bezet) 493 ms      Calculated P Axis 64 degrees      Calculated R Axis -27 degrees      Calculated T Axis -46 degrees      Diagnosis --     Sinus rhythm with occasional premature ventricular complexes  Possible Left atrial enlargement  Incomplete right bundle branch block  T wave abnormality, consider inferior ischemia  Prolonged QT  Abnormal ECG  When compared with ECG of 09-JUN-2021 08:51,  Inverted T waves have replaced nonspecific T wave abnormality in Anterior   leads  Confirmed by Baldwin Letters ([id number]) on 6/9/2021 9:27:46 PM      EKG, 12 LEAD, INITIAL [[id number]] Collected: 06/09/21 0851    Order Status: Completed Updated: 06/09/21 2127     Ventricular Rate 86 BPM      Atrial Rate 86 BPM      P-R Interval 148 ms      QRS Duration 100 ms      Q-T Interval 408 ms      QTC Calculation (Bezet) 488 ms Calculated P Axis 68 degrees      Calculated R Axis -3 degrees      Calculated T Axis -50 degrees      Diagnosis --     Sinus rhythm with occasional premature ventricular complexes  Possible Left atrial enlargement  Incomplete right bundle branch block  T wave abnormality, consider inferior ischemia  Abnormal ECG  No previous ECGs available  Confirmed by Janki Musa ([id number]) on 6/9/2021 9:27:02 PM              02/08/19    NM [facility] MYOCARDIAL PERFUSION MULTIPLE  2/8/2019          XR Results (most recent):  Results from [place] encounter on 06/09/21    XR CHEST PORT    Narrative  AP CHEST, PORTABLE    INDICATION: Chest pain    COMPARISON: Chest x-ray 1/8/2019    FINDINGS:  EKG leads overlie the patient. The cardiac silhouette is normal in size. Central pulmonary arteries appear  slightly prominent, similar to prior exams. The pulmonary vasculature is  unremarkable. No focal consolidation, pleural effusion, or pneumothorax. No  acute osseous abnormalities are identified. Impression  1. No clearly acute finding.   2. Chronic prominence of the central pulmonary arteries, could indicate  pulmonary arterial hypertension        Signed By: Malcolm Argueta PA-C    June 14, 2021

## 2021-06-14 NOTE — PROGRESS NOTES
0700: Bedside and Verbal shift change report given to HIRAL Miller (oncoming nurse) by Lina Sandoval RN (offgoing nurse). Report included the following information SBAR, Kardex, [facility] and Cardiac Rhythm NSR.       1205: Dr Iris Nichols in patient's room discussing procedure for tomorrow. Family present at bedside. 1250: Pt ate lunch tray. Pt stable with no complaints of pain. 1348: Patient complaining of pain 7/10 in abdomen. Also experiencing itching and red bumps along upper part of back. Gave patient lotion to apply and if that does not help will contact doctor for further advisement. 1900: Bedside and Verbal shift change report given to Lina Sandoval RN (oncoming nurse) by Julia Rosenbaum RN (offgoing nurse). Report included the following information SBAR, Kardex, MAR and Cardiac Rhythm NSR.

## 2021-06-14 NOTE — PROGRESS NOTES
completed a  follow up visit with and Spiritual assessment of patient in room 2314. and offered Pastoral care once again.  Chaplains will continue to follow and will provide pastoral care on an as needed/requested basis    Chaplain Curtis Wong   Board Certified [address] [place] [address]   [phone]

## 2021-06-14 NOTE — PROGRESS NOTES
Interventional Radiology     Consult received from Dr. Shama Morales for evaluation of PE and DVT. Case and images reviewed by Dr. Adriana Cook. Will plan for image-guided pulmonary artery thrombectomy and IVC filter placement 6/15 with anesthesia. Patient to remain NPO after midnight with heparin held at 0800 6/15/21    Orders placed. Consent to be obtained prior to procedure. Full consult note to follow.       Jacquie Regan, [address] [place] [address]

## 2021-06-15 ENCOUNTER — ANESTHESIA EVENT (OUTPATIENT)
Dept: INTERVENTIONAL RADIOLOGY/VASCULAR | Age: 61
DRG: 164 | End: 2021-06-15
Payer: COMMERCIAL

## 2021-06-15 ENCOUNTER — ANESTHESIA (OUTPATIENT)
Dept: INTERVENTIONAL RADIOLOGY/VASCULAR | Age: 61
DRG: 164 | End: 2021-06-15
Payer: COMMERCIAL

## 2021-06-15 ENCOUNTER — APPOINTMENT (OUTPATIENT)
Dept: INTERVENTIONAL RADIOLOGY/VASCULAR | Age: 61
DRG: 164 | End: 2021-06-15
Attending: PHYSICIAN ASSISTANT
Payer: COMMERCIAL

## 2021-06-15 LAB
ANION GAP SERPL CALC-SCNC: 3 MMOL/L (ref 3–18)
APTT PPP: 40.5 SEC (ref 23–36.4)
APTT PPP: 78 SEC (ref 23–36.4)
BUN SERPL-MCNC: 15 MG/DL (ref 7–18)
BUN/CREAT SERPL: 11 (ref 12–20)
CALCIUM SERPL-MCNC: 8.8 MG/DL (ref 8.5–10.1)
CHLORIDE SERPL-SCNC: 106 MMOL/L (ref 100–111)
CO2 SERPL-SCNC: 29 MMOL/L (ref 21–32)
COVID-19 RAPID TEST, COVR: NOT DETECTED
CREAT SERPL-MCNC: 1.37 MG/DL (ref 0.6–1.3)
ERYTHROCYTE [DISTWIDTH] IN BLOOD BY AUTOMATED COUNT: 12.3 % (ref 11.6–14.5)
GLUCOSE SERPL-MCNC: 89 MG/DL (ref 74–99)
HCT VFR BLD AUTO: 41 % (ref 36–48)
HGB BLD-MCNC: 13.4 G/DL (ref 13–16)
INR PPP: 1.1 (ref 0.8–1.2)
MCH RBC QN AUTO: 32.6 PG (ref 24–34)
MCHC RBC AUTO-ENTMCNC: 32.7 G/DL (ref 31–37)
MCV RBC AUTO: 99.8 FL (ref 74–97)
PLATELET # BLD AUTO: 223 K/UL (ref 135–420)
PLT FUNCTION P2Y12 - XPRUT: 216 PRU (ref 182–335)
PMV BLD AUTO: 10.7 FL (ref 9.2–11.8)
POTASSIUM SERPL-SCNC: 3.4 MMOL/L (ref 3.5–5.5)
PROTHROMBIN TIME: 13.9 SEC (ref 11.5–15.2)
RBC # BLD AUTO: 4.11 M/UL (ref 4.35–5.65)
SODIUM SERPL-SCNC: 138 MMOL/L (ref 136–145)
SOURCE, COVRS: NORMAL
WBC # BLD AUTO: 9.2 K/UL (ref 4.6–13.2)

## 2021-06-15 PROCEDURE — 77010033678 HC OXYGEN DAILY

## 2021-06-15 PROCEDURE — 74011250636 HC RX REV CODE- 250/636

## 2021-06-15 PROCEDURE — 80048 BASIC METABOLIC PNL TOTAL CA: CPT

## 2021-06-15 PROCEDURE — 02CQ3ZZ EXTIRPATION OF MATTER FROM RIGHT PULMONARY ARTERY, PERCUTANEOUS APPROACH: ICD-10-PCS | Performed by: RADIOLOGY

## 2021-06-15 PROCEDURE — 74011250637 HC RX REV CODE- 250/637: Performed by: INTERNAL MEDICINE

## 2021-06-15 PROCEDURE — 01926 ANES IVNTL RAD ICR ICAR/AORT: CPT | Performed by: ANESTHESIOLOGY

## 2021-06-15 PROCEDURE — 99233 SBSQ HOSP IP/OBS HIGH 50: CPT | Performed by: INTERNAL MEDICINE

## 2021-06-15 PROCEDURE — 74011250636 HC RX REV CODE- 250/636: Performed by: INTERNAL MEDICINE

## 2021-06-15 PROCEDURE — 74011250636 HC RX REV CODE- 250/636: Performed by: RADIOLOGY

## 2021-06-15 PROCEDURE — 02CR3ZZ EXTIRPATION OF MATTER FROM LEFT PULMONARY ARTERY, PERCUTANEOUS APPROACH: ICD-10-PCS | Performed by: RADIOLOGY

## 2021-06-15 PROCEDURE — APPNB15 APP NON BILLABLE TIME 0-15 MINS: Performed by: PHYSICIAN ASSISTANT

## 2021-06-15 PROCEDURE — 06H03DZ INSERTION OF INTRALUMINAL DEVICE INTO INFERIOR VENA CAVA, PERCUTANEOUS APPROACH: ICD-10-PCS | Performed by: RADIOLOGY

## 2021-06-15 PROCEDURE — 74011000250 HC RX REV CODE- 250: Performed by: NURSE ANESTHETIST, CERTIFIED REGISTERED

## 2021-06-15 PROCEDURE — 87635 SARS-COV-2 COVID-19 AMP PRB: CPT

## 2021-06-15 PROCEDURE — 74011250636 HC RX REV CODE- 250/636: Performed by: NURSE ANESTHETIST, CERTIFIED REGISTERED

## 2021-06-15 PROCEDURE — B2111ZZ FLUOROSCOPY OF MULTIPLE CORONARY ARTERIES USING LOW OSMOLAR CONTRAST: ICD-10-PCS | Performed by: INTERNAL MEDICINE

## 2021-06-15 PROCEDURE — 36415 COLL VENOUS BLD VENIPUNCTURE: CPT

## 2021-06-15 PROCEDURE — 74011250637 HC RX REV CODE- 250/637: Performed by: PHYSICIAN ASSISTANT

## 2021-06-15 PROCEDURE — 85730 THROMBOPLASTIN TIME PARTIAL: CPT

## 2021-06-15 PROCEDURE — 76060000036 HC ANESTHESIA 2.5 TO 3 HR

## 2021-06-15 PROCEDURE — 01926 ANES IVNTL RAD ICR ICAR/AORT: CPT | Performed by: NURSE ANESTHETIST, CERTIFIED REGISTERED

## 2021-06-15 PROCEDURE — 99233 SBSQ HOSP IP/OBS HIGH 50: CPT | Performed by: FAMILY MEDICINE

## 2021-06-15 PROCEDURE — 74011000250 HC RX REV CODE- 250: Performed by: INTERNAL MEDICINE

## 2021-06-15 PROCEDURE — 85027 COMPLETE CBC AUTOMATED: CPT

## 2021-06-15 PROCEDURE — 85610 PROTHROMBIN TIME: CPT

## 2021-06-15 PROCEDURE — 76937 US GUIDE VASCULAR ACCESS: CPT

## 2021-06-15 PROCEDURE — 4A023N7 MEASUREMENT OF CARDIAC SAMPLING AND PRESSURE, LEFT HEART, PERCUTANEOUS APPROACH: ICD-10-PCS | Performed by: INTERNAL MEDICINE

## 2021-06-15 PROCEDURE — 75825 VEIN X-RAY TRUNK: CPT

## 2021-06-15 PROCEDURE — 65660000004 HC RM CVT STEPDOWN

## 2021-06-15 RX ORDER — SODIUM CHLORIDE 9 MG/ML
50 INJECTION, SOLUTION INTRAVENOUS CONTINUOUS
Status: DISCONTINUED | OUTPATIENT
Start: 2021-06-15 | End: 2021-06-16 | Stop reason: SDUPTHER

## 2021-06-15 RX ORDER — MORPHINE SULFATE 4 MG/ML
INJECTION, SOLUTION INTRAMUSCULAR; INTRAVENOUS
Status: DISPENSED
Start: 2021-06-15 | End: 2021-06-16

## 2021-06-15 RX ORDER — NALOXONE HYDROCHLORIDE 0.4 MG/ML
0.1 INJECTION, SOLUTION INTRAMUSCULAR; INTRAVENOUS; SUBCUTANEOUS ONCE
Status: ACTIVE | OUTPATIENT
Start: 2021-06-15 | End: 2021-06-16

## 2021-06-15 RX ORDER — MIDAZOLAM HYDROCHLORIDE 1 MG/ML
INJECTION, SOLUTION INTRAMUSCULAR; INTRAVENOUS AS NEEDED
Status: DISCONTINUED | OUTPATIENT
Start: 2021-06-15 | End: 2021-06-15 | Stop reason: HOSPADM

## 2021-06-15 RX ORDER — HEPARIN SODIUM 200 [USP'U]/100ML
INJECTION, SOLUTION INTRAVENOUS
Status: DISPENSED
Start: 2021-06-15 | End: 2021-06-16

## 2021-06-15 RX ORDER — MORPHINE SULFATE 2 MG/ML
1 INJECTION, SOLUTION INTRAMUSCULAR; INTRAVENOUS
Status: ACTIVE | OUTPATIENT
Start: 2021-06-15 | End: 2021-06-16

## 2021-06-15 RX ORDER — SODIUM CHLORIDE 0.9 % (FLUSH) 0.9 %
5-40 SYRINGE (ML) INJECTION AS NEEDED
Status: DISCONTINUED | OUTPATIENT
Start: 2021-06-15 | End: 2021-06-17 | Stop reason: HOSPADM

## 2021-06-15 RX ORDER — LIDOCAINE HYDROCHLORIDE 20 MG/ML
INJECTION, SOLUTION EPIDURAL; INFILTRATION; INTRACAUDAL; PERINEURAL AS NEEDED
Status: DISCONTINUED | OUTPATIENT
Start: 2021-06-15 | End: 2021-06-15 | Stop reason: HOSPADM

## 2021-06-15 RX ORDER — HYDROMORPHONE HYDROCHLORIDE 1 MG/ML
0.5 INJECTION, SOLUTION INTRAMUSCULAR; INTRAVENOUS; SUBCUTANEOUS
Status: DISCONTINUED | OUTPATIENT
Start: 2021-06-15 | End: 2021-06-16 | Stop reason: HOSPADM

## 2021-06-15 RX ORDER — DIPHENHYDRAMINE HYDROCHLORIDE 50 MG/ML
12.5 INJECTION, SOLUTION INTRAMUSCULAR; INTRAVENOUS
Status: DISCONTINUED | OUTPATIENT
Start: 2021-06-15 | End: 2021-06-17 | Stop reason: HOSPADM

## 2021-06-15 RX ORDER — POTASSIUM CHLORIDE 20 MEQ/1
40 TABLET, EXTENDED RELEASE ORAL 2 TIMES DAILY
Status: DISPENSED | OUTPATIENT
Start: 2021-06-15 | End: 2021-06-16

## 2021-06-15 RX ORDER — PROPOFOL 10 MG/ML
VIAL (ML) INTRAVENOUS
Status: DISCONTINUED | OUTPATIENT
Start: 2021-06-15 | End: 2021-06-15 | Stop reason: HOSPADM

## 2021-06-15 RX ORDER — PROPOFOL 10 MG/ML
INJECTION, EMULSION INTRAVENOUS AS NEEDED
Status: DISCONTINUED | OUTPATIENT
Start: 2021-06-15 | End: 2021-06-15 | Stop reason: HOSPADM

## 2021-06-15 RX ORDER — FENTANYL CITRATE 50 UG/ML
INJECTION, SOLUTION INTRAMUSCULAR; INTRAVENOUS AS NEEDED
Status: DISCONTINUED | OUTPATIENT
Start: 2021-06-15 | End: 2021-06-15 | Stop reason: HOSPADM

## 2021-06-15 RX ORDER — FENTANYL CITRATE 50 UG/ML
INJECTION, SOLUTION INTRAMUSCULAR; INTRAVENOUS
Status: COMPLETED
Start: 2021-06-15 | End: 2021-06-15

## 2021-06-15 RX ORDER — FENTANYL CITRATE 50 UG/ML
50 INJECTION, SOLUTION INTRAMUSCULAR; INTRAVENOUS
Status: DISCONTINUED | OUTPATIENT
Start: 2021-06-15 | End: 2021-06-16 | Stop reason: HOSPADM

## 2021-06-15 RX ORDER — CIPROFLOXACIN 2 MG/ML
400 INJECTION, SOLUTION INTRAVENOUS ONCE
Status: COMPLETED | OUTPATIENT
Start: 2021-06-15 | End: 2021-06-15

## 2021-06-15 RX ORDER — SODIUM CHLORIDE 0.9 % (FLUSH) 0.9 %
5-40 SYRINGE (ML) INJECTION EVERY 8 HOURS
Status: DISCONTINUED | OUTPATIENT
Start: 2021-06-15 | End: 2021-06-17 | Stop reason: HOSPADM

## 2021-06-15 RX ORDER — ONDANSETRON 2 MG/ML
4 INJECTION INTRAMUSCULAR; INTRAVENOUS ONCE
Status: ACTIVE | OUTPATIENT
Start: 2021-06-15 | End: 2021-06-16

## 2021-06-15 RX ADMIN — MORPHINE SULFATE 1 MG: 2 INJECTION, SOLUTION INTRAMUSCULAR; INTRAVENOUS at 01:16

## 2021-06-15 RX ADMIN — ISOSORBIDE DINITRATE 30 MG: 10 TABLET ORAL at 21:16

## 2021-06-15 RX ADMIN — HEPARIN SODIUM 15 UNITS/KG/HR: 10000 INJECTION, SOLUTION INTRAVENOUS at 03:39

## 2021-06-15 RX ADMIN — Medication 10 ML: at 21:19

## 2021-06-15 RX ADMIN — PROPOFOL 60 MG: 10 INJECTION, EMULSION INTRAVENOUS at 13:22

## 2021-06-15 RX ADMIN — MORPHINE SULFATE 1 MG: 2 INJECTION, SOLUTION INTRAMUSCULAR; INTRAVENOUS at 04:20

## 2021-06-15 RX ADMIN — FENTANYL CITRATE 50 MCG: 50 INJECTION, SOLUTION INTRAMUSCULAR; INTRAVENOUS at 13:27

## 2021-06-15 RX ADMIN — FAMOTIDINE 20 MG: 20 TABLET ORAL at 21:17

## 2021-06-15 RX ADMIN — FENTANYL CITRATE 50 MCG: 50 INJECTION, SOLUTION INTRAMUSCULAR; INTRAVENOUS at 17:15

## 2021-06-15 RX ADMIN — DOCUSATE SODIUM 100 MG: 100 CAPSULE, LIQUID FILLED ORAL at 08:40

## 2021-06-15 RX ADMIN — Medication 10 ML: at 21:18

## 2021-06-15 RX ADMIN — LIDOCAINE HYDROCHLORIDE 100 MG: 20 INJECTION, SOLUTION EPIDURAL; INFILTRATION; INTRACAUDAL; PERINEURAL at 13:23

## 2021-06-15 RX ADMIN — NITROGLYCERIN 40 MCG/MIN: 40 INJECTION INTRAVENOUS at 03:40

## 2021-06-15 RX ADMIN — FENTANYL CITRATE 50 MCG: 50 INJECTION, SOLUTION INTRAMUSCULAR; INTRAVENOUS at 15:16

## 2021-06-15 RX ADMIN — MORPHINE SULFATE 1 MG: 2 INJECTION, SOLUTION INTRAMUSCULAR; INTRAVENOUS at 21:19

## 2021-06-15 RX ADMIN — Medication 81 MG: at 08:41

## 2021-06-15 RX ADMIN — MUPIROCIN: 20 OINTMENT TOPICAL at 08:43

## 2021-06-15 RX ADMIN — MIDAZOLAM HYDROCHLORIDE 2 MG: 2 INJECTION, SOLUTION INTRAMUSCULAR; INTRAVENOUS at 13:02

## 2021-06-15 RX ADMIN — PROPOFOL 70 MCG/KG/MIN: 10 INJECTION, EMULSION INTRAVENOUS at 13:22

## 2021-06-15 RX ADMIN — POTASSIUM CHLORIDE 40 MEQ: 1500 TABLET, EXTENDED RELEASE ORAL at 21:18

## 2021-06-15 RX ADMIN — PROPOFOL 40 MG: 10 INJECTION, EMULSION INTRAVENOUS at 13:30

## 2021-06-15 RX ADMIN — CIPROFLOXACIN 400 MG: 2 INJECTION, SOLUTION INTRAVENOUS at 13:27

## 2021-06-15 RX ADMIN — SODIUM CHLORIDE 50 ML/HR: 900 INJECTION, SOLUTION INTRAVENOUS at 03:39

## 2021-06-15 RX ADMIN — Medication 10 ML: at 07:45

## 2021-06-15 RX ADMIN — ATORVASTATIN CALCIUM 40 MG: 40 TABLET, FILM COATED ORAL at 21:18

## 2021-06-15 NOTE — ANESTHESIA POSTPROCEDURE EVALUATION
* No procedures listed *. MAC    Anesthesia Post Evaluation      Multimodal analgesia: multimodal analgesia used between 6 hours prior to anesthesia start to PACU discharge  Patient location during evaluation: PACU  Patient participation: complete - patient participated  Level of consciousness: awake and alert  Pain management: adequate  Airway patency: patent  Anesthetic complications: no  Cardiovascular status: acceptable  Respiratory status: acceptable  Hydration status: acceptable  Post anesthesia nausea and vomiting:  controlled  Final Post Anesthesia Temperature Assessment:  Normothermia (36.0-37.5 degrees C)      INITIAL Post-op Vital signs:   Vitals Value Taken Time   /77 06/15/21 1829   Temp 36.6 °C (97.8 °F) 06/15/21 1829   Pulse 58 06/15/21 1832   Resp 16 06/15/21 1832   SpO2 95 % 06/15/21 1832   Vitals shown include unvalidated device data.

## 2021-06-15 NOTE — PROGRESS NOTES
CM went to bedside to meet with pt to discuss discharge plans. Pt currently off the floor for a procedure. CM spoke with pt's wife. The current plan is for pt to discharge home with wife when medically ready. CM continues to follow along for further discharge needs.      Jim Johnson RN BSN  Care Manager  [phone]

## 2021-06-15 NOTE — PROGRESS NOTES
visited with the family of Logan Yang, who is a 56 y. o.,male. The  provided the following Interventions:  Initiated a relationship of care and support. Offered prayer and assurance of continued prayers on patients behalf. Plan:  Chaplains will continue to follow and will provide pastoral care on an as needed/requested basis.  recommends bedside caregivers page  on duty if patient shows signs of acute spiritual or emotional distress.

## 2021-06-15 NOTE — PERIOP NOTES
Assumed care of pt from IR via bed s/p thrombectomy and IVC filter insertion. NTG drip infusing at 40 mcg/min or 6 ml/hr. Denies pain or discomforts. Pt informed that he is own CBR and must remain flat for 3 hours. Right groin 4x4 and transparent dsg noted with no drainage observed. 1800  TRANSFER - OUT REPORT:    Verbal report given to Tanmay Peter RN (name) on Jeannette Isbell  being transferred to CVT Stepdown (unit) for routine progression of care       Report consisted of patients Situation, Background, Assessment and   Recommendations(SBAR). Information from the following report(s) SBAR, Procedure Summary, MAR and Cardiac Rhythm sinus rhythm was reviewed with the receiving nurse. Lines:   Peripheral IV 06/10/21 Right;Posterior Hand (Active)   Site Assessment Clean, dry, & intact 06/15/21 0743   Phlebitis Assessment 0 06/15/21 0743   Infiltration Assessment 0 06/15/21 0743   Dressing Status Clean, dry, & intact 06/15/21 0743   Dressing Type Transparent 06/15/21 0743   Hub Color/Line Status Pink; Infusing 06/15/21 0743   Action Taken Open ports on tubing capped 06/15/21 0743   Alcohol Cap Used Yes 06/15/21 0743        Opportunity for questions and clarification was provided.       Patient transported with:   Monitor  O2 @ 2 liters  Registered Nurse  [facility]

## 2021-06-15 NOTE — PROGRESS NOTES
[facility]  Pulmonary, Critical Care, and Sleep Medicine    Follow up progress note    Name: Alex Blackwell MRN: [id number]   : 1960 Hospital: [facility]   Date: 6/15/2021        IMPRESSION:   · Acute submassive PE with extensive bilateral large nonobstructing emboli noted in right distal pulmonary artery, left mainstem PA extending into the lobar, segmental and subsegmental arteries. In addition there is right inferior pulmonary vein clot. Evidence of RV dilatation with flattening of septum suggesting RV load and positive Powell sign with measured PA P of 60 mmHg. Patient has remained hemodynamically stable and requiring minimal supplemental oxygen. Patient is not aware of any hypercoagulable state-has had screening colonoscopies and no known malignancies. Denies having Covid. [facility] Respiratory virus panel including SARS-CoV-2 is negative for Covid  · Pulmonary hypertension-acute moderate secondary to above  · Chest pain-likely related to pulmonary infarct-right lower lobe  · DVT-left popliteal and posterior tibial veins. Patient gives a history of lumbar radiculopathy L4-S1 disc disease and numbness in his left lower extremity  · Unstable angina-s/p cardiac cath 2021 revealing multivessel disease. Patient is s/p PCI in 2019 done at [facility] and has been on antiplatelet agents since   · Hypertension  · Active tobacco use: Active 1 pack/day smoker cigarettes  · Unvaccinated for Covid  · Obstructive sleep apnea on CPAP  · ETOH Dependence:  Pt drinks 500cc of bourbon daily      RECOMMENDATIONS:   · Continue full dose anticoagulation with heparin. Post thrombectomy and IVC filter placement, can consider switching to therapeutic Lovenox BID since CABG will be delayed for 3 months. Keep on Heparin gtt if Cardiology plans intervention. Pt will need full dose for minimum of 6 months, but may need lifelong anticoagulation pending repeat imaging at that time.   If patient is able to tolerate full dose anticoagulation, may have IVC filter removed a few months after successful CABG. Please switch to NOAC on discharge - either therapeutic Eliquis or Xarelto -- can be held prior to CABG per CT Surgery. Pt will then need to followup with IR post CABG for IVC filter removal if able to tolerate anticoagulation  · Repeat TTE tomorrow post thrombectomy to assess RV/PA pressures  · Management of CAD and severe angina per Cardiology  · Repeat TTE after thrombectomy to assess RV function/PA pressures  · Consider transitioning off of NTG IV given risk for tachyphylaxis, will defer to cardiology  · Oxygen-continue supplementation with SaO2 goal more than 89%  · Continue CPAP nightly  · Aspiration precautions  · Continue to monitor for acute EtOH withdrawal  · Need for further diagnostics- CT scan, echocardiogram, ILD serologies, hypercoagulable panel-can be ordered at stable state as outpatient  · Out patient testing- PFT, 6 min walk  · Assess home Oxygen needs at discharge  · OT, PT, OOB and ambulate  · Healthy weight  · Will Follow  · DVTppx as noted above  · Smoking cessation counseled. I advise pt get PRN nicotine lozenges at discharge           Subjective: This patient has been seen and evaluated at the request of Dr. Nida Urias for pulmonary embolism    06/15/21   Patient seen and examined at bedside. No acute events overnight. Patient remains on nitroglycerin drip. Patient reports no new issues with chest pain. Patient does report chest pain with deep inspiration - very mild now. Patient reports he is compliant with CPAP. Denies any nausea, vomiting, diarrhea, chest pain. Patient is tolerating his home CPAP well, nasal interface. IR thrombectomy planned for later today. HPI  Patient is a 56 y.o. male with medical co-morbidities including HTN, CAD with stent, hyperlipidemia, active tobacco smoking, SHILPI on CPAP, morbid obesity, presented from home with chest pain. Accordingly, he has substernal chest pain associates with exertion over the last weeks. He has chest pain with radiation to his neck today when it woke him from his sleep. Patient states that the ambulance brought him to [address] [facility] route patient was given morphine 10 mg for severe chest pain. He has associated shortness of breath. No chest palpitation. No leg swelling. Complains of numbness of his left leg from L4-S1 radiculopathy  No cough. He is a every day smoker and occasionally has a smoker's cough  No recent sick contact. In the ER, cardiac enzyme was negative. EKG was non specific ST change. Cardiology consulted for concern of unstable angina. He was taken to OR for cardiac cath. Per cardiology, he has significant atherosclerosis disease but no obstructive finding. No PCI was done. Patient was referred for coronary artery bypass grafting surgical opinion. He continued to complain of chest pain and D-dimer was noted to be elevated so CT of the chest was done which showed evidence of large nonobstructing pulmonary embolism and therefore pulmonary has been consulted  Currently patient is on IV heparin wearing oxygen at 2 L sitting up in bed in no distress. He states that intermittently hurts in the mid chest but overall significantly improved. On further questioning he is unaware of any previous blood clots  States he has been on Plavix since 1999  He smokes actively and is committed to quit now that he has multiple problems  He works as a  for a [employer]-fairly active  Denies any known Covid exposures  He has not received Covid vaccination  I have reviewed all of the available data including the patient's previous history external records and radiological imaging available for review. In addition applicable cardiology and other lab data were also reviewed.     Past Medical History:   Diagnosis Date    CAD (coronary artery disease) 6/9/2021    Coronary arteriosclerosis     Dupuytren's disease of palm     Hyperlipidemia 2021    Hypertension     SHILPI (obstructive sleep apnea) 2021    Severe obesity ([address] 10/24/2018    Tobacco dependence 2021      Past Surgical History:   Procedure Laterality Date    HX CHOLECYSTECTOMY      HX CORONARY STENT PLACEMENT      HX HERNIA REPAIR        Allergies   Allergen Reactions    Statins-Hmg-Coa Reductase Inhibitors Other (comments)     Other reaction(s): SEVERE PAIN PER PT    Gabapentin Other (comments)     \"Caused bad nightmares\"    Pcn [Penicillins] Hives       Current Facility-Administered Medications   Medication Dose Route Frequency    nitroglycerin (TRIDIL) 400 mcg/ml infusion  40 mcg/min IntraVENous CONTINUOUS    isosorbide dinitrate (ISORDIL) tablet 30 mg  30 mg Oral TID    aspirin delayed-release tablet 81 mg  81 mg Oral DAILY    metoprolol tartrate (LOPRESSOR) tablet 50 mg  50 mg Oral BID    sodium chloride (NS) flush 5-40 mL  5-40 mL IntraVENous Q8H    atorvastatin (LIPITOR) tablet 40 mg  40 mg Oral QHS    famotidine (PEPCID) tablet 20 mg  20 mg Oral QPM    furosemide (LASIX) tablet 40 mg  40 mg Oral ACB&D    heparin 25,000 units in D5W 250 ml infusion  18-36 Units/kg/hr IntraVENous TITRATE    mupirocin (BACTROBAN) 2 % ointment   Both Nostrils BID    docusate sodium (COLACE) capsule 100 mg  100 mg Oral BID    0.9% sodium chloride infusion  50 mL/hr IntraVENous CONTINUOUS       Review of Systems:  A comprehensive review of systems was negative except for that written in the HPI.     Objective:   Vital Signs:    Visit Vitals  /83   Pulse (!) 55   Temp 97.7 °F (36.5 °C)   Resp 18   Ht 5' 10\" (1.778 m)   Wt 118.8 kg (261 lb 14.4 oz)   SpO2 94%   BMI 37.58 kg/m²       O2 Device: None (Room air)   O2 Flow Rate (L/min): 3 l/min   Temp (24hrs), Av.8 °F (36.6 °C), Min:97.2 °F (36.2 °C), Max:98.2 °F (36.8 °C)       Intake/Output:   Last shift:      06/15 07 - 06/15 1900  In: -   Out: 550 [Urine:550]  Last 3 shifts: 06/13 1901 - 06/15 0700  In: 1657.2 [P.O.:740; I.V.:917.2]  Out: 6315 [Urine:6315]    Intake/Output Summary (Last 24 hours) at 6/15/2021 1045  Last data filed at 6/15/2021 0743  Gross per 24 hour   Intake 240 ml   Output 4415 ml   Net -4175 ml      Physical Exam:   General:  Alert, cooperative, no distress, appears stated age, sitting in chair, wearing nasal cannula   Head:  Normocephalic, without obvious abnormality, atraumatic. Eyes:  Conjunctivae/corneas clear. PERRL, EOMs intact. Nose: Nares normal. Septum midline. Mucosa normal. No drainage or sinus tenderness. Throat: Lips, mucosa, and tongue normal.  Fair dentition, no oral lesions   Neck: Supple, symmetrical, trachea midline, no adenopathy, no carotid bruit and no JVD. Back:   Symmetric, no curvature. ROM normal.   Lungs:    Distant breath sounds bilaterally, CTA BL, no wheezes/rales/rhonchi throughout all lung fields   Heart:  Regular rate and rhythm, S1, S2 normal, no murmur, click, rub or gallop. Abdomen:   Soft, non-tender. Bowel sounds normal. No masses,  No organomegaly. Extremities: Extremities normal, atraumatic, no cyanosis or edema. No clubbing   Pulses: 2+ and symmetric all extremities.    Skin: Skin color, texture, turgor normal. No rashes or lesions   Lymph nodes: Cervical, supraclavicular, and axillary nodes normal.   Neurologic: Grossly nonfocal, strength and coordination grossly intact throughout all extremities     Data review:     Recent Results (from the past 24 hour(s))   PROTHROMBIN TIME + INR    Collection Time: 06/15/21  5:44 AM   Result Value Ref Range    Prothrombin time 13.9 11.5 - 15.2 sec    INR 1.1 0.8 - 1.2     PTT    Collection Time: 06/15/21  5:44 AM   Result Value Ref Range    aPTT 78.0 (H) 23.0 - 36.4 SEC   CBC W/O DIFF    Collection Time: 06/15/21  5:44 AM   Result Value Ref Range    WBC 9.2 4.6 - 13.2 K/uL    RBC 4.11 (L) 4.35 - 5.65 M/uL    HGB 13.4 13.0 - 16.0 g/dL    HCT 41.0 36.0 - 48.0 %    MCV 99.8 (H) 74.0 - 97.0 FL    MCH 32.6 24.0 - 34.0 PG    MCHC 32.7 31.0 - 37.0 g/dL    RDW 12.3 11.6 - 14.5 %    PLATELET 501 945 - 620 K/uL    MPV 10.7 9.2 - 38.4 FL   METABOLIC PANEL, BASIC    Collection Time: 06/15/21  8:13 AM   Result Value Ref Range    Sodium 138 136 - 145 mmol/L    Potassium 3.4 (L) 3.5 - 5.5 mmol/L    Chloride 106 100 - 111 mmol/L    CO2 29 21 - 32 mmol/L    Anion gap 3 3.0 - 18 mmol/L    Glucose 89 74 - 99 mg/dL    BUN 15 7.0 - 18 MG/DL    Creatinine 1.37 (H) 0.6 - 1.3 MG/DL    BUN/Creatinine ratio 11 (L) 12 - 20      GFR est AA >60 >60 ml/min/1.73m2    GFR est non-AA 53 (L) >60 ml/min/1.73m2    Calcium 8.8 8.5 - 10.1 MG/DL       Imaging:  I have personally reviewed the patients radiographs and have reviewed the reports:  XR Results (most recent):  Results from Hospital Encounter encounter on 06/09/21    XR CHEST PORT    Narrative  AP CHEST, PORTABLE    INDICATION: Chest pain    COMPARISON: Chest x-ray 1/8/2019    FINDINGS:  EKG leads overlie the patient. The cardiac silhouette is normal in size. Central pulmonary arteries appear  slightly prominent, similar to prior exams. The pulmonary vasculature is  unremarkable. No focal consolidation, pleural effusion, or pneumothorax. No  acute osseous abnormalities are identified. Impression  1. No clearly acute finding. 2. Chronic prominence of the central pulmonary arteries, could indicate  pulmonary arterial hypertension      CT Results (most recent):  Results from Hospital Encounter encounter on 06/09/21    CTA CHEST W OR W WO CONT    Narrative  CT chest with contrast for PE    HISTORY: Shortness of breath    COMPARISON: None. TECHNIQUE: Dynamic spiral scan through the chest is obtained from the thoracic  inlet to the diaphragm after dynamic nonionic IV contrast administration  per PE  protocol.  Coronal and sagittal MIP computer reconstructions are also obtained  for better visualization of the integrity of pulmonary arteries in 3D dimension,  particularly for lobar/interlobar arterial branches and to minimize radiation  dose. All CT scans at this facility performed using dose optimization techniques as  appreciated to a performed exam, to include automated exposure control,  adjustment of the mA and or KU according to patient size (including appropriate  matching for site specific examination), or use of iterative reconstruction  technique. FINDINGS:    PULMONARY ARTERIES: The contrast bolus is adequate. There are large  nonobstructing pulmonary emboli identified in the distal right and left mainstem  pulmonary arteries with extension to all the lobar, segmental and subsegmental  branches bilaterally. Moderate dilatation of pulmonary trunk and the proximal  mainstem arteries. Near occlusive intraluminal filling defects also seen in the right inferior  pulmonary vein and multiple adjoining venous branches. AORTA AND VASCULAR STRUCTURES: No aortic aneurysm or dissection. Unremarkable  great vessels. Heart: The heart is normal in size. No pericardial effusion. LUNG PARENCHYMA: Patchy opacities at the posterior basal segment of right lower  lobe, most likely pulmonary infarction. No other airspace infiltration or  consolidation seen. Several tiny lung nodules identified, 3 mm in left apex on  #11/3, 2 x 4 mm in lateral left upper lobe on #24/3 3 mm nodule in anterior  right upper lobe on #26/3. IMAGED THYROID: Unremarkable. MEDIASTINUM: No adenopathy. PLEURAL SPACES AND CHEST WALL: No significant pleural pathology. VISUALIZED UPPER ABDOMEN: Unremarkable. OSSEOUS STRUCTURES: Unremarkable. Impression  1. Extensive bilateral pulmonary emboli from distal mainstem extending to all  the lobar arteries and their branches with near occlusion. No occlusive emboli  also noted in inferior right pulmonary vein and multiple joining branches.     2.  Moderate dilatation of pulmonary trunk and proximal mainstem arteries. 3. Pulmonary infarction at posterior right lower lobe. 4. No pleural effusion or adenopathy. 5. Several small lung nodules as incidental findings. Recommendation as provided  below. -----------------------------------  [facility] SOCIETY RECOMMENDATIONS (2017):    Less than 6 mm nodule:  A. Any solitary nodule(solid, semisolid or groundglass) or multiple solid:  Low Risk: No follow-up; If suspicious morphology or upper lobe location  consider 12 month follow-up. High Risk: Optional CT at 12 months. B. Multiple subsolid/groundglass:  CT in 3 to 6 months. If unchanged consider CT at 2 and 4 years. A wet read was provided to the floor nurse, Bruce Hurtado, for the ordering physician  Dr. Kenny Costello by me upon the interpretation at approximately  1150  hours. Thank you for your referral.         06/09/21    ECHO ADULT COMPLETE 06/09/2021 6/9/2021    Interpretation Summary  · LV: Estimated LVEF is 50 - 55%. Normal cavity size, wall thickness and systolic function (ejection fraction normal). Abnormal left ventricular septal motion. Systolic flattening of the interventricular septum consistent with right ventricle pressure overload. Mild (grade 1) left ventricular diastolic dysfunction. · AO: Mild aortic root dilatation. · TV: Non-specific thickening of the tricuspid valve. Mild to moderate tricuspid valve regurgitation is present. · RV: Dilated right ventricle. Reduced systolic function. Right ventricular findings are consistent with Powell's sign. · IVC: Moderately elevated central venous pressure (8 mmHg); IVC diameter is larger than 21 mm and collapses more than 50% with respiration. · PA: Moderate pulmonary hypertension. Pulmonary arterial systolic pressure is 60 mmHg. Addendum by: Jennifer Brittle, MD on 6/9/2021  4:16 PM    Signed by: Jennifer Brittle, MD on 6/9/2021  3:11 PM    Complex decision making was made in the evaluation and management plans during this consultation.   More than 50% of time was spent in counseling and coordination of care including review of data and discussion with other team members.                Daniel Ramirez MD/MPH     Pulmonary, Critical Care Medicine  [facility]

## 2021-06-15 NOTE — CONSULTS
Interventional Radiology Consult Note    Patient: Kiley Manzo               Sex: male          DOA: 6/9/2021    YOB: 1960      Age:  56 y.o.        LOS:  LOS: 6 days              HPI:     Kiley Manzo is a 56 y.o. male who has been seen in evaluation of pulmonary embolism at the request of Dr. Calvin Kaur. The patient presented to the hospital with chest pain and dyspnea 6/09/21. PMHx significant for CAD with stents, obesity, SHILPI, and tobacco use. ECHO 6/09/21 shows moderate pulmonary hypertension and RV dilatation. CTA 6/10/21 showed bilateral submassive PE with right heart strain. The patient was started on heparin drip 6/09/21. LE US shows left popliteal and posterior tibial DVT. Left heart cath done 6/09/21 shows 75% lesions LAD. 70% stenosis of LCA, and known 100% occlusion of RCA stent with collaterals present. Cardiology is following - initial plans for CABG postponed due to ongoing PE treatment and RV failure. Today, the patient reports ongoing left substernal chest discomfort that does not radiate. He endorses GOFF and chronic night time CPAP use. The patient denies hemoptysis, fever, chills, palpitations, leg pain or swelling, syncope, or dizziness. The anticipated procedure was discussed in detail including risks of injury, infection, and bleeding. Informed consents were obtained.     Past Medical History:   Diagnosis Date    CAD (coronary artery disease) 6/9/2021    Coronary arteriosclerosis     Dupuytren's disease of palm     Hyperlipidemia 6/9/2021    Hypertension     SHILPI (obstructive sleep apnea) 6/9/2021    Severe obesity ([address] 10/24/2018    Tobacco dependence 6/9/2021       Past Surgical History:   Procedure Laterality Date    HX CHOLECYSTECTOMY      HX CORONARY STENT PLACEMENT      HX HERNIA REPAIR         Family History   Problem Relation Age of Onset    Hypertension Mother     Diabetes Mother        Social History     Socioeconomic History    Marital status:      Spouse name: Not on file    Number of children: Not on file    Years of education: Not on file    Highest education level: Not on file   Tobacco Use    Smoking status: Current Every Day Smoker     Types: Cigarettes    Smokeless tobacco: Never Used   Substance and Sexual Activity    Alcohol use: Yes    Drug use: No   Other Topics Concern     Social Determinants of Health     Financial Resource Strain:     Difficulty of Paying Living Expenses:    Food Insecurity:     Worried About Running Out of Food in the Last Year:     [address] in the Last Year:    Transportation Needs:     Lack of Transportation (Medical):  Lack of Transportation (Non-Medical):    Physical Activity:     Days of Exercise per Week:     Minutes of Exercise per Session:    Stress:     Feeling of Stress :    Social Connections:     Frequency of Communication with Friends and Family:     Frequency of Social Gatherings with Friends and Family:     Attends Mandaen Services:     Active Member of Clubs or Organizations:     Attends Club or Organization Meetings:     Marital Status:        Prior to Admission medications    Medication Sig Start Date End Date Taking?  Authorizing Provider   isosorbide mononitrate ER (IMDUR) 60 mg CR tablet TAKE 1 TABLET BY MOUTH TWICE A DAY AS DIRECTED  6/4/21  Yes Yousif Wang MD   nitroglycerin (NITROLINGUAL) 400 mcg/spray spray SPRAY ONE SPRAY UNDER THE TONGUE AS NEEDED MAY REPEAT UP TO 2 TIMES AS DIRECTED  5/23/21  Yes Yousif Wang MD   metoprolol tartrate (LOPRESSOR) 100 mg IR tablet TAKE 1/2 TABLET (50MG) TWICE A DAY FOR  BLOOD PRESSURE  1/18/21  Yes Yousif Wang MD   atorvastatin (LIPITOR) 20 mg tablet TAKE 1 TABLET BY MOUTH DAILY FOR 90 DAYS  12/24/20  Yes Yousif Wang MD   clopidogreL (PLAVIX) 75 mg tab TAKE 1 TABLET BY MOUTH DAILY FOR 90 DAYS 11/25/20  Yes Yousif Wang MD   furosemide (LASIX) 20 mg tablet TAKE TWO (2) TABLETS BY MOUTH EVERY DAY AS NEEDED FOR SWELLING 10/26/20  Yes Gabriele Limon MD   aspirin delayed-release 81 mg tablet  4/29/19  Yes Provider, Historical   co-enzyme Q-10 (COQ-10) 100 mg capsule Take 100 mg by mouth daily. Yes Provider, Historical   fluocinoNIDE (LIDEX) 0.05 % topical cream  10/17/18   Provider, Historical       Allergies   Allergen Reactions    Statins-Hmg-Coa Reductase Inhibitors Other (comments)     Other reaction(s): SEVERE PAIN PER PT    Gabapentin Other (comments)     \"Caused bad nightmares\"    Pcn [Penicillins] Hives     Review of Systems  Pertinent items are noted in the History of Present Illness.     Physical Exam:      Visit Vitals  /83   Pulse (!) 55   Temp 97.7 °F (36.5 °C)   Resp 18   Ht 5' 10\" (1.778 m)   Wt 118.8 kg (261 lb 14.4 oz)   SpO2 94%   BMI 37.58 kg/m²       Physical Exam:  Constitutional: Awake, alert, and oriented x 4 NAD  Respiratory: Normal work of breathing  Cardiovascular: RRR  Gastrointestinal: Soft NT ND    Labs Reviewed:  CMP:   Lab Results   Component Value Date/Time     06/15/2021 08:13 AM    K 3.4 (L) 06/15/2021 08:13 AM     06/15/2021 08:13 AM    CO2 29 06/15/2021 08:13 AM    AGAP 3 06/15/2021 08:13 AM    GLU 89 06/15/2021 08:13 AM    BUN 15 06/15/2021 08:13 AM    CREA 1.37 (H) 06/15/2021 08:13 AM    GFRAA >60 06/15/2021 08:13 AM    GFRNA 53 (L) 06/15/2021 08:13 AM    CA 8.8 06/15/2021 08:13 AM     CBC:   Lab Results   Component Value Date/Time    WBC 9.2 06/15/2021 05:44 AM    HGB 13.4 06/15/2021 05:44 AM    HCT 41.0 06/15/2021 05:44 AM     06/15/2021 05:44 AM     COAGS:   Lab Results   Component Value Date/Time    APTT 78.0 (H) 06/15/2021 05:44 AM    PTP 13.9 06/15/2021 05:44 AM    INR 1.1 06/15/2021 05:44 AM       Assessment     Principal Problem:    Bilateral pulmonary embolism ([facility] [address] (6/14/2021)    Active Problems:    Unstable angina ([facility] (6/9/2021)      Chest pain (6/9/2021)      CAD (coronary artery disease) (6/9/2021)      SHILPI (obstructive sleep apnea) (6/9/2021)      Tobacco dependence (6/9/2021)      Hyperlipidemia (6/9/2021)      Jorge Turner is a 56 y.o. male with a history of CAD and tobacco use presenting with new onset chest pain and associated CT showing bilateral submassive PE with right heart strain and pulmonary hypertension. Plan   Case and images reviewed by Dr. Dary Dobbins. Our recommendations are as follows:    1. Pulmonary artery thrombectomy 6/15/21 with anesthesia    2. IVC filter placement 6/15/21 - retrievable. Will be removed after CABG recovery. IR clinic will follow. 3. OK to resume heparin drip post procedure today. Continue anticoagulation     4.   NPO prior to procedure    Thank you,  JENNIFER Harper

## 2021-06-15 NOTE — PROGRESS NOTES
Chart reviewed. Discussed with Dr. Rafa Tuttle and Dr. Clinton Quesada yesterday. Patient needs 3 months of anticoagulation after his thrombectomy prior to CABG consideration. We will see patient in clinic in 3 months for CABG reconsideration. CT surgery will sign off for now. Please call back if needed.     Rony Noel PA-C  [facility]  [phone]

## 2021-06-15 NOTE — PROGRESS NOTES
1230 - pt off unit for IR procedure, transported by myself Anabelle Alexis, [facility]) & [id number] - verbal report received by Jeanette Vivar from PACU     1900- paged hospitalist concerning heparin drip, order to restart heparin drip received    1920 - Bedside shift change report given to Evelio RN (oncoming nurse) by Unique Ross RN (offgoing nurse).  Report included the following information SBAR, Kardex, Procedure Summary, Intake/Output, MAR, Recent Results and Cardiac Rhythm sinus sarah.

## 2021-06-15 NOTE — ANESTHESIA PREPROCEDURE EVALUATION
Relevant Problems   No relevant active problems       Anesthetic History   No history of anesthetic complications            Review of Systems / Medical History  Patient summary reviewed and pertinent labs reviewed    Pulmonary  Within defined limits      Sleep apnea  Smoker      Comments: PE   Neuro/Psych   Within defined limits           Cardiovascular    Hypertension          CAD    Exercise tolerance: >4 METS     GI/Hepatic/Renal  Within defined limits              Endo/Other  Within defined limits      Morbid obesity     Other Findings            Physical Exam    Airway  Mallampati: III  TM Distance: 4 - 6 cm  Neck ROM: normal range of motion   Mouth opening: Normal     Cardiovascular  Regular rate and rhythm,  S1 and S2 normal,  no murmur, click, rub, or gallop  Rhythm: regular  Rate: normal         Dental    Dentition: Lower dentition intact and Upper dentition intact  Comments: Several broken teeth   Pulmonary  Breath sounds clear to auscultation               Abdominal  GI exam deferred       Other Findings            Anesthetic Plan    ASA: 3  Anesthesia type: MAC          Induction: Intravenous  Anesthetic plan and risks discussed with: Patient

## 2021-06-15 NOTE — PROGRESS NOTES
Progress Note         Patient: Birgit Preciado MRN: [id number]  CSN: [id number]    YOB: 1960  Age: 56 y.o. Sex: male    DOA: 6/9/2021 LOS:  LOS: 6 days                    Subjective:     Birgit Preciado is a 56 y.o. male with a PMHx of CAD s/p stenting, HTN, HLD, SHILPI on CPAP, morbid obesity and active tobacco abuse who is admitted for acute submassive bilateral PE's with RV failure and chest pain 2/2 unstable angina and diffuse CAD. Seen in room this a.m. Comfortable, has been up walking around room and bathing himself  Reports improvement in CP  Still on NTG drip and receiving morphine for CP   Denies any significant SOB   Denies any further complaints       Objective:     Physical Exam:  Visit Vitals  /72   Pulse (!) 54   Temp 97.7 °F (36.5 °C)   Resp 12   Ht 5' 10\" (1.778 m)   Wt 118.8 kg (261 lb 14.4 oz)   SpO2 95%   BMI 37.58 kg/m²        General:         Alert, cooperative, no acute distress    HEENT: NC, Atraumatic. Anicteric sclerae. Lungs: CTA Bilaterally. No Wheezing/Rhonchi/Rales. Heart:  Regular  rhythm,  No murmur, No Rubs, No Gallops  Abdomen: Soft, Non distended, Non tender. +Bowel sounds, no HSM  Extremities: No c/c/e  Psych:   Not anxious or agitated. Neurologic:  Alert and oriented X 3. No acute neurological deficits. Intake and Output:  Current Shift:  06/15 0701 - 06/15 1900  In: 2040.8 [I.V.:2040.8]  Out: 850 [Urine:850]  Last three shifts:  06/13 1901 - 06/15 0700  In: 1657.2 [P.O.:740;  I.V.:917.2]  Out: 6665 [Urine:6665]    Labs: Results:       Chemistry Recent Labs     06/15/21  0813 06/14/21  0109 06/13/21  0700   GLU 89 118* 78    139 141   K 3.4* 3.4* 3.8    105 108   CO2 29 30 28   BUN 15 15 12   CREA 1.37* 1.38* 1.12   CA 8.8 8.5 9.0   AGAP 3 4 5   BUCR 11* 11* 11*      CBC w/Diff Recent Labs     06/15/21  0544 06/14/21  0109 06/13/21  0700   WBC 9.2 9.8 8.9   RBC 4.11* 3.95* 3.94*   HGB 13.4 13.3 13.0   HCT 41.0 38.9 38.8    210 207      Cardiac Enzymes No results for input(s): CPK, CKND1, PALMA in the last 72 hours. No lab exists for component: CKRMB, TROIP   Coagulation Recent Labs     06/15/21  0544 06/14/21  0109   PTP 13.9  --    INR 1.1  --    APTT 78.0* 86.1*       Lipid Panel Lab Results   Component Value Date/Time    Cholesterol, total 120 06/10/2021 05:30 AM    HDL Cholesterol 33 (L) 06/10/2021 05:30 AM    LDL, calculated 69.6 06/10/2021 05:30 AM    VLDL, calculated 17.4 06/10/2021 05:30 AM    Triglyceride 87 06/10/2021 05:30 AM    CHOL/HDL Ratio 3.6 06/10/2021 05:30 AM      BNP No results for input(s): BNPP in the last 72 hours. Liver Enzymes No results for input(s): TP, ALB, TBIL, AP in the last 72 hours. No lab exists for component: SGOT, GPT, DBIL   Thyroid Studies No results found for: T4, T3U, TSH, TSHEXT, TSHEXT             Assessment and Plan:     Brooklyn Burns is a 56 y.o. male with a PMHx of CAD s/p stenting, HTN, HLD, SHILPI on CPAP, morbid obesity and active tobacco abuse who is admitted for acute submassive bilateral PE's with RV failure and chest pain 2/2 unstable angina and diffuse CAD. 1. Acute submassive extensive bilateral PE  2. RLL pulmonary infarction   3. RV failure   4. Acute occlusive LLE DVT    5. Chest pain    6. Unstable angina   7. Diffuse CAD   8. Moderate pulmonary HTN   9. Multiple pulmonary nodules- incidental finding  10. DAVI   11. HTN   12. HLD   13. SHILPI on CPAP   14. Morbid obesity   15. Active tobacco abuse  16.  Alcohol dependence      Cardiology and pulmonology following   Thrombectomy and IVC filter placement today  Follow-up repeat echo to assess for RV strain/failure tomorrow  Resume heparin drip after procedure today  Transition to NOAC prior to discharge  Needs at least 6 months 934 , may need lifelong   S/p LHC on 6/9 that showed diffuse CAD- CABG recommended, but not until after pt recovers from PE's  NTG drip per cardiology   Cont ASA, statin, isordil, metoprolol   Cont lasix 40 mg PO BID  FU CBC, BMP   Pain control    Strict I&O's, daily weight   Incentive spirometry   Tobacco cessation counseling  Consult to cardiac rehab      Case discussed with:  [x]Patient  [x]Family  [x]Nursing  [x]Case Management  DVT prophylaxis: Heparin drip    Diet: Cardiac  Contact: Ember Friend (wife)    [phone]  Code Status: FULL   Disposition: Cont current care, likely home in 2-3 days      HJewels Rosales,   6/15/2021       Dragon medical dictation software was used for portions of this report. Unintended errors may occur.

## 2021-06-15 NOTE — PROGRESS NOTES
Cardiology Progress Note    Consultation request by Low Grullon MD for advice/opinion related to evaluating CP    Date of  Admission: 6/9/2021  8:52 AM   Primary Care Physician:  Giovanny Ruiz MD            Attending Cardiologist: Dr. Di Ocampo   Patient was not seen by me after 3 different attempts as he was in his procedure and post procedure. Present plan discussed with  Jeison Resendiz PA-C and Dr. Wilma Ruiz. Discussed with patient's family members later in the day. Jordan Gibbs MD   Assessment:     -Acute Submassive B/L PE, initially presented with symptoms concerning for unstable angina. Pain out of proportion to [facility] findings. CT chest showed extensive bilateral large nonobstructing emboli noted in right distal pulmonary artery, left mainstem PA extending into the lobar, segmental and subsegmental arteries. In addition there is right inferior pulmonary vein clot. Initial troponin normal. ECG SR with subtle ST changes. ECHO this admission showed Dilated right ventricle. Abnormal wall motion: free wall hypokinesis of the right ventricle. Reduced systolic function. The findings are consistent with Powell's sign   -CAD s/p [facility] (2019,2018) Widely patent Lcx stents, known occluded RCA, diffuse LM/LAD disease. · S/p [facility] (06/09/21)   Left Main   Large-caliber vessel. Distal vessel approximately 50% stenosis with calcification that involves LAD and circumflex bifurcation   Left Anterior Descending   The vessel is calcified. The vessel is tortuous. Ostial, proximal and the mid LAD has diffuse calcified moderate disease with sequential eccentric 75% lesions. Mid-distal LAD has no significant obstructive disease Diagonal branch: Moderate disease. Medium caliber vessel   Left Circumflex   The vessel is calcified. The vessel is tortuous. Proximal LCx stent has mild in-stent restenosis followed by hazy calcified borderline 70% stenosis. OM1: Small caliber vessel OM 2: Large bifurcating vessel.  Calcified hazy 70% stenosis before bifurcation. Superior branch distally distally has subtotal occlusion with faint homocollateral. Inferior branch without any significant obstructive disease   Right Coronary Artery   Proximal-mid 100% in-stent occlusion. Previously known by cardiac catheterization in 2019. Evidence of good collaterals from left to right supplying distal RCA and RPDA   -ECHO (06/09/21) EF 67-67%, Systolic flattening of the interventricular septum consistent with right ventricle pressure overload. Mild (grade 1) left ventricular diastolic dysfunction. -Mild-Moderate TR by echo   -Moderate pulmonary HTN by echo PASP 60 mmHg  -Hypertension, on lopressor, Imdur, Lasix as outpatient   -Hyperlipidemia  -Tobacco Abuse, 1 ppd, cessation advised  -Obesity, BMI 35.58 kg/m2    Primary Cardiologist: [facility], Wants to establish care with Dr. Fuller Bottom:     -CABG postponed at this time with RV failure 2/2 submassive B/L PEs.   -Patient scheduled for thrombectomy and IVC filter placement today. Heparin gtt currently being held. -Prior to discharge, patient will need to be placed on oral anticoagulation prior to discharge for at least 3 months.   -Continued on NTG gtt   -Continued on lasix po    -BB held this morning for bradycardia. Patient asymptomatic.   -Continue ASA and statin  . Subjective:     CP free, denies SOB. Ready for procedure today.       Past Medical History:     Past Medical History:   Diagnosis Date    CAD (coronary artery disease) 6/9/2021    Coronary arteriosclerosis     Dupuytren's disease of palm     Hyperlipidemia 6/9/2021    Hypertension     SHILPI (obstructive sleep apnea) 6/9/2021    Severe obesity ([address] 10/24/2018    Tobacco dependence 6/9/2021         Social History:     Social History     Socioeconomic History    Marital status:      Spouse name: Not on file    Number of children: Not on file    Years of education: Not on file    Highest education level: Not on file   Tobacco Use    Smoking status: Current Every Day Smoker     Types: Cigarettes    Smokeless tobacco: Never Used   Substance and Sexual Activity    Alcohol use: Yes    Drug use: No   Other Topics Concern     Social Determinants of Health     Financial Resource Strain:     Difficulty of Paying Living Expenses:    Food Insecurity:     Worried About Running Out of Food in the Last Year:     [address] in the Last Year:    Transportation Needs:     Lack of Transportation (Medical):  Lack of Transportation (Non-Medical):    Physical Activity:     Days of Exercise per Week:     Minutes of Exercise per Session:    Stress:     Feeling of Stress :    Social Connections:     Frequency of Communication with Friends and Family:     Frequency of Social Gatherings with Friends and Family:     Attends Judaism Services:     Active Member of Clubs or Organizations:     Attends Club or Organization Meetings:     Marital Status:         Family History:     Family History   Problem Relation Age of Onset    Hypertension Mother     Diabetes Mother         Medications:      Allergies   Allergen Reactions    Statins-Hmg-Coa Reductase Inhibitors Other (comments)     Other reaction(s): SEVERE PAIN PER PT    Gabapentin Other (comments)     \"Caused bad nightmares\"    Pcn [Penicillins] Hives        Current Facility-Administered Medications   Medication Dose Route Frequency    nitroglycerin (TRIDIL) 400 mcg/ml infusion  40 mcg/min IntraVENous CONTINUOUS    isosorbide dinitrate (ISORDIL) tablet 30 mg  30 mg Oral TID    zolpidem (AMBIEN) tablet 5 mg  5 mg Oral QHS PRN    morphine injection 1 mg  1 mg IntraVENous Q3H PRN    aspirin delayed-release tablet 81 mg  81 mg Oral DAILY    metoprolol tartrate (LOPRESSOR) tablet 50 mg  50 mg Oral BID    sodium chloride (NS) flush 5-40 mL  5-40 mL IntraVENous Q8H    sodium chloride (NS) flush 5-40 mL  5-40 mL IntraVENous PRN    acetaminophen (TYLENOL) tablet 650 mg  650 mg Oral Q6H PRN    Or    acetaminophen (TYLENOL) suppository 650 mg  650 mg Rectal Q6H PRN    polyethylene glycol (MIRALAX) packet 17 g  17 g Oral DAILY PRN    promethazine (PHENERGAN) tablet 12.5 mg  12.5 mg Oral Q6H PRN    Or    ondansetron (ZOFRAN) injection 4 mg  4 mg IntraVENous Q6H PRN    atorvastatin (LIPITOR) tablet 40 mg  40 mg Oral QHS    albuterol-ipratropium (DUO-NEB) 2.5 MG-0.5 MG/3 ML  3 mL Nebulization Q6H PRN    famotidine (PEPCID) tablet 20 mg  20 mg Oral QPM    furosemide (LASIX) tablet 40 mg  40 mg Oral ACB&D    heparin 25,000 units in D5W 250 ml infusion  18-36 Units/kg/hr IntraVENous TITRATE    mupirocin (BACTROBAN) 2 % ointment   Both Nostrils BID    docusate sodium (COLACE) capsule 100 mg  100 mg Oral BID    0.9% sodium chloride infusion  50 mL/hr IntraVENous CONTINUOUS         Physical Exam:     Visit Vitals  /83   Pulse (!) 55   Temp 97.7 °F (36.5 °C)   Resp 18   Ht 5' 10\" (1.778 m)   Wt 118.8 kg (261 lb 14.4 oz)   SpO2 94%   BMI 37.58 kg/m²       TELE: normal sinus rhythm, Sinus Bk 56 bpm     BP Readings from Last 3 Encounters:   06/15/21 128/83   07/01/19 137/87   01/23/19 131/78     Pulse Readings from Last 3 Encounters:   06/15/21 (!) 55   07/01/19 83   01/23/19 71     Wt Readings from Last 3 Encounters:   06/15/21 118.8 kg (261 lb 14.4 oz)   07/01/19 126.7 kg (279 lb 6.4 oz)   01/23/19 126.8 kg (279 lb 9.6 oz)       General:  alert, cooperative, no distress, appears stated age  Neck:  nontender, no JVD  Lungs:  clear to auscultation bilaterally  Heart:  regular rate and rhythm, S1, S2 normal, no murmur, click, rub or gallop  Abdomen:  abdomen is soft without significant tenderness, masses, organomegaly or guarding  Extremities:  extremities normal, atraumatic, no cyanosis or edema       Data Review:     Recent Labs     06/15/21  0544 06/14/21  0109 06/13/21  0700   WBC 9.2 9.8 8.9   HGB 13.4 13.3 13.0   HCT 41.0 38.9 38.8    210 207     Recent Labs 06/15/21  0813 06/15/21  0544 06/14/21  0109 06/13/21  0700     --  139 141   K 3.4*  --  3.4* 3.8     --  105 108   CO2 29  --  30 28   GLU 89  --  118* 78   BUN 15  --  15 12   CREA 1.37*  --  1.38* 1.12   CA 8.8  --  8.5 9.0   INR  --  1.1  --   --        Results for orders placed or performed during the hospital encounter of 06/09/21   EKG, 12 LEAD, INITIAL   Result Value Ref Range    Ventricular Rate 86 BPM    Atrial Rate 86 BPM    P-R Interval 148 ms    QRS Duration 100 ms    Q-T Interval 408 ms    QTC Calculation (Bezet) 488 ms    Calculated P Axis 68 degrees    Calculated R Axis -3 degrees    Calculated T Axis -50 degrees    Diagnosis       Sinus rhythm with occasional premature ventricular complexes  Possible Left atrial enlargement  Incomplete right bundle branch block  T wave abnormality, consider inferior ischemia  Abnormal ECG  No previous ECGs available  Confirmed by Narendra Reardon ([id number]) on 6/9/2021 9:27:02 PM         All Cardiac Markers in the last 24 hours:    No results found for: CPK, CK, CKMMB, CKMB, RCK3, CKMBT, CKNDX, CKND1, PALMA, TROPT, TROIQ, SIL, TROPT, TNIPOC, BNP, BNPP    Last Lipid:    Lab Results   Component Value Date/Time    Cholesterol, total 120 06/10/2021 05:30 AM    HDL Cholesterol 33 (L) 06/10/2021 05:30 AM    LDL, calculated 69.6 06/10/2021 05:30 AM    Triglyceride 87 06/10/2021 05:30 AM    CHOL/HDL Ratio 3.6 06/10/2021 05:30 AM       Cardiographics:     EKG Results     Procedure 720 Value Units Date/Time    EKG, 12 LEAD, SUBSEQUENT [[id number]] Collected: 06/09/21 0953    Order Status: Completed Updated: 06/09/21 2129     Ventricular Rate 100 BPM      Atrial Rate 100 BPM      P-R Interval 146 ms      QRS Duration 96 ms      Q-T Interval 356 ms      QTC Calculation (Bezet) 459 ms      Calculated P Axis 63 degrees      Calculated R Axis -159 degrees      Calculated T Axis -34 degrees      Diagnosis --     Sinus rhythm with occasional premature ventricular complexes and premature   atrial complexes  Possible Left atrial enlargement  Indeterminate axis  Incomplete right bundle branch block  T wave abnormality, consider inferior ischemia  Abnormal ECG  When compared with ECG of 09-JUN-2021 09:31,  premature atrial complexes are now present  Nonspecific T wave abnormality has replaced inverted T waves in Anterior   leads  Confirmed by Narendra Reardon [id number]) on 6/9/2021 9:28:53 PM      EKG, 12 LEAD, SUBSEQUENT [[id number]] Collected: 06/09/21 0931    Order Status: Completed Updated: 06/09/21 2127     Ventricular Rate 84 BPM      Atrial Rate 84 BPM      P-R Interval 148 ms      QRS Duration 98 ms      Q-T Interval 418 ms      QTC Calculation (Bezet) 493 ms      Calculated P Axis 64 degrees      Calculated R Axis -27 degrees      Calculated T Axis -46 degrees      Diagnosis --     Sinus rhythm with occasional premature ventricular complexes  Possible Left atrial enlargement  Incomplete right bundle branch block  T wave abnormality, consider inferior ischemia  Prolonged QT  Abnormal ECG  When compared with ECG of 09-JUN-2021 08:51,  Inverted T waves have replaced nonspecific T wave abnormality in Anterior   leads  Confirmed by Narendra Reardon [id number]) on 6/9/2021 9:27:46 PM      EKG, 12 LEAD, INITIAL [[id number]] Collected: 06/09/21 0851    Order Status: Completed Updated: 06/09/21 2127     Ventricular Rate 86 BPM      Atrial Rate 86 BPM      P-R Interval 148 ms      QRS Duration 100 ms      Q-T Interval 408 ms      QTC Calculation (Bezet) 488 ms      Calculated P Axis 68 degrees      Calculated R Axis -3 degrees      Calculated T Axis -50 degrees      Diagnosis --     Sinus rhythm with occasional premature ventricular complexes  Possible Left atrial enlargement  Incomplete right bundle branch block  T wave abnormality, consider inferior ischemia  Abnormal ECG  No previous ECGs available  Confirmed by Narendra Reardon ([id number]) on 6/9/2021 9:27:02 PM              02/08/19    NM [facility] MYOCARDIAL PERFUSION MULTIPLE  2/8/2019          XR Results (most recent):  Results from Hospital Encounter encounter on 06/09/21    XR CHEST PORT    Narrative  AP CHEST, PORTABLE    INDICATION: Chest pain    COMPARISON: Chest x-ray 1/8/2019    FINDINGS:  EKG leads overlie the patient. The cardiac silhouette is normal in size. Central pulmonary arteries appear  slightly prominent, similar to prior exams. The pulmonary vasculature is  unremarkable. No focal consolidation, pleural effusion, or pneumothorax. No  acute osseous abnormalities are identified. Impression  1. No clearly acute finding.   2. Chronic prominence of the central pulmonary arteries, could indicate  pulmonary arterial hypertension        Signed By: Shadia Retana PA-C    Ally 15, 2021

## 2021-06-15 NOTE — PROGRESS NOTES
Progress Note         Patient: Jennifer Craven MRN: [id number]  CSN: [id number]    YOB: 1960  Age: 56 y.o. Sex: male    DOA: 6/9/2021 LOS:  LOS: 6 days                    Subjective:     Jennifer Craven is a 56 y.o. male with a PMHx of CAD s/p stenting, HTN, HLD, SHILPI on CPAP, morbid obesity and active tobacco abuse who is admitted for acute submassive bilateral PE's with RV failure and chest pain 2/2 unstable angina and diffuse CAD. Late entry for 6/14     Seen in room today   Comfortable sitting up in bed   Reports improvement in CP, still having some intermittent pains   Still on NTG drip and receiving morphine for CP   Denies any significant SOB   Denies any further complaints       Objective:     Physical Exam:  Visit Vitals  /79   Pulse (!) 59   Temp 98.2 °F (36.8 °C)   Resp 18   Ht 5' 10\" (1.778 m)   Wt 112.5 kg (248 lb)   SpO2 95%   BMI 35.58 kg/m²        General:         Alert, cooperative, no acute distress    HEENT: NC, Atraumatic. Anicteric sclerae. Lungs: CTA Bilaterally. No Wheezing/Rhonchi/Rales. Heart:  Regular  rhythm,  No murmur, No Rubs, No Gallops  Abdomen: Soft, Non distended, Non tender. +Bowel sounds, no HSM  Extremities: No c/c/e  Psych:   Not anxious or agitated. Neurologic:  Alert and oriented X 3. No acute neurological deficits. Intake and Output:  Current Shift:  06/14 1901 - 06/15 0700  In: -   Out: 350 [Urine:350]  Last three shifts:  06/13 0701 - 06/14 1900  In: 2843.5 [P.O.:1220;  I.V.:1623.5]  Out: 7075 [Urine:7075]    Labs: Results:       Chemistry Recent Labs     06/14/21  0109 06/13/21  0700 06/12/21  0620   * 78 89    141 141   K 3.4* 3.8 3.7    108 108   CO2 30 28 27   BUN 15 12 12   CREA 1.38* 1.12 1.04   CA 8.5 9.0 8.3*   AGAP 4 5 6   BUCR 11* 11* 12      CBC w/Diff Recent Labs     06/14/21  0109 06/13/21  0700 06/12/21  0620   WBC 9.8 8.9 8.3   RBC 3.95* 3.94* 3.74*   HGB 13.3 13.0 12.4*   HCT 38.9 38.8 37.6    207 190      Cardiac Enzymes No results for input(s): CPK, CKND1, PALMA in the last 72 hours. No lab exists for component: CKRMB, TROIP   Coagulation Recent Labs     06/14/21  0109 06/13/21  1534   APTT 86.1* >180.0*       Lipid Panel Lab Results   Component Value Date/Time    Cholesterol, total 120 06/10/2021 05:30 AM    HDL Cholesterol 33 (L) 06/10/2021 05:30 AM    LDL, calculated 69.6 06/10/2021 05:30 AM    VLDL, calculated 17.4 06/10/2021 05:30 AM    Triglyceride 87 06/10/2021 05:30 AM    CHOL/HDL Ratio 3.6 06/10/2021 05:30 AM      BNP No results for input(s): BNPP in the last 72 hours. Liver Enzymes No results for input(s): TP, ALB, TBIL, AP in the last 72 hours. No lab exists for component: SGOT, GPT, DBIL   Thyroid Studies No results found for: T4, T3U, TSH, TSHEXT             Assessment and Plan:     Karin Metz is a 56 y.o. male with a PMHx of CAD s/p stenting, HTN, HLD, SHILPI on CPAP, morbid obesity and active tobacco abuse who is admitted for acute submassive bilateral PE's with RV failure and chest pain 2/2 unstable angina and diffuse CAD. 1. Acute submassive extensive bilateral PE  2. RLL pulmonary infarction   3. RV failure   4. Acute occlusive LLE DVT    5. Chest pain   6. Unstable angina   7. Diffuse CAD   8. Moderate pulmonary HTN   9. Multiple pulmonary nodules- incidental finding  10. DAVI   11. HTN   12. HLD   13. SHILPI on CPAP   14. Morbid obesity   15. Active tobacco abuse  16.  Alcohol dependence      Cardiology and pulmonology following   IR consulted for thrombectomy and IVC filter placement- scheduled for 6/15   Heparin drip to hold at 8 am   Needs at least 6 months [facility] may need lifelong   Needs repeat echo after thrombectomy    S/p LHC on 6/9 that showed diffuse CAD- CABG recommended, but not until after pt recovers from PE's  NTG drip per cardiology   Cont ASA, statin, isordil, metoprolol   Cont lasix 40 mg PO BID  FU CBC, BMP   Pain control    Strict I&O's, daily weight Incentive spirometry   Tobacco cessation counseling       Case discussed with:  [x]Patient  [x]Family  [x]Nursing  [x]Case Management  DVT prophylaxis: Heparin drip    Diet: NPO at midnight   Contact: Giovana Vega (wife)    [phone]  Code Status: FULL   Disposition: Cont current care, >2 nights       H. Tyra Dale,   6/15/2021       Dragon medical dictation software was used for portions of this report. Unintended errors may occur.

## 2021-06-15 NOTE — PROCEDURES
RADIOLOGY POST PROCEDURE NOTE     Ally 15, 2021       3:06 PM     Preoperative Diagnosis:   Submassive PE. Postoperative Diagnosis:  Same. :  Dr. Mckinley Morgan    Assistant:  None. Type of Anesthesia: 1% plain lidocaine and IV moderate sedation with Versed and Fentanyl. Procedure/Description:  Image guided bilateral PA thrombectomy and IVC filter placement. Findings:   No bleeding. Remaining moderate burden of bilateral PE's, therefore, thrombectomy, was performed. Estimated blood Loss:  Minimal    Specimen Removed:   no    Blood transfusions:  None. Implants:  None.     Complications: None    Condition: Stable    Discharge Plan:  continue present therapy    Clarissa Olson MD

## 2021-06-16 ENCOUNTER — APPOINTMENT (OUTPATIENT)
Dept: NON INVASIVE DIAGNOSTICS | Age: 61
DRG: 164 | End: 2021-06-16
Attending: INTERNAL MEDICINE
Payer: COMMERCIAL

## 2021-06-16 DIAGNOSIS — J43.2 CENTRILOBULAR EMPHYSEMA (HCC): Primary | ICD-10-CM

## 2021-06-16 LAB
ANION GAP SERPL CALC-SCNC: 4 MMOL/L (ref 3–18)
APTT PPP: 32.3 SEC (ref 23–36.4)
APTT PPP: 90 SEC (ref 23–36.4)
BUN SERPL-MCNC: 14 MG/DL (ref 7–18)
BUN/CREAT SERPL: 12 (ref 12–20)
CALCIUM SERPL-MCNC: 9.1 MG/DL (ref 8.5–10.1)
CHLORIDE SERPL-SCNC: 109 MMOL/L (ref 100–111)
CO2 SERPL-SCNC: 26 MMOL/L (ref 21–32)
CREAT SERPL-MCNC: 1.21 MG/DL (ref 0.6–1.3)
ECHO LV INTERNAL DIMENSION DIASTOLIC: 5.53 CM (ref 4.2–5.9)
ECHO LV INTERNAL DIMENSION SYSTOLIC: 4.38 CM
ECHO LV IVSD: 1.12 CM (ref 0.6–1)
ECHO LV MASS 2D: 248.7 G (ref 88–224)
ECHO LV MASS INDEX 2D: 108.6 G/M2 (ref 49–115)
ECHO LV POSTERIOR WALL DIASTOLIC: 1.11 CM (ref 0.6–1)
ECHO RV TAPSE: 1.66 CM (ref 1.5–2)
ECHO TV REGURGITANT MAX VELOCITY: 252.26 CM/S
ECHO TV REGURGITANT PEAK GRADIENT: 25.54 MMHG
ERYTHROCYTE [DISTWIDTH] IN BLOOD BY AUTOMATED COUNT: 12.2 % (ref 11.6–14.5)
GLUCOSE SERPL-MCNC: 123 MG/DL (ref 74–99)
HCT VFR BLD AUTO: 40.8 % (ref 36–48)
HGB BLD-MCNC: 13.9 G/DL (ref 13–16)
MCH RBC QN AUTO: 33.1 PG (ref 24–34)
MCHC RBC AUTO-ENTMCNC: 34.1 G/DL (ref 31–37)
MCV RBC AUTO: 97.1 FL (ref 74–97)
PLATELET # BLD AUTO: 217 K/UL (ref 135–420)
PMV BLD AUTO: 10.2 FL (ref 9.2–11.8)
POTASSIUM SERPL-SCNC: 4.2 MMOL/L (ref 3.5–5.5)
RBC # BLD AUTO: 4.2 M/UL (ref 4.35–5.65)
SODIUM SERPL-SCNC: 139 MMOL/L (ref 136–145)
WBC # BLD AUTO: 8.2 K/UL (ref 4.6–13.2)

## 2021-06-16 PROCEDURE — 85730 THROMBOPLASTIN TIME PARTIAL: CPT

## 2021-06-16 PROCEDURE — 65660000004 HC RM CVT STEPDOWN

## 2021-06-16 PROCEDURE — 74011250636 HC RX REV CODE- 250/636: Performed by: INTERNAL MEDICINE

## 2021-06-16 PROCEDURE — 80048 BASIC METABOLIC PNL TOTAL CA: CPT

## 2021-06-16 PROCEDURE — 36415 COLL VENOUS BLD VENIPUNCTURE: CPT

## 2021-06-16 PROCEDURE — 74011250637 HC RX REV CODE- 250/637: Performed by: INTERNAL MEDICINE

## 2021-06-16 PROCEDURE — C1893 INTRO/SHEATH, FIXED,NON-PEEL: HCPCS

## 2021-06-16 PROCEDURE — 99233 SBSQ HOSP IP/OBS HIGH 50: CPT | Performed by: INTERNAL MEDICINE

## 2021-06-16 PROCEDURE — 74011250637 HC RX REV CODE- 250/637: Performed by: PHYSICIAN ASSISTANT

## 2021-06-16 PROCEDURE — 85027 COMPLETE CBC AUTOMATED: CPT

## 2021-06-16 PROCEDURE — 77030011229 HC DIL VESL COON COOK -A

## 2021-06-16 PROCEDURE — 74011000250 HC RX REV CODE- 250: Performed by: INTERNAL MEDICINE

## 2021-06-16 PROCEDURE — 93321 DOPPLER ECHO F-UP/LMTD STD: CPT

## 2021-06-16 PROCEDURE — C1880 VENA CAVA FILTER: HCPCS

## 2021-06-16 RX ORDER — ATORVASTATIN CALCIUM 40 MG/1
40 TABLET, FILM COATED ORAL
Qty: 30 TABLET | Refills: 0 | Status: SHIPPED | OUTPATIENT
Start: 2021-06-16 | End: 2021-07-16

## 2021-06-16 RX ORDER — ISOSORBIDE DINITRATE 40 MG/1
40 TABLET ORAL 3 TIMES DAILY
Qty: 90 TABLET | Refills: 0 | Status: SHIPPED | OUTPATIENT
Start: 2021-06-16 | End: 2021-07-16

## 2021-06-16 RX ORDER — ATORVASTATIN CALCIUM 40 MG/1
80 TABLET, FILM COATED ORAL
Status: DISCONTINUED | OUTPATIENT
Start: 2021-06-16 | End: 2021-06-17 | Stop reason: HOSPADM

## 2021-06-16 RX ORDER — METOPROLOL TARTRATE 50 MG/1
50 TABLET ORAL 2 TIMES DAILY
Qty: 60 TABLET | Refills: 0 | Status: SHIPPED | OUTPATIENT
Start: 2021-06-16 | End: 2021-07-16

## 2021-06-16 RX ORDER — FUROSEMIDE 40 MG/1
40 TABLET ORAL 2 TIMES DAILY
Qty: 60 TABLET | Refills: 0 | Status: SHIPPED | OUTPATIENT
Start: 2021-06-16 | End: 2021-08-03

## 2021-06-16 RX ORDER — TIOTROPIUM BROMIDE AND OLODATEROL 3.124; 2.736 UG/1; UG/1
2 SPRAY, METERED RESPIRATORY (INHALATION) DAILY
Qty: 1 INHALER | Refills: 0 | Status: SHIPPED | OUTPATIENT
Start: 2021-06-16 | End: 2021-07-12 | Stop reason: SDUPTHER

## 2021-06-16 RX ORDER — ISOSORBIDE DINITRATE 10 MG/1
40 TABLET ORAL 3 TIMES DAILY
Status: DISCONTINUED | OUTPATIENT
Start: 2021-06-16 | End: 2021-06-17 | Stop reason: HOSPADM

## 2021-06-16 RX ORDER — RIVAROXABAN 15 MG-20MG
KIT ORAL
Qty: 1 DOSE PACK | Refills: 0 | Status: SHIPPED | OUTPATIENT
Start: 2021-06-16 | End: 2021-07-12 | Stop reason: SDUPTHER

## 2021-06-16 RX ADMIN — MORPHINE SULFATE 1 MG: 2 INJECTION, SOLUTION INTRAMUSCULAR; INTRAVENOUS at 12:24

## 2021-06-16 RX ADMIN — MORPHINE SULFATE 1 MG: 2 INJECTION, SOLUTION INTRAMUSCULAR; INTRAVENOUS at 17:53

## 2021-06-16 RX ADMIN — ISOSORBIDE DINITRATE 40 MG: 10 TABLET ORAL at 23:04

## 2021-06-16 RX ADMIN — RIVAROXABAN 15 MG: 15 TABLET, FILM COATED ORAL at 17:48

## 2021-06-16 RX ADMIN — FAMOTIDINE 20 MG: 20 TABLET ORAL at 17:48

## 2021-06-16 RX ADMIN — Medication 81 MG: at 08:50

## 2021-06-16 RX ADMIN — Medication 10 ML: at 05:52

## 2021-06-16 RX ADMIN — MORPHINE SULFATE 1 MG: 2 INJECTION, SOLUTION INTRAMUSCULAR; INTRAVENOUS at 08:50

## 2021-06-16 RX ADMIN — MORPHINE SULFATE 1 MG: 2 INJECTION, SOLUTION INTRAMUSCULAR; INTRAVENOUS at 23:02

## 2021-06-16 RX ADMIN — FUROSEMIDE 40 MG: 40 TABLET ORAL at 08:50

## 2021-06-16 RX ADMIN — ZOLPIDEM TARTRATE 5 MG: 5 TABLET ORAL at 23:02

## 2021-06-16 RX ADMIN — DOCUSATE SODIUM 100 MG: 100 CAPSULE, LIQUID FILLED ORAL at 17:48

## 2021-06-16 RX ADMIN — ZOLPIDEM TARTRATE 5 MG: 5 TABLET ORAL at 01:23

## 2021-06-16 RX ADMIN — NITROGLYCERIN 40 MCG/MIN: 40 INJECTION INTRAVENOUS at 01:23

## 2021-06-16 RX ADMIN — METOPROLOL TARTRATE 50 MG: 50 TABLET, FILM COATED ORAL at 08:50

## 2021-06-16 RX ADMIN — HEPARIN SODIUM 15 UNITS/KG/HR: 10000 INJECTION, SOLUTION INTRAVENOUS at 07:29

## 2021-06-16 RX ADMIN — Medication 10 ML: at 23:05

## 2021-06-16 RX ADMIN — ISOSORBIDE DINITRATE 40 MG: 10 TABLET ORAL at 17:48

## 2021-06-16 RX ADMIN — ATORVASTATIN CALCIUM 80 MG: 40 TABLET, FILM COATED ORAL at 23:04

## 2021-06-16 RX ADMIN — MUPIROCIN: 20 OINTMENT TOPICAL at 17:48

## 2021-06-16 RX ADMIN — DOCUSATE SODIUM 100 MG: 100 CAPSULE, LIQUID FILLED ORAL at 08:50

## 2021-06-16 RX ADMIN — ISOSORBIDE DINITRATE 40 MG: 10 TABLET ORAL at 08:49

## 2021-06-16 RX ADMIN — MUPIROCIN: 20 OINTMENT TOPICAL at 08:49

## 2021-06-16 RX ADMIN — FUROSEMIDE 40 MG: 40 TABLET ORAL at 17:48

## 2021-06-16 RX ADMIN — METOPROLOL TARTRATE 50 MG: 50 TABLET, FILM COATED ORAL at 23:05

## 2021-06-16 RX ADMIN — MORPHINE SULFATE 1 MG: 2 INJECTION, SOLUTION INTRAMUSCULAR; INTRAVENOUS at 01:11

## 2021-06-16 RX ADMIN — Medication 10 ML: at 13:25

## 2021-06-16 NOTE — DISCHARGE SUMMARY
[facility]  [facility]       Patient: Bozena Cahn Age: 56 y.o. : 1960 MR#: [id number] SSN: [id number]  PCP on record: Romario Warren MD  Admit date: 2021  Discharge date: 2021    Consults:  Ms Arun Cabrera., PA-IR  -IDA Nevarez,-pccm  -Dr Consuello Osgood, A.,-CTA  -Dr Geena Mathews SJewels,-cardiology     Procedure/Description:  Image guided bilateral PA thrombectomy and IVC filter placement. 6/15/21  -     Significant Diagnostic Studies:-CXR 21:  IMPRESSION     1. No clearly acute finding. 2. Chronic prominence of the central pulmonary arteries, could indicate  pulmonary arterial hypertension     - CTA chest 6/10/21:  IMPRESSION     1. Extensive bilateral pulmonary emboli from distal mainstem extending to all  the lobar arteries and their branches with near occlusion. No occlusive emboli  also noted in inferior right pulmonary vein and multiple joining branches.     2. Moderate dilatation of pulmonary trunk and proximal mainstem arteries.     3. Pulmonary infarction at posterior right lower lobe.     4. No pleural effusion or adenopathy.     5. Several small lung nodules as incidental findings. Recommendation as provided  below.     -----------------------------------  [address] (2017):     Less than 6 mm nodule:  A. Any solitary nodule(solid, semisolid or groundglass) or multiple solid:       Low Risk: No follow-up; If suspicious morphology or upper lobe location  consider 12 month follow-up. High Risk: Optional CT at 12 months.     B. Multiple subsolid/groundglass:   CT in 3 to 6 months.  If unchanged consider CT at 2 and 4 years.     A wet read was provided to the floor nurse, Mahnaz Bunch, for the ordering physician  Dr. Chucky Santoyo by me upon the interpretation at approximately  1150  hours.     Thank you for your referral.    -Cardiac echo 21:  Result status: Edited Result - FINAL   Addendum by Lorri Eldridge MD on , 2021  4:16 PM   · LV: Estimated LVEF is 50 - 55%. Normal cavity size, wall thickness and systolic function (ejection fraction normal). Abnormal left ventricular septal motion. Systolic flattening of the interventricular septum consistent with right ventricle pressure overload. Mild (grade 1) left ventricular diastolic dysfunction. · AO: Mild aortic root dilatation. · TV: Non-specific thickening of the tricuspid valve. Mild to moderate tricuspid valve regurgitation is present. · RV: Dilated right ventricle. Reduced systolic function. Right ventricular findings are consistent with Powell's sign. · IVC: Moderately elevated central venous pressure (8 mmHg); IVC diameter is larger than 21 mm and collapses more than 50% with respiration. · PA: Moderate pulmonary hypertension. Pulmonary arterial systolic pressure is 60 mmHg. Finalized by Dana Mann MD on Wed Jun 9, 2021  3:11 PM   · LV: Estimated LVEF is 50 - 55%. Normal cavity size, wall thickness and systolic function (ejection fraction normal). Wall motion: normal. Mild (grade 1) left ventricular diastolic dysfunction. · AO: Mild aortic root dilatation. · TV: Non-specific thickening of the tricuspid valve. Mild to moderate tricuspid valve regurgitation is present. · RV: Dilated right ventricle. Reduced systolic function. Right ventricular findings are consistent with Powell's sign. · IVC: Moderately elevated central venous pressure (8 mmHg); IVC diameter is larger than 21 mm and collapses more than 50% with respiration. · PA: Moderate pulmonary hypertension. Pulmonary arterial systolic pressure is 60 mmHg.        -Cardiac echo 6/16/21:  Interpretation Summary    Result status: Final result   · LV: Estimated LVEF is 55 - 60%. Visually measured ejection fraction. Normal cavity size, wall thickness and systolic function (ejection fraction normal). Wall motion: normal.  · RV: Mildly dilated right ventricle. Mildly reduced systolic function. Moderator band present. · RA: Dilated right atrium. · PA: Mild pulmonary hypertension. Pulmonary arterial systolic pressure is 35 mmHg. · TV: Mild tricuspid valve regurgitation is present. Comparison Study Information    Prior Study    There is a prior study available for comparison. Prior study date: 6/9/2021. As compared to the previous study, there are significant changes. Pulmonary hypertension has decreased. Discharge Diagnoses:  -Acute submassive PE  -Pulmonary hypertension  -DVT left popliteal and posterior tibial veins  -Unstable angina   -Active tobacco use  -EtOH dependence                                           Patient Active Problem List   Diagnosis Code    Severe obesity ([address] E66.01    Unstable angina ([address] I20.0    Chest pain R07.9    CAD (coronary artery disease) I25.10    SHILPI (obstructive sleep apnea) G47.33    Tobacco dependence F17.200    Hyperlipidemia E78.5    Bilateral pulmonary embolism ([address] I26.99       Hospital Course by Problem     68-year-old male with multiple medical problems including coronary artery disease with stent, dyslipidemia and active tobacco abuse as well as obstructive sleep apnea on CPAP,  who presented to the emergency room with complaints of chest pain and shortness of breath. Initially admitted with a diagnosis of unstable angina and urgently underwent cardiac catheterization with findings of multivessel disease. The overall plan was that patient would benefit from CABG however he was noted to have RV dysfunction and concern for right ventricular strain that was thought to be concerning for PE which was confirmed on further eval.  He underwent embolectomy of pulmonary embolism this by IR with the plan for CABG once his right heart recovers. He also was noted to have evidence of deep venous thrombosis in the left popliteal and posterior tibial veins.   For the coronary artery disease he has been discharged on oral nitro, aspirin, beta-blocker and statin. His Plavix has been held as he is now on oral anticoagulation in addition to the aspirin. This has been suggested by cardiology to stop Plavix. Patient has been transitioned from heparin drip to oral anticoagulation. He is to follow-up with the cardiology service with eventual plans for CABG. He has been discharged in stable condition. On date of discharge he did not have reports of chest pain and has been able to ambulate on room air with oxygen saturations falling as low as 93%. These note that patient stayed overnight due to not being able to find a ride outside of the hospital and was discharged in the morning. No overnight events, patient remained stable.     Today's examination of the patient revealed:     Subjective:   Patient without complaints  Objective:   VS:   Visit Vitals  /80 (BP 1 Location: Left upper arm, BP Patient Position: At rest)   Pulse 62   Temp 98.3 °F (36.8 °C)   Resp 18   Ht 5' 10\" (1.778 m)   Wt 112.8 kg (248 lb 9.6 oz)   SpO2 93%   BMI 35.67 kg/m²      Tmax/24hrs: Temp (24hrs), Av °F (36.7 °C), Min:97.5 °F (36.4 °C), Max:98.8 °F (37.1 °C)     Input/Output:     Intake/Output Summary (Last 24 hours) at 2021 1318  Last data filed at 2021 0902  Gross per 24 hour   Intake 862.74 ml   Output 400 ml   Net 462.74 ml       General:  Alert, awake, in nad  Cardiovascular:  Rrr, no murmurs  Pulmonary:  ctab  GI:  Soft, nt, nd  Extremities:  No edema  Additional:      Labs:    Recent Results (from the past 24 hour(s))   PTT    Collection Time: 06/15/21  8:25 PM   Result Value Ref Range    aPTT 40.5 (H) 23.0 - 36.4 SEC   CBC W/O DIFF    Collection Time: 21  2:00 AM   Result Value Ref Range    WBC 8.2 4.6 - 13.2 K/uL    RBC 4.20 (L) 4.35 - 5.65 M/uL    HGB 13.9 13.0 - 16.0 g/dL    HCT 40.8 36.0 - 48.0 %    MCV 97.1 (H) 74.0 - 97.0 FL    MCH 33.1 24.0 - 34.0 PG    MCHC 34.1 31.0 - 37.0 g/dL    RDW 12.2 11.6 - 14.5 %    PLATELET 519 435 - 387 K/uL    MPV 10.2 9.2 - 44.5 FL   METABOLIC PANEL, BASIC    Collection Time: 06/16/21  2:00 AM   Result Value Ref Range    Sodium 139 136 - 145 mmol/L    Potassium 4.2 3.5 - 5.5 mmol/L    Chloride 109 100 - 111 mmol/L    CO2 26 21 - 32 mmol/L    Anion gap 4 3.0 - 18 mmol/L    Glucose 123 (H) 74 - 99 mg/dL    BUN 14 7.0 - 18 MG/DL    Creatinine 1.21 0.6 - 1.3 MG/DL    BUN/Creatinine ratio 12 12 - 20      GFR est AA >60 >60 ml/min/1.73m2    GFR est non-AA >60 >60 ml/min/1.73m2    Calcium 9.1 8.5 - 10.1 MG/DL   PTT    Collection Time: 06/16/21  2:00 AM   Result Value Ref Range    aPTT 90.0 (H) 23.0 - 36.4 SEC     Additional Data Reviewed:     Condition on discharge: stable  Disposition:    [x]Home   []Home with Home Health   []SNF/NH   []Rehab   []Home with family   []Alternate Facility:____________________      Discharge Medications:     Current Discharge Medication List      START taking these medications    Details   isosorbide dinitrate (ISORDIL) 40 mg tablet Take 1 Tablet by mouth three (3) times daily for 30 days. Qty: 90 Tablet, Refills: 0      rivaroxaban (Xarelto DVT-PE Treat 30d Start) 15 mg (42)- 20 mg (9) DsPk Take one 15 mg tablet twice a day with food for the first 21 days. Then, take one 20 mg tablet once a day with food for 9 days. Qty: 1 Dose Pack, Refills: 0         CONTINUE these medications which have CHANGED    Details   atorvastatin (LIPITOR) 40 mg tablet Take 1 Tablet by mouth nightly for 30 days. Qty: 30 Tablet, Refills: 0      furosemide (LASIX) 40 mg tablet Take 1 Tablet by mouth two (2) times a day for 30 days. Qty: 60 Tablet, Refills: 0      metoprolol tartrate (LOPRESSOR) 50 mg tablet Take 1 Tablet by mouth two (2) times a day for 30 days.   Qty: 60 Tablet, Refills: 0         CONTINUE these medications which have NOT CHANGED    Details   nitroglycerin (NITROLINGUAL) 400 mcg/spray spray SPRAY ONE SPRAY UNDER THE TONGUE AS NEEDED MAY REPEAT UP TO 2 TIMES AS DIRECTED   Qty: 12 g, Refills: 5      aspirin delayed-release 81 mg tablet Refills: 1      co-enzyme Q-10 (COQ-10) 100 mg capsule Take 100 mg by mouth daily. fluocinoNIDE (LIDEX) 0.05 % topical cream Refills: 3         STOP taking these medications       isosorbide mononitrate ER (IMDUR) 60 mg CR tablet Comments:   Reason for Stopping:         clopidogreL (PLAVIX) 75 mg tab Comments:   Reason for Stopping: Follow-up Appointments:   1. Your PCP: Duyen Nelson MD, within 7-10days  2.  Cardiologist in 7-10 days            >30 minutes spent coordinating this discharge (review instructions/follow-up, prescriptions, preparing report for sign off)    Signed:  Rosa Hickey MD  6/16/2021  1:18 PM

## 2021-06-16 NOTE — PROGRESS NOTES
0700: Bedside and Verbal shift change report given to HIRAL Miller (oncoming nurse) by Kentrell Nair RN (offgoing nurse). Report included the following information SBAR, Kardex and MAR.     0800: Assessed patient. Administered medications. 1040: Dr Radha Rangel requesting oxygen walk test to be completed prior to discharge. Pt will also be switched to oral anti-coagulation medication today. Pulmonology requesting repeat echo. 1430: Performed walk test with patient. Pt's O2 sat got down to 93% on room air. No dyspnea on exertion. 1900: Bedside and Verbal shift change report given to HIRAL Fraser (oncoming nurse) by Lucho Velazco RN (offgoing nurse). Report included the following information SBAR, Kardex, MAR and Cardiac Rhythm NSR.

## 2021-06-16 NOTE — PROGRESS NOTES
[address] June 16, 2021       RE: Leyla Hernandez      To Whom It May Concern,    This is to certify that Leyla Hernandez has been hospitalized during the period of 6/9/2021-6/16/21 and will need rest until 6/30/2021. Please feel free to contact my office if you have any questions or concerns. Thank you for your assistance in this matter.       Sincerely,  Claudetta Crews, MD  Hospitalist Office  [phone]

## 2021-06-16 NOTE — ROUTINE PROCESS
Bedside and Verbal shift change report given to Marylen Patter (oncoming nurse) by Bhavik Hernandez RN (offgoing nurse). Report included the following information SBAR, Kardex, Intake/Output, MAR, Recent Results and Cardiac Rhythm NSR.
Bedside and Verbal shift change report given to Pascale Tesfaye RN (oncoming nurse) by Pavan Ambrocio RN (offgoing nurse). Report included the following information SBAR, Kardex, Intake/Output, MAR, Recent Results and Cardiac Rhythm Sinus Rhythm.
Pt resting in high semi-fowlers position w/NC in place @3lpm. [id number]%. Pt w/own home CPAP unit. Pt states he will put on when ready for bed. Pt inquired if this writer could change the pressure on CPAP unit- informed that no changes can be made w/o a physicians order. Pt understood.
Adult

## 2021-06-16 NOTE — PROGRESS NOTES
Cardiology Progress Note    Consultation request by Yeni Peraza MD for advice/opinion related to evaluating CP    Date of  Admission: 6/9/2021  8:52 AM   Primary Care Physician:  Gabriele Limon MD        Attending Cardiologist: Dr. Lucia Ramirez MD      Assessment:     -Acute Submassive B/L PE, initially presented with symptoms concerning for unstable angina. Pain out of proportion to [facility] findings. CT chest showed extensive bilateral large nonobstructing emboli noted in right distal pulmonary artery, left mainstem PA extending into the lobar, segmental and subsegmental arteries. In addition there is right inferior pulmonary vein clot. Initial troponin normal. ECG SR with subtle ST changes. ECHO this admission showed Dilated right ventricle. Abnormal wall motion: free wall hypokinesis of the right ventricle. Reduced systolic function. The findings are consistent with Powell's sign   · S/p B/L PA thrombectomy and IVC filter placement (06/15/21)  -CAD s/p [facility] ([id number]) Widely patent Lcx stents, known occluded RCA, diffuse LM/LAD disease. · S/p [facility] (06/09/21)   Left Main   Large-caliber vessel. Distal vessel approximately 50% stenosis with calcification that involves LAD and circumflex bifurcation   Left Anterior Descending   The vessel is calcified. The vessel is tortuous. Ostial, proximal and the mid LAD has diffuse calcified moderate disease with sequential eccentric 75% lesions. Mid-distal LAD has no significant obstructive disease Diagonal branch: Moderate disease. Medium caliber vessel   Left Circumflex   The vessel is calcified. The vessel is tortuous. Proximal LCx stent has mild in-stent restenosis followed by hazy calcified borderline 70% stenosis. OM1: Small caliber vessel OM 2: Large bifurcating vessel. Calcified hazy 70% stenosis before bifurcation.  Superior branch distally distally has subtotal occlusion with faint homocollateral. Inferior branch without any significant obstructive disease   Right Coronary Artery   Proximal-mid 100% in-stent occlusion. Previously known by cardiac catheterization in 2019. Evidence of good collaterals from left to right supplying distal RCA and RPDA   -ECHO (06/09/21) EF 12-45%, Systolic flattening of the interventricular septum consistent with right ventricle pressure overload. Mild (grade 1) left ventricular diastolic dysfunction. -Mild-Moderate TR by echo   -Moderate pulmonary HTN by echo PASP 60 mmHg  -Hypertension, on lopressor, Imdur, Lasix as outpatient   -Hyperlipidemia  -Tobacco Abuse, 1 ppd, cessation advised  -Obesity, BMI 35.58 kg/m2    Primary Cardiologist: Will establish care with Dr. Emily Mccauley:       I saw, evaluated, interviewed and examined the patient personally. I agree with the findings and plan of care as documented below with PA-C note  Patient feels significantly better. Denies any angina. Dyspnea has improved  Vitals reviwed. Exam with no significant fliud overload  Pertinent labs reviewed  Change IV NTG to oral NTG  DW Pulmonary team. . Agree to change to oral anticoagulation ( DOAC)  ECHO images reviewed personally. RV function has improved and PA pressure has improved but not normal yet  Would continue ASA, statin, BB, Isordil, lasix  DW patient that CABG will be postponed for few weeks with hopes for RV to improve further. DW Primary team who is planning to DC patient and I will see patient in clinic in 10 days and will repeat ECHO in 2-3 weeks. Time spent > 30 mins     Dion Allison MD       -CABG postponed at this time with RV failure 2/2 submassive B/L PEs. Will follow closely on an outpatient basis and determine future need. -Will discontinue IV NTG gtt.  Will increase Isordil.   -Will transition to oral anticoagulation, Dr. Anselmo Ness to d/w primary team.    -Will repeat ECHO today  -Follow up with Dr. Dion Allison in the office in 2 weeks  -stable from a CV standpoint, Further recommendations per Dr. Eagle Pink Subjective:     Patient doing well s/p thrombectomy and IVC filter placement. Denies CP or SOB. Past Medical History:     Past Medical History:   Diagnosis Date    CAD (coronary artery disease) 6/9/2021    Coronary arteriosclerosis     Dupuytren's disease of palm     Hyperlipidemia 6/9/2021    Hypertension     SHILPI (obstructive sleep apnea) 6/9/2021    Severe obesity ([address] 10/24/2018    Tobacco dependence 6/9/2021         Social History:     Social History     Socioeconomic History    Marital status:      Spouse name: Not on file    Number of children: Not on file    Years of education: Not on file    Highest education level: Not on file   Tobacco Use    Smoking status: Current Every Day Smoker     Types: Cigarettes    Smokeless tobacco: Never Used   Substance and Sexual Activity    Alcohol use: Yes    Drug use: No   Other Topics Concern     Social Determinants of Health     Financial Resource Strain:     Difficulty of Paying Living Expenses:    Food Insecurity:     Worried About Running Out of Food in the Last Year:     [address] in the Last Year:    Transportation Needs:     Lack of Transportation (Medical):  Lack of Transportation (Non-Medical):    Physical Activity:     Days of Exercise per Week:     Minutes of Exercise per Session:    Stress:     Feeling of Stress :    Social Connections:     Frequency of Communication with Friends and Family:     Frequency of Social Gatherings with Friends and Family:     Attends Gnosticist Services:     Active Member of Clubs or Organizations:     Attends Club or Organization Meetings:     Marital Status:         Family History:     Family History   Problem Relation Age of Onset    Hypertension Mother     Diabetes Mother         Medications:      Allergies   Allergen Reactions    Statins-Hmg-Coa Reductase Inhibitors Other (comments)     Other reaction(s): SEVERE PAIN PER PT    Gabapentin Other (comments)     \"Caused bad nightmares\"    Pcn [Penicillins] Hives        Current Facility-Administered Medications   Medication Dose Route Frequency    isosorbide dinitrate (ISORDIL) tablet 40 mg  40 mg Oral TID    potassium chloride (K-DUR, KLOR-CON) SR tablet 40 mEq  40 mEq Oral BID    sodium chloride (NS) flush 5-40 mL  5-40 mL IntraVENous Q8H    sodium chloride (NS) flush 5-40 mL  5-40 mL IntraVENous PRN    0.9% sodium chloride infusion  50 mL/hr IntraVENous CONTINUOUS    HYDROmorphone (DILAUDID) syringe 0.5 mg  0.5 mg IntraVENous Q5MIN PRN    fentaNYL citrate (PF) injection 50 mcg  50 mcg IntraVENous Q5MIN PRN    diphenhydrAMINE (BENADRYL) injection 12.5 mg  12.5 mg IntraVENous Multiple    zolpidem (AMBIEN) tablet 5 mg  5 mg Oral QHS PRN    morphine injection 1 mg  1 mg IntraVENous Q3H PRN    aspirin delayed-release tablet 81 mg  81 mg Oral DAILY    metoprolol tartrate (LOPRESSOR) tablet 50 mg  50 mg Oral BID    sodium chloride (NS) flush 5-40 mL  5-40 mL IntraVENous Q8H    sodium chloride (NS) flush 5-40 mL  5-40 mL IntraVENous PRN    acetaminophen (TYLENOL) tablet 650 mg  650 mg Oral Q6H PRN    Or    acetaminophen (TYLENOL) suppository 650 mg  650 mg Rectal Q6H PRN    polyethylene glycol (MIRALAX) packet 17 g  17 g Oral DAILY PRN    promethazine (PHENERGAN) tablet 12.5 mg  12.5 mg Oral Q6H PRN    Or    ondansetron (ZOFRAN) injection 4 mg  4 mg IntraVENous Q6H PRN    atorvastatin (LIPITOR) tablet 40 mg  40 mg Oral QHS    albuterol-ipratropium (DUO-NEB) 2.5 MG-0.5 MG/3 ML  3 mL Nebulization Q6H PRN    famotidine (PEPCID) tablet 20 mg  20 mg Oral QPM    furosemide (LASIX) tablet 40 mg  40 mg Oral ACB&D    heparin 25,000 units in D5W 250 ml infusion  18-36 Units/kg/hr IntraVENous TITRATE    mupirocin (BACTROBAN) 2 % ointment   Both Nostrils BID    docusate sodium (COLACE) capsule 100 mg  100 mg Oral BID    0.9% sodium chloride infusion  50 mL/hr IntraVENous CONTINUOUS         Physical Exam:     Visit Vitals  BP 132/73 (BP 1 Location: Left upper arm, BP Patient Position: At rest)   Pulse 77   Temp 98.6 °F (37 °C)   Resp 23   Ht 5' 10\" (1.778 m)   Wt 112.8 kg (248 lb 9.6 oz)   SpO2 90%   BMI 35.67 kg/m²       TELE: Sinus Bk 56 bpm     BP Readings from Last 3 Encounters:   06/16/21 132/73   07/01/19 137/87   01/23/19 131/78     Pulse Readings from Last 3 Encounters:   06/16/21 77   07/01/19 83   01/23/19 71     Wt Readings from Last 3 Encounters:   06/16/21 112.8 kg (248 lb 9.6 oz)   07/01/19 126.7 kg (279 lb 6.4 oz)   01/23/19 126.8 kg (279 lb 9.6 oz)       General:  alert, cooperative, no distress, appears stated age  Neck:  nontender, no JVD  Lungs:  clear to auscultation bilaterally  Heart:  regular rate and rhythm, S1, S2 normal, no murmur, click, rub or gallop  Abdomen:  abdomen is soft without significant tenderness, masses, organomegaly or guarding  Extremities:  extremities normal, atraumatic, no cyanosis or edema       Data Review:     Recent Labs     06/16/21  0200 06/15/21  0544 06/14/21  0109   WBC 8.2 9.2 9.8   HGB 13.9 13.4 13.3   HCT 40.8 41.0 38.9    223 210     Recent Labs     06/16/21  0200 06/15/21  0813 06/15/21  0544 06/14/21  0109    138  --  139   K 4.2 3.4*  --  3.4*    106  --  105   CO2 26 29  --  30   * 89  --  118*   BUN 14 15  --  15   CREA 1.21 1.37*  --  1.38*   CA 9.1 8.8  --  8.5   INR  --   --  1.1  --        Results for orders placed or performed during the hospital encounter of 06/09/21   EKG, 12 LEAD, INITIAL   Result Value Ref Range    Ventricular Rate 86 BPM    Atrial Rate 86 BPM    P-R Interval 148 ms    QRS Duration 100 ms    Q-T Interval 408 ms    QTC Calculation (Bezet) 488 ms    Calculated P Axis 68 degrees    Calculated R Axis -3 degrees    Calculated T Axis -50 degrees    Diagnosis       Sinus rhythm with occasional premature ventricular complexes  Possible Left atrial enlargement  Incomplete right bundle branch block  T wave abnormality, consider inferior ischemia  Abnormal ECG  No previous ECGs available  Confirmed by Mary Pablo ([id number]) on 6/9/2021 9:27:02 PM         All Cardiac Markers in the last 24 hours:    No results found for: CPK, CK, CKMMB, CKMB, RCK3, CKMBT, CKNDX, CKND1, PALMA, TROPT, TROIQ, SIL, TROPT, TNIPOC, BNP, BNPP    Last Lipid:    Lab Results   Component Value Date/Time    Cholesterol, total 120 06/10/2021 05:30 AM    HDL Cholesterol 33 (L) 06/10/2021 05:30 AM    LDL, calculated 69.6 06/10/2021 05:30 AM    Triglyceride 87 06/10/2021 05:30 AM    CHOL/HDL Ratio 3.6 06/10/2021 05:30 AM       Cardiographics:     EKG Results     Procedure 720 Value Units Date/Time    EKG, 12 LEAD, SUBSEQUENT [[id number]] Collected: 06/09/21 0953    Order Status: Completed Updated: 06/09/21 2129     Ventricular Rate 100 BPM      Atrial Rate 100 BPM      P-R Interval 146 ms      QRS Duration 96 ms      Q-T Interval 356 ms      QTC Calculation (Bezet) 459 ms      Calculated P Axis 63 degrees      Calculated R Axis -159 degrees      Calculated T Axis -34 degrees      Diagnosis --     Sinus rhythm with occasional premature ventricular complexes and premature   atrial complexes  Possible Left atrial enlargement  Indeterminate axis  Incomplete right bundle branch block  T wave abnormality, consider inferior ischemia  Abnormal ECG  When compared with ECG of 09-JUN-2021 09:31,  premature atrial complexes are now present  Nonspecific T wave abnormality has replaced inverted T waves in Anterior   leads  Confirmed by Mary Pablo ([id number]) on 6/9/2021 9:28:53 PM      EKG, 12 LEAD, SUBSEQUENT [[id number]] Collected: 06/09/21 0931    Order Status: Completed Updated: 06/09/21 2127     Ventricular Rate 84 BPM      Atrial Rate 84 BPM      P-R Interval 148 ms      QRS Duration 98 ms      Q-T Interval 418 ms      QTC Calculation (Bezet) 493 ms      Calculated P Axis 64 degrees      Calculated R Axis -27 degrees      Calculated T Axis -46 degrees      Diagnosis --     Sinus rhythm with occasional premature ventricular complexes  Possible Left atrial enlargement  Incomplete right bundle branch block  T wave abnormality, consider inferior ischemia  Prolonged QT  Abnormal ECG  When compared with ECG of 09-JUN-2021 08:51,  Inverted T waves have replaced nonspecific T wave abnormality in Anterior   leads  Confirmed by Ioana Atkins ([id number]) on 6/9/2021 9:27:46 PM      EKG, 12 LEAD, INITIAL [[id number]] Collected: 06/09/21 0851    Order Status: Completed Updated: 06/09/21 2127     Ventricular Rate 86 BPM      Atrial Rate 86 BPM      P-R Interval 148 ms      QRS Duration 100 ms      Q-T Interval 408 ms      QTC Calculation (Bezet) 488 ms      Calculated P Axis 68 degrees      Calculated R Axis -3 degrees      Calculated T Axis -50 degrees      Diagnosis --     Sinus rhythm with occasional premature ventricular complexes  Possible Left atrial enlargement  Incomplete right bundle branch block  T wave abnormality, consider inferior ischemia  Abnormal ECG  No previous ECGs available  Confirmed by Ioana Atkins ([id number]) on 6/9/2021 9:27:02 PM              02/08/19    NM [facility] MYOCARDIAL PERFUSION MULTIPLE  2/8/2019          XR Results (most recent):  Results from Hospital Encounter encounter on 06/09/21    XR CHEST PORT    Narrative  AP CHEST, PORTABLE    INDICATION: Chest pain    COMPARISON: Chest x-ray 1/8/2019    FINDINGS:  EKG leads overlie the patient. The cardiac silhouette is normal in size. Central pulmonary arteries appear  slightly prominent, similar to prior exams. The pulmonary vasculature is  unremarkable. No focal consolidation, pleural effusion, or pneumothorax. No  acute osseous abnormalities are identified. Impression  1. No clearly acute finding.   2. Chronic prominence of the central pulmonary arteries, could indicate  pulmonary arterial hypertension        Signed By: Tree Hodgson PA-C    June 16, 2021

## 2021-06-16 NOTE — PROGRESS NOTES
Problem: Pressure Injury - Risk of  Goal: *Prevention of pressure injury  Description: Document Ez Scale and appropriate interventions in the flowsheet. Outcome: Progressing Towards Goal  Note: Pressure Injury Interventions:       Moisture Interventions: Absorbent underpads, Minimize layers    Activity Interventions: PT/OT evaluation, Increase time out of bed, Pressure redistribution bed/mattress(bed type)    Mobility Interventions: HOB 30 degrees or less, Pressure redistribution bed/mattress (bed type), PT/OT evaluation    Nutrition Interventions: Document food/fluid/supplement intake                     Problem: Patient Education: Go to Patient Education Activity  Goal: Patient/Family Education  Outcome: Progressing Towards Goal     Problem: Falls - Risk of  Goal: *Absence of Falls  Description: Document Deon Fall Risk and appropriate interventions in the flowsheet.   Outcome: Progressing Towards Goal  Note: Fall Risk Interventions:  Mobility Interventions: Bed/chair exit alarm, Patient to call before getting OOB         Medication Interventions: Assess postural VS orthostatic hypotension, Bed/chair exit alarm, Evaluate medications/consider consulting pharmacy, Patient to call before getting OOB, Teach patient to arise slowly    Elimination Interventions: Bed/chair exit alarm, Call light in reach, Urinal in reach, Toileting schedule/hourly rounds, Patient to call for help with toileting needs              Problem: Patient Education: Go to Patient Education Activity  Goal: Patient/Family Education  Outcome: Progressing Towards Goal     Problem: Pain  Goal: *Control of Pain  Outcome: Progressing Towards Goal  Goal: *PALLIATIVE CARE:  Alleviation of Pain  Outcome: Progressing Towards Goal     Problem: Patient Education: Go to Patient Education Activity  Goal: Patient/Family Education  Outcome: Progressing Towards Goal     Problem: Pulmonary Embolism Care Plan (Adult)  Goal: *Improvement of existing pulmonary embolism  Outcome: Progressing Towards Goal  Goal: *Absence of bleeding  Outcome: Progressing Towards Goal  Goal: *Labs within defined limits  Outcome: Progressing Towards Goal     Problem: Patient Education: Go to Patient Education Activity  Goal: Patient/Family Education  Outcome: Progressing Towards Goal     Problem: Patient Education: Go to Patient Education Activity  Goal: Patient/Family Education  Outcome: Progressing Towards Goal     Problem: Unstable Angina/NSTEMI: Discharge Outcomes  Goal: *Hemodynamically stable  Outcome: Progressing Towards Goal  Goal: *Stable cardiac rhythm  Outcome: Progressing Towards Goal  Goal: *Lungs clear or at baseline  Outcome: Progressing Towards Goal  Goal: *Optimal pain control at patient's stated goal  Outcome: Progressing Towards Goal  Goal: *Identifies cardiac risk factors  Outcome: Progressing Towards Goal  Goal: *Verbalizes home exercise program, activity guidelines, cardiac precautions  Outcome: Progressing Towards Goal  Goal: *Verbalizes understanding and describes prescribed diet  Outcome: Progressing Towards Goal  Goal: *Verbalizes name, dosage, time, side effects, and number of days to continue medications  Outcome: Progressing Towards Goal  Goal: *Anxiety reduced or absent  Outcome: Progressing Towards Goal  Goal: *Understands and describes signs and symptoms to report to providers(Stroke Metric)  Outcome: Progressing Towards Goal  Goal: *Describes follow-up/return visits to physicians  Outcome: Progressing Towards Goal  Goal: *Describes available resources and support systems  Outcome: Progressing Towards Goal  Goal: *Influenza immunization  Outcome: Progressing Towards Goal  Goal: *Pneumococcal immunization  Outcome: Progressing Towards Goal  Goal: *Describes smoking cessation resources  Outcome: Progressing Towards Goal     Problem: Patient Education: Go to Patient Education Activity  Goal: Patient/Family Education  Outcome: Progressing Towards Goal     Problem: Patient Education: Go to Patient Education Activity  Goal: Patient/Family Education  Outcome: Progressing Towards Goal     Problem: Patient Education: Go to Patient Education Activity  Goal: Patient/Family Education  Outcome: Progressing Towards Goal     Problem: Tobacco Use  Goal: *Tobacco use abstinence  Outcome: Progressing Towards Goal  Goal: *Knowledge of tobacco use cessation methods  Outcome: Progressing Towards Goal

## 2021-06-17 ENCOUNTER — DOCUMENTATION ONLY (OUTPATIENT)
Dept: PULMONOLOGY | Age: 61
End: 2021-06-17

## 2021-06-17 VITALS
TEMPERATURE: 98.3 F | HEIGHT: 70 IN | DIASTOLIC BLOOD PRESSURE: 74 MMHG | BODY MASS INDEX: 38.3 KG/M2 | OXYGEN SATURATION: 98 % | HEART RATE: 59 BPM | WEIGHT: 267.5 LBS | SYSTOLIC BLOOD PRESSURE: 129 MMHG | RESPIRATION RATE: 17 BRPM

## 2021-06-17 LAB
ANION GAP SERPL CALC-SCNC: 7 MMOL/L (ref 3–18)
BUN SERPL-MCNC: 15 MG/DL (ref 7–18)
BUN/CREAT SERPL: 12 (ref 12–20)
CALCIUM SERPL-MCNC: 8.8 MG/DL (ref 8.5–10.1)
CHLORIDE SERPL-SCNC: 107 MMOL/L (ref 100–111)
CO2 SERPL-SCNC: 27 MMOL/L (ref 21–32)
CREAT SERPL-MCNC: 1.22 MG/DL (ref 0.6–1.3)
ERYTHROCYTE [DISTWIDTH] IN BLOOD BY AUTOMATED COUNT: 12.1 % (ref 11.6–14.5)
GLUCOSE SERPL-MCNC: 107 MG/DL (ref 74–99)
HCT VFR BLD AUTO: 40.5 % (ref 36–48)
HGB BLD-MCNC: 13.6 G/DL (ref 13–16)
MCH RBC QN AUTO: 33 PG (ref 24–34)
MCHC RBC AUTO-ENTMCNC: 33.6 G/DL (ref 31–37)
MCV RBC AUTO: 98.3 FL (ref 74–97)
PLATELET # BLD AUTO: 221 K/UL (ref 135–420)
PMV BLD AUTO: 10.3 FL (ref 9.2–11.8)
POTASSIUM SERPL-SCNC: 3.6 MMOL/L (ref 3.5–5.5)
RBC # BLD AUTO: 4.12 M/UL (ref 4.35–5.65)
SODIUM SERPL-SCNC: 141 MMOL/L (ref 136–145)
WBC # BLD AUTO: 10.3 K/UL (ref 4.6–13.2)

## 2021-06-17 PROCEDURE — 99239 HOSP IP/OBS DSCHRG MGMT >30: CPT | Performed by: INTERNAL MEDICINE

## 2021-06-17 PROCEDURE — 74011250637 HC RX REV CODE- 250/637: Performed by: INTERNAL MEDICINE

## 2021-06-17 PROCEDURE — 74011250637 HC RX REV CODE- 250/637: Performed by: PHYSICIAN ASSISTANT

## 2021-06-17 PROCEDURE — 85027 COMPLETE CBC AUTOMATED: CPT

## 2021-06-17 PROCEDURE — 36415 COLL VENOUS BLD VENIPUNCTURE: CPT

## 2021-06-17 PROCEDURE — 80048 BASIC METABOLIC PNL TOTAL CA: CPT

## 2021-06-17 PROCEDURE — 99231 SBSQ HOSP IP/OBS SF/LOW 25: CPT | Performed by: INTERNAL MEDICINE

## 2021-06-17 RX ORDER — IBUPROFEN 200 MG
1 TABLET ORAL EVERY 24 HOURS
Qty: 30 PATCH | Refills: 0 | Status: SHIPPED
Start: 2021-06-17 | End: 2021-06-25

## 2021-06-17 RX ADMIN — Medication 10 ML: at 06:35

## 2021-06-17 RX ADMIN — Medication 81 MG: at 08:01

## 2021-06-17 RX ADMIN — DOCUSATE SODIUM 100 MG: 100 CAPSULE, LIQUID FILLED ORAL at 08:01

## 2021-06-17 RX ADMIN — ISOSORBIDE DINITRATE 40 MG: 10 TABLET ORAL at 08:01

## 2021-06-17 RX ADMIN — MUPIROCIN: 20 OINTMENT TOPICAL at 08:03

## 2021-06-17 RX ADMIN — FUROSEMIDE 40 MG: 40 TABLET ORAL at 08:01

## 2021-06-17 RX ADMIN — RIVAROXABAN 15 MG: 15 TABLET, FILM COATED ORAL at 08:01

## 2021-06-17 NOTE — DISCHARGE INSTRUCTIONS
Patient Education        Angina: Care Instructions  Your Care Instructions     You have a problem called angina. Angina happens when there is not enough blood flow to your heart muscle. Angina is a sign of coronary artery disease (CAD). CAD occurs when blood vessels that supply the heart become narrowed. Having CAD increases your risk of a heart attack. Chest pain or pressure is the most common symptom of angina. But some people have other symptoms, like:  · Pain, pressure, or a strange feeling in the back, neck, jaw, or upper belly, or in one or both shoulders or arms. · Shortness of breath. · Nausea or vomiting. · Lightheadedness or sudden weakness. · Fast or irregular heartbeat. Women are somewhat more likely than men to have angina symptoms like shortness of breath, nausea, and back or jaw pain. Angina can be dangerous. That's why it is important to pay attention to your symptoms. Know what is typical for you, learn how to control your symptoms, and understand when you need to get treatment. A change in your usual pattern of symptoms is an emergency. It may mean that you are having a heart attack. The doctor has checked you carefully, but problems can develop later. If you notice any problems or new symptoms, get medical treatment right away. Follow-up care is a key part of your treatment and safety. Be sure to make and go to all appointments, and call your doctor if you are having problems. It's also a good idea to know your test results and keep a list of the medicines you take. How can you care for yourself at home? Medicines    · If your doctor has given you nitroglycerin for angina symptoms, keep it with you at all times. If you have symptoms, sit down and rest, and take the first dose of nitroglycerin as directed. If your symptoms get worse or are not getting better within 5 minutes, call 911 right away. Stay on the phone.  The emergency  will give you further instructions.     · If your doctor advises it, take 1 low-dose aspirin a day to prevent heart attack.     · Be safe with medicines. Take your medicines exactly as prescribed. Call your doctor if you think you are having a problem with your medicine. You will get more details on the specific medicines your doctor prescribes. Lifestyle changes    · Do not smoke. If you need help quitting, talk to your doctor about stop-smoking programs and medicines. These can increase your chances of quitting for good.     · Eat a heart-healthy diet that is low in saturated fat and salt, and is high in fiber. Talk to your doctor or a dietitian about healthy eating.     · Stay at a healthy weight. Or lose weight if you need to. Activity    · Talk to your doctor about a level of activity that is safe for you.     · If an activity causes angina symptoms, stop and rest.   When should you call for help? Call 911 anytime you think you may need emergency care. For example, call if:    · You passed out (lost consciousness).     · You have symptoms of a heart attack. These may include:  ? Chest pain or pressure, or a strange feeling in the chest.  ? Sweating. ? Shortness of breath. ? Nausea or vomiting. ? Pain, pressure, or a strange feeling in the back, neck, jaw, or upper belly or in one or both shoulders or arms. ? Lightheadedness or sudden weakness. ? A fast or irregular heartbeat. After you call 911, the  may tell you to chew 1 adult-strength or 2 to 4 low-dose aspirin. Wait for an ambulance. Do not try to drive yourself.     · You have angina symptoms that do not go away with rest or are not getting better within 5 minutes after you take a dose of nitroglycerin. Call your doctor now if:    · Your angina symptoms seem worse but still follow your typical pattern. You can predict when symptoms will happen, but they may come on sooner, feel worse, or last longer.     · You feel dizzy or lightheaded, or you feel like you may faint.    Watch closely for changes in your health, and be sure to contact your doctor if you have any problems. Where can you learn more? Go to http://www.gray.com/  Enter H129 in the search box to learn more about \"Angina: Care Instructions. \"  Current as of: August 31, 2020               Content Version: 12.8  © 2006-2021 [employer]. Care instructions adapted under license by [employer] (which disclaims liability or warranty for this information). If you have questions about a medical condition or this instruction, always ask your healthcare professional. [place] [address] any warranty or liability for your use of this information. Patient Education        Learning About CPAP for Sleep Apnea  What is CPAP? CPAP is a small machine that you use at home every night while you sleep. It increases air pressure in your throat to keep your airway open. When you have sleep apnea, this can help you sleep better so you feel much better. CPAP stands for \"continuous positive airway pressure. \"  The CPAP machine will have one of the following:  · A mask that covers your nose and mouth  · Prongs that fit into your nose  · A mask that covers your nose only, which is the most common type. This type is called NCPAP. The N stands for \"nasal.\"  Why is it done? CPAP is usually the best treatment for obstructive sleep apnea. It is the first treatment choice and the most widely used. CPAP:  · Helps you have more normal sleep, so you feel less sleepy and more alert during the daytime. · May help keep heart failure or other heart problems from getting worse. · May help lower your blood pressure. If you use CPAP, your bed partner may also sleep better. That's because you aren't snoring or restless. Your doctor may suggest CPAP if you have:  · Moderate to severe sleep apnea. · Sleep apnea and coronary artery disease (CAD). · Sleep apnea and heart failure.   What are the side effects? Some people who use CPAP have:  · A dry or stuffy nose and a sore throat. · Irritated skin on the face. · Sore eyes. · Bloating. How can you care for yourself? If using CPAP is not comfortable, or if you have certain side effects, work with your doctor to fix them. Here are some things you can try:  · Be sure the mask or nasal prongs fit well. · See if your doctor can adjust the pressure of your CPAP. · If your nose is dry, try a humidifier. · If your nose is runny or stuffy, try decongestant medicine or a steroid nasal spray. Be safe with medicines. Read and follow all instructions on the label. Do not use the medicine longer than the label says. If these things don't help, you might try a different type of machine. Some machines have air pressure that adjusts on its own. Others have air pressures that are different when you breathe in than when you breathe out. This may reduce discomfort caused by too much pressure in your nose. Where can you learn more? Go to http://www.gray.com/  Enter Claribel Keepers in the search box to learn more about \"Learning About CPAP for Sleep Apnea. \"  Current as of: October 26, 2020               Content Version: 12.8  © 2006-2021 [employer]. Care instructions adapted under license by [employer] (which disclaims liability or warranty for this information). If you have questions about a medical condition or this instruction, always ask your healthcare professional. [place] [address] any warranty or liability for your use of this information. Patient Education        Learning About Coronary Artery Disease (CAD)  What is coronary artery disease? Coronary artery disease is a condition that occurs when plaque builds up in the arteries that bring oxygen-rich blood to your heart. Plaque is a fatty substance made of cholesterol, calcium, and other substances in the blood.  This process is called hardening of the arteries, or atherosclerosis. What happens when you have coronary artery disease? · Plaque may narrow the coronary arteries. Narrowed arteries cause poor blood flow. This can lead to angina symptoms such as chest pain or discomfort. If blood flow is completely blocked, you could have a heart attack. · You can slow and reduce the risk of future problems by making changes in your lifestyle. These include quitting smoking and eating heart-healthy foods. · Treatment, along with changes in your lifestyle, can help you live a longer and healthier life. How can you prevent coronary artery disease? · Do not smoke. It may be the best thing you can do to prevent coronary artery disease. If you need help quitting, talk to your doctor about stop-smoking programs and medicines. These can increase your chances of quitting for good. · Be active. Try to do moderate activity at least 2½ hours a week. Or try to do vigorous activity at least 1¼ hours a week. You may want to walk or try other activities, such as running, swimming, cycling, or playing tennis or team sports. · Eat heart-healthy foods. Eat more fruits and vegetables and less food that contains saturated and trans fats. Limit alcohol, sodium, and sweets. · Stay at a healthy weight. Lose weight if you need to. · Manage other health problems such as diabetes, high blood pressure, and high cholesterol. How is coronary artery disease treated? · Your doctor will suggest that you make lifestyle changes. For example, your doctor may ask you to eat healthy foods, quit smoking, lose extra weight, and be more active. · You will take medicines that help prevent a heart attack. · Your doctor may suggest a procedure to open narrowed or blocked arteries. This is called angioplasty. Or your doctor may suggest using healthy blood vessels to create detours around narrowed or blocked arteries. This is called bypass surgery.   Follow-up care is a key part of your treatment and safety. Be sure to make and go to all appointments, and call your doctor if you are having problems. It's also a good idea to know your test results and keep a list of the medicines you take. Where can you learn more? Go to http://www.gray.com/  Enter C643 in the search box to learn more about \"Learning About Coronary Artery Disease (CAD). \"  Current as of: August 31, 2020               Content Version: 12.8  © 2006-2021 [employer]. Care instructions adapted under license by [employer] (which disclaims liability or warranty for this information). If you have questions about a medical condition or this instruction, always ask your healthcare professional. [place] [address] any warranty or liability for your use of this information. Patient Education        Chest Pain: Care Instructions  Your Care Instructions     There are many things that can cause chest pain. Some are not serious and will get better on their own in a few days. But some kinds of chest pain need more testing and treatment. Your doctor may have recommended a follow-up visit in the next 8 to 12 hours. If you are not getting better, you may need more tests or treatment. Even though your doctor has released you, you still need to watch for any problems. The doctor carefully checked you, but sometimes problems can develop later. If you have new symptoms or if your symptoms do not get better, get medical care right away. If you have worse or different chest pain or pressure that lasts more than 5 minutes or you passed out (lost consciousness), call 911 or seek other emergency help right away. A medical visit is only one step in your treatment. Even if you feel better, you still need to do what your doctor recommends, such as going to all suggested follow-up appointments and taking medicines exactly as directed.  This will help you recover and help prevent future problems. How can you care for yourself at home? · Rest until you feel better. · Take your medicine exactly as prescribed. Call your doctor if you think you are having a problem with your medicine. · Do not drive after taking a prescription pain medicine. When should you call for help? Call 911 if:     · You passed out (lost consciousness).     · You have severe difficulty breathing.     · You have symptoms of a heart attack. These may include:  ? Chest pain or pressure, or a strange feeling in your chest.  ? Sweating. ? Shortness of breath. ? Nausea or vomiting. ? Pain, pressure, or a strange feeling in your back, neck, jaw, or upper belly or in one or both shoulders or arms. ? Lightheadedness or sudden weakness. ? A fast or irregular heartbeat. After you call 911, the  may tell you to chew 1 adult-strength or 2 to 4 low-dose aspirin. Wait for an ambulance. Do not try to drive yourself. Call your doctor today if:     · You have any trouble breathing.     · Your chest pain gets worse.     · You are dizzy or lightheaded, or you feel like you may faint.     · You are not getting better as expected.     · You are having new or different chest pain. Where can you learn more? Go to http://www.samuel.com/  Enter A120 in the search box to learn more about \"Chest Pain: Care Instructions. \"  Current as of: February 26, 2020               Content Version: 12.8  © 2006-2021 [employer]. Care instructions adapted under license by [employer] (which disclaims liability or warranty for this information). If you have questions about a medical condition or this instruction, always ask your healthcare professional. [place] [address] any warranty or liability for your use of this information. Patient Education        Learning About How to Prevent Blood Clots  What is a blood clot?     A blood clot is a clump of blood that forms in a blood vessel, such as a vein or an artery. If a clot gets stuck in a blood vessel, it can cause serious problems like a deep vein thrombosis (DVT) or a pulmonary embolism. A DVT is a blood clot in certain veins of the legs, pelvis, or arms. It most often occurs in the legs. Blood clots in these veins need to be treated, because they can get bigger, break loose, and travel through the bloodstream to the heart and then to the lungs. This causes a pulmonary embolism. A pulmonary embolism is a sudden blockage of an artery in the lung. Blood clots in the deep veins of the leg are the most common cause of a pulmonary embolism. In many cases, the clots are small. They may damage the lung. But if the clot is large and stops blood flow to the lung, it can be deadly. What increases your risk for blood clots? Some of the things that can increase your risk for a blood clot include:  Slowed blood flow  When blood doesn't flow normally, clots are more likely to develop. Reduced blood flow may result from long-term bed rest, such as after a surgery, injury, or serious illness. Or it may result from sitting for a long time, especially when traveling long distances. Abnormal clotting  Some people have blood that clots too easily or too quickly. Problems that may cause increased clotting include:  · Having certain blood problems that make blood clot too easily. This is a problem that may run in families. · Having certain health problems, such as cancer, heart failure, stroke, or severe infection. · Being pregnant. A woman's risk of getting blood clots increases both during pregnancy and shortly after delivery or after a  section. · Using hormonal forms of birth control or hormone therapy. · Smoking. Injury to the blood vessel wall  Blood is more likely to clot in veins and arteries shortly after they are injured.  Injury can be caused by a recent medical procedure or surgery that involved your legs, hips, belly, or brain. Or it can be caused by an injury, such as a broken hip. What can you do to prevent blood clots? After any procedure or event that increases your risk  · Take a blood-thinning medicine (called an anticoagulant) as directed if your doctor prescribes one. · Exercise  your lower leg muscles to help keep the blood moving through your legs. Point your toes up toward your head so the calves of your legs are stretched, then relax. Repeat. This is a good exercise to do when you are sitting for long periods of time. · Get up out of bed as soon as you safely can or as soon as your doctor says it's okay after an illness or surgery. If you can't get out of bed, you can do the leg exercise described above. Try to do this leg exercise every hour when you are awake. This will help keep the blood moving through your legs. If you are in the hospital and need to stay in bed, your doctor may have you use a special device that inflates and deflates knee-high boots to help keep blood from pooling in your legs. · Use compression stockings if your doctor prescribes them. These are specially fitted stockings that may prevent blood clots by keeping blood from pooling in your legs. When you travel  · Take breaks when you travel. On long car trips, stop the car and walk around every hour or so. On long bus or train rides or plane flights, get out of your seat and walk up and down the aisle every hour or so. · Do leg exercises while you are seated. For example, pump your feet up and down by pulling your toes up toward your knees and then pointing them down. If you already have a risk of blood clots, talk to your doctor before taking a long trip. Your doctor may want you to wear compression stockings or take blood-thinning medicine. Take care of your body  · Be active. Try to get 30 minutes or more of activity on most days of the week. · Don't smoke. Smoking can increase your risk of blood clots.  If you need help quitting, talk to your doctor about stop-smoking programs and medicines. · Check with your doctor about whether you should use hormonal forms of birth control or hormone therapy. These may increase your risk of blood clots. When should you call for help? Call 911 anytime you think you may need emergency care. For example, call if:  · You have symptoms of a blood clot in your lung (called a pulmonary embolism). These may include:  ? Sudden chest pain. ? Trouble breathing. ? Coughing up blood. Call your doctor now or seek immediate medical care if:  · You have symptoms of a blood clot in your arm or leg (called a deep vein thrombosis). These may include:  ? Pain in the arm, calf, back of the knee, thigh, or groin. ? Redness and swelling in the arm, leg, or groin. Where can you learn more? Go to http://www.gray.com/  Enter F229 in the search box to learn more about \"Learning About How to Prevent Blood Clots. \"  Current as of: March 4, 2020               Content Version: 12.8  © 2006-2021 [employer]. Care instructions adapted under license by "[employer] (which disclaims liability or warranty for this information). If you have questions about a medical condition or this instruction, always ask your healthcare professional. [address] any warranty or liability for your use of this information. Patient Education        Coronary Artery Bypass: Before Your Surgery  What is coronary artery bypass surgery? Coronary artery bypass is surgery to treat coronary artery disease. It helps blood make a detour, or bypass, around one or more narrowed or blocked coronary arteries. These arteries are the blood vessels that bring blood to the heart. This is also called coronary artery bypass graft (CABG) or bypass surgery. Your doctor will make the bypass with a healthy piece of blood vessel from another part of your body.  Then the doctor will attach, or graft, the healthy blood vessel to the narrowed or blocked artery. The new blood vessel bypasses the diseased artery to increase blood flow to the heart muscle. The doctor will make a cut in the skin over your breastbone (sternum). This cut is called an incision. Then the doctor will cut through your sternum to reach your heart and coronary arteries. The doctor may connect you to a heart-lung bypass machine. It adds oxygen to the blood and moves the blood through the body. The machine will allow the doctor to stop your heartbeat while working on your arteries. The doctor will use blood vessels from your chest, arm, or leg to bypass the narrowed or blocked parts of your arteries. When the blood vessels are in place, the doctor will restart your heart. In some cases, the doctor may be able to do the surgery without using a heart-lung machine. This is called \"off-pump\" surgery. The doctor will use wire to put your sternum back together. Stitches or staples will be used to close the incisions in the skin over your sternum and where your healthy blood vessel was taken. The wire will stay in your chest. The incisions will leave scars. They may fade with time. You will stay in the hospital for 3 to 8 days after surgery. You will probably be able to do many of your usual activities after 4 to 6 weeks. But for 2 to 3 months you will not be able to lift heavy objects or do things that strain your chest or upper arm muscles. At first you may notice that you get tired quickly. You may need to rest often. It may take 1 to 2 months before your energy is back to normal.  Follow-up care is a key part of your treatment and safety. Be sure to make and go to all appointments, and call your doctor if you are having problems. It's also a good idea to know your test results and keep a list of the medicines you take. How do you prepare for surgery? Surgery can be stressful.  This information will help you understand what you can expect. And it will help you safely prepare for surgery. Preparing for surgery    · Be sure you have someone to take you home. Anesthesia and pain medicine will make it unsafe for you to drive or get home on your own.     · Understand exactly what surgery is planned, along with the risks, benefits, and other options.     · If you take aspirin or some other blood thinner, ask your doctor if you should stop taking it before your surgery. Make sure that you understand exactly what your doctor wants you to do. These medicines increase the risk of bleeding.     · Tell your doctor ALL the medicines, vitamins, supplements, and herbal remedies you take. Some may increase the risk of problems during your surgery. Your doctor will tell you if you should stop taking any of them before the surgery and how soon to do it.     · Make sure your doctor and the hospital have a copy of your advance directive. If you don't have one, you may want to prepare one. It lets others know your health care wishes. It's a good thing to have before any type of surgery or procedure.     · Do not smoke. Smoking can make your coronary artery disease worse. If you need help quitting, talk to your doctor about stop-smoking programs and medicines. These can increase your chances of quitting for good. What happens on the day of surgery? · Follow the instructions exactly about when to stop eating and drinking. If you don't, your surgery may be canceled. If your doctor told you to take your medicines on the day of surgery, take them with only a sip of water.     · Take a bath or shower before you come in for your surgery. Do not apply lotions, perfumes, deodorants, or nail polish.     · Do not shave the surgical site yourself.     · Take off all jewelry and piercings. And take out contact lenses, if you wear them. At the hospital or surgery center   · Bring a picture ID.     · The area for surgery is often marked to make sure there are no errors.   · You will be kept comfortable and safe by your anesthesia provider. You will be asleep during the surgery.     · The surgery will take about 3 to 6 hours. This depends on the number of arteries that are bypassed and the type of surgery you have.     · You will go to the intensive care unit (ICU) right after surgery. You will probably stay in the ICU for 1 or 2 days before you go to your regular hospital room.     · You will have a breathing tube down your throat. This is usually removed within 6 hours after surgery. You will not be able to talk or drink liquids while the tube is in your throat. After the tube is removed, your throat will feel dry and scratchy. Your nurse will tell you when it is safe to drink liquids again.     · You will have a thin plastic tube, called a catheter, in a vein in your neck. It is used to keep track of how well your heart is working. This is usually removed in 1 to 3 days.     · You will have chest tubes to drain fluid and blood after surgery. The fluid and extra blood are normal and usually last only a few days. The chest tubes are usually removed in 1 or 2 days.     · You will have several thin wires coming out of your chest near your incision. These wires can help keep your heartbeat steady after surgery. They will be removed before you go home. Where can you learn more? Go to http://www.gray.com/  Enter X849 in the search box to learn more about \"Coronary Artery Bypass: Before Your Surgery. \"  Current as of: August 31, 2020               Content Version: 12.8  © 2006-2021 [employer]. Care instructions adapted under license by [employer] (which disclaims liability or warranty for this information). If you have questions about a medical condition or this instruction, always ask your healthcare professional. [place] [address] any warranty or liability for your use of this information.          Patient Education        Coronary Artery Disease: Care Instructions  Your Care Instructions     The heart is a muscle, and like any muscle, it needs blood to work well. Coronary artery disease occurs when the arteries that bring oxygen-rich blood to your heart have a buildup of plaque--deposits of fats and other substances. Plaque can reduce blood flow to the heart muscle. This can cause angina symptoms such as chest pain or pressure. A heart attack can happen if blood flow is completely blocked. You can do a lot to improve your health and prevent a heart attack. Eating healthy food, not smoking, getting regular exercise, and taking your medicine are the main things you can do every day to stay healthy. Follow-up care is a key part of your treatment and safety. Be sure to make and go to all appointments, and call your doctor if you are having problems. It's also a good idea to know your test results and keep a list of the medicines you take. How can you care for yourself at home? Medicines    · Be safe with medicines. Take your medicines exactly as prescribed. Call your doctor if you think you are having a problem with your medicine. You will get more details on the specific medicines your doctor prescribes.     · You will take medicines that lower your risk of a heart attack and lower your risk of dying early from heart disease. These medicines include:  ? Angiotensin-converting enzyme (ACE) inhibitors or angiotensin II receptor blockers (ARBs). They lower blood pressure. ? Aspirin and other blood thinners. They prevent blood clots that could cause a heart attack. ? Beta-blockers. They lower the heart's workload. ? Statins and other cholesterol medicines. They lower cholesterol.     · If your doctor has given you nitroglycerin for angina symptoms (such as chest pain or pressure) keep it with you at all times. If you have symptoms, sit down and rest, and take the first dose of nitroglycerin as directed.  If your symptoms get worse or are not getting better within 5 minutes, call 911 right away. Stay on the phone. The emergency  will give you further instructions.     · Do not take any over-the-counter medicines, vitamins, or herbal products without talking to your doctor first.   Lifestyle  Ask your doctor if a cardiac rehab program is right for you. Cardiac rehab can help you make lifestyle changes. In cardiac rehab, a team of health professionals provides education and support to help you make new, healthy habits.    · Do not smoke. Avoid secondhand smoke too. Smoking can increase your risk of a heart attack or stroke. If you need help quitting, talk to your doctor about stop-smoking programs and medicines. These can increase your chances of quitting for good.     · Eat heart-healthy foods. These include vegetables, fruits, nuts, beans, lean meat, fish, and whole grains. Limit saturated fat, sodium, and alcohol. Limit drinks and foods with added sugar.     · If your doctor recommends it, get more exercise. Ask your doctor what level of exercise is safe for you. Walking is a good choice. Bit by bit, increase the amount you walk every day. Try for at least 30 minutes on most days of the week. You also may want to swim, bike, or do other activities.     · Stay at a healthy weight. Lose weight if you need to.     · Manage other health problems. These include diabetes, high blood pressure, and high cholesterol. If you think you may have a problem with alcohol or drug use, talk to your doctor.     · If you have angina symptoms, pay attention to your symptoms. This can help you see what causes them and what is typical for you.     · Avoid colds and flu. Get a pneumococcal vaccine shot. If you have had one before, ask your doctor whether you need another dose. Get a flu vaccine every year.  If you must be around people with colds or flu, wash your hands often.     · If you think you have symptoms of depression, talk to your doctor. Symptoms include feeling sad or hopeless most of the time, or losing interest in activities that used to make you happy. When should you call for help? Call 911 anytime you think you may need emergency care. For example, call if:    · You have symptoms of a heart attack. These may include:  ? Chest pain or pressure, or a strange feeling in the chest.  ? Sweating. ? Shortness of breath. ? Nausea or vomiting. ? Pain, pressure, or a strange feeling in the back, neck, jaw, or upper belly or in one or both shoulders or arms. ? Lightheadedness or sudden weakness. ? A fast or irregular heartbeat. After you call 911, the  may tell you to chew 1 adult-strength or 2 to 4 low-dose aspirin. Wait for an ambulance. Do not try to drive yourself.     · You have angina symptoms (such as chest pain or pressure) that do not go away with rest or are not getting better within 5 minutes after you take a dose of nitroglycerin.     · You passed out (lost consciousness). Call your doctor now or seek immediate medical care if:    · You are having angina symptoms, such as chest pain or pressure, more often than usual, or they are different or worse than usual.     · You have new or increased shortness of breath.     · You are dizzy or lightheaded, or you feel like you may faint. Watch closely for changes in your health, and be sure to contact your doctor if you have any problems. Where can you learn more? Go to http://www.gray.com/  Enter P045 in the search box to learn more about \"Coronary Artery Disease: Care Instructions. \"  Current as of: August 31, 2020               Content Version: 12.8  © 9400-5019 [employer]. Care instructions adapted under license by [employer] (which disclaims liability or warranty for this information).  If you have questions about a medical condition or this instruction, always ask your healthcare professional. [employer] disclaims any warranty or liability for your use of this information. Patient Education        High Cholesterol: Care Instructions  Your Care Instructions     Cholesterol is a type of fat in your blood. It is needed for many body functions, such as making new cells. Cholesterol is made by your body. It also comes from food you eat. High cholesterol means that you have too much of the fat in your blood. This raises your risk of a heart attack and stroke. LDL and HDL are part of your total cholesterol. LDL is the \"bad\" cholesterol. High LDL can raise your risk for heart disease, heart attack, and stroke. HDL is the \"good\" cholesterol. It helps clear bad cholesterol from the body. High HDL is linked with a lower risk of heart disease, heart attack, and stroke. Your cholesterol levels help your doctor find out your risk for having a heart attack or stroke. You and your doctor can talk about whether you need to lower your risk and what treatment is best for you. A heart-healthy lifestyle along with medicines can help lower your cholesterol and your risk. The way you choose to lower your risk will depend on how high your risk is for heart attack and stroke. It will also depend on how you feel about taking medicines. Follow-up care is a key part of your treatment and safety. Be sure to make and go to all appointments, and call your doctor if you are having problems. It's also a good idea to know your test results and keep a list of the medicines you take. How can you care for yourself at home? · Eat a variety of foods every day. Good choices include fruits, vegetables, whole grains (like oatmeal), dried beans and peas, nuts and seeds, soy products (like tofu), and fat-free or low-fat dairy products. · Replace butter, margarine, and hydrogenated or partially hydrogenated oils with olive and canola oils.  (Canola oil margarine without trans fat is fine.)  · Replace red meat with fish, poultry, and soy protein (like tofu). · Limit processed and packaged foods like chips, crackers, and cookies. · Bake, broil, or steam foods. Don't fortune them. · Be physically active. Get at least 30 minutes of exercise on most days of the week. Walking is a good choice. You also may want to do other activities, such as running, swimming, cycling, or playing tennis or team sports. · Stay at a healthy weight or lose weight by making the changes in eating and physical activity listed above. Losing just a small amount of weight, even 5 to 10 pounds, can reduce your risk for having a heart attack or stroke. · Do not smoke. When should you call for help? Watch closely for changes in your health, and be sure to contact your doctor if:    · You need help making lifestyle changes.     · You have questions about your medicine. Where can you learn more? Go to http://www.gray.com/  Enter O243 in the search box to learn more about \"High Cholesterol: Care Instructions. \"  Current as of: August 31, 2020               Content Version: 12.8  © 4876-6691 [employer]. Care instructions adapted under license by [employer] (which disclaims liability or warranty for this information). If you have questions about a medical condition or this instruction, always ask your healthcare professional. [place] [address] any warranty or liability for your use of this information. Patient Education        Pulmonary Embolism: Care Instructions  Overview     Pulmonary embolism is the sudden blockage of an artery in the lung. Blood clots in the deep veins of the leg or pelvis (deep vein thrombosis, or DVT) are the most common cause. These blood clots can travel to the lungs. Pulmonary embolism can be very serious. Because you have had one pulmonary embolism, you are at greater risk for having another one.  But you can take steps to prevent another pulmonary embolism by following your doctor's instructions. You will probably take a prescription blood-thinning medicine to prevent blood clots. A blood thinner can stop a blood clot from growing larger and prevent new clots from forming. Follow-up care is a key part of your treatment and safety. Be sure to make and go to all appointments, and call your doctor if you are having problems. It's also a good idea to know your test results and keep a list of the medicines you take. How can you care for yourself at home? · Take your medicines exactly as prescribed. Call your doctor if you think you are having a problem with your medicine. You will get more details on the specific medicines your doctor prescribes. · If you are taking a blood thinner, be sure you get instructions about how to take your medicine safely. Blood thinners can cause serious bleeding problems. Preventing future pulmonary embolisms  · Exercise. Keep blood moving in your legs to keep clots from forming. If you are traveling by car, stop every hour or so. Get out and walk around for a few minutes. If you are traveling by bus, train, or plane, get out of your seat and walk up and down the aisles every hour or so. You also can do leg exercises while you are seated. Pump your feet up and down by pulling your toes up toward your knees then pointing them down. · Get up out of bed as soon as possible after an illness or surgery. · Do not smoke. If you need help quitting, talk to your doctor about stop-smoking programs and medicines. These can increase your chances of quitting for good. · Check with your doctor before taking hormone or birth control pills. These may increase your risk of blood clots. · Ask your doctor about wearing compression stockings to help prevent blood clots in your legs. There are different types of stockings, and they need to fit right. So your doctor will recommend what you need. When should you call for help?    Call 911 anytime you think you may need emergency care. For example, call if:    · You have shortness of breath.     · You have chest pain.     · You passed out (lost consciousness).     · You cough up blood. Call your doctor now or seek immediate medical care if:    · You have new or worsening pain or swelling in your leg. Watch closely for changes in your health, and be sure to contact your doctor if:    · You do not get better as expected. Where can you learn more? Go to http://www.gray.com/  Enter T793 in the search box to learn more about \"Pulmonary Embolism: Care Instructions. \"  Current as of: March 4, 2020               Content Version: 12.8  © 6095-1746 [employer]. Care instructions adapted under license by [employer] (which disclaims liability or warranty for this information). If you have questions about a medical condition or this instruction, always ask your healthcare professional. [place] [address] any warranty or liability for your use of this information. Patient Education        Learning About Benefits From Quitting Smoking  How does quitting smoking make you healthier? If you're thinking about quitting smoking, you may have a few reasons to be smoke-free. Your health may be one of them. · When you quit smoking, you lower your risks for cancer, lung disease, heart attack, stroke, blood vessel disease, and blindness from macular degeneration. · When you're smoke-free, you get sick less often, and you heal faster. You are less likely to get colds, flu, bronchitis, and pneumonia. · As a nonsmoker, you may find that your mood is better and you are less stressed. When and how will you feel healthier? Quitting has real health benefits that start from day 1 of being smoke-free. And the longer you stay smoke-free, the healthier you get and the better you feel. The first hours  · After just 20 minutes, your blood pressure and heart rate go down.  That means there's less stress on your heart and blood vessels. · Within 12 hours, the level of carbon monoxide in your blood drops back to normal. That makes room for more oxygen. With more oxygen in your body, you may notice that you have more energy than when you smoked. After 2 weeks  · Your lungs start to work better. · Your risk of heart attack starts to drop. After 1 month  · When your lungs are clear, you cough less and breathe deeper, so it's easier to be active. · Your sense of taste and smell return. That means you can enjoy food more than you have since you started smoking. Over the years  · Over the years, your risks of heart disease, heart attack, and stroke are lower. · After 10 years, your risk of dying from lung cancer is cut by about half. And your risk for many other types of cancer is lower too. How would quitting help others in your life? When you quit smoking, you improve the health of everyone who now breathes in your smoke. · Their heart, lung, and cancer risks drop, much like yours. · They are sick less. For babies and small children, living smoke-free means they're less likely to have ear infections, pneumonia, and bronchitis. · If you're a woman who is or will be pregnant someday, quitting smoking means a healthier . · Children who are close to you are less likely to become adult smokers. Where can you learn more? Go to http://www.gray.com/  Enter O319 in the search box to learn more about \"Learning About Benefits From Quitting Smoking. \"  Current as of: 2020               Content Version: 12.8   [employer]. Care instructions adapted under license by [employer] (which disclaims liability or warranty for this information).  If you have questions about a medical condition or this instruction, always ask your healthcare professional. [place] [address] any warranty or liability for your use of this information. Patient Education        Deciding About Using Medicines To Quit Smoking  How can you decide about using medicines to quit smoking? What are the medicines you can use? Your doctor may prescribe varenicline (Chantix) or bupropion (Zyban). These medicines can help you cope with cravings for tobacco. They are pills that don't contain nicotine. You also can use nicotine replacement products. These do contain nicotine. There are many types. · Gum and lozenges slowly release nicotine into your mouth. · Patches stick to your skin. They slowly release nicotine into your bloodstream.  · An inhaler has a garsia that contains nicotine. You breathe in a puff of nicotine vapor through your mouth and throat. · Nasal spray releases a mist that contains nicotine. What are key points about this decision? · Using medicines can double your chances of quitting smoking. They can ease cravings and withdrawal symptoms. · Getting counseling along with using medicine can raise your chances of quitting even more. · If you smoke fewer than 5 cigarettes a day, you may not need medicines to help you quit smoking. · These medicines have less nicotine than cigarettes. And by itself, nicotine is not nearly as harmful as smoking. The tars, carbon monoxide, and other toxic chemicals in tobacco cause the harmful effects. · The side effects of nicotine replacement products depend on the type of product. For example, a patch can make your skin red and itchy. Medicines in pill form can make you sick to your stomach. They can also cause dry mouth and trouble sleeping. For most people, the side effects are not bad enough to make them stop using the products. Why might you choose to use medicines to quit smoking? · You have tried on your own to stop smoking, but you were not able to stop. · You smoke more than 5 cigarettes a day. · You want to increase your chances of quitting smoking.   · You want to reduce your cravings and withdrawal symptoms. · You feel the benefits of medicine outweigh the side effects. Why might you choose not to use medicine? · You want to try quitting on your own by stopping all at once (\"cold turkey\"). · You want to cut back slowly on the number of cigarettes you smoke. · You smoke fewer than 5 cigarettes a day. · You do not like using medicine. · You feel the side effects of medicines outweigh the benefits. · You are worried about the cost of medicines. Your decision  Thinking about the facts and your feelings can help you make a decision that is right for you. Be sure you understand the benefits and risks of your options, and think about what else you need to do before you make the decision. Where can you learn more? Go to http://www.gray.com/  Enter C751 in the search box to learn more about \"Deciding About Using Medicines To Quit Smoking. \"  Current as of: March 12, 2020               Content Version: 12.8  © 2006-2021 [employer]. Care instructions adapted under license by [employer] (which disclaims liability or warranty for this information). If you have questions about a medical condition or this instruction, always ask your healthcare professional. [place] [address] any warranty or liability for your use of this information. Patient Education        Vena Cava Filter Placement: What to Expect at Home  Your Recovery     A vena cava filter was put into the vena cava using a thin, flexible tube (catheter) that was inserted through a vein in your neck or groin. A vena cava filter helps prevent blood clots from traveling to the lungs and heart, where they may block blood flow. The filter may be permanent, or it may be removed later. Vena cava filters may be used if you have problems taking a medicine (called a blood thinner) that prevents blood clots.   After having a vena cava filter placed, you may feel tired and have some pain for several days. You may have a small bandage where the catheter was placed. This care sheet gives you a general idea about how long it will take for you to recover. But each person recovers at a different pace. Follow the steps below to feel better as quickly as possible. How can you care for yourself at home? Activity    · Take it easy for a day or two. Avoid strenuous activities, such as bicycle riding, jogging, weight lifting, or aerobic exercise, until your doctor says it is okay. Diet    · You can eat your normal diet. If your stomach is upset, try bland, low-fat foods like plain rice, broiled chicken, toast, and yogurt.     · Drink plenty of fluids to avoid becoming dehydrated. Medicines    · Your doctor will tell you if and when you can restart your medicines. He or she will also give you instructions about taking any new medicines.     · If you take aspirin or some other blood thinner, ask your doctor if and when to start taking it again. Make sure that you understand exactly what your doctor wants you to do.     · Be safe with medicines. Take pain medicines exactly as directed. ? If the doctor gave you a prescription medicine for pain, take it as prescribed. ? If you are not taking a prescription pain medicine, ask your doctor if you can take an over-the-counter medicine.     · If you think your pain medicine is making you sick to your stomach:  ? Take your medicine after meals (unless your doctor has told you not to). ? Ask your doctor for a different pain medicine. Care of the catheter site    · Keep a bandage over the spot where the catheter was inserted for the first day, or for as long as your doctor recommends.     · Put ice or a cold pack on the area for 10 to 20 minutes at a time to help with soreness or swelling. Do this every few hours.  Put a thin cloth between the ice and your skin.     · You may shower 24 to 48 hours after the procedure, if your doctor okays it. Pat the incision dry.     · Do not soak the catheter site until it is healed. Don't take a bath for 1 week, or until your doctor tells you it is okay.     · Watch for bleeding from the site. A small amount of blood (up to the size of a quarter) on the bandage can be normal.     · If you are bleeding, lie down and press on the area for 15 minutes to try to make it stop. If the bleeding does not stop, call your doctor or seek immediate medical care. Follow-up care is a key part of your treatment and safety. Be sure to make and go to all appointments, and call your doctor if you are having problems. It's also a good idea to know your test results and keep a list of the medicines you take. When should you call for help? Call 911 anytime you think you may need emergency care. For example, call if:    · You passed out (lost consciousness).     · You have severe trouble breathing.     · You have sudden chest pain and shortness of breath, or you cough up blood. Call your doctor now or seek immediate medical care if:    · You have pain that does not get better after you take pain medicine.     · You are bleeding through your dressing. A small amount of bleeding is normal.     · You have a fast-growing, painful lump at the catheter site.     · You have signs of infection, such as:  ? Increased pain, swelling, warmth, or redness. ? Red streaks leading from the incision. ? Pus draining from the incision. ? A fever.     · You have symptoms of a blood clot in your leg, such as:  ? Pain in the calf, back of the knee, thigh, or groin. ? Redness and swelling in your leg or groin. Watch closely for any changes in your health, and be sure to contact your doctor if you have any problems. Where can you learn more? Go to http://www.gray.com/  Enter N974 in the search box to learn more about \"Vena Cava Filter Placement: What to Expect at Home. \"  Current as of: March 4, 2020               Content Version: 12.8  © 2006-2021 [employer]. Care instructions adapted under license by [employer] (which disclaims liability or warranty for this information). If you have questions about a medical condition or this instruction, always ask your healthcare professional. [address] any warranty or liability for your use of this information. Patient Education        Vena Cava Filter Removal: What to Expect at Home  Your Recovery     A vena cava filter helps prevent blood clots from traveling to your lungs and heart, where they may block blood flow. Your doctor removed your vena cava filter using a thin, flexible tube (catheter) that was put into your vein. A filter is removed to avoid problems that can happen if the filter is left in your vein for a long time. Vena cava filters can cause serious health problems if they break or become blocked with one or more blood clots. Most people go home a few hours after the procedure. You will probably be able to return to work or your normal routine in a day or two. After having a vena cava filter removed, you may feel tired and have some pain for several days. You may have a small bandage where the catheter was placed. This care sheet gives you a general idea about how long it will take for you to recover. But each person recovers at a different pace. Follow the steps below to feel better as quickly as possible. How can you care for yourself at home? Activity    · Take it easy for a day or two. Avoid strenuous activities, such as bicycle riding, jogging, weight lifting, or aerobic exercise, until your doctor says it is okay. Diet    · You can eat your normal diet. If your stomach is upset, try bland, low-fat foods like plain rice, broiled chicken, toast, and yogurt.     · Drink plenty of fluids to avoid becoming dehydrated.    Medicines    · Your doctor will tell you if and when you can restart your medicines. He or she will also give you instructions about taking any new medicines.     · If you take aspirin or some other blood thinner, ask your doctor if and when to start taking it again. Make sure that you understand exactly what your doctor wants you to do.     · Be safe with medicines. Take pain medicines exactly as directed. ? If the doctor gave you a prescription medicine for pain, take it as prescribed. ? If you are not taking a prescription pain medicine, ask your doctor if you can take an over-the-counter medicine.     · If you think your pain medicine is making you sick to your stomach:  ? Take your medicine after meals (unless your doctor has told you not to). ? Ask your doctor for a different pain medicine. Care of the catheter site    · Keep a bandage over the spot where the catheter was inserted for the first day, or for as long as your doctor recommends.     · Put ice or a cold pack on the area for 10 to 20 minutes at a time to help with soreness or swelling. Do this every few hours. Put a thin cloth between the ice and your skin.     · You may shower 24 to 48 hours after the procedure, if your doctor okays it. Pat the incision dry.     · Do not soak the catheter site until it is healed. Don't take a bath for 1 week, or until your doctor tells you it is okay.     · Watch for bleeding from the site. A small amount of blood (up to the size of a quarter) on the bandage can be normal.     · If you are bleeding, lie down and press on the area for 15 minutes to try to make it stop. If the bleeding does not stop, call your doctor or seek immediate medical care. Follow-up care is a key part of your treatment and safety. Be sure to make and go to all appointments, and call your doctor if you are having problems. It's also a good idea to know your test results and keep a list of the medicines you take. When should you call for help?    Call 911 anytime you think you may need emergency care. For example, call if:    · You passed out (lost consciousness).     · You have severe trouble breathing.     · You have sudden chest pain and shortness of breath, or you cough up blood. Call your doctor now or seek immediate medical care if:    · You have pain that does not get better after you take pain medicine.     · You are bleeding through your dressing. A small amount of bleeding is normal.     · You have a fast-growing, painful lump at the catheter site.     · You have signs of infection, such as:  ? Increased pain, swelling, warmth, or redness. ? Red streaks leading from the incision. ? Pus draining from the incision. ? A fever.     · You have symptoms of a blood clot in your leg, such as:  ? Pain in the calf, back of the knee, thigh, or groin. ? Redness and swelling in your leg or groin. Watch closely for any changes in your health, and be sure to contact your doctor if you have any problems. Where can you learn more? Go to http://www.gray.com/  Enter B419 in the search box to learn more about \"Vena Cava Filter Removal: What to Expect at Home. \"  Current as of: March 4, 2020               Content Version: 12.8  © 2006-2021 [employer]. Care instructions adapted under license by [employer] (which disclaims liability or warranty for this information). If you have questions about a medical condition or this instruction, always ask your healthcare professional. [place] [address] any warranty or liability for your use of this information.

## 2021-06-17 NOTE — PROGRESS NOTES
0700: Bedside and Verbal shift change report given to Angela BLACKMAN (oncoming nurse) by Makayla Stratton RN (offgoing nurse). Report included the following information SBAR, Kardex, [facility] and Cardiac Rhythm NSR/ Sinus Charles Chepe. 0800: Assessed patient. No complaints of pain. Lung sounds clear. Ambulated to restroom. Will continue to monitor.

## 2021-06-17 NOTE — PROGRESS NOTES
conducted a Follow up consultation and Spiritual Assessment for [place] Pain, who is a 56 y. o.,male. The  provided the following Interventions:  Continued the relationship of care and support. Listened empathically. Offered prayer and assurance of continued prayer on patients behalf. Chart reviewed. The following outcomes were achieved:  Patient expressed gratitude for pastoral care visit. Assessment:  There are no further spiritual or Alevism issues which require Spiritual Care Services interventions at this time. Plan:  Chaplains will continue to follow and will provide pastoral care on an as needed/requested basis.  recommends bedside caregivers page  on duty if patient shows signs of acute spiritual or emotional distress.

## 2021-06-17 NOTE — PROGRESS NOTES
Problem: Pressure Injury - Risk of  Goal: *Prevention of pressure injury  Description: Document Ez Scale and appropriate interventions in the flowsheet. Outcome: Resolved/Met     Problem: Patient Education: Go to Patient Education Activity  Goal: Patient/Family Education  Outcome: Resolved/Met     Problem: Falls - Risk of  Goal: *Absence of Falls  Description: Document Deon Fall Risk and appropriate interventions in the flowsheet.   Outcome: Resolved/Met     Problem: Patient Education: Go to Patient Education Activity  Goal: Patient/Family Education  Outcome: Resolved/Met     Problem: Pain  Goal: *Control of Pain  Outcome: Resolved/Met  Goal: *PALLIATIVE CARE:  Alleviation of Pain  Outcome: Resolved/Met     Problem: Patient Education: Go to Patient Education Activity  Goal: Patient/Family Education  Outcome: Resolved/Met     Problem: Pulmonary Embolism Care Plan (Adult)  Goal: *Improvement of existing pulmonary embolism  Outcome: Resolved/Met  Goal: *Absence of bleeding  Outcome: Resolved/Met  Goal: *Labs within defined limits  Outcome: Resolved/Met     Problem: Patient Education: Go to Patient Education Activity  Goal: Patient/Family Education  Outcome: Resolved/Met     Problem: Patient Education: Go to Patient Education Activity  Goal: Patient/Family Education  Outcome: Resolved/Met     Problem: Unstable Angina/NSTEMI: Discharge Outcomes  Goal: *Hemodynamically stable  Outcome: Resolved/Met  Goal: *Stable cardiac rhythm  Outcome: Resolved/Met  Goal: *Lungs clear or at baseline  Outcome: Resolved/Met  Goal: *Optimal pain control at patient's stated goal  Outcome: Resolved/Met  Goal: *Identifies cardiac risk factors  Outcome: Resolved/Met  Goal: *Verbalizes home exercise program, activity guidelines, cardiac precautions  Outcome: Resolved/Met  Goal: *Verbalizes understanding and describes prescribed diet  Outcome: Resolved/Met  Goal: *Verbalizes name, dosage, time, side effects, and number of days to continue medications  Outcome: Resolved/Met  Goal: *Anxiety reduced or absent  Outcome: Resolved/Met  Goal: *Understands and describes signs and symptoms to report to providers(Stroke Metric)  Outcome: Resolved/Met  Goal: *Describes follow-up/return visits to physicians  Outcome: Resolved/Met  Goal: *Describes available resources and support systems  Outcome: Resolved/Met  Goal: *Influenza immunization  Outcome: Resolved/Met  Goal: *Pneumococcal immunization  Outcome: Resolved/Met  Goal: *Describes smoking cessation resources  Outcome: Resolved/Met     Problem: Patient Education: Go to Patient Education Activity  Goal: Patient/Family Education  Outcome: Resolved/Met     Problem: Patient Education: Go to Patient Education Activity  Goal: Patient/Family Education  Outcome: Resolved/Met     Problem: Patient Education: Go to Patient Education Activity  Goal: Patient/Family Education  Outcome: Resolved/Met     Problem: Tobacco Use  Goal: *Tobacco use abstinence  Outcome: Resolved/Met  Goal: *Knowledge of tobacco use cessation methods  Outcome: Resolved/Met

## 2021-06-17 NOTE — PROGRESS NOTES
Discharge order noted for today. CM met with pt who confirmed that his ride is on the way. Orders reviewed. CM provided pt with discount cards for Xarelto. No additional needs identified at this time.  remains available if needed.      Andrew Goldberg RN BSN  Care Manager  [phone]

## 2021-06-17 NOTE — PROGRESS NOTES
Patient given pain medication x1 overnight. Stated pain wasn't as bad as it has been before. RA O2 remained >92%. Urine output 2000ml clear yellow urine. Bedside and Verbal shift change report given to [address] Irizarry RN (oncoming nurse) by Ximena Obando RN   (offgoing nurse). Report included the following information SBAR, Kardex, MAR and Recent Results.

## 2021-06-17 NOTE — PROGRESS NOTES
Problem: Pressure Injury - Risk of  Goal: *Prevention of pressure injury  Description: Document Ez Scale and appropriate interventions in the flowsheet. Outcome: Progressing Towards Goal  Note: Pressure Injury Interventions:       Moisture Interventions: Absorbent underpads, Minimize layers    Activity Interventions: PT/OT evaluation, Increase time out of bed, Pressure redistribution bed/mattress(bed type)    Mobility Interventions: HOB 30 degrees or less, Pressure redistribution bed/mattress (bed type), PT/OT evaluation    Nutrition Interventions: Document food/fluid/supplement intake                     Problem: Patient Education: Go to Patient Education Activity  Goal: Patient/Family Education  Outcome: Progressing Towards Goal     Problem: Falls - Risk of  Goal: *Absence of Falls  Description: Document Deon Fall Risk and appropriate interventions in the flowsheet.   Outcome: Progressing Towards Goal  Note: Fall Risk Interventions:  Mobility Interventions: Patient to call before getting OOB         Medication Interventions: Assess postural VS orthostatic hypotension, Evaluate medications/consider consulting pharmacy, Patient to call before getting OOB, Teach patient to arise slowly    Elimination Interventions: Call light in reach, Urinal in reach    History of Falls Interventions: Evaluate medications/consider consulting pharmacy         Problem: Patient Education: Go to Patient Education Activity  Goal: Patient/Family Education  Outcome: Progressing Towards Goal     Problem: Pain  Goal: *Control of Pain  Outcome: Progressing Towards Goal  Goal: *PALLIATIVE CARE:  Alleviation of Pain  Outcome: Progressing Towards Goal     Problem: Patient Education: Go to Patient Education Activity  Goal: Patient/Family Education  Outcome: Progressing Towards Goal     Problem: Pulmonary Embolism Care Plan (Adult)  Goal: *Improvement of existing pulmonary embolism  Outcome: Progressing Towards Goal  Goal: *Absence of bleeding  Outcome: Progressing Towards Goal  Goal: *Labs within defined limits  Outcome: Progressing Towards Goal     Problem: Patient Education: Go to Patient Education Activity  Goal: Patient/Family Education  Outcome: Progressing Towards Goal     Problem: Patient Education: Go to Patient Education Activity  Goal: Patient/Family Education  Outcome: Progressing Towards Goal     Problem: Unstable Angina/NSTEMI: Discharge Outcomes  Goal: *Hemodynamically stable  Outcome: Progressing Towards Goal  Goal: *Stable cardiac rhythm  Outcome: Progressing Towards Goal  Goal: *Lungs clear or at baseline  Outcome: Progressing Towards Goal  Goal: *Optimal pain control at patient's stated goal  Outcome: Progressing Towards Goal  Goal: *Identifies cardiac risk factors  Outcome: Progressing Towards Goal  Goal: *Verbalizes home exercise program, activity guidelines, cardiac precautions  Outcome: Progressing Towards Goal  Goal: *Verbalizes understanding and describes prescribed diet  Outcome: Progressing Towards Goal  Goal: *Verbalizes name, dosage, time, side effects, and number of days to continue medications  Outcome: Progressing Towards Goal  Goal: *Anxiety reduced or absent  Outcome: Progressing Towards Goal  Goal: *Understands and describes signs and symptoms to report to providers(Stroke Metric)  Outcome: Progressing Towards Goal  Goal: *Describes follow-up/return visits to physicians  Outcome: Progressing Towards Goal  Goal: *Describes available resources and support systems  Outcome: Progressing Towards Goal  Goal: *Influenza immunization  Outcome: Progressing Towards Goal  Goal: *Pneumococcal immunization  Outcome: Progressing Towards Goal  Goal: *Describes smoking cessation resources  Outcome: Progressing Towards Goal     Problem: Patient Education: Go to Patient Education Activity  Goal: Patient/Family Education  Outcome: Progressing Towards Goal     Problem: Patient Education: Go to Patient Education Activity  Goal: Patient/Family Education  Outcome: Progressing Towards Goal     Problem: Patient Education: Go to Patient Education Activity  Goal: Patient/Family Education  Outcome: Progressing Towards Goal     Problem: Tobacco Use  Goal: *Tobacco use abstinence  Outcome: Progressing Towards Goal  Goal: *Knowledge of tobacco use cessation methods  Outcome: Progressing Towards Goal

## 2021-06-21 ENCOUNTER — DOCUMENTATION ONLY (OUTPATIENT)
Dept: PULMONOLOGY | Age: 61
End: 2021-06-21

## 2021-06-21 NOTE — PROGRESS NOTES
Aakash cr for pt to [facility] w/V Oswaldo(she did consult); w/full pfts in August    Pt called back-he will call us back after he sees heart dr. He thinks he may end up having open heart surgery.

## 2021-06-25 ENCOUNTER — OFFICE VISIT (OUTPATIENT)
Dept: FAMILY MEDICINE CLINIC | Age: 61
End: 2021-06-25
Payer: COMMERCIAL

## 2021-06-25 VITALS — SYSTOLIC BLOOD PRESSURE: 101 MMHG | DIASTOLIC BLOOD PRESSURE: 63 MMHG | OXYGEN SATURATION: 96 % | HEART RATE: 73 BPM

## 2021-06-25 DIAGNOSIS — I20.8 STABLE ANGINA PECTORIS (HCC): ICD-10-CM

## 2021-06-25 DIAGNOSIS — M10.00 ACUTE IDIOPATHIC GOUT, UNSPECIFIED SITE: ICD-10-CM

## 2021-06-25 DIAGNOSIS — I25.10 CORONARY ARTERY DISEASE INVOLVING NATIVE CORONARY ARTERY WITHOUT ANGINA PECTORIS, UNSPECIFIED WHETHER NATIVE OR TRANSPLANTED HEART: Primary | ICD-10-CM

## 2021-06-25 DIAGNOSIS — I26.01 SEPTIC PULMONARY EMBOLISM WITH ACUTE COR PULMONALE, UNSPECIFIED CHRONICITY (HCC): ICD-10-CM

## 2021-06-25 PROCEDURE — 99213 OFFICE O/P EST LOW 20 MIN: CPT | Performed by: FAMILY MEDICINE

## 2021-06-25 RX ORDER — ASPIRIN 81 MG/1
TABLET ORAL
Refills: 1 | Status: CANCELLED | OUTPATIENT
Start: 2021-06-25

## 2021-06-25 RX ORDER — COLCHICINE 0.6 MG/1
0.6 CAPSULE ORAL DAILY
Qty: 30 CAPSULE | Refills: 2 | Status: SHIPPED | OUTPATIENT
Start: 2021-06-25 | End: 2022-05-06 | Stop reason: SDUPTHER

## 2021-06-25 RX ORDER — PREDNISONE 20 MG/1
TABLET ORAL
Qty: 14 TABLET | Refills: 0 | Status: ON HOLD | OUTPATIENT
Start: 2021-06-25 | End: 2021-08-11 | Stop reason: ALTCHOICE

## 2021-06-25 NOTE — PROGRESS NOTES
Subjective:   Bozena Chan is a 56 y.o. male who was seen for Hospital Follow Up (hospital follow up for chestpain )    HPI is a 13-year-old gentleman he is not smoking anymore. He has atherosclerotic coronary vascular disease with a multitude of stents. He was having some shortness of breath he had not been seen in quite a while due to COVID-19. He went to the hospital for significant shortness of breath was taken to [facility].  Ultimately had a catheterization which showed significant blood clots in the lungs as well as significant continued atherosclerotic coronary vascular disease they are looking at having a vascular procedure on his heart but they cannot do it now until they get the blood clot taken care of he was placed on Xarelto. He has had no falls or injuries no rash no syncope no loss of consciousness. Bowel movements have been appropriate has been eating relatively well. Nobody at home has been sick he lives with his wife. He is feeling better since discharge from the hospital.  There are no rashes or other issues. He is eating relatively well. They did change some of his blood work. Home Medications    Medication Sig Start Date End Date Taking? Authorizing Provider   colchicine (Mitigare) 0.6 mg capsule Take 1 Capsule by mouth daily. 6/25/21  Yes Romario Warren MD   predniSONE (DELTASONE) 20 mg tablet 3 for 2 days then 2 for 2 days then 1 for 2 days then 1/2 for 2 days 6/25/21  Yes Romario Warren MD   atorvastatin (LIPITOR) 40 mg tablet Take 1 Tablet by mouth nightly for 30 days. 6/16/21 7/16/21 Yes Ezequiel Hilliard MD   furosemide (LASIX) 40 mg tablet Take 1 Tablet by mouth two (2) times a day for 30 days. 6/16/21 7/16/21 Yes Ezequiel Hilliard MD   metoprolol tartrate (LOPRESSOR) 50 mg tablet Take 1 Tablet by mouth two (2) times a day for 30 days.  6/16/21 7/16/21 Yes zEequiel Hilliard MD   isosorbide dinitrate (ISORDIL) 40 mg tablet Take 1 Tablet by mouth three (3) times daily for 30 days. 6/16/21 7/16/21 Yes Karin Mohs, MD   rivaroxaban (Xarelto DVT-PE Treat 30d Start) 15 mg (42)- 20 mg (9) DsPk Take one 15 mg tablet twice a day with food for the first 21 days. Then, take one 20 mg tablet once a day with food for 9 days. 6/16/21  Yes Karin Mohs, MD   tiotropium-olodateroL (Stiolto Respimat) 2.5-2.5 mcg/actuation inhaler Take 2 Puffs by inhalation daily. 6/16/21  Yes Karin Mohs, MD   nitroglycerin (NITROLINGUAL) 400 mcg/spray spray SPRAY ONE SPRAY UNDER THE TONGUE AS NEEDED MAY REPEAT UP TO 2 TIMES AS DIRECTED  5/23/21  Yes Chester Alpers, MD   aspirin delayed-release 81 mg tablet  4/29/19  Yes Provider, Historical      Allergies   Allergen Reactions    Statins-Hmg-Coa Reductase Inhibitors Other (comments)     Other reaction(s): SEVERE PAIN PER PT    Gabapentin Other (comments)     \"Caused bad nightmares\"    Pcn [Penicillins] Hives     Social History     Tobacco Use    Smoking status: Current Every Day Smoker     Types: Cigarettes    Smokeless tobacco: Never Used   Substance Use Topics    Alcohol use: Yes    Drug use: No            Review of Systems   Constitutional: Negative. HENT: Negative. Eyes: Negative. Respiratory: Positive for shortness of breath. Gastrointestinal: Negative. Endocrine: Negative. Genitourinary: Negative. Musculoskeletal: Negative. Skin: Negative. Allergic/Immunologic: Negative. Neurological: Negative. Hematological: Negative. Psychiatric/Behavioral: Negative.          Physical Exam   Objective:     Visit Vitals  /63   Pulse 73   SpO2 96%      General: alert, cooperative, no distress   Mental  status: normal mood, behavior, speech, dress, motor activity, and thought processes, able to follow commands   HENT: NCAT   Neck: no visualized mass   Resp: no respiratory distress   Neuro: no gross deficits   Skin: no discoloration or lesions of concern on visible areas   Psychiatric: normal affect, consistent with stated mood, no evidence of hallucinations   Are clear he has a regular rate and rhythm. No definite murmurs the abdomen is soft the extremities are clear pleasant and cooperative in no significant distress. Edema is appreciated. The abdomen is soft no CVA tenderness no swelling of the right great toe. Assessment & Plan:     Joseph Fink embolism, coronary arteriosclerosis: This was a 25-minute visit. The patient and his wife were present I do not need to refill his medications although he is having some gouty issues. We are going to cover with a short course of prednisone and restarting his midguard 0.6 mg daily. We will follow-up if there is no improvement. He seems medically stable. 712    Additional exam findings: We discussed the expected course, resolution and complications of the diagnosis(es) in detail. Medication risks, benefits, costs, interactions, and alternatives were discussed as indicated. I advised him to contact the office if his condition worsens, changes or fails to improve as anticipated. He expressed understanding with the diagnosis(es) and plan.

## 2021-06-25 NOTE — PROGRESS NOTES
Corrina Phoenix presents today for   Chief Complaint   Patient presents with   [facility] Follow Up     hospital follow up for chestpain        Is someone accompanying this pt? no    Is the patient using any DME equipment during OV? no    Depression Screening:  3 most recent PHQ Screens 6/25/2021   Little interest or pleasure in doing things Not at all   Feeling down, depressed, irritable, or hopeless Not at all   Total Score PHQ 2 0       Learning Assessment:  No flowsheet data found. Fall Risk  No flowsheet data found. ADL  No flowsheet data found. Travel Screening:    Travel Screening     Question   Response    In the last month, have you been in contact with someone who was confirmed or suspected to have Coronavirus / COVID-19? No / Unsure    Have you had a COVID-19 viral test in the last 14 days? No    Do you have any of the following new or worsening symptoms? None of these    Have you traveled internationally or domestically in the last month? No      Travel History   Travel since 05/25/21     No documented travel since 05/25/21          Health Maintenance reviewed and discussed and ordered per Provider. Health Maintenance Due   Topic Date Due    Hepatitis C Screening  Never done    Pneumococcal 0-64 years (1 of 2 - PPSV23) Never done    COVID-19 Vaccine (1) Never done    DTaP/Tdap/Td series (1 - Tdap) Never done    Shingrix Vaccine Age 50> (1 of 2) Never done    Colorectal Cancer Screening Combo  Never done   . Coordination of Care:  1. Have you been to the ER, urgent care clinic since your last visit? Hospitalized since your last visit? no    2. Have you seen or consulted any other health care providers outside of the [address] [place] [address] since your last visit? Include any pap smears or colon screening.  no

## 2021-06-28 ENCOUNTER — DOCUMENTATION ONLY (OUTPATIENT)
Dept: CARDIOLOGY CLINIC | Age: 61
End: 2021-06-28

## 2021-06-28 ENCOUNTER — OFFICE VISIT (OUTPATIENT)
Dept: CARDIOLOGY CLINIC | Age: 61
End: 2021-06-28
Payer: COMMERCIAL

## 2021-06-28 VITALS
BODY MASS INDEX: 36.22 KG/M2 | OXYGEN SATURATION: 95 % | WEIGHT: 253 LBS | SYSTOLIC BLOOD PRESSURE: 122 MMHG | HEART RATE: 78 BPM | DIASTOLIC BLOOD PRESSURE: 72 MMHG | HEIGHT: 70 IN

## 2021-06-28 DIAGNOSIS — I20.0 UNSTABLE ANGINA (HCC): ICD-10-CM

## 2021-06-28 DIAGNOSIS — I25.10 CORONARY ARTERY DISEASE INVOLVING NATIVE CORONARY ARTERY WITHOUT ANGINA PECTORIS, UNSPECIFIED WHETHER NATIVE OR TRANSPLANTED HEART: Primary | ICD-10-CM

## 2021-06-28 DIAGNOSIS — I20.8 STABLE ANGINA PECTORIS (HCC): ICD-10-CM

## 2021-06-28 PROCEDURE — 99214 OFFICE O/P EST MOD 30 MIN: CPT | Performed by: INTERNAL MEDICINE

## 2021-06-28 PROCEDURE — 93000 ELECTROCARDIOGRAM COMPLETE: CPT | Performed by: INTERNAL MEDICINE

## 2021-06-28 NOTE — PROGRESS NOTES
[facility]    Mr. Zulma Fuentes is a 75-year-old male with a history of CAD, hypertension, hyperlipidemia, sleep apnea, pulmonary embolism, tobacco use disorder    Patient is here today for cardiac follow-up. Patient has history of coronary disease that is post left circumflex coronary stent more than 10 years ago. [facility] for [facility]. Patient underwent emergent cardiac catheterization which showed severe three-vessel coronary artery disease. Patient continues to have a chest pain despite appropriate medical management. Echocardiogram revealed RV strain. CT chest revealed submassive bilateral PE  Patient underwent IR guided thromboembolectomy in 06/2021 followed by IVC filter placement. Patient was started on Xarelto    Since patient has gone home, he has been feeling much better. He has used nitroglycerin only once. He denies any presyncope or syncope. His chest pain and dyspnea has improved significantly. He denies PND or lower extremity swelling  Denies any nausea, vomiting, abdominal pain, fever, chills, sputum production. No hematuria or other bleeding complaints    Past Medical History:   Diagnosis Date    CAD (coronary artery disease)     Dupuytren's disease of palm     Hyperlipidemia     Hypertension     SHILPI (obstructive sleep apnea)     PE [facility]pulmonary thromboembolism) ([facility] [address] 06/2021    Severe obesity ([facility] [address]     Tobacco dependence        Review of Systems:  Cardiac symptoms as noted above in HPI. All others negative. Denies fatigue, malaise, skin rash, joint pain, blurring vision, photophobia, neck pain, hemoptysis, chronic cough, nausea, vomiting, hematuria, burning micturition, BRBPR, chronic headaches. Current Outpatient Medications   Medication Sig    colchicine (Mitigare) 0.6 mg capsule Take 1 Capsule by mouth daily.     predniSONE (DELTASONE) 20 mg tablet 3 for 2 days then 2 for 2 days then 1 for 2 days then 1/2 for 2 days    atorvastatin (LIPITOR) 40 mg tablet Take 1 Tablet by mouth nightly for 30 days.  furosemide (LASIX) 40 mg tablet Take 1 Tablet by mouth two (2) times a day for 30 days.  metoprolol tartrate (LOPRESSOR) 50 mg tablet Take 1 Tablet by mouth two (2) times a day for 30 days.  isosorbide dinitrate (ISORDIL) 40 mg tablet Take 1 Tablet by mouth three (3) times daily for 30 days.  rivaroxaban (Xarelto DVT-PE Treat 30d Start) 15 mg (42)- 20 mg (9) DsPk Take one 15 mg tablet twice a day with food for the first 21 days. Then, take one 20 mg tablet once a day with food for 9 days.  tiotropium-olodateroL (Stiolto Respimat) 2.5-2.5 mcg/actuation inhaler Take 2 Puffs by inhalation daily.  nitroglycerin (NITROLINGUAL) 400 mcg/spray spray SPRAY ONE SPRAY UNDER THE TONGUE AS NEEDED MAY REPEAT UP TO 2 TIMES AS DIRECTED     aspirin delayed-release 81 mg tablet      No current facility-administered medications for this visit. Past Surgical History:   Procedure Laterality Date    HX CHOLECYSTECTOMY      HX CORONARY STENT PLACEMENT      HX HERNIA REPAIR      IR IVC FILTER PLACEMENT PERCUTANEOUS  6/15/2021    IR THROMBECTOMY [facility] ART PRIMARY NON HETAL OR INTRACRANIAL  6/15/2021       Allergies and Sensitivities:  Allergies   Allergen Reactions    Statins-Hmg-Coa Reductase Inhibitors Other (comments)     Other reaction(s): SEVERE PAIN PER PT    Gabapentin Other (comments)     \"Caused bad nightmares\"    Pcn [Penicillins] Hives       Family History:  Family History   Problem Relation Age of Onset    Hypertension Mother     Diabetes Mother        Social History:  Social History     Tobacco Use    Smoking status: Current Every Day Smoker     Types: Cigarettes    Smokeless tobacco: Never Used   Substance Use Topics    Alcohol use: Yes    Drug use: No     He  reports that he has been smoking cigarettes.  He has never used smokeless tobacco.  He  reports current alcohol use. Physical Exam:  BP Readings from Last 3 Encounters:   06/28/21 122/72   06/25/21 101/63   06/17/21 129/74         Pulse Readings from Last 3 Encounters:   06/28/21 78   06/25/21 73   06/17/21 (!) 59          Wt Readings from Last 3 Encounters:   06/28/21 114.8 kg (253 lb)   06/17/21 121.3 kg (267 lb 8 oz)   07/01/19 126.7 kg (279 lb 6.4 oz)       Constitutional: Oriented to person, place, and time. HENT: Head: Normocephalic and atraumatic. Eyes: Conjunctivae and extraocular motions are normal.   Neck: No JVD present. Carotid bruit is not appreciated. Cardiovascular: Regular rhythm. No murmur, gallop or rubs appreciated  Lung: Breath sounds normal. No respiratory distress. No ronchi or rales appreciated  Abdominal: No tenderness. No rebound and no guarding. Musculoskeletal: There is no lower extremity edema. No cynosis    LABS:   @  Lab Results   Component Value Date/Time    WBC 10.3 06/17/2021 04:59 AM    HGB 13.6 06/17/2021 04:59 AM    HCT 40.5 06/17/2021 04:59 AM    PLATELET 958 22/12/6665 04:59 AM    MCV 98.3 (H) 06/17/2021 04:59 AM     Lab Results   Component Value Date/Time    Sodium 141 06/17/2021 04:59 AM    Potassium 3.6 06/17/2021 04:59 AM    Chloride 107 06/17/2021 04:59 AM    CO2 27 06/17/2021 04:59 AM    Glucose 107 (H) 06/17/2021 04:59 AM    BUN 15 06/17/2021 04:59 AM    Creatinine 1.22 06/17/2021 04:59 AM     Lipids Latest Ref Rng & Units 6/10/2021   Chol, Total <200 MG/   HDL 40 - 60 MG/DL 33(L)   LDL 0 - 100 MG/DL 69.6   Trig <150 MG/DL 87   Chol/HDL Ratio 0 - 5.0   3.6     Lab Results   Component Value Date/Time    ALT (SGPT) 48 06/10/2021 05:30 AM     Lab Results   Component Value Date/Time    Hemoglobin A1c 5.3 06/10/2021 05:30 AM     No results found for: TSH, TSH2, TSH3, TSHP, TSHEXT    EKG    ECHO (06/2021)  Left Ventricle Normal cavity size, wall thickness and systolic function (ejection fraction normal).  The estimated EF is 50 - 55%. Abnormal left ventricular septal motion. Systolic flattening of the interventricular septum consistent with right ventricle pressure overload. There is mild (grade 1) left ventricular diastolic dysfunction. Wall Scoring The left ventricular wall motion is normal.            Left Atrium Normal cavity size. Right Ventricle Dilated right ventricle. Abnormal wall motion: free wall hypokinesis of the right ventricle. Reduced systolic function. The findings are consistent with Powell's sign . Right Atrium Normal cavity size. Aortic Valve Normal valve structure, trileaflet valve structure, no stenosis and no regurgitation. Mitral Valve Normal valve structure and no stenosis. Trace regurgitation. Tricuspid Valve No stenosis. Non-specific thickening. Mild to moderate regurgitation. Pulmonic Valve Pulmonic valve not well visualized, but normal doppler findings. Aorta Mildly dilated aortic root; diameter is 3.8 cm. Pulmonary Artery Pulmonary arterial systolic pressure (PASP) is 60 mmHg. Pulmonary hypertension found to be moderate. IVC/Hepatic Veins Moderately elevated central venous pressure (8 mmHg); IVC diameter is larger than 21 mm and collapses more than 50% with respiration. STRESS TEST (EST, PHARM, NUC, ECHO etc)    CATHETERIZATION (06/2021)  Left Main   Large-caliber vessel. Distal vessel approximately 50% stenosis with calcification that involves LAD and circumflex bifurcation   Left Anterior Descending   The vessel is calcified. The vessel is tortuous. Ostial, proximal and the mid LAD has diffuse calcified moderate disease with sequential eccentric 75% lesions. Mid-distal LAD has no significant obstructive disease Diagonal branch: Moderate disease. Medium caliber vessel   Left Circumflex   The vessel is calcified. The vessel is tortuous. Proximal LCx stent has mild in-stent restenosis followed by hazy calcified borderline 70% stenosis.  OM1: Small caliber vessel OM 2: Large bifurcating vessel. Calcified hazy 70% stenosis before bifurcation. Superior branch distally distally has subtotal occlusion with faint homocollateral. Inferior branch without any significant obstructive disease   Right Coronary Artery   Proximal-mid 100% in-stent occlusion. Previously known by cardiac catheterization in 2019. Evidence of good collaterals from left to right supplying distal RCA and RPDA     Left Ventricle The left ventricular size is normal. LV end diastolic pressure is normal. LV EDP is: 16. The estimated EF = grossly normal and Echocardiogram will be performed. . There are no wall motion abnormalities in the left ventricle. There is no evidence of mitral regurgitation. Aortic Valve There is no aortic valve stenosis. IMPRESSION & PLAN:  Mr. Cornelio Garcia is 58-year-old male with multiple medical problem    CAD:  Status post left circumflex coronary stent more than 10 years ago. Cardiac catheterization in 06/2021 showing three-vessel CAD. Plan was to proceed with CABG however patient also was found to have a submassive PE with right-sided dysfunction. CABG was deferred  Patient without any unstable coronary symptoms. Continue with aspirin, beta-blocker, Isordil, statin. Use nitroglycerin as needed. Have advised patient to avoid any exertional activity. Have advised patient to stay off work. He works as a . I have asked him to call 911 if he has any chest pain not responsive to nitroglycerin  I will repeat echocardiogram in next 3 to 4 weeks to evaluate RV. If the RV function continues to improve, he will be sent again for CABG.     PE and DVT:  Patient has submassive PE and DVT in 06/2021 with evidence of RV strain by echo  Since then patient has undergone IR guided thromboembolectomy, IVC filter placement and now on Xarelto  Lifelong anticoagulation will be needed  Patient is supposed to see pulmonary team as well  Will order echocardiogram to evaluate RV function before proceeding with echocardiogram to reduce risk. Hypertension:  /72. Continue metoprolol, Isordil    Hyperlipidemia:  Currently on atorvastatin 40 mg daily. Last LDL 69. Continue same    This plan was discussed with patient who is in agreement. Thank you for allowing me to participate in patient care. Please feel free to call me if you have any question or concern. Vianca Mendoza MD  Please note: This document has been produced using voice recognition software. Unrecognized errors in transcription may be present.

## 2021-06-28 NOTE — LETTER
6/28/2021    Patient: Debbi Palmer   YOB: 1960   Date of Visit: 6/28/2021     Marleny Calixto MD  [address] [address]  [place] [place] [facility] [address]    Dear Marleny Calixto MD,      Thank you for referring Mr. Rhonda Sterling to [address] [place] [address] for evaluation. My notes for this consultation are attached. If you have questions, please do not hesitate to call me. I look forward to following your patient along with you.       Sincerely,    Janice Marshall MD

## 2021-06-28 NOTE — PROGRESS NOTES
Leyla Mary presents today for   Chief Complaint   Patient presents with    Follow-up     s/p cath       Leyla Hernandez preferred language for health care discussion is english/other. Is someone accompanying this pt? no    Is the patient using any DME equipment during [address]? no    Depression Screening:  3 most recent PHQ Screens 6/28/2021   Little interest or pleasure in doing things Not at all   Feeling down, depressed, irritable, or hopeless Not at all   Total Score PHQ 2 0       Learning Assessment:  No flowsheet data found. Abuse Screening:  Abuse Screening Questionnaire 6/28/2021   Do you ever feel afraid of your partner? N   Are you in a relationship with someone who physically or mentally threatens you? N   Is it safe for you to go home? Y       Fall Risk  No flowsheet data found. Pt currently taking Anticoagulant therapy? Xarelto pack 15mg - 20mg    Coordination of Care:  1. Have you been to the ER, urgent care clinic since your last visit? Hospitalized since your last visit? no    2. Have you seen or consulted any other health care providers outside of the [address] [place] [address] since your last visit? Include any pap smears or colon screening.  no

## 2021-07-12 RX ORDER — RIVAROXABAN 15 MG-20MG
KIT ORAL
Qty: 1 DOSE PACK | Refills: 0 | Status: SHIPPED | OUTPATIENT
Start: 2021-07-12 | End: 2021-07-16 | Stop reason: SDUPTHER

## 2021-07-12 RX ORDER — TIOTROPIUM BROMIDE AND OLODATEROL 3.124; 2.736 UG/1; UG/1
2 SPRAY, METERED RESPIRATORY (INHALATION) DAILY
Qty: 1 INHALER | Refills: 0 | Status: SHIPPED | OUTPATIENT
Start: 2021-07-12 | End: 2021-08-20

## 2021-07-16 RX ORDER — ISOSORBIDE DINITRATE 40 MG/1
40 TABLET ORAL 3 TIMES DAILY
Qty: 90 TABLET | Refills: 0 | Status: SHIPPED | OUTPATIENT
Start: 2021-07-16 | End: 2021-07-26 | Stop reason: ALTCHOICE

## 2021-07-16 RX ORDER — RIVAROXABAN 15 MG-20MG
KIT ORAL
Qty: 1 DOSE PACK | Refills: 0 | Status: SHIPPED | OUTPATIENT
Start: 2021-07-16 | End: 2021-07-18

## 2021-07-16 NOTE — TELEPHONE ENCOUNTER
Requested Prescriptions     Pending Prescriptions Disp Refills    rivaroxaban (Xarelto DVT-PE Treat 30d Start) 15 mg (42)- 20 mg (9) DsPk 1 Dose Pack 0     Sig: Take one 15 mg tablet twice a day with food for the first 21 days. Then, take one 20 mg tablet once a day with food for 9 days.

## 2021-07-18 RX ORDER — RIVAROXABAN 15 MG-20MG
KIT ORAL
Qty: 1 DOSE PACK | Refills: 0 | Status: SHIPPED | OUTPATIENT
Start: 2021-07-18 | End: 2021-07-20 | Stop reason: SDUPTHER

## 2021-07-26 ENCOUNTER — OFFICE VISIT (OUTPATIENT)
Dept: CARDIOLOGY CLINIC | Age: 61
End: 2021-07-26
Payer: COMMERCIAL

## 2021-07-26 VITALS
DIASTOLIC BLOOD PRESSURE: 60 MMHG | HEIGHT: 70 IN | HEART RATE: 84 BPM | WEIGHT: 257 LBS | BODY MASS INDEX: 36.79 KG/M2 | SYSTOLIC BLOOD PRESSURE: 120 MMHG | OXYGEN SATURATION: 96 %

## 2021-07-26 DIAGNOSIS — I20.8 STABLE ANGINA PECTORIS (HCC): ICD-10-CM

## 2021-07-26 DIAGNOSIS — I10 ESSENTIAL HYPERTENSION WITH GOAL BLOOD PRESSURE LESS THAN 140/90: ICD-10-CM

## 2021-07-26 DIAGNOSIS — I25.10 CORONARY ARTERY DISEASE INVOLVING NATIVE CORONARY ARTERY WITHOUT ANGINA PECTORIS, UNSPECIFIED WHETHER NATIVE OR TRANSPLANTED HEART: Primary | ICD-10-CM

## 2021-07-26 DIAGNOSIS — E78.00 PURE HYPERCHOLESTEROLEMIA: ICD-10-CM

## 2021-07-26 PROCEDURE — 99214 OFFICE O/P EST MOD 30 MIN: CPT | Performed by: INTERNAL MEDICINE

## 2021-07-26 RX ORDER — ISOSORBIDE MONONITRATE 30 MG/1
30 TABLET, EXTENDED RELEASE ORAL
Qty: 90 TABLET | Refills: 3 | Status: SHIPPED | OUTPATIENT
Start: 2021-07-26 | End: 2021-08-16

## 2021-07-26 NOTE — LETTER
7/26/2021    Patient: Maru Sarmiento   YOB: 1960   Date of Visit: 7/26/2021     Samuel Dhaliwal MD  [address] [place] [address] [place] [address] [address]  [address] [place]    Dear Samuel Dhaliwal MD,      Thank you for referring Mr. Abdifatah Davis to [address] [place] [address] for evaluation. My notes for this consultation are attached. If you have questions, please do not hesitate to call me. I look forward to following your patient along with you.       Sincerely,    Humphrey Krabbe, MD

## 2021-07-26 NOTE — PROGRESS NOTES
Antolin Hansen presents today for   Chief Complaint   Patient presents with    Follow-up     3 weeks after echo        Antolin Hansen preferred language for health care discussion is english/other. Is someone accompanying this pt? Wife     Is the patient using any DME equipment during [address]? no    Depression Screening:  3 most recent PHQ Screens 7/26/2021   Little interest or pleasure in doing things Not at all   Feeling down, depressed, irritable, or hopeless Not at all   Total Score PHQ 2 0       Learning Assessment:  Learning Assessment 7/26/2021   PRIMARY LEARNER Patient   PRIMARY LANGUAGE ENGLISH   LEARNER PREFERENCE PRIMARY DEMONSTRATION   ANSWERED BY Patient   RELATIONSHIP SELF       Abuse Screening:  Abuse Screening Questionnaire 7/26/2021   Do you ever feel afraid of your partner? N   Are you in a relationship with someone who physically or mentally threatens you? N   Is it safe for you to go home? Y     Pt currently taking Anticoagulant therapy? ASA 81mg every day and Xarelto     Coordination of Care:  1. Have you been to the ER, urgent care clinic since your last visit? Hospitalized since your last visit? no    2. Have you seen or consulted any other health care providers outside of the [address] [place] [address] since your last visit? Include any pap smears or colon screening.  no

## 2021-07-26 NOTE — PROGRESS NOTES
Cardi[facility] Specialists    Mr. Manda Arrington is a 56 y.o. male with a history of CAD, hypertension, hyperlipidemia, sleep apnea, pulmonary embolism, tobacco use disorder    Patient is here today for cardiac follow-up. Patient has history of coronary disease that is post left circumflex coronary stent more than 10 years ago. [facility] concerning for angina in 06/2021. Patient underwent emergent cardiac catheterization which showed severe three-vessel coronary artery disease. Patient continues to have a chest pain despite appropriate medical management. Echocardiogram revealed RV strain. CT chest revealed submassive bilateral PE  Patient underwent IR guided thromboembolectomy in 06/2021 followed by IVC filter placement. Patient was started on Xarelto    Since patient has gone home, he has been feeling much better. Lately he has been using nitroglycerin couple times a week with exertion. No resting chest pain  He denies any presyncope or syncope. He denies PND or lower extremity swelling  Denies any nausea, vomiting, abdominal pain, fever, chills, sputum production. No hematuria or other bleeding complaints    Past Medical History:   Diagnosis Date    CAD (coronary artery disease)     Dupuytren's disease of palm     Hyperlipidemia     Hypertension     SHILPI (obstructive sleep apnea)     PE (pulmonary thromboembolism) ([address] 06/2021    Severe obesity ([facility] [address]     Tobacco dependence        Review of Systems:  Cardiac symptoms as noted above in HPI. All others negative. Denies fatigue, malaise, skin rash, joint pain, blurring vision, photophobia, neck pain, hemoptysis, chronic cough, nausea, vomiting, hematuria, burning micturition, BRBPR, chronic headaches. Current Outpatient Medications   Medication Sig    rivaroxaban (XARELTO) 20 mg tab tablet Take 1 Tablet by mouth daily.     isosorbide dinitrate (ISORDIL) 40 mg tablet TAKE 1 TABLET BY MOUTH THREE (3) TIMES DAILY FOR 30 DAYS.  tiotropium-olodateroL (Stiolto Respimat) 2.5-2.5 mcg/actuation inhaler Take 2 Puffs by inhalation daily.  colchicine (Mitigare) 0.6 mg capsule Take 1 Capsule by mouth daily.  predniSONE (DELTASONE) 20 mg tablet 3 for 2 days then 2 for 2 days then 1 for 2 days then 1/2 for 2 days    nitroglycerin (NITROLINGUAL) 400 mcg/spray spray SPRAY ONE SPRAY UNDER THE TONGUE AS NEEDED MAY REPEAT UP TO 2 TIMES AS DIRECTED     aspirin delayed-release 81 mg tablet      No current facility-administered medications for this visit. Past Surgical History:   Procedure Laterality Date    HX CHOLECYSTECTOMY      HX CORONARY STENT PLACEMENT      HX HERNIA REPAIR      IR IVC FILTER PLACEMENT PERCUTANEOUS  6/15/2021    IR THROMBECTOMY [facility] ART PRIMARY NON HETAL OR INTRACRANIAL  6/15/2021       Allergies and Sensitivities:  Allergies   Allergen Reactions    Statins-Hmg-Coa Reductase Inhibitors Other (comments)     Other reaction(s): SEVERE PAIN PER PT    Gabapentin Other (comments)     \"Caused bad nightmares\"    Pcn [Penicillins] Hives       Family History:  Family History   Problem Relation Age of Onset    Hypertension Mother     Diabetes Mother        Social History:  Social History     Tobacco Use    Smoking status: Current Every Day Smoker     Types: Cigarettes    Smokeless tobacco: Never Used   Substance Use Topics    Alcohol use: Yes    Drug use: No     He  reports that he has been smoking cigarettes. He has never used smokeless tobacco.  He  reports current alcohol use.     Physical Exam:  BP Readings from Last 3 Encounters:   07/26/21 120/60   07/26/21 120/70   06/28/21 122/72         Pulse Readings from Last 3 Encounters:   07/26/21 84   06/28/21 78   06/25/21 73          Wt Readings from Last 3 Encounters:   07/26/21 116.6 kg (257 lb)   07/26/21 114.8 kg (253 lb)   06/28/21 114.8 kg (253 lb)       Constitutional: Oriented to person, place, and time. HENT: Head: Normocephalic and atraumatic. Neck: No JVD present. Carotid bruit is not appreciated. Cardiovascular: Regular rhythm. No murmur, gallop or rubs appreciated  Lung: Breath sounds normal. No respiratory distress. No ronchi or rales appreciated  Abdominal: No tenderness. No rebound and no guarding. Musculoskeletal: There is no lower extremity edema. No cynosis    LABS:   @  Lab Results   Component Value Date/Time    WBC 10.3 06/17/2021 04:59 AM    HGB 13.6 06/17/2021 04:59 AM    HCT 40.5 06/17/2021 04:59 AM    PLATELET 214 16/41/0718 04:59 AM    MCV 98.3 (H) 06/17/2021 04:59 AM     Lab Results   Component Value Date/Time    Sodium 141 06/17/2021 04:59 AM    Potassium 3.6 06/17/2021 04:59 AM    Chloride 107 06/17/2021 04:59 AM    CO2 27 06/17/2021 04:59 AM    Glucose 107 (H) 06/17/2021 04:59 AM    BUN 15 06/17/2021 04:59 AM    Creatinine 1.22 06/17/2021 04:59 AM     Lipids Latest Ref Rng & Units 6/10/2021   Chol, Total <200 MG/   HDL 40 - 60 MG/DL 33(L)   LDL 0 - 100 MG/DL 69.6   Trig <150 MG/DL 87   Chol/HDL Ratio 0 - 5.0   3.6     Lab Results   Component Value Date/Time    ALT (SGPT) 48 06/10/2021 05:30 AM     Lab Results   Component Value Date/Time    Hemoglobin A1c 5.3 06/10/2021 05:30 AM     No results found for: TSH, TSH2, TSH3, TSHP, TSHEXT, TSHEXT    EKG    ECHO (06/2021)  Left Ventricle Normal cavity size, wall thickness and systolic function (ejection fraction normal). The estimated EF is 50 - 55%. Abnormal left ventricular septal motion. Systolic flattening of the interventricular septum consistent with right ventricle pressure overload. There is mild (grade 1) left ventricular diastolic dysfunction. Wall Scoring The left ventricular wall motion is normal.            Left Atrium Normal cavity size. Right Ventricle Dilated right ventricle. Abnormal wall motion: free wall hypokinesis of the right ventricle. Reduced systolic function.  The findings are consistent with Powell's sign . Right Atrium Normal cavity size. Aortic Valve Normal valve structure, trileaflet valve structure, no stenosis and no regurgitation. Mitral Valve Normal valve structure and no stenosis. Trace regurgitation. Tricuspid Valve No stenosis. Non-specific thickening. Mild to moderate regurgitation. Pulmonic Valve Pulmonic valve not well visualized, but normal doppler findings. Aorta Mildly dilated aortic root; diameter is 3.8 cm. Pulmonary Artery Pulmonary arterial systolic pressure (PASP) is 60 mmHg. Pulmonary hypertension found to be moderate. IVC/Hepatic Veins Moderately elevated central venous pressure (8 mmHg); IVC diameter is larger than 21 mm and collapses more than 50% with respiration. CATHETERIZATION (06/2021)  Left Main   Large-caliber vessel. Distal vessel approximately 50% stenosis with calcification that involves LAD and circumflex bifurcation   Left Anterior Descending   The vessel is calcified. The vessel is tortuous. Ostial, proximal and the mid LAD has diffuse calcified moderate disease with sequential eccentric 75% lesions. Mid-distal LAD has no significant obstructive disease Diagonal branch: Moderate disease. Medium caliber vessel   Left Circumflex   The vessel is calcified. The vessel is tortuous. Proximal LCx stent has mild in-stent restenosis followed by hazy calcified borderline 70% stenosis. OM1: Small caliber vessel OM 2: Large bifurcating vessel. Calcified hazy 70% stenosis before bifurcation. Superior branch distally distally has subtotal occlusion with faint homocollateral. Inferior branch without any significant obstructive disease   Right Coronary Artery   Proximal-mid 100% in-stent occlusion. Previously known by cardiac catheterization in 2019.  Evidence of good collaterals from left to right supplying distal RCA and RPDA     Left Ventricle The left ventricular size is normal. LV end diastolic pressure is normal. LV EDP is: 16. The estimated EF = grossly normal and Echocardiogram will be performed. . There are no wall motion abnormalities in the left ventricle. There is no evidence of mitral regurgitation. Aortic Valve There is no aortic valve stenosis. IMPRESSION & PLAN:  Mr. Win Chacon is 56 y.o. male with multiple medical problem    CAD:  Status post left circumflex coronary stent more than 10 years ago. Cardiac catheterization in 06/2021 showing three-vessel CAD. Plan was to proceed with CABG however patient also was found to have a submassive PE with right-sided dysfunction. CABG was deferred  Continue with aspirin, beta-blocker, statin. Changing Isordil 3 times daily to once a day Imdur   Repeat echocardiogram was done today. Images reviewed. RV function appears to be better. Patient has been using more frequent nitroglycerin as mentioned in HPI since last visit however no resting pain. Has appointment with CT surgery in September 15th however will call [facility] surgery office and have him schedule appointment sooner to proceed with CABG    PE and DVT:  Patient has submassive PE and DVT in 06/2021 with evidence of RV strain by echo  Since then patient has undergone IR guided thromboembolectomy, IVC filter placement and now on Xarelto  Lifelong anticoagulation will be needed  Patient is supposed to see pulmonary team as well. Patient to call pulmonary team to follow-up    Hypertension:  BP labile. Continue current medication. Changing Isordil to Imdur as mentioned above    Hyperlipidemia:  Currently on atorvastatin 40 mg daily. Last LDL 69. Continue same    This plan was discussed with patient who is in agreement. Thank you for allowing me to participate in patient care. Please feel free to call me if you have any question or concern. Ciara Odom MD  Please note: This document has been produced using voice recognition software. Unrecognized errors in transcription may be present.

## 2021-07-26 NOTE — PATIENT INSTRUCTIONS
Stop your isosorbide dinitrate (ISORDIL) 40 mg  Start isosorbide (Imdur) 30 mg daily   Start Metoprolol   Follow up with Pulmonary  Call Dr. Dank Lundberg at [phone] to move your appointment up within the next two weeks

## 2021-07-27 ENCOUNTER — TELEPHONE (OUTPATIENT)
Dept: CARDIOTHORACIC SURGERY | Age: 61
End: 2021-07-27

## 2021-08-03 RX ORDER — FUROSEMIDE 40 MG/1
40 TABLET ORAL 2 TIMES DAILY
Qty: 60 TABLET | Refills: 0 | Status: SHIPPED | OUTPATIENT
Start: 2021-08-03 | End: 2021-08-16

## 2021-08-04 ENCOUNTER — TELEPHONE (OUTPATIENT)
Dept: CARDIOLOGY CLINIC | Age: 61
End: 2021-08-04

## 2021-08-04 NOTE — TELEPHONE ENCOUNTER
Incoming from pt. Two patient Identifiers confirmed. Advised pt per Dr Julee Hernandez. Pt verbalized understanding.

## 2021-08-04 NOTE — TELEPHONE ENCOUNTER
----- Message from Iggy Nguyen MD sent at 7/27/2021 11:57 AM EDT -----  Cardiac testing appears normal.  Inform patient please    Thanks  SP

## 2021-08-05 ENCOUNTER — TELEPHONE (OUTPATIENT)
Dept: CARDIOTHORACIC SURGERY | Age: 61
End: 2021-08-05

## 2021-08-06 ENCOUNTER — HOSPITAL ENCOUNTER (OUTPATIENT)
Dept: GENERAL RADIOLOGY | Age: 61
Discharge: HOME | End: 2021-08-06
Payer: COMMERCIAL

## 2021-08-06 ENCOUNTER — HOSPITAL ENCOUNTER (OUTPATIENT)
Dept: PREADMISSION TESTING | Age: 61
Discharge: HOME | End: 2021-08-06
Payer: COMMERCIAL

## 2021-08-06 ENCOUNTER — OFFICE VISIT (OUTPATIENT)
Dept: CARDIOTHORACIC SURGERY | Age: 61
End: 2021-08-06
Payer: COMMERCIAL

## 2021-08-06 VITALS
BODY MASS INDEX: 36.12 KG/M2 | HEIGHT: 71 IN | HEART RATE: 68 BPM | DIASTOLIC BLOOD PRESSURE: 78 MMHG | RESPIRATION RATE: 20 BRPM | OXYGEN SATURATION: 97 % | TEMPERATURE: 98.7 F | SYSTOLIC BLOOD PRESSURE: 137 MMHG | WEIGHT: 258 LBS

## 2021-08-06 DIAGNOSIS — Z01.818 PREOPERATIVE TESTING: ICD-10-CM

## 2021-08-06 DIAGNOSIS — I25.10 CORONARY ARTERY DISEASE INVOLVING NATIVE CORONARY ARTERY WITHOUT ANGINA PECTORIS, UNSPECIFIED WHETHER NATIVE OR TRANSPLANTED HEART: ICD-10-CM

## 2021-08-06 DIAGNOSIS — I25.10 CORONARY ARTERY DISEASE INVOLVING NATIVE CORONARY ARTERY WITHOUT ANGINA PECTORIS, UNSPECIFIED WHETHER NATIVE OR TRANSPLANTED HEART: Primary | ICD-10-CM

## 2021-08-06 LAB
ALBUMIN SERPL-MCNC: 3.7 G/DL (ref 3.4–5)
ALBUMIN/GLOB SERPL: 1.1 {RATIO} (ref 0.8–1.7)
ALP SERPL-CCNC: 83 U/L (ref 45–117)
ALT SERPL-CCNC: 18 U/L (ref 16–61)
ANION GAP SERPL CALC-SCNC: 5 MMOL/L (ref 3–18)
APTT PPP: 37.4 SEC (ref 23–36.4)
AST SERPL-CCNC: 13 U/L (ref 10–38)
BASOPHILS # BLD: 0.1 K/UL (ref 0–0.1)
BASOPHILS NFR BLD: 1 % (ref 0–2)
BILIRUB DIRECT SERPL-MCNC: 0.2 MG/DL (ref 0–0.2)
BILIRUB SERPL-MCNC: 0.8 MG/DL (ref 0.2–1)
BUN SERPL-MCNC: 13 MG/DL (ref 7–18)
BUN/CREAT SERPL: 11 (ref 12–20)
CALCIUM SERPL-MCNC: 8.8 MG/DL (ref 8.5–10.1)
CHLORIDE SERPL-SCNC: 107 MMOL/L (ref 100–111)
CO2 SERPL-SCNC: 29 MMOL/L (ref 21–32)
CREAT SERPL-MCNC: 1.19 MG/DL (ref 0.6–1.3)
DIFFERENTIAL METHOD BLD: NORMAL
EOSINOPHIL # BLD: 0.1 K/UL (ref 0–0.4)
EOSINOPHIL NFR BLD: 2 % (ref 0–5)
ERYTHROCYTE [DISTWIDTH] IN BLOOD BY AUTOMATED COUNT: 13.7 % (ref 11.6–14.5)
EST. AVERAGE GLUCOSE BLD GHB EST-MCNC: 103 MG/DL
GLOBULIN SER CALC-MCNC: 3.4 G/DL (ref 2–4)
GLUCOSE SERPL-MCNC: 66 MG/DL (ref 74–99)
HBA1C MFR BLD: 5.2 % (ref 4.2–5.6)
HCT VFR BLD AUTO: 42.6 % (ref 36–48)
HGB BLD-MCNC: 14.6 G/DL (ref 13–16)
HISTORY CHECKED?,CKHIST: NORMAL
INR PPP: 2.3 (ref 0.8–1.2)
LYMPHOCYTES # BLD: 3.3 K/UL (ref 0.9–3.6)
LYMPHOCYTES NFR BLD: 37 % (ref 21–52)
MCH RBC QN AUTO: 33 PG (ref 24–34)
MCHC RBC AUTO-ENTMCNC: 34.3 G/DL (ref 31–37)
MCV RBC AUTO: 96.4 FL (ref 74–97)
MONOCYTES # BLD: 0.8 K/UL (ref 0.05–1.2)
MONOCYTES NFR BLD: 9 % (ref 3–10)
NEUTS SEG # BLD: 4.5 K/UL (ref 1.8–8)
NEUTS SEG NFR BLD: 51 % (ref 40–73)
PLATELET # BLD AUTO: 199 K/UL (ref 135–420)
PMV BLD AUTO: 10.3 FL (ref 9.2–11.8)
POTASSIUM SERPL-SCNC: 3.1 MMOL/L (ref 3.5–5.5)
PROT SERPL-MCNC: 7.1 G/DL (ref 6.4–8.2)
PROTHROMBIN TIME: 24.8 SEC (ref 11.5–15.2)
RBC # BLD AUTO: 4.42 M/UL (ref 4.35–5.65)
SODIUM SERPL-SCNC: 141 MMOL/L (ref 136–145)
TSH SERPL DL<=0.05 MIU/L-ACNC: 0.39 UIU/ML (ref 0.36–3.74)
WBC # BLD AUTO: 8.9 K/UL (ref 4.6–13.2)

## 2021-08-06 PROCEDURE — 99215 OFFICE O/P EST HI 40 MIN: CPT | Performed by: SPECIALIST

## 2021-08-06 PROCEDURE — 83036 HEMOGLOBIN GLYCOSYLATED A1C: CPT

## 2021-08-06 PROCEDURE — 85610 PROTHROMBIN TIME: CPT

## 2021-08-06 PROCEDURE — 80048 BASIC METABOLIC PNL TOTAL CA: CPT

## 2021-08-06 PROCEDURE — 85025 COMPLETE CBC W/AUTO DIFF WBC: CPT

## 2021-08-06 PROCEDURE — 85730 THROMBOPLASTIN TIME PARTIAL: CPT

## 2021-08-06 PROCEDURE — 93005 ELECTROCARDIOGRAM TRACING: CPT

## 2021-08-06 PROCEDURE — 86923 COMPATIBILITY TEST ELECTRIC: CPT

## 2021-08-06 PROCEDURE — 86901 BLOOD TYPING SEROLOGIC RH(D): CPT

## 2021-08-06 PROCEDURE — 80076 HEPATIC FUNCTION PANEL: CPT

## 2021-08-06 PROCEDURE — U0003 INFECTIOUS AGENT DETECTION BY NUCLEIC ACID (DNA OR RNA); SEVERE ACUTE RESPIRATORY SYNDROME CORONAVIRUS 2 (SARS-COV-2) (CORONAVIRUS DISEASE [COVID-19]), AMPLIFIED PROBE TECHNIQUE, MAKING USE OF HIGH THROUGHPUT TECHNOLOGIES AS DESCRIBED BY CMS-2020-01-R: HCPCS

## 2021-08-06 PROCEDURE — 36415 COLL VENOUS BLD VENIPUNCTURE: CPT

## 2021-08-06 PROCEDURE — 71046 X-RAY EXAM CHEST 2 VIEWS: CPT

## 2021-08-06 PROCEDURE — 84443 ASSAY THYROID STIM HORMONE: CPT

## 2021-08-06 RX ORDER — METOPROLOL TARTRATE 50 MG/1
50 TABLET ORAL 2 TIMES DAILY
COMMUNITY
End: 2021-08-16

## 2021-08-06 RX ORDER — ENOXAPARIN SODIUM 100 MG/ML
120 INJECTION SUBCUTANEOUS EVERY 12 HOURS
Qty: 5 SYRINGE | Refills: 0 | Status: SHIPPED | OUTPATIENT
Start: 2021-08-06 | End: 2021-08-08

## 2021-08-06 RX ORDER — ATORVASTATIN CALCIUM 40 MG/1
40 TABLET, FILM COATED ORAL DAILY
COMMUNITY
End: 2021-08-23 | Stop reason: SDUPTHER

## 2021-08-06 RX ORDER — MUPIROCIN 20 MG/G
OINTMENT TOPICAL DAILY
Qty: 22 G | Refills: 0 | Status: ON HOLD | COMMUNITY
Start: 2021-08-08 | End: 2021-08-11 | Stop reason: ALTCHOICE

## 2021-08-06 RX ORDER — CHLORHEXIDINE GLUCONATE 4 G/100ML
1 SOLUTION TOPICAL DAILY
Qty: 3 PACKET | Refills: 0 | Status: ON HOLD | COMMUNITY
Start: 2021-08-08 | End: 2021-08-11 | Stop reason: ALTCHOICE

## 2021-08-06 RX ORDER — AMIODARONE HYDROCHLORIDE 200 MG/1
400 TABLET ORAL 2 TIMES DAILY WITH MEALS
Qty: 20 TABLET | Refills: 0 | Status: ON HOLD | OUTPATIENT
Start: 2021-08-06 | End: 2021-08-11 | Stop reason: ALTCHOICE

## 2021-08-06 NOTE — LETTER
8/6/2021    Patient: Monica Quintanilla   YOB: 1960   Date of Visit: 8/6/2021     Vinny Oropeza MD  [address]    Dear Vinny Oropeza MD,      Thank you for referring Mr. Oliva Ramos to [address] for evaluation. My notes for this consultation are attached. If you have questions, please do not hesitate to call me. I look forward to following your patient along with you.       Sincerely,    Tr Williamson MD

## 2021-08-06 NOTE — PROGRESS NOTES
CARDIAC SURGERY FOLLOW-UP NOTE    8/6/2021  1:13 PM    Subjective:   Leonel Gerardo returns for a follow-up visit regarding scheduling and discussing CABG. I initially met the patient approximately 6 weeks ago and while he was found to have significant three-vessel disease at that time, he also developed at some point bilateral pulmonary emboli which required anticoagulation. He had significant right heart dysfunction which obviously made elective CABG unwise at that time. He has been on anticoagulation (Xarelto) and has subsequently had an echocardiogram which showed significant improvement of his right heart function. I have been in communication with Dr. Luis Longoria regarding the timing of the surgery. Patient is here now to schedule the surgery and to discuss the plan. He feels fairly well but does have symptoms consistent with angina with exertion. Shortness of breath is present. His son is to be  in approximately 6 weeks and he is hoping to get the surgery done as soon as possible now that it is safe to do so. Current medications include:  Current Outpatient Medications   Medication Sig Dispense Refill    furosemide (LASIX) 40 mg tablet TAKE 1 TABLET BY MOUTH TWO (2) TIMES A DAY FOR 30 DAYS. 60 Tablet 0    isosorbide mononitrate ER (IMDUR) 30 mg tablet Take 1 Tablet by mouth every morning. (Patient taking differently: Take 60 mg by mouth two (2) times a day.) 90 Tablet 3    rivaroxaban (XARELTO) 20 mg tab tablet Take 1 Tablet by mouth daily. 90 Tablet 1    tiotropium-olodateroL (Stiolto Respimat) 2.5-2.5 mcg/actuation inhaler Take 2 Puffs by inhalation daily. 1 Inhaler 0    nitroglycerin (NITROLINGUAL) 400 mcg/spray spray SPRAY ONE SPRAY UNDER THE TONGUE AS NEEDED MAY REPEAT UP TO 2 TIMES AS DIRECTED  12 g 5    aspirin delayed-release 81 mg tablet   1    colchicine (Mitigare) 0.6 mg capsule Take 1 Capsule by mouth daily.  (Patient not taking: Reported on 8/6/2021) 30 Capsule 2    predniSONE (DELTASONE) 20 mg tablet 3 for 2 days then 2 for 2 days then 1 for 2 days then 1/2 for 2 days (Patient not taking: Reported on 8/6/2021) 14 Tablet 0       Objective:   Vital signs:    BP Readings from Last 3 Encounters:   08/06/21 137/78   07/26/21 120/60   07/26/21 120/70     Wt Readings from Last 3 Encounters:   08/06/21 117 kg (258 lb)   07/26/21 116.6 kg (257 lb)   07/26/21 114.8 kg (253 lb)     @TMAX(24)@  Wt Readings from Last 3 Encounters:   08/06/21 117 kg (258 lb)   07/26/21 116.6 kg (257 lb)   07/26/21 114.8 kg (253 lb)     Ht Readings from Last 3 Encounters:   08/06/21 5' 10.5\" (1.791 m)   07/26/21 5' 10\" (1.778 m)   07/26/21 5' 10\" (1.778 m)     Respiratory exam: clear to auscultation bilaterally. Cardiac exam: regular rate and rhythm   Pedal edema: moderate. Mild varicosities are noted. Assessment:  The patient is a 55-year-old gentleman who needs coronary artery bypass grafting. Given his recent massive pulmonary embolisms, I would prefer to bridge the patient off of Xarelto with Lovenox for 2-3 days preoperatively as opposed to just holding Xarelto. We will set him up for this. I discussed the surgery at length with the patient and his wife. All questions were answered. The risks and benefits were explained in detail. The patient is scheduled to have surgery tentatively next Wednesday (August 11, 2021). Thank you for allowing me to assist with the care of this patient.     Wendy Matthews MD [facility]  Chief, Cardiothoracic Surgery   [facility]  [phone]

## 2021-08-06 NOTE — PROGRESS NOTES
Met with patient after consultation to discuss scheduling him for surgery on Weds. Dispensed Bactroban and Hibiclens medication. Gave him his instructions as follows:  Surgical Instructions for Charlee Lombardi:     Stop taking your aspirin, after taking the dose on Sunday. No aspirin on Monday or Tuesday.  Stop taking your Xarelto medication on Sunday.  Start taking the enoxaparin (Lovenox) injections on Monday and Tuesday, twice each day as instructed.  Start taking the amiodarone (Cordarone) medication as instructed. Rx was e-scribed to your [employer]. · Complete showers using the Hibiclens surgical soap on Sunday night, Monday night, and Tuesday night. Use soap on chest/sternal area only. **DO NOT wash face or privates with the surgical soap. · Use Bactroban nasal ointment to both nostrils as instructed after each shower. · Nothing to eat or drink after midnight on Tuesday night. · The morning of surgery take only your metoprolol tartrate (Lopressor) medication with a very small sip of water. · Check in at 6:30am Wednesday morning to [facility]. · Please bring all medications bottles to the hospital the morning of surgery. Call [phone] if any questions or concerns. Patient verbalized understanding and I escorted him down to registration to get his testing done today.

## 2021-08-07 LAB
ATRIAL RATE: 81 BPM
CALCULATED P AXIS, ECG09: 49 DEGREES
CALCULATED R AXIS, ECG10: -46 DEGREES
CALCULATED T AXIS, ECG11: 40 DEGREES
DIAGNOSIS, 93000: NORMAL
P-R INTERVAL, ECG05: 172 MS
Q-T INTERVAL, ECG07: 416 MS
QRS DURATION, ECG06: 96 MS
QTC CALCULATION (BEZET), ECG08: 483 MS
SARS-COV-2, COV2NT: NOT DETECTED
VENTRICULAR RATE, ECG03: 81 BPM

## 2021-08-09 ENCOUNTER — TELEPHONE (OUTPATIENT)
Dept: CARDIOTHORACIC SURGERY | Age: 61
End: 2021-08-09

## 2021-08-09 NOTE — TELEPHONE ENCOUNTER
S/w Kiki Beard on Friday to start Tobias Smoke for pt's upcoming heart surgery on 8/11. Faxed clinicals to fax#[phone]. Called today to check status of pending auth# [id number], per Abdiel Landis. It is still pending.

## 2021-08-10 ENCOUNTER — ANESTHESIA EVENT (OUTPATIENT)
Dept: CARDIOTHORACIC SURGERY | Age: 61
DRG: 236 | End: 2021-08-10
Payer: COMMERCIAL

## 2021-08-10 ENCOUNTER — TELEPHONE (OUTPATIENT)
Dept: CARDIOTHORACIC SURGERY | Age: 61
End: 2021-08-10

## 2021-08-10 PROBLEM — K43.9 VENTRAL HERNIA WITHOUT OBSTRUCTION OR GANGRENE: Status: ACTIVE | Noted: 2018-08-28

## 2021-08-10 PROBLEM — I10 ESSENTIAL HYPERTENSION: Status: ACTIVE | Noted: 2018-09-13

## 2021-08-10 PROBLEM — Z95.5 PRESENCE OF DRUG COATED STENT IN LEFT CIRCUMFLEX CORONARY ARTERY: Status: ACTIVE | Noted: 2018-09-20

## 2021-08-10 NOTE — TELEPHONE ENCOUNTER
S/w Rena at [facility]. She stated that surgery on 8/11/21 for CPT 91716, [id number] has been approved for seven days (8/11-8/18).   Auth# [id number]

## 2021-08-11 ENCOUNTER — HOSPITAL ENCOUNTER (INPATIENT)
Age: 61
LOS: 5 days | Discharge: HOME HEALTH | DRG: 236 | End: 2021-08-16
Attending: SPECIALIST | Admitting: SPECIALIST
Payer: COMMERCIAL

## 2021-08-11 ENCOUNTER — ANESTHESIA (OUTPATIENT)
Dept: CARDIOTHORACIC SURGERY | Age: 61
DRG: 236 | End: 2021-08-11
Payer: COMMERCIAL

## 2021-08-11 ENCOUNTER — APPOINTMENT (OUTPATIENT)
Dept: GENERAL RADIOLOGY | Age: 61
DRG: 236 | End: 2021-08-11
Attending: PHYSICIAN ASSISTANT
Payer: COMMERCIAL

## 2021-08-11 DIAGNOSIS — I25.119 CORONARY ARTERY DISEASE INVOLVING NATIVE CORONARY ARTERY OF NATIVE HEART WITH ANGINA PECTORIS (HCC): ICD-10-CM

## 2021-08-11 DIAGNOSIS — F17.200 TOBACCO DEPENDENCE: ICD-10-CM

## 2021-08-11 DIAGNOSIS — Z95.1 S/P CABG X 3: Primary | ICD-10-CM

## 2021-08-11 LAB
ABO + RH BLD: NORMAL
ADMINISTERED INITIALS, ADMINIT: 0
ADMINISTERED INITIALS, ADMINIT: NORMAL
ANION GAP BLD CALC-SCNC: 8 MMOL/L (ref 10–20)
ANION GAP BLD CALC-SCNC: 9 MMOL/L (ref 10–20)
ANION GAP SERPL CALC-SCNC: 6 MMOL/L (ref 3–18)
APTT PPP: 28.8 SEC (ref 23–36.4)
ARTERIAL PATENCY WRIST A: ABNORMAL
ARTERIAL PATENCY WRIST A: POSITIVE
ATRIAL RATE: 62 BPM
BASE DEFICIT BLD-SCNC: 0.3 MMOL/L
BASE DEFICIT BLD-SCNC: 0.6 MMOL/L
BASE DEFICIT BLD-SCNC: 0.8 MMOL/L
BASE DEFICIT BLD-SCNC: 1.2 MMOL/L
BASE DEFICIT BLD-SCNC: 2.1 MMOL/L
BASE DEFICIT BLD-SCNC: 2.3 MMOL/L
BASE DEFICIT BLD-SCNC: 2.5 MMOL/L
BASE EXCESS BLD CALC-SCNC: 0.2 MMOL/L
BASE EXCESS BLD CALC-SCNC: 0.4 MMOL/L
BASOPHILS # BLD: 0 K/UL (ref 0–0.1)
BASOPHILS NFR BLD: 0 % (ref 0–2)
BDY SITE: ABNORMAL
BLD PROD TYP BPU: NORMAL
BLD PROD TYP BPU: NORMAL
BLOOD GROUP ANTIBODIES SERPL: NORMAL
BODY TEMPERATURE: 100
BODY TEMPERATURE: 98.1
BODY TEMPERATURE: 98.8
BODY TEMPERATURE: 98.8
BPU ID: NORMAL
BPU ID: NORMAL
BUN SERPL-MCNC: 14 MG/DL (ref 7–18)
BUN/CREAT SERPL: 10 (ref 12–20)
CA-I BLD-MCNC: 0.97 MMOL/L (ref 1.12–1.32)
CA-I BLD-MCNC: 1.04 MMOL/L (ref 1.12–1.32)
CA-I BLD-MCNC: 1.07 MMOL/L (ref 1.12–1.32)
CA-I BLD-MCNC: 1.1 MMOL/L (ref 1.12–1.32)
CA-I BLD-MCNC: 1.17 MMOL/L (ref 1.12–1.32)
CA-I BLD-MCNC: 1.18 MMOL/L (ref 1.12–1.32)
CA-I BLD-MCNC: 1.2 MMOL/L (ref 1.12–1.32)
CALCIUM SERPL-MCNC: 7.5 MG/DL (ref 8.5–10.1)
CALCULATED P AXIS, ECG09: 52 DEGREES
CALCULATED R AXIS, ECG10: -63 DEGREES
CALCULATED T AXIS, ECG11: 26 DEGREES
CHLORIDE BLD-SCNC: 100 MMOL/L (ref 98–107)
CHLORIDE BLD-SCNC: 101 MMOL/L (ref 98–107)
CHLORIDE BLD-SCNC: 103 MMOL/L (ref 98–107)
CHLORIDE BLD-SCNC: 103 MMOL/L (ref 98–107)
CHLORIDE BLD-SCNC: 107 MMOL/L (ref 98–107)
CHLORIDE BLD-SCNC: 108 MMOL/L (ref 98–107)
CHLORIDE BLD-SCNC: 111 MMOL/L (ref 98–107)
CHLORIDE BLD-SCNC: 99 MMOL/L (ref 98–107)
CHLORIDE BLD-SCNC: 99 MMOL/L (ref 98–107)
CHLORIDE SERPL-SCNC: 106 MMOL/L (ref 100–111)
CO2 BLD-SCNC: 24 MMOL/L (ref 19–24)
CO2 BLD-SCNC: 25 MMOL/L (ref 19–24)
CO2 BLD-SCNC: 26 MMOL/L (ref 19–24)
CO2 BLD-SCNC: 26 MMOL/L (ref 19–24)
CO2 BLD-SCNC: 27 MMOL/L (ref 19–24)
CO2 BLD-SCNC: 27 MMOL/L (ref 19–24)
CO2 BLD-SCNC: 30 MMOL/L (ref 19–24)
CO2 SERPL-SCNC: 28 MMOL/L (ref 21–32)
CREAT BLD-MCNC: 1.41 MG/DL (ref 0.6–1.3)
CREAT BLD-MCNC: 1.46 MG/DL (ref 0.6–1.3)
CREAT BLD-MCNC: 1.49 MG/DL (ref 0.6–1.3)
CREAT BLD-MCNC: 1.51 MG/DL (ref 0.6–1.3)
CREAT BLD-MCNC: 1.56 MG/DL (ref 0.6–1.3)
CREAT BLD-MCNC: 1.59 MG/DL (ref 0.6–1.3)
CREAT BLD-MCNC: 1.61 MG/DL (ref 0.6–1.3)
CREAT BLD-MCNC: 1.66 MG/DL (ref 0.6–1.3)
CREAT BLD-MCNC: 1.71 MG/DL (ref 0.6–1.3)
CREAT SERPL-MCNC: 1.45 MG/DL (ref 0.6–1.3)
CROSSMATCH RESULT,%XM: NORMAL
CROSSMATCH RESULT,%XM: NORMAL
D50 ADMINISTERED, D50ADM: 0 ML
D50 ORDER, D50ORD: 0 ML
DIAGNOSIS, 93000: NORMAL
DIFFERENTIAL METHOD BLD: ABNORMAL
EOSINOPHIL # BLD: 0.1 K/UL (ref 0–0.4)
EOSINOPHIL NFR BLD: 0 % (ref 0–5)
ERYTHROCYTE [DISTWIDTH] IN BLOOD BY AUTOMATED COUNT: 13.8 % (ref 11.6–14.5)
FIO2 ON VENT: 100 %
GAS FLOW.O2 O2 DELIVERY SYS: ABNORMAL L/MIN
GAS FLOW.O2 SETTING OXYMISER: 16 BPM
GAS FLOW.O2 SETTING OXYMISER: 23 BPM
GLUCOSE BLD STRIP.AUTO-MCNC: 102 MG/DL (ref 70–110)
GLUCOSE BLD STRIP.AUTO-MCNC: 109 MG/DL (ref 74–106)
GLUCOSE BLD STRIP.AUTO-MCNC: 111 MG/DL (ref 70–110)
GLUCOSE BLD STRIP.AUTO-MCNC: 111 MG/DL (ref 70–110)
GLUCOSE BLD STRIP.AUTO-MCNC: 125 MG/DL (ref 70–110)
GLUCOSE BLD STRIP.AUTO-MCNC: 131 MG/DL (ref 74–106)
GLUCOSE BLD STRIP.AUTO-MCNC: 149 MG/DL (ref 74–106)
GLUCOSE BLD STRIP.AUTO-MCNC: 154 MG/DL (ref 74–106)
GLUCOSE BLD STRIP.AUTO-MCNC: 154 MG/DL (ref 74–106)
GLUCOSE BLD STRIP.AUTO-MCNC: 161 MG/DL (ref 70–110)
GLUCOSE BLD STRIP.AUTO-MCNC: 176 MG/DL (ref 74–106)
GLUCOSE BLD STRIP.AUTO-MCNC: 88 MG/DL (ref 74–106)
GLUCOSE BLD STRIP.AUTO-MCNC: 92 MG/DL (ref 70–110)
GLUCOSE BLD-MCNC: 106 MG/DL (ref 65–100)
GLUCOSE BLD-MCNC: 122 MG/DL (ref 65–100)
GLUCOSE SERPL-MCNC: 129 MG/DL (ref 74–99)
GLUCOSE, GLC: 102 MG/DL
GLUCOSE, GLC: 106 MG/DL
GLUCOSE, GLC: 111 MG/DL
GLUCOSE, GLC: 111 MG/DL
GLUCOSE, GLC: 122 MG/DL
GLUCOSE, GLC: 125 MG/DL
GLUCOSE, GLC: 131 MG/DL
GLUCOSE, GLC: 135 MG/DL
GLUCOSE, GLC: 161 MG/DL
GLUCOSE, GLC: 92 MG/DL
HCO3 BLD-SCNC: 23.1 MMOL/L (ref 22–26)
HCO3 BLD-SCNC: 23.2 MMOL/L (ref 22–26)
HCO3 BLD-SCNC: 23.6 MMOL/L (ref 22–26)
HCO3 BLD-SCNC: 23.7 MMOL/L (ref 22–26)
HCO3 BLD-SCNC: 24.1 MMOL/L (ref 22–26)
HCO3 BLD-SCNC: 24.8 MMOL/L (ref 22–26)
HCO3 BLD-SCNC: 25.3 MMOL/L (ref 22–26)
HCO3 BLD-SCNC: 25.5 MMOL/L (ref 22–26)
HCO3 BLD-SCNC: 25.8 MMOL/L (ref 22–26)
HCO3 BLD-SCNC: 25.9 MMOL/L (ref 22–26)
HCO3 BLD-SCNC: 26.1 MMOL/L (ref 22–26)
HCO3 BLD-SCNC: 29.3 MMOL/L (ref 22–26)
HCT VFR BLD AUTO: 38.2 % (ref 36–48)
HCT VFR BLD CALC: 29 % (ref 36–49)
HCT VFR BLD CALC: 29 % (ref 36–49)
HCT VFR BLD CALC: 31 % (ref 36–49)
HCT VFR BLD CALC: 31 % (ref 36–49)
HCT VFR BLD CALC: 32 % (ref 36–49)
HCT VFR BLD CALC: 35 % (ref 36–49)
HCT VFR BLD CALC: 38 % (ref 36–49)
HGB BLD-MCNC: 10 G/DL (ref 12–16)
HGB BLD-MCNC: 10.5 G/DL (ref 12–16)
HGB BLD-MCNC: 10.5 G/DL (ref 12–16)
HGB BLD-MCNC: 10.8 G/DL (ref 12–16)
HGB BLD-MCNC: 12 G/DL (ref 12–16)
HGB BLD-MCNC: 12.9 G/DL (ref 12–16)
HGB BLD-MCNC: 13 G/DL (ref 13–16)
HGB BLD-MCNC: 9.8 G/DL (ref 12–16)
HIGH TARGET, HITG: 150 MG/DL
INR PPP: 1.1 (ref 0.8–1.2)
INSULIN ADMINSTERED, INSADM: 1 UNITS/HOUR
INSULIN ADMINSTERED, INSADM: 1.2 UNITS/HOUR
INSULIN ADMINSTERED, INSADM: 1.3 UNITS/HOUR
INSULIN ADMINSTERED, INSADM: 1.4 UNITS/HOUR
INSULIN ADMINSTERED, INSADM: 1.5 UNITS/HOUR
INSULIN ADMINSTERED, INSADM: 2 UNITS/HOUR
INSULIN ADMINSTERED, INSADM: 2.1 UNITS/HOUR
INSULIN ADMINSTERED, INSADM: 3 UNITS/HOUR
INSULIN ORDER, INSORD: 1 UNITS/HOUR
INSULIN ORDER, INSORD: 1.2 UNITS/HOUR
INSULIN ORDER, INSORD: 1.3 UNITS/HOUR
INSULIN ORDER, INSORD: 1.4 UNITS/HOUR
INSULIN ORDER, INSORD: 1.5 UNITS/HOUR
INSULIN ORDER, INSORD: 2 UNITS/HOUR
INSULIN ORDER, INSORD: 2.1 UNITS/HOUR
INSULIN ORDER, INSORD: 3 UNITS/HOUR
LACTATE BLD-SCNC: 1.8 MMOL/L (ref 0.4–2)
LACTATE BLD-SCNC: 1.9 MMOL/L (ref 0.4–2)
LACTATE BLD-SCNC: 2.46 MMOL/L (ref 0.4–2)
LOW TARGET, LOT: 80 MG/DL
LYMPHOCYTES # BLD: 2 K/UL (ref 0.9–3.6)
LYMPHOCYTES NFR BLD: 15 % (ref 21–52)
MAGNESIUM SERPL-MCNC: 2.7 MG/DL (ref 1.6–2.6)
MCH RBC QN AUTO: 33.3 PG (ref 24–34)
MCHC RBC AUTO-ENTMCNC: 34 G/DL (ref 31–37)
MCV RBC AUTO: 97.9 FL (ref 74–97)
MINUTES UNTIL NEXT BG, NBG: 60 MIN
MONOCYTES # BLD: 0.9 K/UL (ref 0.05–1.2)
MONOCYTES NFR BLD: 7 % (ref 3–10)
MULTIPLIER, MUL: 0.02
MULTIPLIER, MUL: 0.02
MULTIPLIER, MUL: 0.03
NEUTS SEG # BLD: 10.4 K/UL (ref 1.8–8)
NEUTS SEG NFR BLD: 77 % (ref 40–73)
O2/TOTAL GAS SETTING VFR VENT: 40 %
O2/TOTAL GAS SETTING VFR VENT: 44 %
ORDER INITIALS, ORDINIT: NORMAL
P-R INTERVAL, ECG05: 202 MS
PAW @ MEAN EXP FLOW ON VENT: 65 CMH2O
PCO2 BLD: 37.3 MMHG (ref 35–45)
PCO2 BLD: 39 MMHG (ref 35–45)
PCO2 BLD: 39.6 MMHG (ref 35–45)
PCO2 BLD: 39.9 MMHG (ref 35–45)
PCO2 BLD: 41.8 MMHG (ref 35–45)
PCO2 BLD: 45.6 MMHG (ref 35–45)
PCO2 BLD: 47.8 MMHG (ref 35–45)
PCO2 BLD: 49 MMHG (ref 35–45)
PCO2 BLD: 50.4 MMHG (ref 35–45)
PCO2 BLD: 50.9 MMHG (ref 35–45)
PCO2 BLD: 51.4 MMHG (ref 35–45)
PCO2 BLD: 68.7 MMHG (ref 35–45)
PEEP RESPIRATORY: 5 CMH2O
PEEP RESPIRATORY: 5 CMH2O
PEEP RESPIRATORY: 5 CM[H2O]
PH BLD: 7.24 [PH] (ref 7.35–7.45)
PH BLD: 7.31 [PH] (ref 7.35–7.45)
PH BLD: 7.32 [PH] (ref 7.35–7.45)
PH BLD: 7.33 [PH] (ref 7.35–7.45)
PH BLD: 7.33 [PH] (ref 7.35–7.45)
PH BLD: 7.35 [PH] (ref 7.35–7.45)
PH BLD: 7.37 [PH] (ref 7.35–7.45)
PH BLD: 7.4 [PH] (ref 7.35–7.45)
PH BLD: 7.41 [PH] (ref 7.35–7.45)
PH BLD: 7.41 [PH] (ref 7.35–7.45)
PLATELET # BLD AUTO: 129 K/UL (ref 135–420)
PMV BLD AUTO: 10.2 FL (ref 9.2–11.8)
PO2 BLD: 180 MMHG (ref 80–100)
PO2 BLD: 200 MMHG (ref 80–100)
PO2 BLD: 216 MMHG (ref 80–100)
PO2 BLD: 226 MMHG (ref 80–100)
PO2 BLD: 232 MMHG (ref 80–100)
PO2 BLD: 432 MMHG (ref 80–100)
PO2 BLD: 54 MMHG (ref 80–100)
PO2 BLD: 67 MMHG (ref 80–100)
PO2 BLD: 70 MMHG (ref 80–100)
PO2 BLD: 72 MMHG (ref 80–100)
PO2 BLD: 81 MMHG (ref 80–100)
PO2 BLD: 85 MMHG (ref 80–100)
POTASSIUM BLD-SCNC: 3.1 MMOL/L (ref 3.5–5.5)
POTASSIUM BLD-SCNC: 3.3 MMOL/L (ref 3.5–5.5)
POTASSIUM BLD-SCNC: 3.4 MMOL/L (ref 3.5–5.1)
POTASSIUM BLD-SCNC: 3.4 MMOL/L (ref 3.5–5.5)
POTASSIUM BLD-SCNC: 3.6 MMOL/L (ref 3.5–5.5)
POTASSIUM BLD-SCNC: 4.2 MMOL/L (ref 3.5–5.1)
POTASSIUM BLD-SCNC: 4.2 MMOL/L (ref 3.5–5.5)
POTASSIUM BLD-SCNC: 4.2 MMOL/L (ref 3.5–5.5)
POTASSIUM BLD-SCNC: 4.9 MMOL/L (ref 3.5–5.5)
POTASSIUM SERPL-SCNC: 4.1 MMOL/L (ref 3.5–5.5)
PRESSURE SUPPORT SETTING VENT: 7 CMH2O
PRESSURE SUPPORT SETTING VENT: 7 CM[H2O]
PROTHROMBIN TIME: 14.3 SEC (ref 11.5–15.2)
Q-T INTERVAL, ECG07: 482 MS
QRS DURATION, ECG06: 92 MS
QTC CALCULATION (BEZET), ECG08: 489 MS
RBC # BLD AUTO: 3.9 M/UL (ref 4.35–5.65)
SAO2 % BLD: 100 %
SAO2 % BLD: 88.1 % (ref 92–97)
SAO2 % BLD: 92.2 % (ref 92–97)
SAO2 % BLD: 92.3 % (ref 92–97)
SAO2 % BLD: 94 %
SAO2 % BLD: 95.5 % (ref 92–97)
SAO2 % BLD: 95.5 % (ref 92–97)
SAO2 % BLD: 99.5 % (ref 92–97)
SAO2 % BLD: 99.7 % (ref 92–97)
SAO2 % BLD: 99.8 % (ref 92–97)
SERVICE CMNT-IMP: ABNORMAL
SODIUM BLD-SCNC: 135 MMOL/L (ref 136–145)
SODIUM BLD-SCNC: 136 MMOL/L (ref 136–145)
SODIUM BLD-SCNC: 138 MMOL/L (ref 136–145)
SODIUM BLD-SCNC: 140 MMOL/L (ref 136–145)
SODIUM BLD-SCNC: 141 MMOL/L (ref 136–145)
SODIUM BLD-SCNC: 141 MMOL/L (ref 136–145)
SODIUM BLD-SCNC: 142 MMOL/L (ref 136–145)
SODIUM SERPL-SCNC: 140 MMOL/L (ref 136–145)
SPECIMEN EXP DATE BLD: NORMAL
SPECIMEN SITE: ABNORMAL
SPECIMEN SITE: ABNORMAL
SPECIMEN TYPE: ABNORMAL
STATUS OF UNIT,%ST: NORMAL
STATUS OF UNIT,%ST: NORMAL
UNIT DIVISION, %UDIV: 0
UNIT DIVISION, %UDIV: 0
VENTILATION MODE VENT: ABNORMAL
VENTRICULAR RATE, ECG03: 62 BPM
VT SETTING VENT: 500 ML
VT SETTING VENT: 570 ML
WBC # BLD AUTO: 13.5 K/UL (ref 4.6–13.2)

## 2021-08-11 PROCEDURE — 77030013797 HC KT TRNSDUC PRSSR EDWD -A: Performed by: ANESTHESIOLOGY

## 2021-08-11 PROCEDURE — 74011000250 HC RX REV CODE- 250: Performed by: SPECIALIST

## 2021-08-11 PROCEDURE — 77030026855 HC PNCH AORT MYO AEMC -B: Performed by: SPECIALIST

## 2021-08-11 PROCEDURE — 77030002933 HC SUT MCRYL J&J -A: Performed by: SPECIALIST

## 2021-08-11 PROCEDURE — 83735 ASSAY OF MAGNESIUM: CPT

## 2021-08-11 PROCEDURE — 84295 ASSAY OF SERUM SODIUM: CPT

## 2021-08-11 PROCEDURE — 77030020178 HC LP VESL TW QUES -B: Performed by: SPECIALIST

## 2021-08-11 PROCEDURE — 77030019605: Performed by: SPECIALIST

## 2021-08-11 PROCEDURE — 74011636637 HC RX REV CODE- 636/637: Performed by: ANESTHESIOLOGY

## 2021-08-11 PROCEDURE — 06BQ4ZZ EXCISION OF LEFT SAPHENOUS VEIN, PERCUTANEOUS ENDOSCOPIC APPROACH: ICD-10-PCS | Performed by: SPECIALIST

## 2021-08-11 PROCEDURE — 77030007667 HC INSRT SUT HLD MEDT -B: Performed by: SPECIALIST

## 2021-08-11 PROCEDURE — 33508 ENDOSCOPIC VEIN HARVEST: CPT | Performed by: SPECIALIST

## 2021-08-11 PROCEDURE — 77030008771 HC TU NG SALEM SUMP -A: Performed by: ANESTHESIOLOGY

## 2021-08-11 PROCEDURE — 77030026438 HC STYL ET INTUB CARD -A: Performed by: ANESTHESIOLOGY

## 2021-08-11 PROCEDURE — C1751 CATH, INF, PER/CENT/MIDLINE: HCPCS | Performed by: SPECIALIST

## 2021-08-11 PROCEDURE — 77030038692 HC WND DEB SYS IRMX -B: Performed by: SPECIALIST

## 2021-08-11 PROCEDURE — 71045 X-RAY EXAM CHEST 1 VIEW: CPT

## 2021-08-11 PROCEDURE — 85025 COMPLETE CBC W/AUTO DIFF WBC: CPT

## 2021-08-11 PROCEDURE — 77030040392 HC DRSG OPTIFOAM MDII -A

## 2021-08-11 PROCEDURE — 74011000250 HC RX REV CODE- 250

## 2021-08-11 PROCEDURE — 77030002982 HC SUT POLYSRB J&J -A: Performed by: SPECIALIST

## 2021-08-11 PROCEDURE — 02100Z9 BYPASS CORONARY ARTERY, ONE ARTERY FROM LEFT INTERNAL MAMMARY, OPEN APPROACH: ICD-10-PCS | Performed by: SPECIALIST

## 2021-08-11 PROCEDURE — 94640 AIRWAY INHALATION TREATMENT: CPT

## 2021-08-11 PROCEDURE — P9045 ALBUMIN (HUMAN), 5%, 250 ML: HCPCS | Performed by: PHYSICIAN ASSISTANT

## 2021-08-11 PROCEDURE — 77030002987 HC SUT PROL J&J -B: Performed by: SPECIALIST

## 2021-08-11 PROCEDURE — 5A1221Z PERFORMANCE OF CARDIAC OUTPUT, CONTINUOUS: ICD-10-PCS | Performed by: SPECIALIST

## 2021-08-11 PROCEDURE — 82947 ASSAY GLUCOSE BLOOD QUANT: CPT

## 2021-08-11 PROCEDURE — 77030013079 HC BLNKT BAIR HGGR 3M -A: Performed by: ANESTHESIOLOGY

## 2021-08-11 PROCEDURE — 74011250636 HC RX REV CODE- 250/636: Performed by: ANESTHESIOLOGY

## 2021-08-11 PROCEDURE — 77030040922 HC BLNKT HYPOTHRM STRY -A: Performed by: SPECIALIST

## 2021-08-11 PROCEDURE — 77030010929 HC CLMP VASC FGRTY EDWD -B: Performed by: SPECIALIST

## 2021-08-11 PROCEDURE — 00567 ANES DIRECT CABG W/PUMP: CPT | Performed by: ANESTHESIOLOGY

## 2021-08-11 PROCEDURE — 76060000040 HC ANESTHESIA 4.5 TO 5 HR: Performed by: SPECIALIST

## 2021-08-11 PROCEDURE — 82962 GLUCOSE BLOOD TEST: CPT

## 2021-08-11 PROCEDURE — 74011636637 HC RX REV CODE- 636/637: Performed by: SPECIALIST

## 2021-08-11 PROCEDURE — 77030008500 HC TBNG CONN PRSS BD -A: Performed by: ANESTHESIOLOGY

## 2021-08-11 PROCEDURE — 74011250637 HC RX REV CODE- 250/637: Performed by: PHYSICIAN ASSISTANT

## 2021-08-11 PROCEDURE — 77030026918 HC ADMN ST IV BLD BD -A: Performed by: ANESTHESIOLOGY

## 2021-08-11 PROCEDURE — 74011000258 HC RX REV CODE- 258: Performed by: SPECIALIST

## 2021-08-11 PROCEDURE — 77030002986 HC SUT PROL J&J -A: Performed by: SPECIALIST

## 2021-08-11 PROCEDURE — 33533 CABG ARTERIAL SINGLE: CPT | Performed by: SPECIALIST

## 2021-08-11 PROCEDURE — 77030016037 HC MANFLD MULTI QUES -B: Performed by: ANESTHESIOLOGY

## 2021-08-11 PROCEDURE — 74011000250 HC RX REV CODE- 250: Performed by: ANESTHESIOLOGY

## 2021-08-11 PROCEDURE — 77030008683 HC TU ET CUF COVD -A: Performed by: ANESTHESIOLOGY

## 2021-08-11 PROCEDURE — 77030002912 HC SUT ETHBND J&J -A: Performed by: SPECIALIST

## 2021-08-11 PROCEDURE — 36556 INSERT NON-TUNNEL CV CATH: CPT | Performed by: ANESTHESIOLOGY

## 2021-08-11 PROCEDURE — 74011250637 HC RX REV CODE- 250/637: Performed by: SPECIALIST

## 2021-08-11 PROCEDURE — 77030034848: Performed by: SPECIALIST

## 2021-08-11 PROCEDURE — 76010000199 HC CV SURG 4.5 TO 5 HR INTENSV-TIER 1: Performed by: SPECIALIST

## 2021-08-11 PROCEDURE — 77030022199 HC SYS HARV VESL GTNG -G1: Performed by: SPECIALIST

## 2021-08-11 PROCEDURE — 2709999900 HC NON-CHARGEABLE SUPPLY

## 2021-08-11 PROCEDURE — 85610 PROTHROMBIN TIME: CPT

## 2021-08-11 PROCEDURE — 65620000000 HC RM CCU GENERAL

## 2021-08-11 PROCEDURE — 77030010818: Performed by: SPECIALIST

## 2021-08-11 PROCEDURE — 77030012390 HC DRN CHST BTL GTNG -B: Performed by: SPECIALIST

## 2021-08-11 PROCEDURE — 74011250636 HC RX REV CODE- 250/636: Performed by: SPECIALIST

## 2021-08-11 PROCEDURE — 74011000258 HC RX REV CODE- 258

## 2021-08-11 PROCEDURE — 77030002996 HC SUT SLK J&J -A: Performed by: SPECIALIST

## 2021-08-11 PROCEDURE — 021109W BYPASS CORONARY ARTERY, TWO ARTERIES FROM AORTA WITH AUTOLOGOUS VENOUS TISSUE, OPEN APPROACH: ICD-10-PCS | Performed by: SPECIALIST

## 2021-08-11 PROCEDURE — 2709999900 HC NON-CHARGEABLE SUPPLY: Performed by: SPECIALIST

## 2021-08-11 PROCEDURE — 77030018390 HC SPNG HEMSTAT2 J&J -B: Performed by: SPECIALIST

## 2021-08-11 PROCEDURE — 82803 BLOOD GASES ANY COMBINATION: CPT

## 2021-08-11 PROCEDURE — 80048 BASIC METABOLIC PNL TOTAL CA: CPT

## 2021-08-11 PROCEDURE — C1729 CATH, DRAINAGE: HCPCS | Performed by: SPECIALIST

## 2021-08-11 PROCEDURE — B246ZZ4 ULTRASONOGRAPHY OF RIGHT AND LEFT HEART, TRANSESOPHAGEAL: ICD-10-PCS | Performed by: ANESTHESIOLOGY

## 2021-08-11 PROCEDURE — 85730 THROMBOPLASTIN TIME PARTIAL: CPT

## 2021-08-11 PROCEDURE — 77030005401 HC CATH RAD ARRO -A: Performed by: ANESTHESIOLOGY

## 2021-08-11 PROCEDURE — 74011000272 HC RX REV CODE- 272: Performed by: SPECIALIST

## 2021-08-11 PROCEDURE — 77030014491 HC PLEDG PTFE BARD -A: Performed by: SPECIALIST

## 2021-08-11 PROCEDURE — 36620 INSERTION CATHETER ARTERY: CPT | Performed by: ANESTHESIOLOGY

## 2021-08-11 PROCEDURE — 77030040361 HC SLV COMPR DVT MDII -B: Performed by: SPECIALIST

## 2021-08-11 PROCEDURE — 77030002916 HC SUT ETHLN J&J -A: Performed by: SPECIALIST

## 2021-08-11 PROCEDURE — 74011250636 HC RX REV CODE- 250/636: Performed by: STUDENT IN AN ORGANIZED HEALTH CARE EDUCATION/TRAINING PROGRAM

## 2021-08-11 PROCEDURE — C1769 GUIDE WIRE: HCPCS | Performed by: SPECIALIST

## 2021-08-11 PROCEDURE — 74011636637 HC RX REV CODE- 636/637

## 2021-08-11 PROCEDURE — 74011000250 HC RX REV CODE- 250: Performed by: PHYSICIAN ASSISTANT

## 2021-08-11 PROCEDURE — 93005 ELECTROCARDIOGRAM TRACING: CPT

## 2021-08-11 PROCEDURE — 94002 VENT MGMT INPAT INIT DAY: CPT

## 2021-08-11 PROCEDURE — 77030018719 HC DRSG PTCH ANTIMIC J&J -A: Performed by: ANESTHESIOLOGY

## 2021-08-11 PROCEDURE — 77030015683 HC ADPT CRDPLG MEDT -B: Performed by: SPECIALIST

## 2021-08-11 PROCEDURE — 74011250636 HC RX REV CODE- 250/636: Performed by: PHYSICIAN ASSISTANT

## 2021-08-11 PROCEDURE — 33518 CABG ARTERY-VEIN TWO: CPT | Performed by: SPECIALIST

## 2021-08-11 PROCEDURE — 77030013313: Performed by: SPECIALIST

## 2021-08-11 PROCEDURE — 77030003010 HC SUT SURG STL J&J -B: Performed by: SPECIALIST

## 2021-08-11 PROCEDURE — 77030020061 HC IV BLD WRMR ADMIN SET 3M -B: Performed by: ANESTHESIOLOGY

## 2021-08-11 PROCEDURE — 77030013140 HC MSK NEB VYRM -A: Performed by: ANESTHESIOLOGY

## 2021-08-11 PROCEDURE — C1751 CATH, INF, PER/CENT/MIDLINE: HCPCS | Performed by: ANESTHESIOLOGY

## 2021-08-11 PROCEDURE — 74011250637 HC RX REV CODE- 250/637: Performed by: STUDENT IN AN ORGANIZED HEALTH CARE EDUCATION/TRAINING PROGRAM

## 2021-08-11 PROCEDURE — 77030019896 HC KT ARTERIAL LN TELE -B: Performed by: SPECIALIST

## 2021-08-11 PROCEDURE — 74011000258 HC RX REV CODE- 258: Performed by: ANESTHESIOLOGY

## 2021-08-11 PROCEDURE — 77030031139 HC SUT VCRL2 J&J -A: Performed by: SPECIALIST

## 2021-08-11 RX ORDER — HEPARIN SODIUM 5000 [USP'U]/ML
INJECTION, SOLUTION INTRAVENOUS; SUBCUTANEOUS
Status: DISCONTINUED
Start: 2021-08-11 | End: 2021-08-11 | Stop reason: WASHOUT

## 2021-08-11 RX ORDER — MIDAZOLAM HYDROCHLORIDE 1 MG/ML
INJECTION, SOLUTION INTRAMUSCULAR; INTRAVENOUS AS NEEDED
Status: DISCONTINUED | OUTPATIENT
Start: 2021-08-11 | End: 2021-08-11 | Stop reason: HOSPADM

## 2021-08-11 RX ORDER — FENTANYL CITRATE 50 UG/ML
INJECTION, SOLUTION INTRAMUSCULAR; INTRAVENOUS AS NEEDED
Status: DISCONTINUED | OUTPATIENT
Start: 2021-08-11 | End: 2021-08-11 | Stop reason: HOSPADM

## 2021-08-11 RX ORDER — SODIUM CHLORIDE 9 MG/ML
500 INJECTION, SOLUTION INTRAVENOUS CONTINUOUS
Status: DISCONTINUED | OUTPATIENT
Start: 2021-08-12 | End: 2021-08-11

## 2021-08-11 RX ORDER — MAGNESIUM SULFATE 100 %
4 CRYSTALS MISCELLANEOUS AS NEEDED
Status: DISCONTINUED | OUTPATIENT
Start: 2021-08-11 | End: 2021-08-12

## 2021-08-11 RX ORDER — NITROGLYCERIN 40 MG/100ML
0-200 INJECTION INTRAVENOUS
Status: DISCONTINUED | OUTPATIENT
Start: 2021-08-11 | End: 2021-08-12

## 2021-08-11 RX ORDER — SODIUM CHLORIDE, SODIUM LACTATE, POTASSIUM CHLORIDE, CALCIUM CHLORIDE 600; 310; 30; 20 MG/100ML; MG/100ML; MG/100ML; MG/100ML
50 INJECTION, SOLUTION INTRAVENOUS CONTINUOUS
Status: DISCONTINUED | OUTPATIENT
Start: 2021-08-11 | End: 2021-08-11

## 2021-08-11 RX ORDER — ENOXAPARIN SODIUM 100 MG/ML
40 INJECTION SUBCUTANEOUS EVERY 24 HOURS
Status: DISCONTINUED | OUTPATIENT
Start: 2021-08-13 | End: 2021-08-12

## 2021-08-11 RX ORDER — ONDANSETRON 2 MG/ML
4 INJECTION INTRAMUSCULAR; INTRAVENOUS
Status: DISCONTINUED | OUTPATIENT
Start: 2021-08-11 | End: 2021-08-16 | Stop reason: HOSPADM

## 2021-08-11 RX ORDER — SODIUM CHLORIDE 0.9 % (FLUSH) 0.9 %
5-40 SYRINGE (ML) INJECTION AS NEEDED
Status: DISCONTINUED | OUTPATIENT
Start: 2021-08-11 | End: 2021-08-16 | Stop reason: HOSPADM

## 2021-08-11 RX ORDER — SODIUM CHLORIDE 9 MG/ML
INJECTION INTRAMUSCULAR; INTRAVENOUS; SUBCUTANEOUS
Status: COMPLETED
Start: 2021-08-11 | End: 2021-08-11

## 2021-08-11 RX ORDER — ALBUTEROL SULFATE 0.83 MG/ML
2.5 SOLUTION RESPIRATORY (INHALATION)
Status: DISCONTINUED | OUTPATIENT
Start: 2021-08-11 | End: 2021-08-16 | Stop reason: HOSPADM

## 2021-08-11 RX ORDER — SODIUM CHLORIDE 0.9 % (FLUSH) 0.9 %
5-40 SYRINGE (ML) INJECTION EVERY 8 HOURS
Status: DISCONTINUED | OUTPATIENT
Start: 2021-08-11 | End: 2021-08-16 | Stop reason: HOSPADM

## 2021-08-11 RX ORDER — FAMOTIDINE 20 MG/1
20 TABLET, FILM COATED ORAL 2 TIMES DAILY
Status: DISCONTINUED | OUTPATIENT
Start: 2021-08-12 | End: 2021-08-16 | Stop reason: HOSPADM

## 2021-08-11 RX ORDER — PROTAMINE SULFATE 10 MG/ML
INJECTION, SOLUTION INTRAVENOUS AS NEEDED
Status: DISCONTINUED | OUTPATIENT
Start: 2021-08-11 | End: 2021-08-11 | Stop reason: HOSPADM

## 2021-08-11 RX ORDER — CALCIUM CHLORIDE INJECTION 100 MG/ML
1 INJECTION, SOLUTION INTRAVENOUS ONCE
Status: COMPLETED | OUTPATIENT
Start: 2021-08-12 | End: 2021-08-11

## 2021-08-11 RX ORDER — PROPOFOL 10 MG/ML
INJECTION, EMULSION INTRAVENOUS
Status: COMPLETED
Start: 2021-08-11 | End: 2021-08-11

## 2021-08-11 RX ORDER — PAPAVERINE HYDROCHLORIDE 30 MG/ML
INJECTION INTRAMUSCULAR; INTRAVENOUS
Status: DISCONTINUED
Start: 2021-08-11 | End: 2021-08-11

## 2021-08-11 RX ORDER — ACETAMINOPHEN 650 MG/1
SUPPOSITORY RECTAL
Status: DISCONTINUED
Start: 2021-08-11 | End: 2021-08-11

## 2021-08-11 RX ORDER — POTASSIUM CHLORIDE 14.9 MG/ML
INJECTION INTRAVENOUS
Status: DISPENSED
Start: 2021-08-11 | End: 2021-08-12

## 2021-08-11 RX ORDER — SODIUM CHLORIDE 0.9 % (FLUSH) 0.9 %
5-40 SYRINGE (ML) INJECTION AS NEEDED
Status: DISCONTINUED | OUTPATIENT
Start: 2021-08-11 | End: 2021-08-11

## 2021-08-11 RX ORDER — FENTANYL CITRATE 50 UG/ML
12.5-25 INJECTION, SOLUTION INTRAMUSCULAR; INTRAVENOUS
Status: DISCONTINUED | OUTPATIENT
Start: 2021-08-11 | End: 2021-08-15

## 2021-08-11 RX ORDER — SODIUM CHLORIDE, SODIUM LACTATE, POTASSIUM CHLORIDE, CALCIUM CHLORIDE 600; 310; 30; 20 MG/100ML; MG/100ML; MG/100ML; MG/100ML
INJECTION, SOLUTION INTRAVENOUS
Status: DISCONTINUED | OUTPATIENT
Start: 2021-08-11 | End: 2021-08-11 | Stop reason: HOSPADM

## 2021-08-11 RX ORDER — DOCUSATE SODIUM 100 MG/1
100 CAPSULE, LIQUID FILLED ORAL 2 TIMES DAILY
Status: DISCONTINUED | OUTPATIENT
Start: 2021-08-12 | End: 2021-08-16 | Stop reason: HOSPADM

## 2021-08-11 RX ORDER — AMIODARONE HYDROCHLORIDE 200 MG/1
200 TABLET ORAL 3 TIMES DAILY
Status: DISCONTINUED | OUTPATIENT
Start: 2021-08-12 | End: 2021-08-16 | Stop reason: HOSPADM

## 2021-08-11 RX ORDER — NICARDIPINE HYDROCHLORIDE 0.1 MG/ML
5-15 INJECTION INTRAVENOUS
Status: DISCONTINUED | OUTPATIENT
Start: 2021-08-11 | End: 2021-08-12

## 2021-08-11 RX ORDER — PROPOFOL 10 MG/ML
INJECTION, EMULSION INTRAVENOUS AS NEEDED
Status: DISCONTINUED | OUTPATIENT
Start: 2021-08-11 | End: 2021-08-11 | Stop reason: HOSPADM

## 2021-08-11 RX ORDER — NITROGLYCERIN 40 MG/100ML
INJECTION INTRAVENOUS
Status: DISCONTINUED | OUTPATIENT
Start: 2021-08-11 | End: 2021-08-11 | Stop reason: HOSPADM

## 2021-08-11 RX ORDER — SODIUM CHLORIDE 9 MG/ML
10 INJECTION, SOLUTION INTRAVENOUS CONTINUOUS
Status: DISCONTINUED | OUTPATIENT
Start: 2021-08-11 | End: 2021-08-12

## 2021-08-11 RX ORDER — MUPIROCIN 20 MG/G
OINTMENT TOPICAL 2 TIMES DAILY
Status: DISCONTINUED | OUTPATIENT
Start: 2021-08-11 | End: 2021-08-11

## 2021-08-11 RX ORDER — HYDRALAZINE HYDROCHLORIDE 20 MG/ML
INJECTION INTRAMUSCULAR; INTRAVENOUS
Status: DISPENSED
Start: 2021-08-11 | End: 2021-08-12

## 2021-08-11 RX ORDER — ALBUMIN HUMAN 50 G/1000ML
12.5 SOLUTION INTRAVENOUS
Status: DISCONTINUED | OUTPATIENT
Start: 2021-08-11 | End: 2021-08-16 | Stop reason: HOSPADM

## 2021-08-11 RX ORDER — ACETAMINOPHEN 650 MG/1
SUPPOSITORY RECTAL AS NEEDED
Status: DISCONTINUED | OUTPATIENT
Start: 2021-08-11 | End: 2021-08-12

## 2021-08-11 RX ORDER — SODIUM CHLORIDE 9 MG/ML
INJECTION, SOLUTION INTRAVENOUS
Status: DISCONTINUED | OUTPATIENT
Start: 2021-08-11 | End: 2021-08-11 | Stop reason: HOSPADM

## 2021-08-11 RX ORDER — ROCURONIUM BROMIDE 10 MG/ML
INJECTION, SOLUTION INTRAVENOUS AS NEEDED
Status: DISCONTINUED | OUTPATIENT
Start: 2021-08-11 | End: 2021-08-11 | Stop reason: HOSPADM

## 2021-08-11 RX ORDER — SODIUM CHLORIDE 0.9 % (FLUSH) 0.9 %
5-40 SYRINGE (ML) INJECTION EVERY 8 HOURS
Status: DISCONTINUED | OUTPATIENT
Start: 2021-08-11 | End: 2021-08-11

## 2021-08-11 RX ORDER — ASPIRIN 81 MG/1
81 TABLET ORAL DAILY
Status: DISCONTINUED | OUTPATIENT
Start: 2021-08-12 | End: 2021-08-16 | Stop reason: HOSPADM

## 2021-08-11 RX ORDER — HEPARIN SODIUM 1000 [USP'U]/ML
INJECTION, SOLUTION INTRAVENOUS; SUBCUTANEOUS AS NEEDED
Status: DISCONTINUED | OUTPATIENT
Start: 2021-08-11 | End: 2021-08-11 | Stop reason: HOSPADM

## 2021-08-11 RX ORDER — OXYCODONE HYDROCHLORIDE 5 MG/1
5-10 TABLET ORAL
Status: ACTIVE | OUTPATIENT
Start: 2021-08-11 | End: 2021-08-12

## 2021-08-11 RX ORDER — MORPHINE SULFATE 2 MG/ML
2-4 INJECTION, SOLUTION INTRAMUSCULAR; INTRAVENOUS
Status: DISCONTINUED | OUTPATIENT
Start: 2021-08-11 | End: 2021-08-11

## 2021-08-11 RX ORDER — CALCIUM CHLORIDE INJECTION 100 MG/ML
INJECTION, SOLUTION INTRAVENOUS
Status: DISCONTINUED
Start: 2021-08-11 | End: 2021-08-11

## 2021-08-11 RX ORDER — MANNITOL 20 G/100ML
INJECTION, SOLUTION INTRAVENOUS
Status: COMPLETED
Start: 2021-08-11 | End: 2021-08-11

## 2021-08-11 RX ORDER — NALOXONE HYDROCHLORIDE 0.4 MG/ML
0.4 INJECTION, SOLUTION INTRAMUSCULAR; INTRAVENOUS; SUBCUTANEOUS AS NEEDED
Status: DISCONTINUED | OUTPATIENT
Start: 2021-08-11 | End: 2021-08-16 | Stop reason: HOSPADM

## 2021-08-11 RX ORDER — LIDOCAINE HYDROCHLORIDE 20 MG/ML
INJECTION, SOLUTION EPIDURAL; INFILTRATION; INTRACAUDAL; PERINEURAL AS NEEDED
Status: DISCONTINUED | OUTPATIENT
Start: 2021-08-11 | End: 2021-08-11 | Stop reason: HOSPADM

## 2021-08-11 RX ORDER — VANCOMYCIN HYDROCHLORIDE 1 G/20ML
INJECTION, POWDER, LYOPHILIZED, FOR SOLUTION INTRAVENOUS
Status: DISCONTINUED
Start: 2021-08-11 | End: 2021-08-11

## 2021-08-11 RX ORDER — NOREPINEPHRINE BITARTRATE/D5W 8 MG/250ML
2-16 PLASTIC BAG, INJECTION (ML) INTRAVENOUS
Status: DISCONTINUED | OUTPATIENT
Start: 2021-08-11 | End: 2021-08-12

## 2021-08-11 RX ORDER — AMINOCAPROIC ACID 250 MG/ML
INJECTION, SOLUTION INTRAVENOUS AS NEEDED
Status: DISCONTINUED | OUTPATIENT
Start: 2021-08-11 | End: 2021-08-11 | Stop reason: HOSPADM

## 2021-08-11 RX ORDER — DEXTROSE 50 % IN WATER (D50W) INTRAVENOUS SYRINGE
25-50 AS NEEDED
Status: DISCONTINUED | OUTPATIENT
Start: 2021-08-11 | End: 2021-08-12

## 2021-08-11 RX ORDER — MUPIROCIN 20 MG/G
OINTMENT TOPICAL 2 TIMES DAILY
Status: COMPLETED | OUTPATIENT
Start: 2021-08-11 | End: 2021-08-16

## 2021-08-11 RX ORDER — NITROGLYCERIN 40 MG/100ML
INJECTION INTRAVENOUS
Status: COMPLETED
Start: 2021-08-11 | End: 2021-08-11

## 2021-08-11 RX ORDER — POTASSIUM CHLORIDE 14.9 MG/ML
20 INJECTION INTRAVENOUS
Status: COMPLETED | OUTPATIENT
Start: 2021-08-12 | End: 2021-08-12

## 2021-08-11 RX ORDER — PROPOFOL 10 MG/ML
VIAL (ML) INTRAVENOUS
Status: DISCONTINUED | OUTPATIENT
Start: 2021-08-11 | End: 2021-08-11 | Stop reason: HOSPADM

## 2021-08-11 RX ORDER — HYDRALAZINE HYDROCHLORIDE 20 MG/ML
20 INJECTION INTRAMUSCULAR; INTRAVENOUS ONCE
Status: COMPLETED | OUTPATIENT
Start: 2021-08-11 | End: 2021-08-11

## 2021-08-11 RX ORDER — HEPARIN SODIUM 1000 [USP'U]/ML
INJECTION, SOLUTION INTRAVENOUS; SUBCUTANEOUS
Status: COMPLETED
Start: 2021-08-11 | End: 2021-08-11

## 2021-08-11 RX ORDER — ARFORMOTEROL TARTRATE 15 UG/2ML
15 SOLUTION RESPIRATORY (INHALATION)
Status: DISCONTINUED | OUTPATIENT
Start: 2021-08-11 | End: 2021-08-16 | Stop reason: HOSPADM

## 2021-08-11 RX ORDER — MORPHINE SULFATE 2 MG/ML
2-4 INJECTION, SOLUTION INTRAMUSCULAR; INTRAVENOUS
Status: DISCONTINUED | OUTPATIENT
Start: 2021-08-11 | End: 2021-08-12

## 2021-08-11 RX ORDER — SODIUM CHLORIDE 9 MG/ML
50 INJECTION, SOLUTION INTRAVENOUS CONTINUOUS
Status: DISCONTINUED | OUTPATIENT
Start: 2021-08-11 | End: 2021-08-11

## 2021-08-11 RX ORDER — BISACODYL 5 MG
10 TABLET, DELAYED RELEASE (ENTERIC COATED) ORAL DAILY
Status: DISCONTINUED | OUTPATIENT
Start: 2021-08-12 | End: 2021-08-16 | Stop reason: HOSPADM

## 2021-08-11 RX ORDER — COLCHICINE 0.6 MG/1
0.6 CAPSULE ORAL DAILY
Status: DISCONTINUED | OUTPATIENT
Start: 2021-08-12 | End: 2021-08-16 | Stop reason: HOSPADM

## 2021-08-11 RX ORDER — VANCOMYCIN 2 GRAM/500 ML IN 0.9 % SODIUM CHLORIDE INTRAVENOUS
2000 EVERY 12 HOURS
Status: DISCONTINUED | OUTPATIENT
Start: 2021-08-11 | End: 2021-08-11

## 2021-08-11 RX ORDER — VANCOMYCIN 2 GRAM/500 ML IN 0.9 % SODIUM CHLORIDE INTRAVENOUS
2000
Status: COMPLETED | OUTPATIENT
Start: 2021-08-11 | End: 2021-08-11

## 2021-08-11 RX ORDER — CHLORHEXIDINE GLUCONATE 1.2 MG/ML
10 RINSE ORAL 2 TIMES DAILY
Status: DISCONTINUED | OUTPATIENT
Start: 2021-08-11 | End: 2021-08-16 | Stop reason: HOSPADM

## 2021-08-11 RX ORDER — POLYETHYLENE GLYCOL 3350 17 G/17G
17 POWDER, FOR SOLUTION ORAL DAILY
Status: DISCONTINUED | OUTPATIENT
Start: 2021-08-12 | End: 2021-08-16 | Stop reason: HOSPADM

## 2021-08-11 RX ORDER — VANCOMYCIN HYDROCHLORIDE 1 G/20ML
INJECTION, POWDER, LYOPHILIZED, FOR SOLUTION INTRAVENOUS AS NEEDED
Status: DISCONTINUED | OUTPATIENT
Start: 2021-08-11 | End: 2021-08-11

## 2021-08-11 RX ORDER — METOPROLOL TARTRATE 25 MG/1
12.5 TABLET, FILM COATED ORAL EVERY 12 HOURS
Status: DISCONTINUED | OUTPATIENT
Start: 2021-08-12 | End: 2021-08-16 | Stop reason: HOSPADM

## 2021-08-11 RX ORDER — ATORVASTATIN CALCIUM 40 MG/1
40 TABLET, FILM COATED ORAL
Status: DISCONTINUED | OUTPATIENT
Start: 2021-08-11 | End: 2021-08-16 | Stop reason: HOSPADM

## 2021-08-11 RX ORDER — PROPOFOL 10 MG/ML
0-50 VIAL (ML) INTRAVENOUS
Status: DISCONTINUED | OUTPATIENT
Start: 2021-08-11 | End: 2021-08-12

## 2021-08-11 RX ORDER — HYDROCODONE BITARTRATE AND ACETAMINOPHEN 5; 325 MG/1; MG/1
1-2 TABLET ORAL
Status: DISCONTINUED | OUTPATIENT
Start: 2021-08-11 | End: 2021-08-12

## 2021-08-11 RX ORDER — POTASSIUM CHLORIDE 14.9 MG/ML
20 INJECTION INTRAVENOUS
Status: COMPLETED | OUTPATIENT
Start: 2021-08-11 | End: 2021-08-12

## 2021-08-11 RX ORDER — SUCCINYLCHOLINE CHLORIDE 20 MG/ML
INJECTION INTRAMUSCULAR; INTRAVENOUS AS NEEDED
Status: DISCONTINUED | OUTPATIENT
Start: 2021-08-11 | End: 2021-08-11 | Stop reason: HOSPADM

## 2021-08-11 RX ORDER — MANNITOL 20 G/100ML
INJECTION, SOLUTION INTRAVENOUS
Status: DISCONTINUED | OUTPATIENT
Start: 2021-08-11 | End: 2021-08-11 | Stop reason: HOSPADM

## 2021-08-11 RX ORDER — IPRATROPIUM BROMIDE 0.5 MG/2.5ML
0.5 SOLUTION RESPIRATORY (INHALATION)
Status: DISCONTINUED | OUTPATIENT
Start: 2021-08-12 | End: 2021-08-16 | Stop reason: HOSPADM

## 2021-08-11 RX ORDER — ACETAMINOPHEN 325 MG/1
650 TABLET ORAL
Status: DISCONTINUED | OUTPATIENT
Start: 2021-08-11 | End: 2021-08-16 | Stop reason: HOSPADM

## 2021-08-11 RX ORDER — FAMOTIDINE 20 MG/1
20 TABLET, FILM COATED ORAL ONCE
Status: COMPLETED | OUTPATIENT
Start: 2021-08-11 | End: 2021-08-11

## 2021-08-11 RX ADMIN — FENTANYL CITRATE 50 MCG: 50 INJECTION, SOLUTION INTRAMUSCULAR; INTRAVENOUS at 12:56

## 2021-08-11 RX ADMIN — SODIUM CHLORIDE 1.2 UNITS/HR: 9 INJECTION, SOLUTION INTRAVENOUS at 14:57

## 2021-08-11 RX ADMIN — ARFORMOTEROL TARTRATE 15 MCG: 15 SOLUTION RESPIRATORY (INHALATION) at 20:44

## 2021-08-11 RX ADMIN — MORPHINE SULFATE 2 MG: 2 INJECTION, SOLUTION INTRAMUSCULAR; INTRAVENOUS at 16:00

## 2021-08-11 RX ADMIN — NITROGLYCERIN 100 MCG/MIN: 40 INJECTION INTRAVENOUS at 14:30

## 2021-08-11 RX ADMIN — ATORVASTATIN CALCIUM 40 MG: 40 TABLET, FILM COATED ORAL at 21:22

## 2021-08-11 RX ADMIN — MIDAZOLAM HYDROCHLORIDE 2 MG: 2 INJECTION, SOLUTION INTRAMUSCULAR; INTRAVENOUS at 11:02

## 2021-08-11 RX ADMIN — CALCIUM CHLORIDE 1 G: 100 INJECTION INTRAVENOUS; INTRAVENTRICULAR at 23:25

## 2021-08-11 RX ADMIN — FENTANYL CITRATE 100 MCG: 50 INJECTION, SOLUTION INTRAMUSCULAR; INTRAVENOUS at 08:39

## 2021-08-11 RX ADMIN — FENTANYL CITRATE 100 MCG: 50 INJECTION, SOLUTION INTRAMUSCULAR; INTRAVENOUS at 13:15

## 2021-08-11 RX ADMIN — NITROGLYCERIN 2 MCG/MIN: 40 INJECTION INTRAVENOUS at 09:07

## 2021-08-11 RX ADMIN — PROPOFOL 25 MCG/KG/MIN: 10 INJECTION, EMULSION INTRAVENOUS at 10:47

## 2021-08-11 RX ADMIN — POTASSIUM CHLORIDE 20 MEQ: 14.9 INJECTION, SOLUTION INTRAVENOUS at 23:22

## 2021-08-11 RX ADMIN — FENTANYL CITRATE 100 MCG: 50 INJECTION, SOLUTION INTRAMUSCULAR; INTRAVENOUS at 10:36

## 2021-08-11 RX ADMIN — PROPOFOL 200 MCG: 10 INJECTION, EMULSION INTRAVENOUS at 08:39

## 2021-08-11 RX ADMIN — LIDOCAINE HYDROCHLORIDE 40 MG: 20 INJECTION, SOLUTION EPIDURAL; INFILTRATION; INTRACAUDAL; PERINEURAL at 08:39

## 2021-08-11 RX ADMIN — FENTANYL CITRATE 100 MCG: 50 INJECTION, SOLUTION INTRAMUSCULAR; INTRAVENOUS at 12:28

## 2021-08-11 RX ADMIN — ALBUMIN (HUMAN) 12.5 G: 12.5 INJECTION, SOLUTION INTRAVENOUS at 21:48

## 2021-08-11 RX ADMIN — FENTANYL CITRATE 100 MCG: 50 INJECTION, SOLUTION INTRAMUSCULAR; INTRAVENOUS at 11:05

## 2021-08-11 RX ADMIN — MANNITOL: 20 INJECTION, SOLUTION INTRAVENOUS at 09:30

## 2021-08-11 RX ADMIN — FENTANYL CITRATE 100 MCG: 50 INJECTION, SOLUTION INTRAMUSCULAR; INTRAVENOUS at 09:41

## 2021-08-11 RX ADMIN — MIDAZOLAM HYDROCHLORIDE 2 MG: 2 INJECTION, SOLUTION INTRAMUSCULAR; INTRAVENOUS at 08:24

## 2021-08-11 RX ADMIN — MORPHINE SULFATE 4 MG: 2 INJECTION, SOLUTION INTRAMUSCULAR; INTRAVENOUS at 20:11

## 2021-08-11 RX ADMIN — Medication 10 ML: at 14:00

## 2021-08-11 RX ADMIN — SUCCINYLCHOLINE CHLORIDE 120 MG: 20 INJECTION, SOLUTION INTRAMUSCULAR; INTRAVENOUS at 08:39

## 2021-08-11 RX ADMIN — SODIUM CHLORIDE 6 UNITS: 9 INJECTION, SOLUTION INTRAVENOUS at 10:48

## 2021-08-11 RX ADMIN — HEPARIN SODIUM 30000 UNITS: 1000 INJECTION, SOLUTION INTRAVENOUS; SUBCUTANEOUS at 10:28

## 2021-08-11 RX ADMIN — POTASSIUM CHLORIDE 20 MEQ: 14.9 INJECTION, SOLUTION INTRAVENOUS at 22:00

## 2021-08-11 RX ADMIN — FENTANYL CITRATE 100 MCG: 50 INJECTION, SOLUTION INTRAMUSCULAR; INTRAVENOUS at 09:51

## 2021-08-11 RX ADMIN — POTASSIUM CHLORIDE 20 MEQ: 14.9 INJECTION, SOLUTION INTRAVENOUS at 20:00

## 2021-08-11 RX ADMIN — MUPIROCIN: 20 OINTMENT TOPICAL at 18:46

## 2021-08-11 RX ADMIN — NICARDIPINE HYDROCHLORIDE 5 MG/HR: 0.1 INJECTION INTRAVENOUS at 14:50

## 2021-08-11 RX ADMIN — MORPHINE SULFATE 4 MG: 2 INJECTION, SOLUTION INTRAMUSCULAR; INTRAVENOUS at 18:55

## 2021-08-11 RX ADMIN — HEPARIN SODIUM 5000 UNITS: 1000 INJECTION, SOLUTION INTRAVENOUS; SUBCUTANEOUS at 10:40

## 2021-08-11 RX ADMIN — FENTANYL CITRATE 12.5 MCG: 50 INJECTION, SOLUTION INTRAMUSCULAR; INTRAVENOUS at 17:19

## 2021-08-11 RX ADMIN — ROCURONIUM BROMIDE 50 MG: 50 INJECTION INTRAVENOUS at 08:39

## 2021-08-11 RX ADMIN — PHENYLEPHRINE HYDROCHLORIDE 15 MCG/MIN: 10 INJECTION INTRAVENOUS at 22:40

## 2021-08-11 RX ADMIN — VANCOMYCIN HYDROCHLORIDE 2000 MG: 10 INJECTION, POWDER, LYOPHILIZED, FOR SOLUTION INTRAVENOUS at 07:45

## 2021-08-11 RX ADMIN — VANCOMYCIN HYDROCHLORIDE 1000 MG: 1 INJECTION, POWDER, LYOPHILIZED, FOR SOLUTION INTRAVENOUS at 20:06

## 2021-08-11 RX ADMIN — SODIUM CHLORIDE 10 ML/HR: 900 INJECTION, SOLUTION INTRAVENOUS at 15:45

## 2021-08-11 RX ADMIN — ONDANSETRON 4 MG: 2 INJECTION INTRAMUSCULAR; INTRAVENOUS at 18:46

## 2021-08-11 RX ADMIN — MIDAZOLAM HYDROCHLORIDE 2 MG: 2 INJECTION, SOLUTION INTRAMUSCULAR; INTRAVENOUS at 12:27

## 2021-08-11 RX ADMIN — SODIUM CHLORIDE 10 ML: 9 INJECTION INTRAMUSCULAR; INTRAVENOUS; SUBCUTANEOUS at 07:12

## 2021-08-11 RX ADMIN — Medication 10 ML: at 21:22

## 2021-08-11 RX ADMIN — SODIUM CHLORIDE 1.5 UNITS/HR: 9 INJECTION, SOLUTION INTRAVENOUS at 22:58

## 2021-08-11 RX ADMIN — Medication 10 ML: at 07:12

## 2021-08-11 RX ADMIN — ROCURONIUM BROMIDE 50 MG: 50 INJECTION INTRAVENOUS at 11:57

## 2021-08-11 RX ADMIN — FAMOTIDINE 20 MG: 10 INJECTION INTRAVENOUS at 18:45

## 2021-08-11 RX ADMIN — ALBUMIN (HUMAN) 12.5 G: 12.5 INJECTION, SOLUTION INTRAVENOUS at 22:23

## 2021-08-11 RX ADMIN — MIDAZOLAM HYDROCHLORIDE 2 MG: 2 INJECTION, SOLUTION INTRAMUSCULAR; INTRAVENOUS at 09:22

## 2021-08-11 RX ADMIN — NICARDIPINE HYDROCHLORIDE 1 MG/HR: 0.1 INJECTION INTRAVENOUS at 19:08

## 2021-08-11 RX ADMIN — HYDROCODONE BITARTRATE AND ACETAMINOPHEN 2 TABLET: 5; 325 TABLET ORAL at 22:15

## 2021-08-11 RX ADMIN — FAMOTIDINE 20 MG: 20 TABLET ORAL at 07:44

## 2021-08-11 RX ADMIN — SODIUM CHLORIDE: 900 INJECTION, SOLUTION INTRAVENOUS at 08:31

## 2021-08-11 RX ADMIN — MORPHINE SULFATE 4 MG: 2 INJECTION, SOLUTION INTRAMUSCULAR; INTRAVENOUS at 22:24

## 2021-08-11 RX ADMIN — HYDRALAZINE HYDROCHLORIDE 20 MG: 20 INJECTION, SOLUTION INTRAMUSCULAR; INTRAVENOUS at 14:36

## 2021-08-11 RX ADMIN — SODIUM CHLORIDE, SODIUM LACTATE, POTASSIUM CHLORIDE, AND CALCIUM CHLORIDE: 600; 310; 30; 20 INJECTION, SOLUTION INTRAVENOUS at 08:23

## 2021-08-11 RX ADMIN — AMINOCAPROIC ACID 1 G/HR: 250 INJECTION, SOLUTION INTRAVENOUS at 09:39

## 2021-08-11 RX ADMIN — CHLORHEXIDINE GLUCONATE 0.12% ORAL RINSE 10 ML: 1.2 LIQUID ORAL at 17:55

## 2021-08-11 RX ADMIN — AMINOCAPROIC ACID 5000 MG: 250 INJECTION, SOLUTION INTRAVENOUS at 09:40

## 2021-08-11 RX ADMIN — SODIUM CHLORIDE, SODIUM LACTATE, POTASSIUM CHLORIDE, AND CALCIUM CHLORIDE 50 ML/HR: 600; 310; 30; 20 INJECTION, SOLUTION INTRAVENOUS at 07:23

## 2021-08-11 RX ADMIN — PROTAMINE SULFATE 250 MG: 10 INJECTION, SOLUTION INTRAVENOUS at 12:22

## 2021-08-11 RX ADMIN — PROPOFOL 50 MCG/KG/MIN: 10 INJECTION, EMULSION INTRAVENOUS at 14:31

## 2021-08-11 RX ADMIN — ROCURONIUM BROMIDE 50 MG: 50 INJECTION INTRAVENOUS at 09:41

## 2021-08-11 NOTE — OP NOTES
CARDIAC SURGERY OPERATIVE NOTE     08/11/21       PREOPERATIVE DIAGNOSIS:  Coronary artery disease with unstable angina     POSTOPERATIVE DIAGNOSIS:  Coronary artery with with unstable angina    PROCEDURE:      1. Coronary artery bypass grafting unstable angina x3 with the left internal mammary artery to the mid left anterior descending coronary artery, SVG to R PLV, and SVG to OM    2. Endoscopic SVG harvest, left lower extremity     ATTENDING SURGEON:  Tor Dailey MD        ASSISTANT: Coby Cage, [facility]     ANESTHESIA:  General with endotracheal tube. ANESTHESIOLOGIST: Pranav Velez MD     ESTIMATED BLOOD LOSS: not applicable (cardiopulmonary bypass)     IMPLANTS: none      INDICATIONS: The patient is a 59 y.o. male who presents with unstable angina. Initially presented approximately 6 to 8 weeks ago and underwent cardiac catheterization at that time. However she was found to have large bilateral PEs with right heart dysfunction. He was started on anticoagulation and the plan was for him to have CABG once his right heart had improved. PROCEDURE:  He was taken to the operating room and was placed on the table in supine position, and after being placed under general anesthesia, was intubated without difficulty. Appropriate monitoring lines were then placed, including a radial  arterial line, a pulmonary artery catheter, a [place] catheter, and a transesophegeal echo probe. He was then prepped and draped in sterile fashion from neck to toes. A full Time Out was performed and the Cardiac Surgery Operative Checklist was then reviewed, which included confirming the patient's identity and planned operation, and also that the preoperative antibiotics were appropriate and had been delivered in appropriate timely fashion. A median sternotomy incision was then performed with a sternal saw.   The left internal mammary artery was then exposed by a Rultract retractor and then harvested and was divided distally after systemic heparinization. The LIMA appeared to be of good quality. It was sprayed with a papaverine solution and then placed in a sponge soaked with the same solution. Simultaneously, endoscopic SVG harvesting was performed on the left lower extremity. This was performed by the physician assistant. In addition to harvesting conduit (SVG), the PA assisted with retraction, exposure, suture management, and chest closure. The patient was then cannulated for cardiopulmonary bypass using a single cannula in the distal ascending aorta through double purse-string sutures; and a single two-staged cannula was placed into the right atrium through a single purse-string suture, all of which were secured by tourniquets. An antegrade cardioplegia cannula was placed into the ascending aorta and was secured by a tourniquet. After confirming that the ACT was greater than 400 seconds, the patient was placed on cardiopulmonary bypass and cooled tto 34 degrees Celsius. A cross clamp was then placed on the distal ascending aorta, and I liter of cardioplegia was given. Additional doses were given as appropriate. Topical cooling was utilized as well. The first distal anastomosis was SVG to the obtuse marginal.  This was a reasonably good target. Of note, the saphenous vein graft was harvested was certainly larger than average in terms of diameter, but was devoid of varicosities. The obtuse marginal vessel was opened and saphenous vein graft was anastomosed in an end-to-side fashion with 7-0 Prolene. Cardioplegia was administered down the graft and a flow rate of 80 cc/min was appreciated. The vein was then brought up along the left side of the heart and the proximal anastomosis was completed to the ascending aorta. This sequence of events was repeated with the right PLV branch. This was also an adequate target. Again, an end-to-side anastomosis was created to the coronary.   Cardioplegia was administered down the graft and a flow rate of 70cc/min was appreciated. The vein was then brought up along the right side of the heart and a proximal anastomosis was created to the ascending aorta. Rewarming commenced at this time. The left internal mammary artery was then anastomosed end-to-side to the mid left anterior descending coronary artery, using 7-0 Prolene running suture. The LIMA had excellent flow, and the LAD was a good target. It was tacked down with 6-0s. The patient had been rewarmed by this time. He was weaned off of CPB easily. The HANH showed improved biventricular function with an ejection fraction estimated to be 55%. He required no inotropes or pressors after coming off of bypass. Decannulation and protamine administration were performed in the usual fashion. Chest tubes and a ventricular pacing wire  were placed. The chest was thoroughly irrigated. Hemostasis was ensured. Closure preceded in standard fashion. I was present in the operating room from the time the patient arrived until the patient left the room and performed the entire procedure as dictated above. The cross-clamp time was 78 minutes. The cardiopulmonary bypass time was 90 minutes.     Karen Childress MD [facility]  Chief, Cardiothoracic Surgery   [facility]  [phone]

## 2021-08-11 NOTE — ROUTINE PROCESS
TRANSFER - OUT REPORT:    Verbal report given to Anthony Drake RN(name) on Jacklyn Lim  being transferred to cvt icu(unit) for routine post - op       Report consisted of patients Situation, Background, Assessment and   Recommendations(SBAR). Information from the following report(s) SBAR, Kardex, OR Summary, Procedure Summary, MAR, Procedure Verification and Quality Measures was reviewed with the receiving nurse. Lines:   Single Lumen Venous Catheter 08/11/21 Right (Active)   Criteria for Appropriate Use Other (comment) 08/11/21 0948   Site Assessment Clean, dry, & intact 08/11/21 0948   Infiltration Assessment 0 08/11/21 0948       Peripheral IV 08/11/21 Anterior;Right Hand (Active)   Site Assessment Clean, dry, & intact 08/11/21 0948   Phlebitis Assessment 0 08/11/21 0948   Infiltration Assessment 0 08/11/21 0948   Dressing Status Clean, dry, & intact 08/11/21 0948   Dressing Type Tape 08/11/21 0948   Hub Color/Line Status Pink 08/11/21 0948   Action Taken Dressing reinforced 08/11/21 0728   Alcohol Cap Used No 08/11/21 0728       Peripheral IV 08/11/21 Anterior; Left Hand (Active)   Site Assessment Clean, dry, & intact 08/11/21 0948   Phlebitis Assessment 0 08/11/21 0948   Infiltration Assessment 0 08/11/21 0948   Dressing Status Clean, dry, & intact 08/11/21 0948   Dressing Type Tape;Transparent 08/11/21 0728   Hub Color/Line Status Pink 08/11/21 0948   Action Taken Dressing reinforced 08/11/21 0728   Alcohol Cap Used No 08/11/21 0728       Arterial Line 08/11/21 (Active)   Site Assessment Clean, dry, & intact 08/11/21 0948   Dressing Status Clean, dry, & intact 08/11/21 0948   Dressing Type Tape;Transparent 08/11/21 0948   Line Status Intact and in place 08/11/21 0948       Arterial Line 08/11/21 (Active)   Site Assessment Clean, dry, & intact 08/11/21 0948   Line Status Intact and in place 08/11/21 0948        Opportunity for questions and clarification was provided.       Patient transported with: Monitor  Registered Nurse

## 2021-08-11 NOTE — ANESTHESIA PREPROCEDURE EVALUATION
Relevant Problems   RESPIRATORY SYSTEM   (+) SHILPI (obstructive sleep apnea)      CARDIOVASCULAR   (+) CAD (coronary artery disease)   (+) Essential hypertension   (+) Presence of drug coated stent in left circumflex coronary artery   (+) Unstable angina (HCC)      ENDOCRINE   (+) Severe obesity (HCC)       Anesthetic History   No history of anesthetic complications            Review of Systems / Medical History  Patient summary reviewed and pertinent labs reviewed    Pulmonary        Sleep apnea: CPAP  Smoker         Neuro/Psych   Within defined limits           Cardiovascular    Hypertension: poorly controlled  Valvular problems/murmurs: tricuspid insufficiency        Past MI, CAD and hyperlipidemia    Exercise tolerance: <4 METS  Comments: Recent DVT and PE  EF 55%  MILD TI   GI/Hepatic/Renal  Within defined limits              Endo/Other        Obesity     Other Findings              Physical Exam    Airway  Mallampati: III  TM Distance: > 6 cm  Neck ROM: normal range of motion   Mouth opening: Normal     Cardiovascular          Murmur: Grade 1, Tricuspid area     Dental    Dentition: Poor dentition     Pulmonary      Decreased breath sounds: bilateral           Abdominal  GI exam deferred       Other Findings            Anesthetic Plan    ASA: 4  Anesthesia type: general    Monitoring Plan: Arterial line, BIS, CVP, [place] and HANH  Post-op pain plan if not by surgeon: IV PCA  Post procedure ventilation   Induction: Intravenous  Anesthetic plan and risks discussed with: Patient

## 2021-08-11 NOTE — ANESTHESIA PROCEDURE NOTES
HANH  Date/Time: 8/11/2021 9:33 AM      Procedure Details: probe placement, image aquisition & interpretation    Site marked, Timeout performed, 09:33 EDT  Risks and benefits discussed with the patient and plans are to proceed    Procedure Note    Performed by: Chrissy Wong MD  Authorized by: Chrissy Wong MD       Indications: assessment of ascending aorta and assessment of surgical repair  Modalities: PWD, CWD, CF, 2D  Probe Type: multiplane  Insertion: atraumatic  Patient Status: intubated and sedated    Echocardiographic and Doppler Measurements   Aorta  Size  Diam(cm)  Dissection PlaqueThick(mm)  Plaque Mobile    Ascending dilated 3.5 No 0-3 No    Arch normal  No 0-3 No    Descending normal              Valves  Annulus  Stenosis  Area/Grad  Regurg  Leaflet   Morph  Leaflet   Motion    Aortic normal none  0 normal normal    Mitral normal none  1+ normal normal    Tricuspid normal none  1+ normal normal          Atria  Size  SEC (smoke)  Thrombus  Tumor  Device    Rt Atrium dilated No No No No    Lt Atrium dilated No No No No     Interatrial Septum Morphology: lipomatous hypertrophy    Interventricular Septum Morphology: normal    Ventricle  Cavity Size  Cavity Dimension Hypertrophy  Thrombus  Gloal FXN  EF    RV dilated  No no normal     LV dilated  Yes No mildly impaired 50%       Regional Function  (1 = normal, 2 = mildly hypokinetic, 3 = severely hypokinetic, 4 = akinetic, 5 = dyskinetic) LAV - [place]   ME LAV = 0  ME LAV = 90  ME LAV = 130   Basal Sept:2 Basal Ant:1 Basal Post:1   Mid Sept:2 Mid Ant:1 Mid Post:1   Apical Sept:2 Apical Ant:1 Basal Ant Sept:1   Basal Lat:1 Basal Inf:1 Mid Ant Sept:1   Mid Lat:1 Mid Inf:1    Apical Lat:1 Apical Inf:1        Pericardium: normal    Post Intervention Follow-up Study  Ventricular Global Function: unchanged  Ventricular Regional Function: unchanged     Valve  Function  Regurgitation  Area    Aortic no change      Mitral no change      Tricuspid no change      Prosthetic        Complications: None

## 2021-08-11 NOTE — H&P
History and Physical        Steven Shaw MD   Physician   Specialty:  Cardiothoracic Surgery   Progress Notes      Signed   Encounter Date:  8/6/2021                    []Hide copied text    []Kim for details  CARDIAC SURGERY FOLLOW-UP NOTE     8/6/2021  1:13 PM     Subjective:   Diane Reed returns for a follow-up visit regarding scheduling and discussing CABG. I initially met the patient approximately 6 weeks ago and while he was found to have significant three-vessel disease at that time, he also developed at some point bilateral pulmonary emboli which required anticoagulation. He had significant right heart dysfunction which obviously made elective CABG unwise at that time. He has been on anticoagulation (Xarelto) and has subsequently had an echocardiogram which showed significant improvement of his right heart function. I have been in communication with Dr. Lashell Rodrigues regarding the timing of the surgery. Patient is here now to schedule the surgery and to discuss the plan.     He feels fairly well but does have symptoms consistent with angina with exertion. Shortness of breath is present. His son is to be  in approximately 6 weeks and he is hoping to get the surgery done as soon as possible now that it is safe to do so.        Current medications include:         Current Outpatient Medications   Medication Sig Dispense Refill    furosemide (LASIX) 40 mg tablet TAKE 1 TABLET BY MOUTH TWO (2) TIMES A DAY FOR 30 DAYS. 60 Tablet 0    isosorbide mononitrate ER (IMDUR) 30 mg tablet Take 1 Tablet by mouth every morning. (Patient taking differently: Take 60 mg by mouth two (2) times a day.) 90 Tablet 3    rivaroxaban (XARELTO) 20 mg tab tablet Take 1 Tablet by mouth daily. 90 Tablet 1    tiotropium-olodateroL (Stiolto Respimat) 2.5-2.5 mcg/actuation inhaler Take 2 Puffs by inhalation daily.  1 Inhaler 0    nitroglycerin (NITROLINGUAL) 400 mcg/spray spray SPRAY ONE SPRAY UNDER THE TONGUE AS NEEDED MAY REPEAT UP TO 2 TIMES AS DIRECTED  12 g 5    aspirin delayed-release 81 mg tablet     1    colchicine (Mitigare) 0.6 mg capsule Take 1 Capsule by mouth daily. (Patient not taking: Reported on 8/6/2021) 30 Capsule 2    predniSONE (DELTASONE) 20 mg tablet 3 for 2 days then 2 for 2 days then 1 for 2 days then 1/2 for 2 days (Patient not taking: Reported on 8/6/2021) 14 Tablet 0         Objective:   Vital signs:        BP Readings from Last 3 Encounters:   08/06/21 137/78   07/26/21 120/60   07/26/21 120/70          Wt Readings from Last 3 Encounters:   08/06/21 117 kg (258 lb)   07/26/21 116.6 kg (257 lb)   07/26/21 114.8 kg (253 lb)      @TMAX(24)@      Wt Readings from Last 3 Encounters:   08/06/21 117 kg (258 lb)   07/26/21 116.6 kg (257 lb)   07/26/21 114.8 kg (253 lb)          Ht Readings from Last 3 Encounters:   08/06/21 5' 10.5\" (1.791 m)   07/26/21 5' 10\" (1.778 m)   07/26/21 5' 10\" (1.778 m)      Respiratory exam: clear to auscultation bilaterally. Cardiac exam: regular rate and rhythm   Pedal edema: moderate. Mild varicosities are noted.        Assessment:  The patient is a 68-year-old gentleman who needs coronary artery bypass grafting. Given his recent massive pulmonary embolisms, I would prefer to bridge the patient off of Xarelto with Lovenox for 2-3 days preoperatively as opposed to just holding Xarelto. We will set him up for this. I discussed the surgery at length with the patient and his wife. All questions were answered. The risks and benefits were explained in detail.     The patient is scheduled to have surgery tentatively next Wednesday (August 11, 2021).      Thank you for allowing me to assist with the care of this patient.  Selena Sagastume MD [facility]  Chief, Cardiothoracic Surgery   [facility]  [phone]         Electronically signed by Virginia Davila MD at 08/06/21 8854  Note Details    Author Virginia Davila MD File Time 08/06/21 1324   Author Type Physician Status Signed   Last  Margi Nova MD Specialty Cardiothoracic Surgery    Office Visit on 8/6/2021     Office Visit on 8/6/2021        Detailed Report      Note viewed by patient

## 2021-08-11 NOTE — PROGRESS NOTES
Ipratropium + arformoterol nebulizers was therapeutically interchanged for [employer] Inhaler per the P&T Committee approved Therapeutic Interchanges Policy.     Qiana Cunha [place], Pharmacist  8/11/2021 1:50 PM

## 2021-08-11 NOTE — DIABETES MGMT
Diabetes Plan of Care    A1c 5.2% (8/06/2021)  Home diabetes medications: None. No history of diabetes mellitus    Patient is status post cardiac procedure. Regular insulin drip protocol via GlucoStabilizer. Recommendation: follow insulin drip protocol via GlucoStabilizer. Most recent blood glucose values: Within target range : Yes. Current A1c 5.2% (8/06/2021) which is equivalent to estimated average blood glucose of 103 mg/dL during the past 2-3 months    Adequate glycemic control PTA: No history of diabetes. Current hospital diabetes medications:  Regular insulin drip via GlucoStabilizer. BG target: . Drip rate at time of review: 1.5 units/hr    TDD previous day N/A.  Patient admitted on 8/11/2021    Diet: Clear liquid; no concentrated sweets - (when patient is ready for po intake post op)    Goals: Blood glucose will be within target of 70 - 180 mg/dl by: 8/14/2021    Education:  _____ Refer to Diabetes Education Record                       __X___ Education not indicated at this time     Agnieszka Long RN [facility]  Pager: [phone]

## 2021-08-11 NOTE — ANESTHESIA PROCEDURE NOTES
Central Line Placement    Start time: 8/11/2021 8:45 AM  End time: 8/11/2021 8:57 AM  Performed by: Samantha Avendaño MD  Authorized by: Samantha Avendaño MD     Indications: vascular access, central pressure monitoring and need for vasopressors  Preanesthetic Checklist: patient identified, risks and benefits discussed, anesthesia consent, site marked, patient being monitored and timeout performed    Timeout Time: 08:45 EDT       Pre-procedure: All elements of maximal sterile barrier technique followed? Yes    2% Chlorhexidine for cutaneous antisepsis, Hand hygiene performed prior to catheter insertion and Ultrasound guidance    Sterile Ultrasound Technique followed?: Yes        Ultrasound Image Stored? Image stored    Procedure:   Prep:  Chlorhexidine  Location: internal jugular  Orientation:  Right  Patient position:  Trendelenburg  Catheter type:  Single lumen  Catheter size:  7 Fr  Catheter length:  10 cm  Number of attempts:  1  Successful placement: Yes      Assessment:   Post-procedure:  Catheter secured, sterile dressing applied and sterile dressing with CHG applied  Assessment:  Free fluid flow, blood return through all ports, guidewire removal verified and placement verified by venous blood gas (VBG)  Insertion:  Uncomplicated  Patient tolerance:  Patient tolerated the procedure well with no immediate complications  Pulmonary Artery Catheter Procedure Note    Patient: Neena Chatman MRN: [id number]  SSN: [id number]   YOB: 1960  Age: 59 y.o. Sex: male   Cardiovascular Function/Vital Signs  There were no vitals taken for this visit. Referring physician:   Stephanie Edgar MD  Indication: Hemodynamic Monitoring    Central venous access previously established using sterile technique. Sterile drape placed around introducer port. Sterile sheath placed around 7. F [place] CCO PAC. PAC ports flushed with saline, balloon inflated and deflated and catheter visually inspected.    PAC inserted using sterile technique and floated with balloon inflated until appropriate PA pressure waveform seen. Balloon then deflated. PAC secured at  49 cm. Secured with sterile tegaderm. Vital signs were monitored throughout and patient tolerated procedure without any apparent complications. Position confirmed by Transesophageal Echocardiography. CXR pending. Jackson Quevedo MD  8/11/2021  9:48 AMPulmonary Artery Catheter Procedure Note    Patient: Maribell Alcala MRN: [id number]  SSN: [id number]   YOB: 1960  Age: 59 y.o. Sex: male   Cardiovascular Function/Vital Signs  There were no vitals taken for this visit. Referring physician:   Ursula Johnston MD  Indication: Hemodynamic Monitoring    Central venous access previously established using sterile technique. Sterile drape placed around introducer port. Sterile sheath placed around 7. F [place] CCO PAC. PAC ports flushed with saline, balloon inflated and deflated and catheter visually inspected. PAC inserted using sterile technique and floated with balloon inflated until appropriate PA pressure waveform seen. Balloon then deflated. PAC secured at  49 cm. Secured with sterile tegaderm. Vital signs were monitored throughout and patient tolerated procedure without any apparent complications. Position confirmed by Transesophageal Echocardiography. CXR pending.       Jackson Quevedo MD  8/11/2021  9:48 AM

## 2021-08-11 NOTE — PROGRESS NOTES
08/11/21 1550   Weaning Parameters   Spontaneous Breathing Trial Complete   (Started)   Resp Rate Observed 28   Ve 13      RSBI 44     1550 SBT Started. PS 7 / Peep 5 / Fio2 35%. 1626 ABG done and results given to JENNIFER An.   - Orders to extubated Patient.      1640 Patient extubated and placed on 6 lpm via NC.

## 2021-08-11 NOTE — PROGRESS NOTES
1333   Bedside and verbal report received from Dr Chris Wright all lines are connected and calibrated by OR tech . V/S stable see flowsheet, chest tube drainage minimal   1600:sedattion wean off patient follow command but drowsy   1630 patient awake follows command ABG result relayed to Soledad RODRIGUEZ with order to extubate patient. Extubated patient without difficulty on 6 l N/C  1745 Poat extubation ABG OK  1800:Pain not controlled PA Comeyne notified please see MAR  1900: Bedside and Verbal shift change report given to Dann Vick RN  (oncoming nurse) by Rosary Lennox RN (offgoing nurse). Report included the following information Kardex and Procedure Summary dripds hemodynamics.

## 2021-08-11 NOTE — ANESTHESIA PROCEDURE NOTES
Arterial Line Placement    Start time: 8/11/2021 8:38 AM  End time: 8/11/2021 8:44 AM  Performed by: Dariana Vail MD  Authorized by: Dariana Vail MD     Pre-Procedure  Preanesthetic Checklist: patient identified, risks and benefits discussed, anesthesia consent, site marked, patient being monitored, timeout performed and patient being monitored    Timeout Time: 08:38 EDT        Procedure:   Prep:  Chlorhexidine  Seldinger Technique?: Yes    Orientation:  Left  Location:  Radial artery  Catheter size:  20 G  Number of attempts:  1  Cont Cardiac Output Sensor: No      Assessment:   Post-procedure:  Line secured and sterile dressing applied  Patient Tolerance:  Patient tolerated the procedure well with no immediate complications

## 2021-08-12 ENCOUNTER — APPOINTMENT (OUTPATIENT)
Dept: GENERAL RADIOLOGY | Age: 61
DRG: 236 | End: 2021-08-12
Attending: PHYSICIAN ASSISTANT
Payer: COMMERCIAL

## 2021-08-12 PROBLEM — E43 SEVERE PROTEIN-CALORIE MALNUTRITION (HCC): Chronic | Status: ACTIVE | Noted: 2021-08-12

## 2021-08-12 PROBLEM — Z95.1 S/P CABG X 3: Status: ACTIVE | Noted: 2021-08-11

## 2021-08-12 LAB
ADMINISTERED INITIALS, ADMINIT: NORMAL
ANION GAP SERPL CALC-SCNC: 4 MMOL/L (ref 3–18)
ANION GAP SERPL CALC-SCNC: 7 MMOL/L (ref 3–18)
APTT PPP: 145.7 SEC (ref 23–36.4)
APTT PPP: 32.4 SEC (ref 23–36.4)
ATRIAL RATE: 67 BPM
ATRIAL RATE: 75 BPM
BASE EXCESS BLD CALC-SCNC: 0.2 MMOL/L
BASOPHILS # BLD: 0 K/UL (ref 0–0.1)
BASOPHILS NFR BLD: 0 % (ref 0–2)
BUN SERPL-MCNC: 17 MG/DL (ref 7–18)
BUN SERPL-MCNC: 19 MG/DL (ref 7–18)
BUN/CREAT SERPL: 11 (ref 12–20)
BUN/CREAT SERPL: 12 (ref 12–20)
CA-I BLD-MCNC: 1.22 MMOL/L (ref 1.12–1.32)
CALCIUM SERPL-MCNC: 7.9 MG/DL (ref 8.5–10.1)
CALCIUM SERPL-MCNC: 8 MG/DL (ref 8.5–10.1)
CALCULATED P AXIS, ECG09: 40 DEGREES
CALCULATED P AXIS, ECG09: 64 DEGREES
CALCULATED R AXIS, ECG10: 14 DEGREES
CALCULATED R AXIS, ECG10: 48 DEGREES
CALCULATED T AXIS, ECG11: 49 DEGREES
CALCULATED T AXIS, ECG11: 65 DEGREES
CHLORIDE BLD-SCNC: 110 MMOL/L (ref 98–107)
CHLORIDE SERPL-SCNC: 109 MMOL/L (ref 100–111)
CHLORIDE SERPL-SCNC: 115 MMOL/L (ref 100–111)
CO2 BLD-SCNC: 27 MMOL/L (ref 19–24)
CO2 SERPL-SCNC: 24 MMOL/L (ref 21–32)
CO2 SERPL-SCNC: 26 MMOL/L (ref 21–32)
CREAT BLD-MCNC: 1.78 MG/DL (ref 0.6–1.3)
CREAT SERPL-MCNC: 1.52 MG/DL (ref 0.6–1.3)
CREAT SERPL-MCNC: 1.59 MG/DL (ref 0.6–1.3)
D50 ADMINISTERED, D50ADM: 0 ML
D50 ORDER, D50ORD: 0 ML
DIAGNOSIS, 93000: NORMAL
DIAGNOSIS, 93000: NORMAL
DIFFERENTIAL METHOD BLD: ABNORMAL
EOSINOPHIL # BLD: 0.2 K/UL (ref 0–0.4)
EOSINOPHIL NFR BLD: 1 % (ref 0–5)
ERYTHROCYTE [DISTWIDTH] IN BLOOD BY AUTOMATED COUNT: 14.2 % (ref 11.6–14.5)
ERYTHROCYTE [DISTWIDTH] IN BLOOD BY AUTOMATED COUNT: 14.4 % (ref 11.6–14.5)
FOLATE SERPL-MCNC: 5.8 NG/ML (ref 3.1–17.5)
GLUCOSE BLD STRIP.AUTO-MCNC: 100 MG/DL (ref 70–110)
GLUCOSE BLD STRIP.AUTO-MCNC: 101 MG/DL (ref 70–110)
GLUCOSE BLD STRIP.AUTO-MCNC: 102 MG/DL (ref 70–110)
GLUCOSE BLD STRIP.AUTO-MCNC: 102 MG/DL (ref 70–110)
GLUCOSE BLD STRIP.AUTO-MCNC: 106 MG/DL (ref 70–110)
GLUCOSE BLD STRIP.AUTO-MCNC: 107 MG/DL (ref 70–110)
GLUCOSE BLD STRIP.AUTO-MCNC: 109 MG/DL (ref 70–110)
GLUCOSE BLD STRIP.AUTO-MCNC: 112 MG/DL (ref 70–110)
GLUCOSE BLD STRIP.AUTO-MCNC: 114 MG/DL (ref 70–110)
GLUCOSE BLD STRIP.AUTO-MCNC: 114 MG/DL (ref 70–110)
GLUCOSE BLD STRIP.AUTO-MCNC: 118 MG/DL (ref 70–110)
GLUCOSE BLD STRIP.AUTO-MCNC: 121 MG/DL (ref 70–110)
GLUCOSE BLD STRIP.AUTO-MCNC: 87 MG/DL (ref 70–110)
GLUCOSE BLD STRIP.AUTO-MCNC: 95 MG/DL (ref 70–110)
GLUCOSE BLD STRIP.AUTO-MCNC: 95 MG/DL (ref 70–110)
GLUCOSE BLD STRIP.AUTO-MCNC: 95 MG/DL (ref 74–106)
GLUCOSE BLD STRIP.AUTO-MCNC: 97 MG/DL (ref 70–110)
GLUCOSE SERPL-MCNC: 105 MG/DL (ref 74–99)
GLUCOSE SERPL-MCNC: 93 MG/DL (ref 74–99)
GLUCOSE, GLC: 100 MG/DL
GLUCOSE, GLC: 101 MG/DL
GLUCOSE, GLC: 102 MG/DL
GLUCOSE, GLC: 102 MG/DL
GLUCOSE, GLC: 106 MG/DL
GLUCOSE, GLC: 107 MG/DL
GLUCOSE, GLC: 109 MG/DL
GLUCOSE, GLC: 112 MG/DL
GLUCOSE, GLC: 114 MG/DL
GLUCOSE, GLC: 114 MG/DL
GLUCOSE, GLC: 121 MG/DL
GLUCOSE, GLC: 87 MG/DL
GLUCOSE, GLC: 95 MG/DL
GLUCOSE, GLC: 95 MG/DL
GLUCOSE, GLC: 97 MG/DL
HCO3 BLD-SCNC: 26 MMOL/L (ref 22–26)
HCT VFR BLD AUTO: 34.6 % (ref 36–48)
HCT VFR BLD AUTO: 37.8 % (ref 36–48)
HCT VFR BLD CALC: 32 % (ref 36–49)
HGB BLD-MCNC: 10.8 G/DL (ref 12–16)
HGB BLD-MCNC: 11.6 G/DL (ref 13–16)
HGB BLD-MCNC: 12.4 G/DL (ref 13–16)
HIGH TARGET, HITG: 150 MG/DL
INSULIN ADMINSTERED, INSADM: 0.8 UNITS/HOUR
INSULIN ADMINSTERED, INSADM: 1.1 UNITS/HOUR
INSULIN ADMINSTERED, INSADM: 1.2 UNITS/HOUR
INSULIN ADMINSTERED, INSADM: 1.2 UNITS/HOUR
INSULIN ADMINSTERED, INSADM: 1.3 UNITS/HOUR
INSULIN ADMINSTERED, INSADM: 1.3 UNITS/HOUR
INSULIN ADMINSTERED, INSADM: 1.4 UNITS/HOUR
INSULIN ADMINSTERED, INSADM: 1.4 UNITS/HOUR
INSULIN ADMINSTERED, INSADM: 1.5 UNITS/HOUR
INSULIN ADMINSTERED, INSADM: 1.6 UNITS/HOUR
INSULIN ADMINSTERED, INSADM: 1.8 UNITS/HOUR
INSULIN ORDER, INSORD: 0.8 UNITS/HOUR
INSULIN ORDER, INSORD: 1.1 UNITS/HOUR
INSULIN ORDER, INSORD: 1.2 UNITS/HOUR
INSULIN ORDER, INSORD: 1.2 UNITS/HOUR
INSULIN ORDER, INSORD: 1.3 UNITS/HOUR
INSULIN ORDER, INSORD: 1.3 UNITS/HOUR
INSULIN ORDER, INSORD: 1.4 UNITS/HOUR
INSULIN ORDER, INSORD: 1.4 UNITS/HOUR
INSULIN ORDER, INSORD: 1.5 UNITS/HOUR
INSULIN ORDER, INSORD: 1.6 UNITS/HOUR
INSULIN ORDER, INSORD: 1.8 UNITS/HOUR
LOW TARGET, LOT: 80 MG/DL
LYMPHOCYTES # BLD: 0.7 K/UL (ref 0.9–3.6)
LYMPHOCYTES NFR BLD: 4 % (ref 21–52)
MAGNESIUM SERPL-MCNC: 2.1 MG/DL (ref 1.6–2.6)
MCH RBC QN AUTO: 33.5 PG (ref 24–34)
MCH RBC QN AUTO: 34.6 PG (ref 24–34)
MCHC RBC AUTO-ENTMCNC: 32.8 G/DL (ref 31–37)
MCHC RBC AUTO-ENTMCNC: 33.5 G/DL (ref 31–37)
MCV RBC AUTO: 100 FL (ref 74–97)
MCV RBC AUTO: 105.6 FL (ref 74–97)
MINUTES UNTIL NEXT BG, NBG: 60 MIN
MONOCYTES # BLD: 0.7 K/UL (ref 0.05–1.2)
MONOCYTES NFR BLD: 4 % (ref 3–10)
MULTIPLIER, MUL: 0.03
MYELOCYTES NFR BLD MANUAL: 1 %
NEUTS SEG # BLD: 16.2 K/UL (ref 1.8–8)
NEUTS SEG NFR BLD: 90 % (ref 40–73)
ORDER INITIALS, ORDINIT: NORMAL
P-R INTERVAL, ECG05: 170 MS
P-R INTERVAL, ECG05: 176 MS
PCO2 BLD: 45.9 MMHG (ref 35–45)
PH BLD: 7.36 [PH] (ref 7.35–7.45)
PLATELET # BLD AUTO: 111 K/UL (ref 135–420)
PLATELET # BLD AUTO: 117 K/UL (ref 135–420)
PLATELET COMMENTS,PCOM: ABNORMAL
PMV BLD AUTO: 10.6 FL (ref 9.2–11.8)
PO2 BLD: 79 MMHG (ref 80–100)
POTASSIUM BLD-SCNC: 4 MMOL/L (ref 3.5–5.5)
POTASSIUM SERPL-SCNC: 3.9 MMOL/L (ref 3.5–5.5)
POTASSIUM SERPL-SCNC: 4.3 MMOL/L (ref 3.5–5.5)
Q-T INTERVAL, ECG07: 412 MS
Q-T INTERVAL, ECG07: 414 MS
QRS DURATION, ECG06: 86 MS
QRS DURATION, ECG06: 88 MS
QTC CALCULATION (BEZET), ECG08: 435 MS
QTC CALCULATION (BEZET), ECG08: 462 MS
RBC # BLD AUTO: 3.46 M/UL (ref 4.35–5.65)
RBC # BLD AUTO: 3.58 M/UL (ref 4.35–5.65)
RBC MORPH BLD: ABNORMAL
SAO2 % BLD: 94.9 % (ref 92–97)
SERVICE CMNT-IMP: ABNORMAL
SODIUM BLD-SCNC: 142 MMOL/L (ref 136–145)
SODIUM SERPL-SCNC: 142 MMOL/L (ref 136–145)
SODIUM SERPL-SCNC: 143 MMOL/L (ref 136–145)
SPECIMEN TYPE: ABNORMAL
VENTRICULAR RATE, ECG03: 67 BPM
VENTRICULAR RATE, ECG03: 75 BPM
VIT B12 SERPL-MCNC: 246 PG/ML (ref 211–911)
WBC # BLD AUTO: 13.7 K/UL (ref 4.6–13.2)
WBC # BLD AUTO: 18 K/UL (ref 4.6–13.2)

## 2021-08-12 PROCEDURE — 85027 COMPLETE CBC AUTOMATED: CPT

## 2021-08-12 PROCEDURE — 99024 POSTOP FOLLOW-UP VISIT: CPT | Performed by: PHYSICIAN ASSISTANT

## 2021-08-12 PROCEDURE — 93005 ELECTROCARDIOGRAM TRACING: CPT

## 2021-08-12 PROCEDURE — 97116 GAIT TRAINING THERAPY: CPT

## 2021-08-12 PROCEDURE — 82746 ASSAY OF FOLIC ACID SERUM: CPT

## 2021-08-12 PROCEDURE — 94762 N-INVAS EAR/PLS OXIMTRY CONT: CPT

## 2021-08-12 PROCEDURE — 97535 SELF CARE MNGMENT TRAINING: CPT

## 2021-08-12 PROCEDURE — 85025 COMPLETE CBC W/AUTO DIFF WBC: CPT

## 2021-08-12 PROCEDURE — 2709999900 HC NON-CHARGEABLE SUPPLY

## 2021-08-12 PROCEDURE — 74011000250 HC RX REV CODE- 250: Performed by: PHYSICIAN ASSISTANT

## 2021-08-12 PROCEDURE — 74011636637 HC RX REV CODE- 636/637: Performed by: PHYSICIAN ASSISTANT

## 2021-08-12 PROCEDURE — 74011250637 HC RX REV CODE- 250/637: Performed by: PHYSICIAN ASSISTANT

## 2021-08-12 PROCEDURE — 94640 AIRWAY INHALATION TREATMENT: CPT

## 2021-08-12 PROCEDURE — 36415 COLL VENOUS BLD VENIPUNCTURE: CPT

## 2021-08-12 PROCEDURE — 74011250636 HC RX REV CODE- 250/636: Performed by: PHYSICIAN ASSISTANT

## 2021-08-12 PROCEDURE — 82962 GLUCOSE BLOOD TEST: CPT

## 2021-08-12 PROCEDURE — 97166 OT EVAL MOD COMPLEX 45 MIN: CPT

## 2021-08-12 PROCEDURE — 99255 IP/OBS CONSLTJ NEW/EST HI 80: CPT | Performed by: INTERNAL MEDICINE

## 2021-08-12 PROCEDURE — 74011250636 HC RX REV CODE- 250/636: Performed by: SPECIALIST

## 2021-08-12 PROCEDURE — 65620000000 HC RM CCU GENERAL

## 2021-08-12 PROCEDURE — 74011000250 HC RX REV CODE- 250: Performed by: SPECIALIST

## 2021-08-12 PROCEDURE — 80048 BASIC METABOLIC PNL TOTAL CA: CPT

## 2021-08-12 PROCEDURE — 84295 ASSAY OF SERUM SODIUM: CPT

## 2021-08-12 PROCEDURE — 97162 PT EVAL MOD COMPLEX 30 MIN: CPT

## 2021-08-12 PROCEDURE — 77010033678 HC OXYGEN DAILY

## 2021-08-12 PROCEDURE — 85730 THROMBOPLASTIN TIME PARTIAL: CPT

## 2021-08-12 PROCEDURE — 71045 X-RAY EXAM CHEST 1 VIEW: CPT

## 2021-08-12 PROCEDURE — APPNB15 APP NON BILLABLE TIME 0-15 MINS: Performed by: PHYSICIAN ASSISTANT

## 2021-08-12 PROCEDURE — 83735 ASSAY OF MAGNESIUM: CPT

## 2021-08-12 RX ORDER — INSULIN GLARGINE 100 [IU]/ML
20 INJECTION, SOLUTION SUBCUTANEOUS DAILY
Status: DISCONTINUED | OUTPATIENT
Start: 2021-08-12 | End: 2021-08-16 | Stop reason: HOSPADM

## 2021-08-12 RX ORDER — HYDROMORPHONE HYDROCHLORIDE 1 MG/ML
.5-1 INJECTION, SOLUTION INTRAMUSCULAR; INTRAVENOUS; SUBCUTANEOUS
Status: DISCONTINUED | OUTPATIENT
Start: 2021-08-12 | End: 2021-08-15

## 2021-08-12 RX ORDER — SODIUM CHLORIDE 9 MG/ML
75 INJECTION, SOLUTION INTRAVENOUS CONTINUOUS
Status: DISPENSED | OUTPATIENT
Start: 2021-08-12 | End: 2021-08-12

## 2021-08-12 RX ORDER — TRAMADOL HYDROCHLORIDE 50 MG/1
50-100 TABLET ORAL
Status: DISCONTINUED | OUTPATIENT
Start: 2021-08-12 | End: 2021-08-12

## 2021-08-12 RX ORDER — HYDROCODONE BITARTRATE AND ACETAMINOPHEN 5; 325 MG/1; MG/1
1-2 TABLET ORAL
Status: DISCONTINUED | OUTPATIENT
Start: 2021-08-12 | End: 2021-08-16 | Stop reason: HOSPADM

## 2021-08-12 RX ORDER — THERA TABS 400 MCG
1 TAB ORAL DAILY
Status: DISCONTINUED | OUTPATIENT
Start: 2021-08-13 | End: 2021-08-16 | Stop reason: HOSPADM

## 2021-08-12 RX ORDER — DEXTROSE 50 % IN WATER (D50W) INTRAVENOUS SYRINGE
25-50 AS NEEDED
Status: DISCONTINUED | OUTPATIENT
Start: 2021-08-12 | End: 2021-08-16 | Stop reason: HOSPADM

## 2021-08-12 RX ORDER — MAGNESIUM SULFATE 100 %
4 CRYSTALS MISCELLANEOUS AS NEEDED
Status: DISCONTINUED | OUTPATIENT
Start: 2021-08-12 | End: 2021-08-16 | Stop reason: HOSPADM

## 2021-08-12 RX ORDER — HEPARIN SODIUM 10000 [USP'U]/100ML
18-36 INJECTION, SOLUTION INTRAVENOUS
Status: DISPENSED | OUTPATIENT
Start: 2021-08-12 | End: 2021-08-15

## 2021-08-12 RX ORDER — INSULIN LISPRO 100 [IU]/ML
INJECTION, SOLUTION INTRAVENOUS; SUBCUTANEOUS
Status: DISCONTINUED | OUTPATIENT
Start: 2021-08-12 | End: 2021-08-16 | Stop reason: HOSPADM

## 2021-08-12 RX ORDER — GUAIFENESIN 600 MG/1
1200 TABLET, EXTENDED RELEASE ORAL EVERY 12 HOURS
Status: DISCONTINUED | OUTPATIENT
Start: 2021-08-12 | End: 2021-08-16 | Stop reason: HOSPADM

## 2021-08-12 RX ORDER — LANOLIN ALCOHOL/MO/W.PET/CERES
100 CREAM (GRAM) TOPICAL DAILY
Status: DISCONTINUED | OUTPATIENT
Start: 2021-08-13 | End: 2021-08-16 | Stop reason: HOSPADM

## 2021-08-12 RX ORDER — VANCOMYCIN HYDROCHLORIDE
1250
Status: COMPLETED | OUTPATIENT
Start: 2021-08-12 | End: 2021-08-13

## 2021-08-12 RX ADMIN — HYDROCODONE BITARTRATE AND ACETAMINOPHEN 2 TABLET: 5; 325 TABLET ORAL at 10:17

## 2021-08-12 RX ADMIN — CHLORHEXIDINE GLUCONATE 0.12% ORAL RINSE 10 ML: 1.2 LIQUID ORAL at 17:55

## 2021-08-12 RX ADMIN — METOPROLOL TARTRATE 12.5 MG: 25 TABLET ORAL at 20:09

## 2021-08-12 RX ADMIN — POLYETHYLENE GLYCOL 3350 17 G: 17 POWDER, FOR SOLUTION ORAL at 10:10

## 2021-08-12 RX ADMIN — Medication 10 ML: at 06:00

## 2021-08-12 RX ADMIN — Medication 10 ML: at 20:21

## 2021-08-12 RX ADMIN — HYDROMORPHONE HYDROCHLORIDE 1 MG: 1 INJECTION, SOLUTION INTRAMUSCULAR; INTRAVENOUS; SUBCUTANEOUS at 16:57

## 2021-08-12 RX ADMIN — HYDROMORPHONE HYDROCHLORIDE 1 MG: 1 INJECTION, SOLUTION INTRAMUSCULAR; INTRAVENOUS; SUBCUTANEOUS at 11:56

## 2021-08-12 RX ADMIN — PHENYLEPHRINE HYDROCHLORIDE 40 MCG/MIN: 10 INJECTION INTRAVENOUS at 10:00

## 2021-08-12 RX ADMIN — FAMOTIDINE 20 MG: 20 TABLET ORAL at 10:09

## 2021-08-12 RX ADMIN — HEPARIN SODIUM 18 UNITS/KG/HR: 10000 INJECTION, SOLUTION INTRAVENOUS at 14:10

## 2021-08-12 RX ADMIN — IPRATROPIUM BROMIDE 0.5 MG: 0.5 SOLUTION RESPIRATORY (INHALATION) at 23:55

## 2021-08-12 RX ADMIN — IPRATROPIUM BROMIDE 0.5 MG: 0.5 SOLUTION RESPIRATORY (INHALATION) at 16:30

## 2021-08-12 RX ADMIN — IPRATROPIUM BROMIDE 0.5 MG: 0.5 SOLUTION RESPIRATORY (INHALATION) at 00:04

## 2021-08-12 RX ADMIN — Medication 10 ML: at 14:00

## 2021-08-12 RX ADMIN — VANCOMYCIN HYDROCHLORIDE 1250 MG: 10 INJECTION, POWDER, LYOPHILIZED, FOR SOLUTION INTRAVENOUS at 10:00

## 2021-08-12 RX ADMIN — GUAIFENESIN 1200 MG: 600 TABLET, EXTENDED RELEASE ORAL at 11:57

## 2021-08-12 RX ADMIN — GUAIFENESIN 1200 MG: 600 TABLET, EXTENDED RELEASE ORAL at 20:09

## 2021-08-12 RX ADMIN — AMIODARONE HYDROCHLORIDE 200 MG: 200 TABLET ORAL at 10:09

## 2021-08-12 RX ADMIN — ATORVASTATIN CALCIUM 40 MG: 40 TABLET, FILM COATED ORAL at 21:11

## 2021-08-12 RX ADMIN — HYDROCODONE BITARTRATE AND ACETAMINOPHEN 2 TABLET: 5; 325 TABLET ORAL at 04:15

## 2021-08-12 RX ADMIN — IPRATROPIUM BROMIDE 0.5 MG: 0.5 SOLUTION RESPIRATORY (INHALATION) at 08:02

## 2021-08-12 RX ADMIN — MUPIROCIN: 20 OINTMENT TOPICAL at 10:22

## 2021-08-12 RX ADMIN — AMIODARONE HYDROCHLORIDE 200 MG: 200 TABLET ORAL at 16:57

## 2021-08-12 RX ADMIN — CHLORHEXIDINE GLUCONATE 0.12% ORAL RINSE 10 ML: 1.2 LIQUID ORAL at 09:18

## 2021-08-12 RX ADMIN — MORPHINE SULFATE 4 MG: 2 INJECTION, SOLUTION INTRAMUSCULAR; INTRAVENOUS at 04:14

## 2021-08-12 RX ADMIN — HYDROMORPHONE HYDROCHLORIDE 0.5 MG: 1 INJECTION, SOLUTION INTRAMUSCULAR; INTRAVENOUS; SUBCUTANEOUS at 20:06

## 2021-08-12 RX ADMIN — MUPIROCIN: 20 OINTMENT TOPICAL at 18:00

## 2021-08-12 RX ADMIN — DOCUSATE SODIUM 100 MG: 100 CAPSULE, LIQUID FILLED ORAL at 17:56

## 2021-08-12 RX ADMIN — HYDROCODONE BITARTRATE AND ACETAMINOPHEN 2 TABLET: 5; 325 TABLET ORAL at 23:57

## 2021-08-12 RX ADMIN — SODIUM CHLORIDE 75 ML/HR: 900 INJECTION, SOLUTION INTRAVENOUS at 13:30

## 2021-08-12 RX ADMIN — INSULIN GLARGINE 20 UNITS: 100 INJECTION, SOLUTION SUBCUTANEOUS at 12:10

## 2021-08-12 RX ADMIN — FAMOTIDINE 20 MG: 20 TABLET ORAL at 18:00

## 2021-08-12 RX ADMIN — Medication 81 MG: at 10:09

## 2021-08-12 RX ADMIN — POTASSIUM CHLORIDE 20 MEQ: 14.9 INJECTION, SOLUTION INTRAVENOUS at 01:25

## 2021-08-12 RX ADMIN — HYDROCODONE BITARTRATE AND ACETAMINOPHEN 2 TABLET: 5; 325 TABLET ORAL at 20:04

## 2021-08-12 RX ADMIN — POTASSIUM CHLORIDE 20 MEQ: 14.9 INJECTION, SOLUTION INTRAVENOUS at 05:45

## 2021-08-12 RX ADMIN — ARFORMOTEROL TARTRATE 15 MCG: 15 SOLUTION RESPIRATORY (INHALATION) at 19:54

## 2021-08-12 RX ADMIN — COLCHICINE 0.6 MG: 0.6 CAPSULE ORAL at 10:09

## 2021-08-12 RX ADMIN — AMIODARONE HYDROCHLORIDE 200 MG: 200 TABLET ORAL at 21:11

## 2021-08-12 RX ADMIN — FENTANYL CITRATE 25 MCG: 50 INJECTION, SOLUTION INTRAMUSCULAR; INTRAVENOUS at 05:00

## 2021-08-12 RX ADMIN — HYDROMORPHONE HYDROCHLORIDE 0.5 MG: 1 INJECTION, SOLUTION INTRAMUSCULAR; INTRAVENOUS; SUBCUTANEOUS at 21:52

## 2021-08-12 RX ADMIN — MORPHINE SULFATE 2 MG: 2 INJECTION, SOLUTION INTRAMUSCULAR; INTRAVENOUS at 09:18

## 2021-08-12 RX ADMIN — BISACODYL 10 MG: 5 TABLET, COATED ORAL at 10:09

## 2021-08-12 RX ADMIN — ARFORMOTEROL TARTRATE 15 MCG: 15 SOLUTION RESPIRATORY (INHALATION) at 08:02

## 2021-08-12 RX ADMIN — PHENYLEPHRINE HYDROCHLORIDE 45 MCG/MIN: 10 INJECTION INTRAVENOUS at 11:45

## 2021-08-12 RX ADMIN — DOCUSATE SODIUM 100 MG: 100 CAPSULE, LIQUID FILLED ORAL at 10:09

## 2021-08-12 RX ADMIN — Medication 10 ML: at 13:29

## 2021-08-12 NOTE — PROGRESS NOTES
Follow up BMP with BUN/Cr still elevated but relatively stable BUN/Cr 17/1.52 > 19/1. 59.   Complete IVF  Keep to today    Rosibel Stewart PA-C  [facility]  [phone]

## 2021-08-12 NOTE — PROGRESS NOTES
Problem: Mobility Impaired (Adult and Pediatric)  Goal: *Acute Goals and Plan of Care (Insert Text)  Description: Physical Therapy Goals  Initiated 8/12/2021 and to be accomplished within 7 day(s)  1. Patient will move from supine to sit and sit to supine  in bed with modified independence. 2.  Patient will transfer from bed to chair and chair to bed with modified independence using the least restrictive device. 3.  Patient will perform sit to stand with modified independence. 4.  Patient will ambulate with modified independence for 200 feet with the least restrictive device. 5.  Patient will ascend/descend 3 stairs with handrail(s) with supervision/set-up. PLOF: Patient was independent with functional mobility. He lives with spouse in single story home. Outcome: Progressing Towards Goal    PHYSICAL THERAPY EVALUATION    Patient: Susan Draper (4 y.o. male)  Date: 8/12/2021  Primary Diagnosis: CAD (coronary artery disease) [I25.10]  Procedure(s) (LRB):  CORONARY ARTERY BYPASS GRAFT (CABG) TIMES THREE (N/A) 1 Day Post-Op   Precautions:   Fall, Sternal        ASSESSMENT :  Patient is 59 male admitted to hospital for CABG and presents today POD#1 and agreeable to therapy. Patient was educated on role of therapy and sternal precautions and demonstrated fair compliance throughout session. Objective assessment performed and patient stood with CGA with verbal cues for rocking to gain momentum. Once standing patient used Rollator to ambulate 10 ft with multiple standing rest breaks limited by pain and decreased endurance. Patient required cues for use of heart hugger during transfers. At conclusion of gait training patient transferred to Chair and was left resting with call bell by their side. Patient educated not to get up without assist and oriented to call bell; patient understanding. Patient also educated on deep breathing techniques and mobility 3x daily protocol with nursing staff.  Patient demonstrates decreased strength, mobility, and endurance and will continue to benefit from skilled PT to work towards goal.      Patient will benefit from skilled intervention to address the above impairments. Patient's rehabilitation potential is considered to be Good  Factors which may influence rehabilitation potential include:   []         None noted  []         Mental ability/status  [x]         Medical condition  [x]         Home/family situation and support systems  [x]         Safety awareness  [x]         Pain tolerance/management  []         Other:      PLAN :  Recommendations and Planned Interventions:   [x]           Bed Mobility Training             [x]    Neuromuscular Re-Education  [x]           Transfer Training                   []    Orthotic/Prosthetic Training  [x]           Gait Training                          []    Modalities  [x]           Therapeutic Exercises           []    Edema Management/Control  [x]           Therapeutic Activities            [x]    Family Training/Education  [x]           Patient Education  []           Other (comment):    Frequency/Duration: Patient will be followed by physical therapy 1-2 times per day/4-7 days per week to address goals. Discharge Recommendations: Home Health with family support  Further Equipment Recommendations for Discharge: rolling walker     SUBJECTIVE:   Patient stated \" I have chronic back pain.     OBJECTIVE DATA SUMMARY:     Past Medical History:   Diagnosis Date    CAD (coronary artery disease)     Dupuytren's disease of palm     Hyperlipidemia     Hypertension     SHILPI (obstructive sleep apnea)     PE (pulmonary thromboembolism) ([facility] [address] 06/2021    Severe obesity (HCC)     Tobacco dependence      Past Surgical History:   Procedure Laterality Date    HX CHOLECYSTECTOMY      HX CORONARY STENT PLACEMENT      HX HERNIA REPAIR      IR IVC FILTER PLACEMENT PERCUTANEOUS  6/15/2021    IR THROMBECTOMY [facility] ART PRIMARY NON HETAL OR INTRACRANIAL 6/15/2021     Barriers to Learning/Limitations: None  Compensate with: N/A  Home Situation:  Home Situation  Home Environment: Private residence  # Steps to Enter: 3  Rails to Enter: Yes  One/Two Story Residence: One story  Living Alone: No  Support Systems: Spouse/Significant Other/Partner  Current DME Used/Available at Home: Shower chair  Tub or Shower Type: Tub/Shower combination  Critical Behavior:  Neurologic State: Alert  Orientation Level: Oriented X4  Cognition: Follows commands;Decreased attention/concentration;Poor safety awareness  Safety/Judgement: Fall prevention  Psychosocial  Patient Behaviors: Calm; Cooperative  Needs Expressed: Nutritional;Other (comment) (\"I need more ice\")  Purposeful Interaction: Yes  Pt Identified Daily Priority: Clinical issues (comment)  Caritas Process: Nurture loving kindness;Enable garrett/hope; Teaching/learning; Attend basic human needs;Create healing environment;Supportive expression  Caring Interventions: Reassure; Therapeutic modalities; Other caring modalities  Reassure: Therapeutic listening; Instilling garrett and hope;Caring rounds  Therapeutic Modalities: Deep breathing; Intentional therapeutic touch  Other Caring Modalities: Hourly Rounding  Skin Condition/Temp: Dry;Warm     Skin Integrity: Incision (comment); Wound (add Wound LDA); Other (comment) (anterior lower forearms \"scratch wounds from pet dog\")  Skin Integumentary  Skin Color: Appropriate for ethnicity  Skin Condition/Temp: Dry;Warm  Skin Integrity: Incision (comment); Wound (add Wound LDA); Other (comment) (anterior lower forearms \"scratch wounds from pet dog\")  Turgor: Epidermis thin w/ loss of subcut tissue  Hair Growth: Present  Varicosities: Absent  Nails: Exceptions to WDL  Exceptions to WDL: Thick     Strength:    Strength: Generally decreased, functional                    Tone & Sensation:   Tone: Normal  Sensation: Intact              Range Of Motion:  AROM: Within functional limits              Functional Mobility:  Bed Mobility:            Scooting: Contact guard assistance    Transfers:  Sit to Stand: Contact guard assistance  Stand to Sit: Contact guard assistance    Balance:   Sitting: Intact  Standing: Impaired; With support  Standing - Static: Fair (+)  Standing - Dynamic : Fair       Ambulation/Gait Training:  Distance (ft): 10 Feet (ft)  Assistive Device: Walker, rollator  Ambulation - Level of Assistance: Contact guard assistance  Gait Abnormalities: Decreased step clearance  Step Length: Right shortened;Left shortened       Pain:  Pain level pre-treatment: 010/10   Pain level post-treatment: 10/10   Pain Intervention(s) : Medication (see MAR); Rest, Ice, Repositioning  Response to intervention: Nurse notified, See doc flow    Activity Tolerance:   Fair  Please refer to the flowsheet for vital signs taken during this treatment. After treatment:   [x]         Patient left in no apparent distress sitting up in chair  []         Patient left in no apparent distress in bed  [x]         Call bell left within reach  [x]         Nursing notified  []         Caregiver present  []         Bed alarm activated  []         SCDs applied    COMMUNICATION/EDUCATION:   []         Role of Physical Therapy in the acute care setting. []         Fall prevention education was provided and the patient/caregiver indicated understanding. []         Patient/family have participated as able in goal setting and plan of care. []         Patient/family agree to work toward stated goals and plan of care. []         Patient understands intent and goals of therapy, but is neutral about his/her participation. []         Patient is unable to participate in goal setting/plan of care: ongoing with therapy staff.  []         Other:     Thank you for this referral.  Rusty Rodriguez, PT   Time Calculation: 35 mins      Eval Complexity: History: MEDIUM  Complexity : 1-2 comorbidities / personal factors will impact the outcome/ POC Exam:MEDIUM Complexity : 3 Standardized tests and measures addressing body structure, function, activity limitation and / or participation in recreation  Presentation: MEDIUM Complexity : Evolving with changing characteristics  Clinical Decision Making:Medium Complexity    Overall Complexity:MEDIUM

## 2021-08-12 NOTE — PROGRESS NOTES
Problem: Self Care Deficits Care Plan (Adult)  Goal: *Acute Goals and Plan of Care (Insert Text)  Description: Occupational Therapy Goals  Initiated 8/12/2021 within 7 day(s). 1.  Patient will perform upper body dressing with supervision/set-up. 2.  Patient will perform lower body dressing with supervision/set-up using AE prn.  3.  Patient will perform grooming task standing at sink for 4-7 minutes with supervision/set-up, F+ balance. 4.  Patient will perform toilet transfers with supervision/set-up. 5.  Patient will perform all aspects of toileting with supervision/set-up. 6.  Patient will recall precautions during selfcare tasks with minimal verbal cues   7. Patient will utilize energy conservation techniques during functional activities with verbal cues. Prior Level of Function: Patient was independent with self-care and functional mobility PTA. Outcome: Progressing Towards Goal     OCCUPATIONAL THERAPY EVALUATION    Patient: Stepan Magana (60 y.o. male)  Date: 8/12/2021  Primary Diagnosis: CAD (coronary artery disease) [I25.10]  Procedure(s) (LRB):  CORONARY ARTERY BYPASS GRAFT (CABG) TIMES THREE (N/A) 1 Day Post-Op   Precautions: Fall, Sternal    ASSESSMENT :  Patient cleared to participate in OT evaluation by RN. Upon entering the room, the patient was seated in chair, alert, and agreeable to participate in OT evaluation. Patient was seen with PT to maximize pt safety and participation. Patient educated on sternal precautions, heart hugger, importance of getting up and moving at least 3x/day per protocol, and safety during this hospital admission. Extensive encouragement on heart hugger use this session to mitigate pain and assist with deep breathing and incentive spirometry goals. Patient with increased pain, requiring constant education/ cues on energy conservation techniques, pursed lip breathing, and self pacing during daily activities. Patient verbalized understanding.  Patient performed functional transfers in preparation for selfcare with contact assist and upper body grooming/feeding with stand by. Based on the objective data described below, the patient presents with decreased strength, decreased independence, decreased activity tolerance, decreased functional balance, and decreased functional mobility, which impedes pt performance in basic self-care/ADL tasks. Patient would benefit from skilled OT to restore PLOF and maximize function. Patient will benefit from skilled intervention to address the above impairments. Patient's rehabilitation potential is considered to be Good  Factors which may influence rehabilitation potential include:   []             None noted  [x]             Mental ability/status  [x]             Medical condition  [x]             Home/family situation and support systems  [x]             Safety awareness  [x]             Pain tolerance/management  []             Other:      PLAN :  Recommendations and Planned Interventions:   [x]               Self Care Training                  [x]      Therapeutic Activities  [x]               Functional Mobility Training   []      Cognitive Retraining  [x]               Therapeutic Exercises           [x]      Endurance Activities  [x]               Balance Training                    [x]      Neuromuscular Re-Education  []               Visual/Perceptual Training     [x]      Home Safety Training  [x]               Patient Education                   [x]      Family Training/Education  []               Other (comment):    Frequency/Duration: Patient will be followed by occupational therapy 1-2 times per day/4-7 days per week to address goals.   Discharge Recommendations: Home Health  Further Equipment Recommendations for Discharge: possible rolling walker pending pt progress with therapy     SUBJECTIVE:   Patient stated you guys want me to stand?!    OBJECTIVE DATA SUMMARY:     Past Medical History:   Diagnosis Date    CAD (coronary artery disease)     Dupuytren's disease of palm     Hyperlipidemia     Hypertension     SHILPI (obstructive sleep apnea)     PE (pulmonary thromboembolism) ([facility] [address] 06/2021    Severe obesity ([facility] [address]     Tobacco dependence      Past Surgical History:   Procedure Laterality Date    HX CHOLECYSTECTOMY      HX CORONARY STENT PLACEMENT      HX HERNIA REPAIR      IR IVC FILTER PLACEMENT PERCUTANEOUS  6/15/2021    IR THROMBECTOMY [facility] ART PRIMARY NON HETAL OR INTRACRANIAL  6/15/2021     Barriers to Learning/Limitations: None  Compensate with: visual, verbal, tactile, kinesthetic cues/model    Home Situation:   Home Situation  Home Environment: Private residence  # Steps to Enter: 3  Rails to Enter: Yes  One/Two Story Residence: One story  Living Alone: No  Support Systems: Spouse/Significant Other/Partner  Current DME Used/Available at Home: Shower chair  Tub or Shower Type: Tub/Shower combination  [x]  Right hand dominant   []  Left hand dominant    Cognitive/Behavioral Status:  Neurologic State: Alert  Orientation Level: Oriented X4  Cognition: Follows commands;Decreased attention/concentration;Poor safety awareness  Safety/Judgement: Fall prevention    Skin: Intact  Edema: None noted    Vision/Perceptual:    Acuity: Within Defined Limits;Able to read normal print without difficulty    Corrective Lenses: Glasses    Coordination: BUE  Fine Motor Skills-Upper: Left Intact; Right Intact    Gross Motor Skills-Upper: Left Intact; Right Intact    Balance:  Sitting: Intact  Standing: Impaired; With support  Standing - Static: Fair (+)  Standing - Dynamic : Fair    Strength: BUE  Strength: Generally decreased, functional (B )    Tone & Sensation: BUE  Tone: Normal  Sensation: Intact    Range of Motion: BUE  AROM: Generally decreased, functional (within sternal precautions; limited by pain)    Functional Mobility and Transfers for ADLs:  Bed Mobility:  Scooting: Contact guard assistance    Transfers:  Sit to Stand: Contact guard assistance  Stand to Sit: Contact guard assistance      ADL Assessment:   Feeding: Setup;Stand-by assistance    Oral Facial Hygiene/Grooming: Setup;Stand-by assistance    Bathing: Moderate assistance    Upper Body Dressing: Minimum assistance    Lower Body Dressing: Moderate assistance    Toileting: Moderate assistance       ADL Intervention:  Feeding  Feeding Assistance: Set-up; Stand-by assistance  Utensil Management: Stand-by assistance  Food to Mouth: Stand-by assistance (ice chips)    Grooming  Grooming Assistance: Set-up; Stand-by assistance  Position Performed: Seated in chair  Washing Face: Stand-by assistance    Cognitive Retraining  Safety/Judgement: Fall prevention    Pain:  Pain level pre-treatment: 10/10 , chest - 8/10 back  Pain level post-treatment: same  Pain Intervention(s): Medication (see MAR)  Response to intervention: Nurse notified, See doc flow    Activity Tolerance:   Fair    Please refer to the flowsheet for vital signs taken during this treatment. After treatment:   [x] Patient left in no apparent distress sitting up in chair  [] Patient left in no apparent distress in bed  [x] Call bell left within reach  [x] Nursing notified  [] Caregiver present  [] Bed alarm activated    COMMUNICATION/EDUCATION:   [x] Role of Occupational Therapy in the acute care setting  [x] Home safety education was provided and the patient/caregiver indicated understanding. [x] Patient/family have participated as able in goal setting and plan of care. [x] Patient/family agree to work toward stated goals and plan of care. [] Patient understands intent and goals of therapy, but is neutral about his/her participation. [] Patient is unable to participate in goal setting and plan of care.     Thank you for this referral.  Masha Soliman OTR/L  Time Calculation: 32 mins    Eval Complexity: History: MEDIUM Complexity : Expanded review of history including physical, cognitive and psychosocial  history ; Examination: MEDIUM Complexity : 3-5 performance deficits relating to physical, cognitive , or psychosocial skils that result in activity limitations and / or participation restrictions; Decision Making:MEDIUM Complexity : Patient may present with comorbidities that affect occupational performnce.  Miniml to moderate modification of tasks or assistance (eg, physical or verbal ) with assesment(s) is necessary to enable patient to complete evaluation

## 2021-08-12 NOTE — PROGRESS NOTES
CARDIAC SURGERY PROGRESS NOTE    2021     Post Operative Day # 1     Chart reviewed. Interval History/Events of Past 24 hours:   Extubated last pm. C/o pain poorly controlled. Elevated BUN/Cr  Assessment:  CAD S/P  CABG x 3  Respiratory parameters stable  Cardiac status stable     Plans:  1. Wean Con as BP ema  2. IVF x 6h, repeat BMP  3. resume BB, ASA, Statin   4. Lantus   5. d/c lucinda   6. wean oxygen  7. Inc Norco frequency, change Morphine to Dilaudid  8. Add Mucinex  9. Start Heparin gtt for recent PE      Heart Hugger use encouraged to mitigate pain and will also assist with deep breathing and incentive spirometry goals. Possible discharge 4 days. Rosibel Stewart PA-C  [facility]  [phone]  _____________________________________________________________________________________________________________________________________________  Subjective:  Patient seen and examined on rounds today. Pain level: poorly controlled     Objective:  Vital signs:   Visit Vitals  /74   Pulse 71   Temp 98.4 °F (36.9 °C)   Resp 20   Ht 5' 10.5\" (1.791 m)   Wt 121.4 kg (267 lb 10.2 oz)   SpO2 91%   BMI 37.86 kg/m²     Temp (24hrs), Av.5 °F (37.5 °C), Min:97.9 °F (36.6 °C), Max:100.9 °F (38.3 °C)    Admission Weight: Last Weight   Weight: 117.9 kg (260 lb) Weight: 121.4 kg (267 lb 10.2 oz)     Last 3 Recorded Weights in this Encounter    21 0710 21 0442   Weight: 117.9 kg (260 lb) 121.4 kg (267 lb 10.2 oz)       Telemetry: NSR    Physical Examination:     General:  Alert, oriented  Lungs: Clear to ascultation without rales, wheezes or rhonchi. Chest: Dressings clean and dry. Sternum stable. Heart: regular rate and rhythm, No murmur. Abdomen: Soft and non-tender without masses. Bowel sounds present. Extremities: Warm and well perfused. Edema absent. Incisions: clean and dry  Neuro: No deficit.         SUP Prophylaxis: Pepcid  DVT Prophylaxis: pneumatic compression boots: Yes  Compression stockings:  Yes  Enoxaparin:  No    Chest tubes:  present    Pacing wires:  present    DME needs:  tbd          Labs:  Lab Results   Component Value Date/Time    WBC 13.7 (H) 08/12/2021 03:50 AM    HCT 34.6 (L) 08/12/2021 03:50 AM     (L) 08/12/2021 03:50 AM      Lab Results   Component Value Date/Time     08/12/2021 03:50 AM    K 3.9 08/12/2021 03:50 AM     (H) 08/12/2021 03:50 AM    CO2 24 08/12/2021 03:50 AM    GLU 93 08/12/2021 03:50 AM    BUN 17 08/12/2021 03:50 AM    CREA 1.52 (H) 08/12/2021 03:50 AM    CREA 1.45 (H) 08/11/2021 01:22 PM    CREA 1.19 08/06/2021 01:58 PM     Lab Results   Component Value Date/Time    HBA1C 5.2 08/06/2021 01:58 PM     pH (POC)   Date Value Ref Range Status   08/12/2021 7.36 7.35 - 7.45   Final     pCO2 (POC)   Date Value Ref Range Status   08/12/2021 45.9 (H) 35.0 - 45.0 MMHG Final     pO2 (POC)   Date Value Ref Range Status   08/12/2021 79 (L) 80 - 100 MMHG Final     HCO3 (POC)   Date Value Ref Range Status   08/12/2021 26.0 22 - 26 MMOL/L Final     sO2 (POC)   Date Value Ref Range Status   08/12/2021 94.9 92 - 97 % Final     Base excess (POC)   Date Value Ref Range Status   08/12/2021 0.2 mmol/L Final          EKG: NSR    CXR: t/l ok, no PTX         PLEASE NOTE:  This document may have been produced using voice recognition software. Unrecognized errors in transcription may be present. Please call with any questions.

## 2021-08-12 NOTE — CONSULTS
Cardiology Consult Note    Consultation request by Hugo Frazier MD for advice/opinion related to evaluating post op CABG/CAD     Date of  Admission: 8/11/2021  6:50 AM   Primary Care Physician:  Carlo Echeverria MD    Consulting Cardiologist: Dr. India Paboniver:     Hospital Problems  Date Reviewed: 8/6/2021        Codes Class Noted POA    Severe protein-calorie malnutrition ([address] (Chronic) ICD-10-CM: F23  ICD-9-CM: 262  8/12/2021 Yes        S/P CABG x 3 ICD-10-CM: Z95.1  ICD-9-CM: V45.81  8/11/2021 Unknown        CAD (coronary artery disease) ICD-10-CM: I25.10  ICD-9-CM: 414.00  6/9/2021 Unknown        SHILPI (obstructive sleep apnea) ICD-10-CM: G47.33  ICD-9-CM: 327.23  6/9/2021 Yes        Tobacco dependence ICD-10-CM: F17.200  ICD-9-CM: 305.1  6/9/2021 Yes        Hyperlipidemia ICD-10-CM: E78.5  ICD-9-CM: 272.4  6/9/2021 Yes        Essential hypertension ICD-10-CM: I10  ICD-9-CM: 401.9  9/13/2018 Yes               -CAD, s/p CABG x3 (08/11/21) LIMA to mLAD, SVG to R PLV and SVG to OM. s/p previous LCx stent (2018)   -Submassive PE, DVT, s/p IR guided thrombectomy, IVC filter placement (06/2021) with evidence of RV strain by ECHO. Significant improvement of RV function on repeat ECHO. Will remain on lifelong anticoagulation. On Xarelto as outpatient. -ECHO (07/26/21) EF 55-60%. Dilated RA.   -HTN, on isordil, Lopressor as outpatient. -HLD, on statin   -SHILPI on cpap.   -Tobacco Abuse       Primary Cardiologist: Dr. Krystyna Bain:     Seen and evaluated. Agree with below. Continue supportive measures for routine post bypass regimen. Encourage incentive spirometry. Patient doing well, POD #1   Patient extubated last night. Hemodynamically stable on pressors, wean off as able. Chest Tube mgmt per CTS team.   Started on Heparin gtt for recent PE. Continue routine postoperative care  Continued on ASA, BB and statin. Continued on perioperative amiodarone.    Will continue to follow along.      History of Present Illness: This is a 59 y.o. male admitted for CAD (coronary artery disease) [I25.10]. Patient complains of: s/p CABG    Leonard Player is a 59 y.o. male, pmhx as stated above, who we are following along with post operatively. Patient underwent CABG x 3 on 0/32/53 w/o any complications. He is drowsy and faitgued b/c of pain medication. Not able to provide history at this time. Chart reviewed in detail, as well as discussed with patient's nurse. Cardiac risk factors: smoking/ tobacco exposure, dyslipidemia, diabetes mellitus, obesity, male gender, hypertension    Review of Symptoms:  Except as stated above include:  Constitutional:  negative  Respiratory:  negative  Cardiovascular:  negative  Gastrointestinal: negative  Genitourinary:  negative  Musculoskeletal:  Negative  Neurological:  Negative  Dermatological:  Negative  Endocrinological: Negative  Psychological:  Negative    Review of systems not obtained due to patient factors. Past Medical History:     Past Medical History:   Diagnosis Date    CAD (coronary artery disease)     Dupuytren's disease of palm     Hyperlipidemia     Hypertension     SHILPI (obstructive sleep apnea)     [facility] 06/2021    Severe obesity (HCC)     Tobacco dependence          Social History:     Social History     Socioeconomic History    Marital status:      Spouse name: Not on file    Number of children: Not on file    Years of education: Not on file    Highest education level: Not on file   Tobacco Use    Smoking status: Current Every Day Smoker     Types: Cigarettes    Smokeless tobacco: Never Used   Substance and Sexual Activity    Alcohol use:  Yes    Drug use: No   Other Topics Concern     Social Determinants of Health     Financial Resource Strain:     Difficulty of Paying Living Expenses:    Food Insecurity:     Worried About Running Out of Food in the Last Year:     [address] [facility] [address] in the Last Year:    Transportation Needs:     Lack of Transportation (Medical):  Lack of Transportation (Non-Medical):    Physical Activity:     Days of Exercise per Week:     Minutes of Exercise per Session:    Stress:     Feeling of Stress :    Social Connections:     Frequency of Communication with Friends and Family:     Frequency of Social Gatherings with Friends and Family:     Attends Hoahaoism Services:     Active Member of Clubs or Organizations:     Attends Club or Organization Meetings:     Marital Status:         Family History:     Family History   Problem Relation Age of Onset    Hypertension Mother     Diabetes Mother         Medications:      Allergies   Allergen Reactions    Statins-Hmg-Coa Reductase Inhibitors Other (comments)     Other reaction(s): SEVERE PAIN PER PT    Gabapentin Other (comments)     \"Caused bad nightmares\"    Pcn [Penicillins] Hives        Current Facility-Administered Medications   Medication Dose Route Frequency    vancomycin (VANCOCIN) 1250 mg in  ml infusion  1,250 mg IntraVENous Q18H    HYDROcodone-acetaminophen (NORCO) 5-325 mg per tablet 1-2 Tablet  1-2 Tablet Oral Q4H PRN    HYDROmorphone (DILAUDID) syringe 0.5-1 mg  0.5-1 mg IntraVENous Q2H PRN    guaiFENesin ER (MUCINEX) tablet 1,200 mg  1,200 mg Oral Q12H    0.9% sodium chloride infusion  75 mL/hr IntraVENous CONTINUOUS    insulin glargine (LANTUS) injection 20 Units  20 Units SubCUTAneous DAILY    heparin 25,000 units in D5W 250 ml infusion  18-36 Units/kg/hr IntraVENous TITRATE    [START ON 8/13/2021] therapeutic multivitamin (THERAGRAN) tablet 1 Tablet  1 Tablet Oral DAILY    [START ON 8/13/2021] thiamine HCL (B-1) tablet 100 mg  100 mg Oral DAILY    papaverine 240 mg in sodium chloride irrigation 0.9 % 40 mL Irrigation    PRN    heparin (porcine) 10,000 Units in sodium chloride irrigation 0.9 % 1,000 mL Irrigation    PRN    aspirin delayed-release tablet 81 mg  81 mg Oral DAILY  atorvastatin (LIPITOR) tablet 40 mg  40 mg Oral QHS    colchicine (MITIGARE) capsule 0.6 mg  0.6 mg Oral DAILY    albumin human 5% (BUMINATE) solution 12.5 g  12.5 g IntraVENous Q20MIN PRN    sodium chloride (NS) flush 5-40 mL  5-40 mL IntraVENous Q8H    sodium chloride (NS) flush 5-40 mL  5-40 mL IntraVENous PRN    acetaminophen (TYLENOL) tablet 650 mg  650 mg Oral Q4H PRN    naloxone (NARCAN) injection 0.4 mg  0.4 mg IntraVENous PRN    mupirocin (BACTROBAN) 2 % ointment   Both Nostrils BID    amiodarone (CORDARONE) tablet 200 mg  200 mg Oral TID    albuterol (PROVENTIL VENTOLIN) nebulizer solution 2.5 mg  2.5 mg Nebulization Q4H PRN    metoprolol tartrate (LOPRESSOR) tablet 12.5 mg  12.5 mg Oral Q12H    ondansetron (ZOFRAN) injection 4 mg  4 mg IntraVENous Q4H PRN    chlorhexidine (PERIDEX) 0.12 % mouthwash 10 mL  10 mL Oral BID    docusate sodium (COLACE) capsule 100 mg  100 mg Oral BID    ELECTROLYTE REPLACEMENT PROTOCOL - Potassium Standard Dosing  1 Each Other PRN    ELECTROLYTE REPLACEMENT PROTOCOL - Magnesium  1 Each Other PRN    glucose chewable tablet 16 g  4 Tablet Oral PRN    glucagon (GLUCAGEN) injection 1 mg  1 mg IntraMUSCular PRN    dextrose (D50W) injection syrg 12.5-25 g  25-50 mL IntraVENous PRN    bisacodyL (DULCOLAX) tablet 10 mg  10 mg Oral DAILY    famotidine (PEPCID) tablet 20 mg  20 mg Oral BID    fentaNYL citrate (PF) injection 12.5-25 mcg  12.5-25 mcg IntraVENous Q15MIN PRN    polyethylene glycol (MIRALAX) packet 17 g  17 g Oral DAILY    acetaminophen (TYLENOL) suppository    PRN    acetaminophen (TYLENOL) suppository    PRN    PHENYLephrine (LUCI-SYNEPHRINE) 30 mg in 0.9% sodium chloride 250 mL infusion   mcg/min IntraVENous TITRATE    ipratropium (ATROVENT) 0.02 % nebulizer solution 0.5 mg  0.5 mg Nebulization Q8H RT    And    arformoteroL (BROVANA) neb solution 15 mcg  15 mcg Nebulization BID RT    insulin regular (Stephanie Debi, HUMULIN) 100 units/100 ml NS infusion (premix)  0-50 Units/hr IntraVENous TITRATE         Physical Exam:     Visit Vitals  /84   Pulse 68   Temp 98 °F (36.7 °C)   Resp 16   Ht 5' 10.5\" (1.791 m)   Wt 121.4 kg (267 lb 10.2 oz)   SpO2 95%   BMI 37.86 kg/m²       TELE: normal sinus rhythm    BP Readings from Last 3 Encounters:   08/12/21 139/84   08/06/21 137/78   07/26/21 120/60     Pulse Readings from Last 3 Encounters:   08/12/21 68   08/06/21 68   07/26/21 84     Wt Readings from Last 3 Encounters:   08/12/21 121.4 kg (267 lb 10.2 oz)   08/06/21 117 kg (258 lb)   07/26/21 116.6 kg (257 lb)       General:  fatigued, drowsy, no distress, appears stated age, moderately obese  Neck:  nontender  Lungs:  diminished breath sounds anterior lateral lung fields B/L    Heart:  regular rate and rhythm, S1, S2 normal, no murmur, click, rub or gallop  Abdomen:  abdomen is soft without significant tenderness, masses, organomegaly or guarding  Extremities:  extremities wrapped B/L  Skin: Warm and dry.  no hyperpigmentation, vitiligo, or suspicious lesions  Neuro: alert, oriented x3, affect appropriate  Psych: non focal     Data Review:     Recent Labs     08/12/21  0350 08/11/21  1322   WBC 13.7* 13.5*   HGB 11.6* 13.0   HCT 34.6* 38.2   * 129*     Recent Labs     08/12/21  0350 08/11/21  1322    140   K 3.9 4.1   * 106   CO2 24 28   GLU 93 129*   BUN 17 14   CREA 1.52* 1.45*   CA 7.9* 7.5*   MG 2.1 2.7*   INR  --  1.1       Results for orders placed or performed during the hospital encounter of 08/11/21   EKG, 12 LEAD, INITIAL   Result Value Ref Range    Ventricular Rate 75 BPM    Atrial Rate 75 BPM    P-R Interval 176 ms    QRS Duration 88 ms    Q-T Interval 414 ms    QTC Calculation (Bezet) 462 ms    Calculated P Axis 64 degrees    Calculated R Axis 48 degrees    Calculated T Axis 65 degrees    Diagnosis       Normal sinus rhythm  Possible Left atrial enlargement  Borderline ECG  When compared with ECG of 11-AUG-2021 13:45,  QRS axis shifted right         All Cardiac Markers in the last 24 hours:  No results found for: CPK, CK, CKMMB, CKMB, RCK3, CKMBT, CKNDX, CKND1, PALMA, TROPT, TROIQ, SIL, TROPT, TNIPOC, BNP, BNPP    Last Lipid:    Lab Results   Component Value Date/Time    Cholesterol, total 120 06/10/2021 05:30 AM    HDL Cholesterol 33 (L) 06/10/2021 05:30 AM    LDL, calculated 69.6 06/10/2021 05:30 AM    Triglyceride 87 06/10/2021 05:30 AM    CHOL/HDL Ratio 3.6 06/10/2021 05:30 AM       Cardiographics:     EKG Results     Procedure 720 Value Units Date/Time    EKG, 12 LEAD, SUBSEQUENT [[id number]] Collected: 08/12/21 1340    Order Status: Completed Updated: 08/12/21 1344     Ventricular Rate 67 BPM      Atrial Rate 67 BPM      P-R Interval 170 ms      QRS Duration 86 ms      Q-T Interval 412 ms      QTC Calculation (Bezet) 435 ms      Calculated P Axis 40 degrees      Calculated R Axis 14 degrees      Calculated T Axis 49 degrees      Diagnosis --     Normal sinus rhythm  Normal ECG  When compared with ECG of 12-AUG-2021 04:28,  No significant change was found      EKG, 12 LEAD, INITIAL [[id number]] Collected: 08/12/21 0428    Order Status: Completed Updated: 08/12/21 0938     Ventricular Rate 75 BPM      Atrial Rate 75 BPM      P-R Interval 176 ms      QRS Duration 88 ms      Q-T Interval 414 ms      QTC Calculation (Bezet) 462 ms      Calculated P Axis 64 degrees      Calculated R Axis 48 degrees      Calculated T Axis 65 degrees      Diagnosis --     Normal sinus rhythm  Possible Left atrial enlargement  Borderline ECG  When compared with ECG of 11-AUG-2021 13:45,  QRS axis shifted right      EKG, 12 LEAD, INITIAL [[id number]] Collected: 08/11/21 1345    Order Status: Completed Updated: 08/11/21 1418     Ventricular Rate 62 BPM      Atrial Rate 62 BPM      P-R Interval 202 ms      QRS Duration 92 ms      Q-T Interval 482 ms      QTC Calculation (Bezet) 489 ms      Calculated P Axis 52 degrees      Calculated R Axis -63 degrees      Calculated T Axis 26 degrees      Diagnosis --     Normal sinus rhythm  Left axis deviation  Incomplete right bundle branch block  Nonspecific ST abnormality  Prolonged QT  Abnormal ECG    Confirmed by Tanner Jerome MD, Jim Patel ([id number]) on 8/11/2021 2:18:00 PM          07/26/21    ECHO ADULT FOLLOW-UP OR LIMITED 07/26/2021 7/26/2021    Interpretation Summary  · LV: Estimated LVEF is 55 - 60%. Visually measured ejection fraction. Normal cavity size and systolic function (ejection fraction normal). Mildly to moderately increased wall thickness. Wall motion: normal.  · RV: Mildly dilated right ventricle. Mildly reduced systolic function. · RA: Dilated right atrium. · IVC: Moderately elevated central venous pressure (8 mmHg); IVC diameter is larger than 21 mm and collapses more than 50% with respiration. · Pericardium: Pericardial fat pad present. Signed by: Lalo Medina MD on 7/26/2021  3:21 PM      02/08/19    NM [facility] MYOCARDIAL PERFUSION MULTIPLE  2/8/2019 06/09/21    CARDIAC PROCEDURE 06/09/2021 6/9/2021    Conclusion  Severe three-vessel coronary disease as mentioned in detail  Normal LVEF and LVEDP. Echocardiogram will be performed  We will have CT surgery evaluation for possible CABG  Addendum by: Lalo Medina MD on 6/9/2021  4:15 PM    Signed by: Lalo Medina MD on 6/9/2021 12:06 PM      XR Results (most recent):  Results from [place] encounter on 08/11/21    XR CHEST PORT    Narrative  EXAM: XR CHEST PORT    INDICATION: post op    COMPARISON: August 6, 2021    FINDINGS: A portable AP radiograph of the chest was obtained at 1342 hours. The  patient is on a cardiac monitor. Hazy bilateral airspace opacities with more  confluent streaky opacities of the lung bases. . Cardiac silhouette upper  normal..  Sternotomy wires. Right IJ [place] catheter with tip over the main  pulmonary artery. Endotracheal tube tip 4.7 cm from cynthia.  Enteric tube  terminates below the visualized field..    Impression  Postoperative changes. Support devices as discussed. Hazy bilateral airspace opacities which may reflect pulmonary edema with more  confluent basilar opacities which may reflect subsegmental atelectasis.         Signed By: Pio Dunaway PA-C     August 12, 2021

## 2021-08-12 NOTE — PROGRESS NOTES
0700: Bedside and Verbal shift change report given to writer by Tanya Bhatia. Report included the following information OR Summary, Intake/Output, MAR, Accordion, Recent Results, Med Rec Status, Cardiac Rhythm ., Alarm Parameters , Quality Measures and Dual Neuro Assessment. 0740: Pt moaning, writer entered room. Pt stated 3/10 upper mid abdominal \"soreness\", pt repositioned. Nurse completed initial pt assessment and pt noted to be sleeping, call bell within reach. 1340: Pt complaining of chest pain, mid anterior chest ache- as has stated his pain has been this AM when describing incisional \"sternum pain\". 12 lead EKG obtained, NL EKG, sinus rhythm 67bpm. Pt repositioned for comfort, sleeping at 1345. Callbell within reach. 1410: heparin infusion started (h/o PE), APTT to be drawn at 2010 tonight.  1600: Insulin infusion and Glucostabilizer stopped, blood glucose checks AC and HS with Lispro corrective insulin subcutaneous ordered. 1657: Pt awoke, complaining of 7/10 sternal incision pain at rest. Pt tachypneic RR 26bpm with O2 sat 88% splinting chest with Heart Hugger NSR 85bpm /84, Neosynephrine infusion OFF. Pt medicated with dilaudid for stated pain- see MAR. Pt repositioned for comfort. Pt coughing up large amounts of white and pale yellow mucous orally- suctioned with [place]. Thuan, RT paged , pts' breath sounds coarse- pt requesting \"breathing treatment\". 1701: Thuan,RT at pts' bedside. Pts' RR 23bpm, O2 sat 94% on 4LPM nasal cannula O2.  1937: Bedside and Verbal shift change report given to Chris Nathan  Report included the following information OR Summary, Procedure Summary, Intake/Output, MAR, Accordion, Recent Results, Med Rec Status, Cardiac Rhythm ., Alarm Parameters , Quality Measures and Dual Neuro Assessment.

## 2021-08-12 NOTE — PROGRESS NOTES
conducted an initial consultation and Spiritual Assessment for Tigre Salmon, who is a 59 y.o.,male. Patients Primary Language is: [place]. According to the patients EMR Sikh Affiliation is: Catholic.     The reason the Patient came to the hospital is:   Patient Active Problem List    Diagnosis Date Noted    Severe protein-calorie malnutrition ([facility] 08/12/2021    S/P CABG x 3 08/11/2021    Acute idiopathic gout 06/25/2021    Septic pulmonary embolism with acute cor pulmonale ([facility] 06/14/2021    Unstable angina ([facility] 06/09/2021    Chest pain 06/09/2021    CAD (coronary artery disease) 06/09/2021    SHILPI (obstructive sleep apnea) 06/09/2021    Tobacco dependence 06/09/2021    Hyperlipidemia 06/09/2021    Severe obesity ([facility] 10/24/2018    Presence of drug coated stent in left circumflex coronary artery 09/20/2018    Essential hypertension 09/13/2018    Ventral hernia without obstruction or gangrene 08/28/2018        The  provided the following Interventions:  Initiated a relationship of care and support with patient in room 2353 this morning. Patient appears to be in pain and discomfort. Listened empathically as he tried to talk thru the pain and be comfortable. Patient does not have an advance directive at this time  Provided information about [id number] [facility]. Offered prayer and assurance of continued prayers on patients behalf. The following outcomes were achieved:  Patient shared limited information about his medical narrative and spiritual journey/beliefs. Patient expressed gratitude for pastoral care visit. Prayer was offered on patient's behalf. Assessment:  Patient does not have any Buddhism/cultural needs that will affect patients preferences in health care. There are no further spiritual or Buddhism issues which require Spiritual Care Services interventions at this time.        Plan:  Chaplains will continue to follow and will provide pastoral care on an as needed/requested basis    . Fabiana Bowie   Spiritual Care   [phone]

## 2021-08-12 NOTE — CONSULTS
Comprehensive Nutrition Assessment    Type and Reason for Visit: Initial, Consult    Nutrition Recommendations/Plan:   - Monitor diet tolerance and ability to advance. Add oral nutrition supplements: Gelatein TID while on clear liquid diet. - Add daily multivitamin and thiamine, check vitamin B12 and folate levels. - IVF per MD.     Nutrition Assessment:  Patient s/p CABG x 3 with fair appetite on clear liquid diet on insulin drip with family at bedside. Nutrition education handouts provided with contact information. Malnutrition Assessment:  Malnutrition Status:  Severe malnutrition    Context:  Chronic illness     Findings of the 6 clinical characteristics of malnutrition:   Energy Intake:  7 - 75% or less est energy requirements for 1 month or longer  Weight Loss:  7.00 - Greater than 10% over 6 months       Nutrition History and Allergies: Past medical history of ventral hernia, hypertension, hyperlipidemia, obesity, obstructive sleep apnea, pulmonary embolism, tobacco use, acute idiopathic gout admitted with coronary artery disease and surgical intervention. Weight history per chart review: 279 lb (7/1/19), 267 lb (6/17/21), 253 lb (6/28/21 and 7/26/21), 258 lb (8/6/21). Patient reports decreased appetite and poor meal intake x several months, improved over the past month PTA with 40 lb weight loss in 4 months, has gained 10 lb back in the past month PTA (30 lb, 11.5% net weight loss x 4 months). NKFA. Estimated Daily Nutrient Needs:  Energy (kcal): 3291-0873; Weight Used for Energy Requirements: Admission (117 kg)  Protein (g): ; Weight Used for Protein Requirements: Admission (0.8-1.2)  Fluid (ml/day): 4787-5611; Method Used for Fluid Requirements: 1 ml/kcal    Nutrition Related Findings:  Last BM 8/10.  Medications: NS at 75 mL/hr, colchicine, bowel regimen      Lab Results   Component Value Date/Time    Vitamin B12 246 08/12/2021 03:50 AM    Folate 5.8 08/12/2021 03:50 AM     Wounds: Multiple, Surgical incision       Current Nutrition Therapies:  ADULT DIET Clear Liquid; No Concentrated Sweets; no concentrated sweets. no red meat. Anthropometric Measures:  · Height:  5' 10.5\" (179.1 cm)  · Current Body Wt:  121.4 kg (267 lb 10.2 oz)   · Admission Body Wt:  259 lb 14.8 oz    · Usual Body Wt:  127 kg (280 lb)     · Ideal Body Wt:  169 lbs:  158.4 %   · BMI Category:  Obese class 2 (BMI 35.0-39. 9)       Nutrition Diagnosis:   · Unintended weight loss related to catabolic illness, inadequate protein-energy intake, early satiety as evidenced by weight loss greater than or equal to 10% in 6 months    Nutrition Interventions:   Food and/or Nutrient Delivery: Continue current diet, Vitamin supplement, Start oral nutrition supplement, IV fluid delivery  Nutrition Education and Counseling: Education completed (cardiac diet education 8/12/21)  Coordination of Nutrition Care: Continue to monitor while inpatient, Coordination of community care    Goals:  PO nutrition intake will meet >75% of patient estimated nutritional needs within the next 7 days       Nutrition Monitoring and Evaluation:   Behavioral-Environmental Outcomes: None identified  Food/Nutrient Intake Outcomes: Diet advancement/tolerance, Food and nutrient intake, Supplement intake, Vitamin/mineral intake, IVF intake  Physical Signs/Symptoms Outcomes: Biochemical data, GI status, Meal time behavior, Nutrition focused physical findings    Discharge Planning:     Too soon to determine     Electronically signed by Paola Melendrez RD, [id number]  on 8/12/2021 at 1:43 PM    Contact: [phone]

## 2021-08-13 LAB
ANION GAP SERPL CALC-SCNC: 3 MMOL/L (ref 3–18)
ANION GAP SERPL CALC-SCNC: 6 MMOL/L (ref 3–18)
APTT PPP: 130.9 SEC (ref 23–36.4)
APTT PPP: 81 SEC (ref 23–36.4)
APTT PPP: 90.2 SEC (ref 23–36.4)
BASOPHILS # BLD: 0 K/UL (ref 0–0.1)
BASOPHILS NFR BLD: 0 % (ref 0–2)
BUN SERPL-MCNC: 21 MG/DL (ref 7–18)
BUN SERPL-MCNC: 21 MG/DL (ref 7–18)
BUN/CREAT SERPL: 13 (ref 12–20)
BUN/CREAT SERPL: 16 (ref 12–20)
CALCIUM SERPL-MCNC: 8.1 MG/DL (ref 8.5–10.1)
CALCIUM SERPL-MCNC: 8.5 MG/DL (ref 8.5–10.1)
CHLORIDE SERPL-SCNC: 108 MMOL/L (ref 100–111)
CHLORIDE SERPL-SCNC: 110 MMOL/L (ref 100–111)
CO2 SERPL-SCNC: 25 MMOL/L (ref 21–32)
CO2 SERPL-SCNC: 26 MMOL/L (ref 21–32)
CREAT SERPL-MCNC: 1.33 MG/DL (ref 0.6–1.3)
CREAT SERPL-MCNC: 1.63 MG/DL (ref 0.6–1.3)
DIFFERENTIAL METHOD BLD: ABNORMAL
EOSINOPHIL # BLD: 0.5 K/UL (ref 0–0.4)
EOSINOPHIL NFR BLD: 3 % (ref 0–5)
ERYTHROCYTE [DISTWIDTH] IN BLOOD BY AUTOMATED COUNT: 14.2 % (ref 11.6–14.5)
GLUCOSE BLD STRIP.AUTO-MCNC: 107 MG/DL (ref 70–110)
GLUCOSE BLD STRIP.AUTO-MCNC: 131 MG/DL (ref 70–110)
GLUCOSE BLD STRIP.AUTO-MCNC: 135 MG/DL (ref 70–110)
GLUCOSE BLD STRIP.AUTO-MCNC: 91 MG/DL (ref 70–110)
GLUCOSE SERPL-MCNC: 121 MG/DL (ref 74–99)
GLUCOSE SERPL-MCNC: 121 MG/DL (ref 74–99)
HCT VFR BLD AUTO: 39.2 % (ref 36–48)
HGB BLD-MCNC: 12.4 G/DL (ref 13–16)
LYMPHOCYTES # BLD: 2.4 K/UL (ref 0.9–3.6)
LYMPHOCYTES NFR BLD: 14 % (ref 21–52)
MCH RBC QN AUTO: 33.5 PG (ref 24–34)
MCHC RBC AUTO-ENTMCNC: 31.6 G/DL (ref 31–37)
MCV RBC AUTO: 105.9 FL (ref 74–97)
MONOCYTES # BLD: 0.9 K/UL (ref 0.05–1.2)
MONOCYTES NFR BLD: 5 % (ref 3–10)
NEUTS BAND NFR BLD MANUAL: 3 % (ref 0–5)
NEUTS SEG # BLD: 13.4 K/UL (ref 1.8–8)
NEUTS SEG NFR BLD: 75 % (ref 40–73)
PLATELET # BLD AUTO: 114 K/UL (ref 135–420)
PLATELET COMMENTS,PCOM: ABNORMAL
PMV BLD AUTO: 11.4 FL (ref 9.2–11.8)
POTASSIUM SERPL-SCNC: 3.9 MMOL/L (ref 3.5–5.5)
POTASSIUM SERPL-SCNC: 4 MMOL/L (ref 3.5–5.5)
RBC # BLD AUTO: 3.7 M/UL (ref 4.35–5.65)
RBC MORPH BLD: ABNORMAL
SODIUM SERPL-SCNC: 139 MMOL/L (ref 136–145)
SODIUM SERPL-SCNC: 139 MMOL/L (ref 136–145)
WBC # BLD AUTO: 17.2 K/UL (ref 4.6–13.2)

## 2021-08-13 PROCEDURE — 85025 COMPLETE CBC W/AUTO DIFF WBC: CPT

## 2021-08-13 PROCEDURE — 2709999900 HC NON-CHARGEABLE SUPPLY

## 2021-08-13 PROCEDURE — 77010033678 HC OXYGEN DAILY

## 2021-08-13 PROCEDURE — 82962 GLUCOSE BLOOD TEST: CPT

## 2021-08-13 PROCEDURE — 85730 THROMBOPLASTIN TIME PARTIAL: CPT

## 2021-08-13 PROCEDURE — 74011250637 HC RX REV CODE- 250/637: Performed by: PHYSICIAN ASSISTANT

## 2021-08-13 PROCEDURE — 74011636637 HC RX REV CODE- 636/637: Performed by: PHYSICIAN ASSISTANT

## 2021-08-13 PROCEDURE — 99232 SBSQ HOSP IP/OBS MODERATE 35: CPT | Performed by: INTERNAL MEDICINE

## 2021-08-13 PROCEDURE — 74011000250 HC RX REV CODE- 250: Performed by: SPECIALIST

## 2021-08-13 PROCEDURE — 97116 GAIT TRAINING THERAPY: CPT

## 2021-08-13 PROCEDURE — 97530 THERAPEUTIC ACTIVITIES: CPT

## 2021-08-13 PROCEDURE — 99024 POSTOP FOLLOW-UP VISIT: CPT | Performed by: PHYSICIAN ASSISTANT

## 2021-08-13 PROCEDURE — 74011000250 HC RX REV CODE- 250: Performed by: PHYSICIAN ASSISTANT

## 2021-08-13 PROCEDURE — 97535 SELF CARE MNGMENT TRAINING: CPT

## 2021-08-13 PROCEDURE — 80048 BASIC METABOLIC PNL TOTAL CA: CPT

## 2021-08-13 PROCEDURE — 74011250636 HC RX REV CODE- 250/636: Performed by: SPECIALIST

## 2021-08-13 PROCEDURE — APPNB45 APP NON BILLABLE 31-45 MINUTES: Performed by: PHYSICIAN ASSISTANT

## 2021-08-13 PROCEDURE — 94640 AIRWAY INHALATION TREATMENT: CPT

## 2021-08-13 PROCEDURE — 36415 COLL VENOUS BLD VENIPUNCTURE: CPT

## 2021-08-13 PROCEDURE — 74011250636 HC RX REV CODE- 250/636: Performed by: PHYSICIAN ASSISTANT

## 2021-08-13 PROCEDURE — 74011250637 HC RX REV CODE- 250/637: Performed by: SPECIALIST

## 2021-08-13 PROCEDURE — 65620000000 HC RM CCU GENERAL

## 2021-08-13 RX ADMIN — DOCUSATE SODIUM 100 MG: 100 CAPSULE, LIQUID FILLED ORAL at 08:18

## 2021-08-13 RX ADMIN — HYDROCODONE BITARTRATE AND ACETAMINOPHEN 2 TABLET: 5; 325 TABLET ORAL at 08:18

## 2021-08-13 RX ADMIN — ONDANSETRON 4 MG: 2 INJECTION INTRAMUSCULAR; INTRAVENOUS at 08:46

## 2021-08-13 RX ADMIN — Medication 100 MG: at 08:18

## 2021-08-13 RX ADMIN — FAMOTIDINE 20 MG: 20 TABLET ORAL at 17:12

## 2021-08-13 RX ADMIN — IPRATROPIUM BROMIDE 0.5 MG: 0.5 SOLUTION RESPIRATORY (INHALATION) at 17:12

## 2021-08-13 RX ADMIN — HEPARIN SODIUM 13 UNITS/KG/HR: 10000 INJECTION, SOLUTION INTRAVENOUS at 20:30

## 2021-08-13 RX ADMIN — AMIODARONE HYDROCHLORIDE 200 MG: 200 TABLET ORAL at 08:18

## 2021-08-13 RX ADMIN — Medication 10 ML: at 15:45

## 2021-08-13 RX ADMIN — CHLORHEXIDINE GLUCONATE 0.12% ORAL RINSE 10 ML: 1.2 LIQUID ORAL at 08:19

## 2021-08-13 RX ADMIN — ARFORMOTEROL TARTRATE 15 MCG: 15 SOLUTION RESPIRATORY (INHALATION) at 19:57

## 2021-08-13 RX ADMIN — HYDROMORPHONE HYDROCHLORIDE 0.5 MG: 1 INJECTION, SOLUTION INTRAMUSCULAR; INTRAVENOUS; SUBCUTANEOUS at 02:26

## 2021-08-13 RX ADMIN — FAMOTIDINE 20 MG: 20 TABLET ORAL at 08:18

## 2021-08-13 RX ADMIN — GUAIFENESIN 1200 MG: 600 TABLET, EXTENDED RELEASE ORAL at 08:18

## 2021-08-13 RX ADMIN — HYDROCODONE BITARTRATE AND ACETAMINOPHEN 2 TABLET: 5; 325 TABLET ORAL at 12:06

## 2021-08-13 RX ADMIN — HEPARIN SODIUM 13 UNITS/KG/HR: 10000 INJECTION, SOLUTION INTRAVENOUS at 06:10

## 2021-08-13 RX ADMIN — HYDROMORPHONE HYDROCHLORIDE 0.5 MG: 1 INJECTION, SOLUTION INTRAMUSCULAR; INTRAVENOUS; SUBCUTANEOUS at 21:45

## 2021-08-13 RX ADMIN — HYDROMORPHONE HYDROCHLORIDE 1 MG: 1 INJECTION, SOLUTION INTRAMUSCULAR; INTRAVENOUS; SUBCUTANEOUS at 12:23

## 2021-08-13 RX ADMIN — COLCHICINE 0.6 MG: 0.6 CAPSULE ORAL at 08:18

## 2021-08-13 RX ADMIN — HYDROCODONE BITARTRATE AND ACETAMINOPHEN 2 TABLET: 5; 325 TABLET ORAL at 20:28

## 2021-08-13 RX ADMIN — Medication 10 ML: at 05:16

## 2021-08-13 RX ADMIN — BISACODYL 10 MG: 5 TABLET, COATED ORAL at 08:18

## 2021-08-13 RX ADMIN — Medication 10 ML: at 20:28

## 2021-08-13 RX ADMIN — METOPROLOL TARTRATE 12.5 MG: 25 TABLET ORAL at 20:20

## 2021-08-13 RX ADMIN — HYDROCODONE BITARTRATE AND ACETAMINOPHEN 2 TABLET: 5; 325 TABLET ORAL at 17:13

## 2021-08-13 RX ADMIN — IPRATROPIUM BROMIDE 0.5 MG: 0.5 SOLUTION RESPIRATORY (INHALATION) at 12:12

## 2021-08-13 RX ADMIN — ATORVASTATIN CALCIUM 40 MG: 40 TABLET, FILM COATED ORAL at 20:29

## 2021-08-13 RX ADMIN — AMIODARONE HYDROCHLORIDE 200 MG: 200 TABLET ORAL at 21:44

## 2021-08-13 RX ADMIN — VANCOMYCIN HYDROCHLORIDE 1250 MG: 10 INJECTION, POWDER, LYOPHILIZED, FOR SOLUTION INTRAVENOUS at 04:13

## 2021-08-13 RX ADMIN — Medication 81 MG: at 08:18

## 2021-08-13 RX ADMIN — DOCUSATE SODIUM 100 MG: 100 CAPSULE, LIQUID FILLED ORAL at 17:12

## 2021-08-13 RX ADMIN — MUPIROCIN: 20 OINTMENT TOPICAL at 08:22

## 2021-08-13 RX ADMIN — HYDROMORPHONE HYDROCHLORIDE 1 MG: 1 INJECTION, SOLUTION INTRAMUSCULAR; INTRAVENOUS; SUBCUTANEOUS at 08:28

## 2021-08-13 RX ADMIN — ONDANSETRON 4 MG: 2 INJECTION INTRAMUSCULAR; INTRAVENOUS at 10:45

## 2021-08-13 RX ADMIN — CHLORHEXIDINE GLUCONATE 0.12% ORAL RINSE 10 ML: 1.2 LIQUID ORAL at 17:12

## 2021-08-13 RX ADMIN — HYDROMORPHONE HYDROCHLORIDE 0.5 MG: 1 INJECTION, SOLUTION INTRAMUSCULAR; INTRAVENOUS; SUBCUTANEOUS at 04:18

## 2021-08-13 RX ADMIN — GUAIFENESIN 1200 MG: 600 TABLET, EXTENDED RELEASE ORAL at 20:20

## 2021-08-13 RX ADMIN — ARFORMOTEROL TARTRATE 15 MCG: 15 SOLUTION RESPIRATORY (INHALATION) at 12:12

## 2021-08-13 RX ADMIN — INSULIN GLARGINE 20 UNITS: 100 INJECTION, SOLUTION SUBCUTANEOUS at 08:36

## 2021-08-13 RX ADMIN — HYDROCODONE BITARTRATE AND ACETAMINOPHEN 2 TABLET: 5; 325 TABLET ORAL at 04:12

## 2021-08-13 RX ADMIN — MUPIROCIN: 20 OINTMENT TOPICAL at 17:14

## 2021-08-13 RX ADMIN — AMIODARONE HYDROCHLORIDE 200 MG: 200 TABLET ORAL at 17:12

## 2021-08-13 RX ADMIN — HYDROMORPHONE HYDROCHLORIDE 0.5 MG: 1 INJECTION, SOLUTION INTRAMUSCULAR; INTRAVENOUS; SUBCUTANEOUS at 05:05

## 2021-08-13 RX ADMIN — POLYETHYLENE GLYCOL 3350 17 G: 17 POWDER, FOR SOLUTION ORAL at 08:19

## 2021-08-13 RX ADMIN — METOPROLOL TARTRATE 12.5 MG: 25 TABLET ORAL at 08:18

## 2021-08-13 RX ADMIN — IPRATROPIUM BROMIDE 0.5 MG: 0.5 SOLUTION RESPIRATORY (INHALATION) at 19:57

## 2021-08-13 RX ADMIN — THERA TABS 1 TABLET: TAB at 08:18

## 2021-08-13 NOTE — PROGRESS NOTES
0700 - Assumed patient care. Handoff received from [facility]. Patient resting in recliner, NAD.  0800 - Complaining of 8/10 pain to surgical incision site. Norco x2 administered. 0830 - Complaining of nausea upon visualization of breakfast tray. Refused meal, ondansetron administered. JENNIFER Chandra at bedside. Plan to remove pacing wires in AM, stop heparin at [address] to give additional dose of ondansetron for persistent nausea. 1100 - Refused ambulation with PT due to nausea. 1130 - Nausea improving. 1215 - Ambulated in hallways with 2 person assist while on 6L NC. Fairly tolerated due to pain. 1300 - Ambulated in hallways with PT. Tolerated fairly on 6L.  1600 - Ambulated in hallways. Tolerated well on 6L NC  1900 - End of shift.  Handoff given to [facility]

## 2021-08-13 NOTE — PROGRESS NOTES
CARDIAC SURGERY PROGRESS NOTE    2021  11:33 AM     Post Operative Day # 2     Chart, images and labs reviewed. Discussed with available staff. Discussed with Dr. Jason Kruegr    Interval History/Events of Past 24 hours:   Walked x1 yesterday. Nausea this morning. No abdominal pain. Passing flatus. On 6 L of oxygen. Using I-S and flutter valve with minimal secretions chest tube still draining. No chest x-ray this morning. Creatinine improved to 1.33. Calcium 4.0. Hematocrit 39%. WBC 17,000    Subjective:  Patient seen and examined on rounds today. Complaining of nausea. Denies abdominal pain. Denies shortness of breath. Pain level: Moderate  Ambulating: Poorly  Sternal pain: Yes    BM: No    Objective:  Vital signs:   Visit Vitals  /64   Pulse 70   Temp 98.8 °F (37.1 °C)   Resp 11   Ht 5' 10.5\" (1.791 m)   Wt 113.8 kg (250 lb 12.8 oz)   SpO2 94%   BMI 35.48 kg/m²     Temp (24hrs), Av.4 °F (36.9 °C), Min:98 °F (36.7 °C), Max:98.8 °F (37.1 °C)    Admission Weight: Last Weight   Weight: 117.9 kg (260 lb) Weight: 113.8 kg (250 lb 12.8 oz)     Physical Examination:     General:  Alert, oriented   Lungs: Decreased sounds at bases but otherwise clear to ascultation without rales, wheezes or rhonchi. Chest: Dressings clean and dry. Heart: regular rate and rhythm, no murmur. Abdomen: Obese. Tympany present. Soft and non-tender without masses. Bowel sounds present. Extremities: Warm and well perfused. Edema mild. Incisions: clean, dry, no drainage. Neuro: No deficit. Beta-blocker: Yes  ASA: Yes   Statin: Yes  ACE-I: No  Diuretic: No     DVT Prophylaxis:   pneumatic compression boots: Yes  Compression stockings:  Yes  Enoxaparin:  No    Chest tubes:  present. Air Leak:  No    Pacing wires:  present    DME needs:  To be determined          Labs:  Lab Results   Component Value Date/Time    WBC 17.2 (H) 2021 05:09 AM    HCT 39.2 2021 05:09 AM     (L) 2021 05:09 AM Lab Results   Component Value Date/Time     08/13/2021 08:25 AM    K 4.0 08/13/2021 08:25 AM     08/13/2021 08:25 AM    CO2 26 08/13/2021 08:25 AM     (H) 08/13/2021 08:25 AM    BUN 21 (H) 08/13/2021 08:25 AM    CREA 1.33 (H) 08/13/2021 08:25 AM    CREA 1.63 (H) 08/13/2021 05:09 AM    CREA 1.59 (H) 08/12/2021 01:25 PM     CXR: None today    Assessment:  S/P  CABG x 3  Respiratory parameters stable  Cardiac status stable     Plans:  1. Continue chest tubes. We will likely remove tomorrow with pacing wires. We will hold heparin at 4 AM in anticipation. Chest x-ray in morning. 2.  Encouraged ambulation, incentive spirometry and flutter valve. Continue with Mucinex and nebs. Bronchial hygiene protocol ordered. 3.  Continue heparin infusion for now. Will hold for pacing wire removal and resume Xarelto 6 hours later. 4.  Continue aspirin, statin and beta-blocker. Continue amiodarone prophylaxis. 5.  Antiemetics. May need scopolamine patch. Heart Hugger use encouraged to mitigate pain and will also assist with deep breathing and incentive spirometry goals. Possible discharge 3 days. Linda Rae PA-C    PLEASE NOTE:  This document has been produced using voice recognition software. Unrecognized errors in transcription may be present.

## 2021-08-13 NOTE — PROGRESS NOTES
Consultation request by Becky Gonzalez MD for advice/opinion related to evaluating post op CABG/CAD     Date of  Admission: 8/11/2021  6:50 AM   Primary Care Physician:  Puneet Piedra MD    Consulting Cardiologist:     Subjective  No CP  Some nausea. Assessment:     -CAD, s/p CABG x3 (08/11/21) LIMA to mLAD, SVG to R PLV and SVG to OM. s/p previous LCx stent (2018)   -Submassive PE, DVT, s/p IR guided thrombectomy, IVC filter placement (06/2021) with evidence of RV strain by ECHO. Significant improvement of RV function on repeat ECHO. Will remain on lifelong anticoagulation. On Xarelto as outpatient. -ECHO (07/26/21) EF 55-60%. Dilated RA.   -HTN, on isordil, Lopressor as outpatient. -HLD, on statin   -SHILPI on cpap.   -Tobacco Abuse     Primary Cardiologist: Dr. Julita Daniel:     Currently on aspirin, atorvastatin, metoprolol  Amiodarone for A. fib prevention protocol. Continue supportive care per CT surgery team plan for pacer wire removal noted tomorrow and then start Xarelto according to CT surgery team  We will be available over the weekend. Please call with question. We will see patient back again on Monday     History of Present Illness: This is a 59 y.o. male admitted for CAD (coronary artery disease) [I25.10]. Patient complains of: s/p CABG    Palatine January is a 59 y.o. male, pmhx as stated above, who we are following along with post operatively. Patient underwent CABG x 3 on 0/16/57 w/o any complications. He is drowsy and faitgued b/c of pain medication. Not able to provide history at this time. Chart reviewed in detail, as well as discussed with patient's nurse.      Cardiac risk factors: smoking/ tobacco exposure, dyslipidemia, diabetes mellitus, obesity, male gender, hypertension    Review of Symptoms:  Except as stated above include:  Constitutional:  negative  Respiratory:  negative  Cardiovascular:  negative  Gastrointestinal: negative  Genitourinary: negative  Musculoskeletal:  Negative  Neurological:  Negative  Dermatological:  Negative  Endocrinological: Negative  Psychological:  Negative    Review of systems not obtained due to patient factors. Past Medical History:     Past Medical History:   Diagnosis Date    CAD (coronary artery disease)     Dupuytren's disease of palm     Hyperlipidemia     Hypertension     SHILPI (obstructive sleep apnea)     PE [facility]pulmonary thromboembolism) ([facility] 06/2021    Severe obesity (HCC)     Tobacco dependence          Social History:     Social History     Socioeconomic History    Marital status:      Spouse name: Not on file    Number of children: Not on file    Years of education: Not on file    Highest education level: Not on file   Tobacco Use    Smoking status: Current Every Day Smoker     Types: Cigarettes    Smokeless tobacco: Never Used   Substance and Sexual Activity    Alcohol use: Yes    Drug use: No   Other Topics Concern     Social Determinants of Health     Financial Resource Strain:     Difficulty of Paying Living Expenses:    Food Insecurity:     Worried About Running Out of Food in the Last Year:     [address] in the Last Year:    Transportation Needs:     Lack of Transportation (Medical):  Lack of Transportation (Non-Medical):    Physical Activity:     Days of Exercise per Week:     Minutes of Exercise per Session:    Stress:     Feeling of Stress :    Social Connections:     Frequency of Communication with Friends and Family:     Frequency of Social Gatherings with Friends and Family:     Attends [employer] Services:     Active Member of Clubs or Organizations:     Attends Club or Organization Meetings:     Marital Status:         Family History:     Family History   Problem Relation Age of Onset    Hypertension Mother     Diabetes Mother         Medications:      Allergies   Allergen Reactions    Statins-Hmg-Coa Reductase Inhibitors Other (comments)     Other reaction(s): SEVERE PAIN PER PT    Gabapentin Other (comments)     \"Caused bad nightmares\"    Pcn [Penicillins] Hives        Current Facility-Administered Medications   Medication Dose Route Frequency    HYDROcodone-acetaminophen (NORCO) 5-325 mg per tablet 1-2 Tablet  1-2 Tablet Oral Q4H PRN    HYDROmorphone (DILAUDID) syringe 0.5-1 mg  0.5-1 mg IntraVENous Q2H PRN    guaiFENesin ER (MUCINEX) tablet 1,200 mg  1,200 mg Oral Q12H    insulin glargine (LANTUS) injection 20 Units  20 Units SubCUTAneous DAILY    heparin 25,000 units in D5W 250 ml infusion  18-36 Units/kg/hr IntraVENous TITRATE    therapeutic multivitamin (THERAGRAN) tablet 1 Tablet  1 Tablet Oral DAILY    thiamine HCL (B-1) tablet 100 mg  100 mg Oral DAILY    insulin lispro (HUMALOG) injection   SubCUTAneous AC&HS    glucose chewable tablet 16 g  4 Tablet Oral PRN    glucagon (GLUCAGEN) injection 1 mg  1 mg IntraMUSCular PRN    dextrose (D50W) injection syrg 12.5-25 g  25-50 mL IntraVENous PRN    aspirin delayed-release tablet 81 mg  81 mg Oral DAILY    atorvastatin (LIPITOR) tablet 40 mg  40 mg Oral QHS    colchicine (MITIGARE) capsule 0.6 mg  0.6 mg Oral DAILY    albumin human 5% (BUMINATE) solution 12.5 g  12.5 g IntraVENous Q20MIN PRN    sodium chloride (NS) flush 5-40 mL  5-40 mL IntraVENous Q8H    sodium chloride (NS) flush 5-40 mL  5-40 mL IntraVENous PRN    acetaminophen (TYLENOL) tablet 650 mg  650 mg Oral Q4H PRN    naloxone (NARCAN) injection 0.4 mg  0.4 mg IntraVENous PRN    mupirocin (BACTROBAN) 2 % ointment   Both Nostrils BID    amiodarone (CORDARONE) tablet 200 mg  200 mg Oral TID    albuterol (PROVENTIL VENTOLIN) nebulizer solution 2.5 mg  2.5 mg Nebulization Q4H PRN    metoprolol tartrate (LOPRESSOR) tablet 12.5 mg  12.5 mg Oral Q12H    ondansetron (ZOFRAN) injection 4 mg  4 mg IntraVENous Q4H PRN    chlorhexidine (PERIDEX) 0.12 % mouthwash 10 mL  10 mL Oral BID    docusate sodium (COLACE) capsule 100 mg  100 mg Oral BID    ELECTROLYTE REPLACEMENT PROTOCOL - Potassium Standard Dosing  1 Each Other PRN    ELECTROLYTE REPLACEMENT PROTOCOL - Magnesium  1 Each Other PRN    bisacodyL (DULCOLAX) tablet 10 mg  10 mg Oral DAILY    famotidine (PEPCID) tablet 20 mg  20 mg Oral BID    fentaNYL citrate (PF) injection 12.5-25 mcg  12.5-25 mcg IntraVENous Q15MIN PRN    polyethylene glycol (MIRALAX) packet 17 g  17 g Oral DAILY    PHENYLephrine (LUCI-SYNEPHRINE) 30 mg in 0.9% sodium chloride 250 mL infusion   mcg/min IntraVENous TITRATE    ipratropium (ATROVENT) 0.02 % nebulizer solution 0.5 mg  0.5 mg Nebulization Q8H RT    And    arformoteroL (BROVANA) neb solution 15 mcg  15 mcg Nebulization BID RT         Physical Exam:     Visit Vitals  BP (!) 99/58   Pulse 67   Temp 98.4 °F (36.9 °C)   Resp 9   Ht 5' 10.5\" (1.791 m)   Wt 113.8 kg (250 lb 12.8 oz)   SpO2 95%   BMI 35.48 kg/m²       TELE: normal sinus rhythm    BP Readings from Last 3 Encounters:   08/13/21 (!) 99/58   08/06/21 137/78   07/26/21 120/60     Pulse Readings from Last 3 Encounters:   08/13/21 67   08/06/21 68   07/26/21 84     Wt Readings from Last 3 Encounters:   08/13/21 113.8 kg (250 lb 12.8 oz)   08/06/21 117 kg (258 lb)   07/26/21 116.6 kg (257 lb)       General:  fatigued, drowsy, no distress,  Neck:  nontender  Lungs:  diminished breath sounds anterior lateral lung fields B/L    Heart:  regular rate and rhythm, S1, S2 normal, no murmur, click, rub or gallop  Abdomen:  abdomen is soft   Extremities:  extremities wrapped B/L  Skin: Warm and dry.  no hyperpigmentation, vitiligo, or suspicious lesions     Data Review:     Recent Labs     08/13/21  0509 08/12/21  1325 08/12/21  0350   WBC 17.2* 18.0* 13.7*   HGB 12.4* 12.4* 11.6*   HCT 39.2 37.8 34.6*   * 111* 117*     Recent Labs     08/13/21  0825 08/13/21  0509 08/12/21  1325 08/12/21  0350 08/12/21  0350 08/11/21  1322 08/11/21  1322    139 142   < > 143   < > 140   K 4.0 3.9 4.3   < > 3.9   < > 4.1    108 109   < > 115*   < > 106   CO2 26 25 26   < > 24   < > 28   * 121* 105*   < > 93   < > 129*   BUN 21* 21* 19*   < > 17   < > 14   CREA 1.33* 1.63* 1.59*   < > 1.52*   < > 1.45*   CA 8.1* 8.5 8.0*   < > 7.9*   < > 7.5*   MG  --   --   --   --  2.1  --  2.7*   INR  --   --   --   --   --   --  1.1    < > = values in this interval not displayed.        Results for orders placed or performed during the hospital encounter of 08/11/21   EKG, 12 LEAD, INITIAL   Result Value Ref Range    Ventricular Rate 75 BPM    Atrial Rate 75 BPM    P-R Interval 176 ms    QRS Duration 88 ms    Q-T Interval 414 ms    QTC Calculation (Bezet) 462 ms    Calculated P Axis 64 degrees    Calculated R Axis 48 degrees    Calculated T Axis 65 degrees    Diagnosis       Normal sinus rhythm  Possible Left atrial enlargement  Borderline ECG  When compared with ECG of 11-AUG-2021 13:45,  QRS axis shifted right  Confirmed by Ky Chaparro MD, --- ([id number]) on 8/12/2021 3:04:43 PM         All Cardiac Markers in the last 24 hours:  No results found for: CPK, CK, CKMMB, CKMB, RCK3, CKMBT, CKNDX, CKND1, PALMA, TROPT, TROIQ, SIL, TROPT, TNIPOC, BNP, BNPP    Last Lipid:    Lab Results   Component Value Date/Time    Cholesterol, total 120 06/10/2021 05:30 AM    HDL Cholesterol 33 (L) 06/10/2021 05:30 AM    LDL, calculated 69.6 06/10/2021 05:30 AM    Triglyceride 87 06/10/2021 05:30 AM    CHOL/HDL Ratio 3.6 06/10/2021 05:30 AM       Cardiographics:     EKG Results     Procedure 720 Value Units Date/Time    EKG, 12 LEAD, SUBSEQUENT [[id number]] Collected: 08/12/21 1340    Order Status: Completed Updated: 08/12/21 1542     Ventricular Rate 67 BPM      Atrial Rate 67 BPM      P-R Interval 170 ms      QRS Duration 86 ms      Q-T Interval 412 ms      QTC Calculation (Bezet) 435 ms      Calculated P Axis 40 degrees      Calculated R Axis 14 degrees      Calculated T Axis 49 degrees      Diagnosis --     Normal sinus rhythm  Normal ECG  When compared with ECG of 12-AUG-2021 04:28,  No significant change was found  Confirmed by David Garcia MD, --- ([id number]) on 8/12/2021 3:43:48 PM      EKG, 12 LEAD, INITIAL [[id number]] Collected: 08/12/21 0428    Order Status: Completed Updated: 08/12/21 1505     Ventricular Rate 75 BPM      Atrial Rate 75 BPM      P-R Interval 176 ms      QRS Duration 88 ms      Q-T Interval 414 ms      QTC Calculation (Bezet) 462 ms      Calculated P Axis 64 degrees      Calculated R Axis 48 degrees      Calculated T Axis 65 degrees      Diagnosis --     Normal sinus rhythm  Possible Left atrial enlargement  Borderline ECG  When compared with ECG of 11-AUG-2021 13:45,  QRS axis shifted right  Confirmed by David Garcia MD, --- [id number]) on 8/12/2021 3:04:43 PM      EKG, 12 LEAD, INITIAL [[id number]] Collected: 08/11/21 1345    Order Status: Completed Updated: 08/11/21 1418     Ventricular Rate 62 BPM      Atrial Rate 62 BPM      P-R Interval 202 ms      QRS Duration 92 ms      Q-T Interval 482 ms      QTC Calculation (Bezet) 489 ms      Calculated P Axis 52 degrees      Calculated R Axis -63 degrees      Calculated T Axis 26 degrees      Diagnosis --     Normal sinus rhythm  Left axis deviation  Incomplete right bundle branch block  Nonspecific ST abnormality  Prolonged QT  Abnormal ECG    Confirmed by Landon Haro MD, Graham Sam ([id number]) on 8/11/2021 2:18:00 PM          07/26/21    ECHO ADULT FOLLOW-UP OR LIMITED 07/26/2021 7/26/2021    Interpretation Summary  · LV: Estimated LVEF is 55 - 60%. Visually measured ejection fraction. Normal cavity size and systolic function (ejection fraction normal). Mildly to moderately increased wall thickness. Wall motion: normal.  · RV: Mildly dilated right ventricle. Mildly reduced systolic function. · RA: Dilated right atrium. · IVC: Moderately elevated central venous pressure (8 mmHg); IVC diameter is larger than 21 mm and collapses more than 50% with respiration. · Pericardium: Pericardial fat pad present.     Signed by: Anthony Ingram MD on 7/26/2021  3:21 PM      02/08/19    NM [facility] MYOCARDIAL PERFUSION MULTIPLE  2/8/2019 06/09/21    CARDIAC PROCEDURE 06/09/2021 6/9/2021    Conclusion  Severe three-vessel coronary disease as mentioned in detail  Normal LVEF and LVEDP. Echocardiogram will be performed  We will have CT surgery evaluation for possible CABG  Addendum by: Anthony Ingram MD on 6/9/2021  4:15 PM    Signed by: Anthony Ingram MD on 6/9/2021 12:06 PM      XR Results (most recent):  Results from [place] encounter on 08/11/21    XR CHEST PORT    Narrative  EXAM: XR CHEST PORT    INDICATION: post op    COMPARISON: August 6, 2021    FINDINGS: A portable AP radiograph of the chest was obtained at 1342 hours. The  patient is on a cardiac monitor. Hazy bilateral airspace opacities with more  confluent streaky opacities of the lung bases. . Cardiac silhouette upper  normal..  Sternotomy wires. Right IJ [place] catheter with tip over the main  pulmonary artery. Endotracheal tube tip 4.7 cm from cynthia. Enteric tube  terminates below the visualized field. .    Impression  Postoperative changes. Support devices as discussed. Hazy bilateral airspace opacities which may reflect pulmonary edema with more  confluent basilar opacities which may reflect subsegmental atelectasis.         Signed By: Calli Bello MD     August 13, 2021

## 2021-08-13 NOTE — PROGRESS NOTES
1900-  Report received. 2006-  Norco 2 tab PO. Dilaudid 0.5 mg IVP. 2152-  Dilaudid 0.5 mg IVP. 2357-  Norco 2 tab PO.  0226-  Dilaudid 0.5 mg IVP. 5651-  Norco 2 tab PO.  0505-  Dilaudid 0.5 mg IVP.  4809-  Ptt noted. Heparin adjusted. Re-check due at 1200.  0700-   Bedside and Verbal shift change report given to HIRAL Gaviria (oncoming nurse) by HIRAL Sinclair (offgoing nurse). Report included the following information SBAR, Kardex, OR Summary, Intake/Output, MAR, Recent Results and Cardiac Rhythm NSR.

## 2021-08-13 NOTE — PROGRESS NOTES
Reason for Admission:  CAD (coronary artery disease) [I25.10]                 RUR Score:    18%            Plan for utilizing home health:    Pt agreeable to any agency that services his area. Likelihood of Readmission:   LOW                         Transition of Care Plan:              Initial assessment completed with patient. Cognitive status of patient: oriented to time, place, person and situation. Face sheet information confirmed:  yes. The patient designates his wife Rosalinda Hernández to participate in his discharge plan and to receive any needed information. This patient lives in a single family home with his wife. Patient is able to navigate steps as needed. Prior to hospitalization, patient was considered to be independent with ADLs/IADLS : yes . Patient has a current ACP document on file: no      Healthcare Decision Maker:     Click here to complete [address] including selection of the Healthcare Decision Maker Relationship (ie \"Primary\")    The wife will be available to transport patient home upon discharge. The patient already has a home CPAP, and walk-in shower with seat  medical equipment available in the home. Patient is not currently active with home health. Patient has not stayed in a skilled nursing facility or rehab. This patient is on dialysis :no      List of available Home Health agencies were provided and reviewed with the patient prior to discharge. Freedom of choice signed: yes, for any accommodating agency that services his area. Currently, the discharge plan is Home with [address] [place] [address] Clarence Rodriguez. Pt lives in [place], [place]. Home health referral sent to [facility]. *1600- Pt was accepted to [facility] by Jesse Rudd    The patient states that he can obtain his medications from the pharmacy, and take his medications as directed. Patient's current insurance is [employer] PPO.         Care Management Interventions  PCP Verified by CM: Yes  Mode of Transport at Discharge:  Other (see comment) (wife )  Transition of Care Consult (CM Consult): Discharge Planning  Physical Therapy Consult: Yes  Occupational Therapy Consult: Yes  Current Support Network: Lives with Spouse  Confirm Follow Up Transport: Family  The Patient and/or Patient Representative was Provided with a Choice of Provider and Agrees with the Discharge Plan?: Yes  Name of the Patient Representative Who was Provided with a Choice of Provider and Agrees with the Discharge Plan: patient  Freedom of Choice List was Provided with Basic Dialogue that Supports the Patient's Individualized Plan of Care/Goals, Treatment Preferences and Shares the Quality Data Associated with the Providers?: Yes  Discharge Location  Discharge Placement: Home with home health (pt agreeable to any home health agency)        [address]  [phone]

## 2021-08-13 NOTE — PROGRESS NOTES
Problem: Falls - Risk of  Goal: *Absence of Falls  Description: Document Jameson Lopez Fall Risk and appropriate interventions in the flowsheet. Outcome: Progressing Towards Goal  Note: Fall Risk Interventions:  Mobility Interventions: Assess mobility with egress test, Bed/chair exit alarm, Communicate number of staff needed for ambulation/transfer, Patient to call before getting OOB, Strengthening exercises (ROM-active/passive), Utilize walker, cane, or other assistive device, OT consult for ADLs, PT Consult for mobility concerns, PT Consult for assist device competence         Medication Interventions: Evaluate medications/consider consulting pharmacy, Patient to call before getting OOB, Teach patient to arise slowly    Elimination Interventions: Call light in reach, Elevated toilet seat              Problem: Patient Education: Go to Patient Education Activity  Goal: Patient/Family Education  Outcome: Progressing Towards Goal     Problem: Pressure Injury - Risk of  Goal: *Prevention of pressure injury  Description: Document Ez Scale and appropriate interventions in the flowsheet. Outcome: Progressing Towards Goal  Note: Pressure Injury Interventions:  Sensory Interventions: Assess changes in LOC, Assess need for specialty bed, Check visual cues for pain, Float heels, Keep linens dry and wrinkle-free, Maintain/enhance activity level, Minimize linen layers, Pressure redistribution bed/mattress (bed type), Turn and reposition approx. every two hours (pillows and wedges if needed)         Activity Interventions: Increase time out of bed, Pressure redistribution bed/mattress(bed type), PT/OT evaluation, Chair cushion    Mobility Interventions: Assess need for specialty bed, Chair cushion, Float heels, Pressure redistribution bed/mattress (bed type), Turn and reposition approx.  every two hours(pillow and wedges), PT/OT evaluation    Nutrition Interventions: Document food/fluid/supplement intake, Offer support with meals,snacks and hydration    Friction and Shear Interventions: Apply protective barrier, creams and emollients, Feet elevated on foot rest, Foam dressings/transparent film/skin sealants, Lift sheet, Lift team/patient mobility team, Minimize layers                Problem: Patient Education: Go to Patient Education Activity  Goal: Patient/Family Education  Outcome: Progressing Towards Goal

## 2021-08-13 NOTE — ANESTHESIA POSTPROCEDURE EVALUATION
Procedure(s):  CORONARY ARTERY BYPASS GRAFT (CABG) TIMES THREE.    general    Anesthesia Post Evaluation      Multimodal analgesia: multimodal analgesia used between 6 hours prior to anesthesia start to PACU discharge  Patient location during evaluation: PACU  Patient participation: complete - patient participated  Level of consciousness: awake  Pain score: 5  Airway patency: patent  Anesthetic complications: no  Cardiovascular status: acceptable  Respiratory status: acceptable  Hydration status: acceptable  Post anesthesia nausea and vomiting:  none  Final Post Anesthesia Temperature Assessment:  Normothermia (36.0-37.5 degrees C)      INITIAL Post-op Vital signs:   Vitals Value Taken Time   /65 08/13/21 0600   Temp 36.9 °C (98.4 °F) 08/13/21 0400   Pulse 68 08/13/21 0643   Resp 10 08/13/21 0643   SpO2 94 % 08/13/21 0643   Vitals shown include unvalidated device data.

## 2021-08-13 NOTE — PROGRESS NOTES
Nutrition Assessment     Type and Reason for Visit: Reassess, Consult    Nutrition Recommendations/Plan:   - Monitor po intake and diet tolerance. Change supplements to Ensure Enlive TID. Nutrition Assessment:  Patient advanced to solid diet with decreased appetite and minimal po intake due to nausea/vomiting. Malnutrition Assessment:  Malnutrition Status: Severe malnutrition     Estimated Daily Nutrient Needs:  Energy (kcal):  3182-8496  Protein (g):         Fluid (ml/day):  5714-4415    Nutrition Related Findings:  Last BM 8/10 (PTA). Medications: bowel regimen, theragran, thiamine      Lab Results   Component Value Date/Time    Vitamin B12 246 08/12/2021 03:50 AM    Folate 5.8 08/12/2021 03:50 AM      Current Nutrition Therapies:  ADULT ORAL NUTRITION SUPPLEMENT Breakfast, Lunch, Dinner; Other Supplement; Gelatein  ADULT DIET Regular; Low Fat/Low Chol/High Fiber/2 gm Na; no concentrated sweets. no red meat.     Anthropometric Measures:  · Height:  5' 10.5\" (179.1 cm)  · Current Body Wt:  121.4 kg (267 lb 10.2 oz)  · BMI: 37.8    Nutrition Diagnosis:   · Unintended weight loss related to catabolic illness, inadequate protein-energy intake, early satiety as evidenced by weight loss greater than or equal to 10% in 6 months    Nutrition Intervention:  Food and/or Nutrient Delivery: Continue current diet, Vitamin supplement, Modify oral nutrition supplement  Nutrition Education and Counseling: Education completed (cardiac diet education 8/12/21)  Coordination of Nutrition Care: Continue to monitor while inpatient, Coordination of community care    Goals:  PO nutrition intake will meet >75% of patient estimated nutritional needs within the next 7 days       Nutrition Monitoring and Evaluation:   Behavioral-Environmental Outcomes: None identified  Food/Nutrient Intake Outcomes: Food and nutrient intake, Diet advancement/tolerance, Supplement intake, Vitamin/mineral intake  Physical Signs/Symptoms Outcomes: Biochemical data, Constipation, GI status, Nausea/vomiting, Meal time behavior, Nutrition focused physical findings    Discharge Planning:     Too soon to determine     Electronically signed by Sharita Moreira RD, [address]  on 8/13/2021 at 3:03 PM    Contact Number: [phone]

## 2021-08-13 NOTE — PROGRESS NOTES
Problem: Self Care Deficits Care Plan (Adult)  Goal: *Acute Goals and Plan of Care (Insert Text)  Description: Occupational Therapy Goals  Initiated 8/12/2021 within 7 day(s). 1.  Patient will perform upper body dressing with supervision/set-up. 2.  Patient will perform lower body dressing with supervision/set-up using AE prn.  3.  Patient will perform grooming task standing at sink for 4-7 minutes with supervision/set-up, F+ balance. 4.  Patient will perform toilet transfers with supervision/set-up. 5.  Patient will perform all aspects of toileting with supervision/set-up. 6.  Patient will recall precautions during selfcare tasks with minimal verbal cues   7. Patient will utilize energy conservation techniques during functional activities with verbal cues. Prior Level of Function: Patient was independent with self-care and functional mobility PTA. Outcome: Progressing Towards Goal  OCCUPATIONAL THERAPY TREATMENT    Patient: Leonel Gerardo (48 y.o. male)  Date: 8/13/2021  Diagnosis: CAD (coronary artery disease) [I25.10] <principal problem not specified>  Procedure(s) (LRB):  CORONARY ARTERY BYPASS GRAFT (CABG) TIMES THREE (N/A) 2 Days Post-Op  Precautions: Fall, Sternal    Chart, occupational therapy assessment, plan of care, and goals were reviewed. ASSESSMENT:  Pt is agreeable to participate in OT this pm. Pt is seen with PT to increase safety of the pt and staff during functional mobility and ADLs due to pt's having increased pain and nausea this date. Pt was able to recall 50% of the sternal precautions, educated on the remaining sternal precautions and how they relate to ADLs and functional mobility. Pt verbalized understanding, required Min VCs to recall and to follow throughout the session. Pt educated on adaptive strategies for UB/LB ADLs while following sternal perecautions, pt verbalized understanding, reports his spouse is usually at home and will be able to assist prn.  Pt educated on mult energy conservation techniques to utilize in home environment, including pacing and deep breathing to prevent SOB and fatigue, and increase activity tolerance and safety w/ADLs and functional mobility, pt verbalized understanding, may benefit from [facility] techniques handout to maximize carryover. Pt performed functional mobility with rollator, simulating BR mobility for 5 min, benefiting from VCs for proper breathing technique. Pt completes simple grooming tasks in sitting with Set-up and requires CGA for ADLs in std unless supported with at least 1 UE. Progression toward goals:  [x]          Improving appropriately and progressing toward goals  []          Improving slowly and progressing toward goals  []          Not making progress toward goals and plan of care will be adjusted     PLAN:  Patient continues to benefit from skilled intervention to address the above impairments. Continue treatment per established plan of care. Discharge Recommendations:  Home Health with family assist prn  Further Equipment Recommendations for Discharge:  shower chair     SUBJECTIVE:   Patient stated I don't feel good.     OBJECTIVE DATA SUMMARY:   Cognitive/Behavioral Status:  Neurologic State: Alert  Orientation Level: Oriented X4  Cognition: Follows commands  Safety/Judgement: Fall prevention    Functional Mobility and Transfers for ADLs:   Bed Mobility:   Scooting: Stand-by assistance   Transfers:  Sit to Stand: Contact guard assistance   Toilet Transfer : Stand-by assistance    Bathroom Mobility: Stand-by assistance    Balance:  Sitting: Intact  Standing: Impaired; With support  Standing - Static: Good  Standing - Dynamic : Fair  ADL Intervention:     Cognitive Retraining  Safety/Judgement: Fall prevention  Pain:  Pain level pre-treatment: not rated   Pain level post-treatment: 8/10  Activity Tolerance:    Fair  Please refer to the flowsheet for vital signs taken during this treatment.   After treatment:   [x]  Patient left in no apparent distress sitting up in chair  []  Patient left in no apparent distress in bed  [x]  Call bell left within reach  [x]  Nursing notified  []  Caregiver present  []  Bed alarm activated    COMMUNICATION/EDUCATION:   [x] Role of Occupational Therapy in the acute care setting  [x] Home safety education was provided and the patient/caregiver indicated understanding. [x] Patient/family have participated as able in working towards goals and plan of care. [x] Patient/family agree to work toward stated goals and plan of care. [] Patient understands intent and goals of therapy, but is neutral about his/her participation. [] Patient is unable to participate in goal setting and plan of care.       Thank you for this referral.  Kalie Teixeira, OTR/L  Time Calculation: 23 mins

## 2021-08-13 NOTE — PROGRESS NOTES
Problem: Mobility Impaired (Adult and Pediatric)  Goal: *Acute Goals and Plan of Care (Insert Text)  Description: Physical Therapy Goals  Initiated 8/12/2021 and to be accomplished within 7 day(s)  1. Patient will move from supine to sit and sit to supine  in bed with modified independence. 2.  Patient will transfer from bed to chair and chair to bed with modified independence using the least restrictive device. 3.  Patient will perform sit to stand with modified independence. 4.  Patient will ambulate with modified independence for 200 feet with the least restrictive device. 5.  Patient will ascend/descend 3 stairs with handrail(s) with supervision/set-up. PLOF: Patient was independent with functional mobility. He lives with spouse in single story home. Outcome: Progressing Towards Goal  .    PHYSICAL THERAPY TREATMENT    Patient: Antolin Hansen (42 y.o. male)  Date: 8/13/2021  Diagnosis: CAD (coronary artery disease) [I25.10] <principal problem not specified>  Procedure(s) (LRB):  CORONARY ARTERY BYPASS GRAFT (CABG) TIMES THREE (N/A) 2 Days Post-Op  Precautions: Fall, Sternal    ASSESSMENT:  Patient cleared by nursing for mobility and pt agreeable to therapy with some encouragement. He c/o sternal pain and increased nausea. Per nursing, pt is coughing up have secretions. Reviewed sternal precautions as he was only able to recall about 50 %. Patient given cues for breathing technique to assist with pain management. He transfers to standing holding onto heart hugger with CGA. Patient ambulates 35 ft with slow, short steps and returns to chair due to fatigue. Will continue to progress as able to tolerate and recommend home health with family support.     Progression toward goals:   []      Improving appropriately and progressing toward goals  [x]      Improving slowly and progressing toward goals  []      Not making progress toward goals and plan of care will be adjusted     PLAN:  Patient continues to benefit from skilled intervention to address the above impairments. Continue treatment per established plan of care. Discharge Recommendations:  Home Health with family support  Further Equipment Recommendations for Discharge:  rolling walker     SUBJECTIVE:   Patient stated Lindsey Rubi got me up.     OBJECTIVE DATA SUMMARY:   Critical Behavior:  Neurologic State: Alert  Orientation Level: Oriented X4  Cognition: Follows commands  Safety/Judgement: Fall prevention  Functional Mobility Training:  Bed Mobility  Scooting: Stand-by assistance         Transfers:  Sit to Stand: Contact guard assistance        Balance:  Sitting: Intact  Standing: Impaired; With support  Standing - Static: Good  Standing - Dynamic : Fair             Ambulation/Gait Training:  Distance (ft): 35 Feet (ft)  Assistive Device: Walker, rollator  Ambulation - Level of Assistance: Contact guard assistance  Gait Abnormalities: Decreased step clearance  Step Length: Right shortened;Left shortened                    Pain:  Pain level pre-treatment: -/10  Pain level post-treatment: 8/10   Pain Intervention(s): Medication (see MAR); Rest, Ice, Repositioning   Response to intervention: Nurse notified, See doc flow    Activity Tolerance:   Fair  Please refer to the flowsheet for vital signs taken during this treatment. After treatment:   [x] Patient left in no apparent distress sitting up in chair  [] Patient left in no apparent distress in bed  [x] Call bell left within reach  [x] Nursing notified  [] Caregiver present  [] Bed alarm activated  [] SCDs applied      COMMUNICATION/EDUCATION:   [x]         Role of Physical Therapy in the acute care setting. [x]         Fall prevention education was provided and the patient/caregiver indicated understanding. [x]         Patient/family have participated as able in working toward goals and plan of care. [x]         Patient/family agree to work toward stated goals and plan of care.   [] Patient understands intent and goals of therapy, but is neutral about his/her participation.   []         Patient is unable to participate in stated goals/plan of care: ongoing with therapy staff.  []         Other:        Enid Pride, PT   Time Calculation: 23 mins

## 2021-08-14 ENCOUNTER — APPOINTMENT (OUTPATIENT)
Dept: GENERAL RADIOLOGY | Age: 61
DRG: 236 | End: 2021-08-14
Attending: PHYSICIAN ASSISTANT
Payer: COMMERCIAL

## 2021-08-14 LAB
ANION GAP SERPL CALC-SCNC: 4 MMOL/L (ref 3–18)
APTT PPP: 122.1 SEC (ref 23–36.4)
BASOPHILS # BLD: 0 K/UL (ref 0–0.1)
BASOPHILS NFR BLD: 0 % (ref 0–2)
BUN SERPL-MCNC: 21 MG/DL (ref 7–18)
BUN/CREAT SERPL: 18 (ref 12–20)
CALCIUM SERPL-MCNC: 8.3 MG/DL (ref 8.5–10.1)
CHLORIDE SERPL-SCNC: 107 MMOL/L (ref 100–111)
CO2 SERPL-SCNC: 27 MMOL/L (ref 21–32)
CREAT SERPL-MCNC: 1.16 MG/DL (ref 0.6–1.3)
DIFFERENTIAL METHOD BLD: ABNORMAL
EOSINOPHIL # BLD: 0.8 K/UL (ref 0–0.4)
EOSINOPHIL NFR BLD: 7 % (ref 0–5)
ERYTHROCYTE [DISTWIDTH] IN BLOOD BY AUTOMATED COUNT: 13.7 % (ref 11.6–14.5)
GLUCOSE BLD STRIP.AUTO-MCNC: 103 MG/DL (ref 70–110)
GLUCOSE BLD STRIP.AUTO-MCNC: 104 MG/DL (ref 70–110)
GLUCOSE BLD STRIP.AUTO-MCNC: 108 MG/DL (ref 70–110)
GLUCOSE BLD STRIP.AUTO-MCNC: 126 MG/DL (ref 70–110)
GLUCOSE SERPL-MCNC: 108 MG/DL (ref 74–99)
HCT VFR BLD AUTO: 34.9 % (ref 36–48)
HGB BLD-MCNC: 11.5 G/DL (ref 13–16)
LYMPHOCYTES # BLD: 2.6 K/UL (ref 0.9–3.6)
LYMPHOCYTES NFR BLD: 22 % (ref 21–52)
MCH RBC QN AUTO: 33.5 PG (ref 24–34)
MCHC RBC AUTO-ENTMCNC: 33 G/DL (ref 31–37)
MCV RBC AUTO: 101.7 FL (ref 74–97)
MONOCYTES # BLD: 0.4 K/UL (ref 0.05–1.2)
MONOCYTES NFR BLD: 3 % (ref 3–10)
NEUTS SEG # BLD: 8.1 K/UL (ref 1.8–8)
NEUTS SEG NFR BLD: 68 % (ref 40–73)
PLATELET # BLD AUTO: 121 K/UL (ref 135–420)
PLATELET COMMENTS,PCOM: ABNORMAL
PMV BLD AUTO: 11.1 FL (ref 9.2–11.8)
POTASSIUM SERPL-SCNC: 3.6 MMOL/L (ref 3.5–5.5)
RBC # BLD AUTO: 3.43 M/UL (ref 4.35–5.65)
RBC MORPH BLD: ABNORMAL
SODIUM SERPL-SCNC: 138 MMOL/L (ref 136–145)
WBC # BLD AUTO: 11.9 K/UL (ref 4.6–13.2)

## 2021-08-14 PROCEDURE — 65620000000 HC RM CCU GENERAL

## 2021-08-14 PROCEDURE — 74011000250 HC RX REV CODE- 250: Performed by: SPECIALIST

## 2021-08-14 PROCEDURE — 85025 COMPLETE CBC W/AUTO DIFF WBC: CPT

## 2021-08-14 PROCEDURE — 82962 GLUCOSE BLOOD TEST: CPT

## 2021-08-14 PROCEDURE — 71045 X-RAY EXAM CHEST 1 VIEW: CPT

## 2021-08-14 PROCEDURE — 74011250636 HC RX REV CODE- 250/636: Performed by: PHYSICIAN ASSISTANT

## 2021-08-14 PROCEDURE — APPNB60 APP NON BILLABLE TIME 46-60 MINS: Performed by: PHYSICIAN ASSISTANT

## 2021-08-14 PROCEDURE — 94640 AIRWAY INHALATION TREATMENT: CPT

## 2021-08-14 PROCEDURE — 85730 THROMBOPLASTIN TIME PARTIAL: CPT

## 2021-08-14 PROCEDURE — 80048 BASIC METABOLIC PNL TOTAL CA: CPT

## 2021-08-14 PROCEDURE — 97535 SELF CARE MNGMENT TRAINING: CPT

## 2021-08-14 PROCEDURE — 99024 POSTOP FOLLOW-UP VISIT: CPT | Performed by: PHYSICIAN ASSISTANT

## 2021-08-14 PROCEDURE — 74011250637 HC RX REV CODE- 250/637: Performed by: PHYSICIAN ASSISTANT

## 2021-08-14 PROCEDURE — 74011636637 HC RX REV CODE- 636/637: Performed by: PHYSICIAN ASSISTANT

## 2021-08-14 PROCEDURE — 74011000250 HC RX REV CODE- 250: Performed by: PHYSICIAN ASSISTANT

## 2021-08-14 PROCEDURE — 94762 N-INVAS EAR/PLS OXIMTRY CONT: CPT

## 2021-08-14 PROCEDURE — 74011250637 HC RX REV CODE- 250/637: Performed by: SPECIALIST

## 2021-08-14 RX ORDER — POTASSIUM CHLORIDE 20 MEQ/1
40 TABLET, EXTENDED RELEASE ORAL
Status: COMPLETED | OUTPATIENT
Start: 2021-08-14 | End: 2021-08-14

## 2021-08-14 RX ADMIN — HYDROCODONE BITARTRATE AND ACETAMINOPHEN 2 TABLET: 5; 325 TABLET ORAL at 00:16

## 2021-08-14 RX ADMIN — Medication 10 ML: at 14:47

## 2021-08-14 RX ADMIN — HEPARIN SODIUM 13 UNITS/KG/HR: 10000 INJECTION, SOLUTION INTRAVENOUS at 14:45

## 2021-08-14 RX ADMIN — DOCUSATE SODIUM 100 MG: 100 CAPSULE, LIQUID FILLED ORAL at 09:32

## 2021-08-14 RX ADMIN — HYDROCODONE BITARTRATE AND ACETAMINOPHEN 2 TABLET: 5; 325 TABLET ORAL at 05:05

## 2021-08-14 RX ADMIN — AMIODARONE HYDROCHLORIDE 200 MG: 200 TABLET ORAL at 09:32

## 2021-08-14 RX ADMIN — GUAIFENESIN 1200 MG: 600 TABLET, EXTENDED RELEASE ORAL at 20:08

## 2021-08-14 RX ADMIN — HYDROCODONE BITARTRATE AND ACETAMINOPHEN 2 TABLET: 5; 325 TABLET ORAL at 14:40

## 2021-08-14 RX ADMIN — AMIODARONE HYDROCHLORIDE 200 MG: 200 TABLET ORAL at 17:19

## 2021-08-14 RX ADMIN — IPRATROPIUM BROMIDE 0.5 MG: 0.5 SOLUTION RESPIRATORY (INHALATION) at 08:20

## 2021-08-14 RX ADMIN — GUAIFENESIN 1200 MG: 600 TABLET, EXTENDED RELEASE ORAL at 09:31

## 2021-08-14 RX ADMIN — ARFORMOTEROL TARTRATE 15 MCG: 15 SOLUTION RESPIRATORY (INHALATION) at 20:32

## 2021-08-14 RX ADMIN — DOCUSATE SODIUM 100 MG: 100 CAPSULE, LIQUID FILLED ORAL at 17:18

## 2021-08-14 RX ADMIN — METOPROLOL TARTRATE 12.5 MG: 25 TABLET ORAL at 20:12

## 2021-08-14 RX ADMIN — CHLORHEXIDINE GLUCONATE 0.12% ORAL RINSE 10 ML: 1.2 LIQUID ORAL at 17:18

## 2021-08-14 RX ADMIN — HYDROMORPHONE HYDROCHLORIDE 0.5 MG: 1 INJECTION, SOLUTION INTRAMUSCULAR; INTRAVENOUS; SUBCUTANEOUS at 06:14

## 2021-08-14 RX ADMIN — POTASSIUM CHLORIDE 40 MEQ: 1500 TABLET, EXTENDED RELEASE ORAL at 05:05

## 2021-08-14 RX ADMIN — COLCHICINE 0.6 MG: 0.6 CAPSULE ORAL at 09:32

## 2021-08-14 RX ADMIN — MUPIROCIN: 20 OINTMENT TOPICAL at 09:33

## 2021-08-14 RX ADMIN — HYDROCODONE BITARTRATE AND ACETAMINOPHEN 1 TABLET: 5; 325 TABLET ORAL at 20:08

## 2021-08-14 RX ADMIN — POLYETHYLENE GLYCOL 3350 17 G: 17 POWDER, FOR SOLUTION ORAL at 09:33

## 2021-08-14 RX ADMIN — ONDANSETRON 4 MG: 2 INJECTION INTRAMUSCULAR; INTRAVENOUS at 07:58

## 2021-08-14 RX ADMIN — IPRATROPIUM BROMIDE 0.5 MG: 0.5 SOLUTION RESPIRATORY (INHALATION) at 16:00

## 2021-08-14 RX ADMIN — Medication 10 ML: at 20:15

## 2021-08-14 RX ADMIN — FAMOTIDINE 20 MG: 20 TABLET ORAL at 09:32

## 2021-08-14 RX ADMIN — THERA TABS 1 TABLET: TAB at 09:32

## 2021-08-14 RX ADMIN — HYDROMORPHONE HYDROCHLORIDE 0.5 MG: 1 INJECTION, SOLUTION INTRAMUSCULAR; INTRAVENOUS; SUBCUTANEOUS at 02:23

## 2021-08-14 RX ADMIN — HYDROMORPHONE HYDROCHLORIDE 0.5 MG: 1 INJECTION, SOLUTION INTRAMUSCULAR; INTRAVENOUS; SUBCUTANEOUS at 22:23

## 2021-08-14 RX ADMIN — BISACODYL 10 MG: 5 TABLET, COATED ORAL at 09:32

## 2021-08-14 RX ADMIN — Medication 10 ML: at 05:05

## 2021-08-14 RX ADMIN — ARFORMOTEROL TARTRATE 15 MCG: 15 SOLUTION RESPIRATORY (INHALATION) at 08:20

## 2021-08-14 RX ADMIN — INSULIN GLARGINE 20 UNITS: 100 INJECTION, SOLUTION SUBCUTANEOUS at 09:33

## 2021-08-14 RX ADMIN — CHLORHEXIDINE GLUCONATE 0.12% ORAL RINSE 10 ML: 1.2 LIQUID ORAL at 09:32

## 2021-08-14 RX ADMIN — FAMOTIDINE 20 MG: 20 TABLET ORAL at 17:19

## 2021-08-14 RX ADMIN — Medication 100 MG: at 09:31

## 2021-08-14 RX ADMIN — AMIODARONE HYDROCHLORIDE 200 MG: 200 TABLET ORAL at 22:23

## 2021-08-14 RX ADMIN — ATORVASTATIN CALCIUM 40 MG: 40 TABLET, FILM COATED ORAL at 20:16

## 2021-08-14 RX ADMIN — Medication 81 MG: at 09:31

## 2021-08-14 RX ADMIN — METOPROLOL TARTRATE 12.5 MG: 25 TABLET ORAL at 09:31

## 2021-08-14 RX ADMIN — MUPIROCIN: 20 OINTMENT TOPICAL at 17:19

## 2021-08-14 RX ADMIN — HYDROMORPHONE HYDROCHLORIDE 0.5 MG: 1 INJECTION, SOLUTION INTRAMUSCULAR; INTRAVENOUS; SUBCUTANEOUS at 14:40

## 2021-08-14 RX ADMIN — HYDROCODONE BITARTRATE AND ACETAMINOPHEN 1 TABLET: 5; 325 TABLET ORAL at 17:19
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
## 2021-08-14 NOTE — PROGRESS NOTES
Problem: Falls - Risk of  Goal: *Absence of Falls  Description: Document Americo Sites Fall Risk and appropriate interventions in the flowsheet. Outcome: Progressing Towards Goal  Note: Fall Risk Interventions:  Mobility Interventions: Bed/chair exit alarm, Communicate number of staff needed for ambulation/transfer, Patient to call before getting OOB         Medication Interventions: Evaluate medications/consider consulting pharmacy, Patient to call before getting OOB    Elimination Interventions: Call light in reach, Patient to call for help with toileting needs, Toileting schedule/hourly rounds              Problem: Patient Education: Go to Patient Education Activity  Goal: Patient/Family Education  Outcome: Progressing Towards Goal     Problem: Ventilator Management  Goal: *Adequate oxygenation and ventilation  Outcome: Progressing Towards Goal  Goal: *Patient maintains clear airway/free of aspiration  Outcome: Progressing Towards Goal  Goal: *Absence of infection signs and symptoms  Outcome: Progressing Towards Goal  Goal: *Normal spontaneous ventilation  Outcome: Progressing Towards Goal     Problem: Patient Education: Go to Patient Education Activity  Goal: Patient/Family Education  Outcome: Progressing Towards Goal     Problem: Pressure Injury - Risk of  Goal: *Prevention of pressure injury  Description: Document Ez Scale and appropriate interventions in the flowsheet. Outcome: Progressing Towards Goal  Note: Pressure Injury Interventions:  Sensory Interventions: Assess changes in LOC, Discuss PT/OT consult with provider, Keep linens dry and wrinkle-free, Pressure redistribution bed/mattress (bed type)         Activity Interventions: PT/OT evaluation, Pressure redistribution bed/mattress(bed type)    Mobility Interventions: Pressure redistribution bed/mattress (bed type), Turn and reposition approx.  every two hours(pillow and wedges)    Nutrition Interventions: Document food/fluid/supplement intake    Friction and Shear Interventions: Foam dressings/transparent film/skin sealants, HOB 30 degrees or less                Problem: Patient Education: Go to Patient Education Activity  Goal: Patient/Family Education  Outcome: Progressing Towards Goal     Problem: Patient Education: Go to Patient Education Activity  Goal: Patient/Family Education  Outcome: Progressing Towards Goal     Problem: Patient Education: Go to Patient Education Activity  Goal: Patient/Family Education  Outcome: Progressing Towards Goal     Problem: Nutrition Deficit  Goal: *Optimize nutritional status  Outcome: Progressing Towards Goal

## 2021-08-14 NOTE — PROGRESS NOTES
Problem: Falls - Risk of  Goal: *Absence of Falls  Description: Document Nicolas Henderson Fall Risk and appropriate interventions in the flowsheet. Outcome: Progressing Towards Goal  Note: Fall Risk Interventions:  Mobility Interventions: Assess mobility with egress test, Communicate number of staff needed for ambulation/transfer, OT consult for ADLs, Patient to call before getting OOB, PT Consult for mobility concerns, PT Consult for assist device competence         Medication Interventions: Evaluate medications/consider consulting pharmacy, Patient to call before getting OOB, Teach patient to arise slowly    Elimination Interventions: Call light in reach, Patient to call for help with toileting needs              Problem: Patient Education: Go to Patient Education Activity  Goal: Patient/Family Education  Outcome: Progressing Towards Goal     Problem: Pressure Injury - Risk of  Goal: *Prevention of pressure injury  Description: Document Ez Scale and appropriate interventions in the flowsheet. Outcome: Progressing Towards Goal  Note: Pressure Injury Interventions:  Sensory Interventions: Assess changes in LOC, Chair cushion, Check visual cues for pain, Float heels, Keep linens dry and wrinkle-free, Maintain/enhance activity level, Minimize linen layers, Pressure redistribution bed/mattress (bed type), Turn and reposition approx.  every two hours (pillows and wedges if needed)         Activity Interventions: Chair cushion, Increase time out of bed, Pressure redistribution bed/mattress(bed type), PT/OT evaluation    Mobility Interventions: Chair cushion, Float heels, Pressure redistribution bed/mattress (bed type), PT/OT evaluation    Nutrition Interventions: Document food/fluid/supplement intake, Offer support with meals,snacks and hydration    Friction and Shear Interventions: Foam dressings/transparent film/skin sealants, Lift sheet, Minimize layers                Problem: Patient Education: Go to Patient Education Activity  Goal: Patient/Family Education  Outcome: Progressing Towards Goal

## 2021-08-14 NOTE — PROGRESS NOTES
[facility]  -  Progress Note      Patient: Leonel Gerardo MRN: [id number]  SSN: [id number]    YOB: 1960  Age: 59 y.o. Sex: male      Admit Date: 8/11/2021    Assessment:     Hospital Problems  Date Reviewed: 8/6/2021        Codes Class Noted POA    Severe protein-calorie malnutrition ([address] (Chronic) ICD-10-CM: E43  ICD-9-CM: 262  8/12/2021 Yes        S/P CABG x 3 ICD-10-CM: Z95.1  ICD-9-CM: V45.81  8/11/2021 Unknown        CAD (coronary artery disease) ICD-10-CM: I25.10  ICD-9-CM: 414.00  6/9/2021 Unknown        SHILPI (obstructive sleep apnea) ICD-10-CM: G47.33  ICD-9-CM: 327.23  6/9/2021 Yes        Tobacco dependence ICD-10-CM: F17.200  ICD-9-CM: 305.1  6/9/2021 Yes        Hyperlipidemia ICD-10-CM: E78.5  ICD-9-CM: 272.4  6/9/2021 Yes        Essential hypertension ICD-10-CM: I10  ICD-9-CM: 401.9  9/13/2018 Yes            -CAD, s/p CABG x3 (08/11/21) LIMA to mLAD, SVG to R PLV and SVG to OM. s/p previous LCx stent (2018)   -Submassive PE, DVT, s/p IR guided thrombectomy, IVC filter placement (06/2021) with evidence of RV strain by ECHO. Significant improvement of RV function on repeat ECHO. Will remain on lifelong anticoagulation. On Xarelto as outpatient. -ECHO (07/26/21) EF 55-60%. Dilated RA.   -HTN, on isordil, Lopressor as outpatient. -HLD, on statin   -SHILPI on cpap.   -Tobacco Abuse      Primary Cardiologist: Dr. Clarissa Dhaliwal:     Postop day #3 CABG. Reasonably stable from cardiac standpoint. Poor pain control. Have encouraged incentive spirometry. We will be available as needed over the weekend. We will see again on Monday. Subjective:     No new complaints.      Objective:      Patient Vitals for the past 8 hrs:   Temp Pulse Resp BP SpO2   08/14/21 0822 -- -- -- -- 93 %   08/14/21 0600 -- 66 18 128/71 93 %   08/14/21 0500 -- 66 16 123/72 93 %   08/14/21 0400 98.7 °F (37.1 °C) 65 12 111/61 91 %         Patient Vitals for the past 96 hrs:   Weight 08/14/21 0615 121.4 kg (267 lb 10.2 oz)   08/13/21 0500 113.8 kg (250 lb 12.8 oz)   08/12/21 0442 121.4 kg (267 lb 10.2 oz)   08/11/21 0710 117.9 kg (260 lb)         Intake/Output Summary (Last 24 hours) at 8/14/2021 1148  Last data filed at 8/14/2021 1100  Gross per 24 hour   Intake 603.1 ml   Output 2442 ml   Net -1838.9 ml       Physical Exam:  General:  alert, cooperative, mild distress, appears stated age  Neck:  no JVD  Lungs:  diminished breath sounds diffusely  Heart:  regular rate and rhythm  Abdomen:  no guarding or rigidity  Extremities:  edema mild; wrapped    Data Review:     Labs: Results:       Chemistry Recent Labs     08/14/21  0330 08/13/21  0825 08/13/21  0509 08/12/21  1325 08/12/21  0350 08/11/21  1322 08/11/21  1322   * 121* 121*   < > 93   < > 129*    139 139   < > 143   < > 140   K 3.6 4.0 3.9   < > 3.9   < > 4.1    110 108   < > 115*   < > 106   CO2 27 26 25   < > 24   < > 28   BUN 21* 21* 21*   < > 17   < > 14   CREA 1.16 1.33* 1.63*   < > 1.52*   < > 1.45*   CA 8.3* 8.1* 8.5   < > 7.9*   < > 7.5*   MG  --   --   --   --  2.1  --  2.7*   AGAP 4 3 6   < > 4   < > 6   BUCR 18 16 13   < > 11*   < > 10*    < > = values in this interval not displayed. CBC w/Diff Recent Labs     08/14/21 0330 08/13/21  0509 08/12/21  1325   WBC 11.9 17.2* 18.0*   RBC 3.43* 3.70* 3.58*   HGB 11.5* 12.4* 12.4*   HCT 34.9* 39.2 37.8   * 114* 111*   GRANS 68 75* 90*   LYMPH 22 14* 4*   EOS 7* 3 1      Cardiac Enzymes No results found for: CPK, CK, CKMMB, CKMB, RCK3, CKMBT, CKNDX, CKND1, PALMA, TROPT, TROIQ, SIL, TROPT, TNIPOC, BNP, BNPP   Coagulation Recent Labs     08/14/21  0330 08/13/21  1915 08/12/21  1325 08/11/21  1322   PTP  --   --   --  14.3   INR  --   --   --  1.1   APTT 122.1* 90.2*   < > 28.8    < > = values in this interval not displayed.        Lipid Panel Lab Results   Component Value Date/Time    Cholesterol, total 120 06/10/2021 05:30 AM    HDL Cholesterol 33 (L) 06/10/2021 05:30 AM    LDL, calculated 69.6 06/10/2021 05:30 AM    VLDL, calculated 17.4 06/10/2021 05:30 AM    Triglyceride 87 06/10/2021 05:30 AM    CHOL/HDL Ratio 3.6 06/10/2021 05:30 AM      BNP No results found for: BNP, BNPP, XBNPT   Liver Enzymes No results for input(s): TP, ALB, TBIL, AP in the last 72 hours.     No lab exists for component: SGOT, GPT, DBIL   Digoxin    Thyroid Studies Lab Results   Component Value Date/Time    TSH 0.39 08/06/2021 01:58 PM

## 2021-08-14 NOTE — PROGRESS NOTES
1900-  Report received. 2028-  Norco 2 tab PO.  2145-  Dilaudid 0.5 mg IVP. 0016-  Norco 2 tab PO.  0223-  Dilaudid 0.5 mg IVP. 0330-  Heparin stopped per MD order. 0505-  Norco 2 tab PO. Potassium 40 mEq PO per electrolyte replacement protocol. 3911-  Dilaudid 0.5 mg IVP.  0700-  Bedside and Verbal shift change report given to Jamar Macario RN (oncoming nurse) by HIRAL Ji (offgoing nurse). Report included the following information SBAR, Kardex, OR Summary, Intake/Output, MAR, Accordion, Recent Results, Cardiac Rhythm NSR and Alarm Parameters .

## 2021-08-14 NOTE — PROGRESS NOTES
Problem: Self Care Deficits Care Plan (Adult)  Goal: *Acute Goals and Plan of Care (Insert Text)  Description: Occupational Therapy Goals  Initiated 8/12/2021 within 7 day(s). 1.  Patient will perform upper body dressing with supervision/set-up. 2.  Patient will perform lower body dressing with supervision/set-up using AE prn.  3.  Patient will perform grooming task standing at sink for 4-7 minutes with supervision/set-up, F+ balance. 4.  Patient will perform toilet transfers with supervision/set-up. 5.  Patient will perform all aspects of toileting with supervision/set-up. 6.  Patient will recall precautions during selfcare tasks with minimal verbal cues   7. Patient will utilize energy conservation techniques during functional activities with verbal cues. Prior Level of Function: Patient was independent with self-care and functional mobility PTA. Outcome: Progressing Towards Goal   OCCUPATIONAL THERAPY TREATMENT    Patient: María Elena Kamara (17 y.o. male)  Date: 8/14/2021  Diagnosis: CAD (coronary artery disease) [I25.10] <principal problem not specified>  Procedure(s) (LRB):  CORONARY ARTERY BYPASS GRAFT (CABG) TIMES THREE (N/A) 3 Days Post-Op  Precautions: Fall, Sternal  PLOF: Patient was independent with self-care and functional mobility PTA. Chart, occupational therapy assessment, plan of care, and goals were reviewed. ASSESSMENT:  Pt cleared by RN for OT tx at this time. Pt presented sitting upright in reclining chair, drowsy, on 4L O2NC, and agreeable to work with OT. Pt unable to recall sternal precautions and was re educated. Pt limited this date 2/2 fatigue and pain however reports nausea is improving. He scooting to edge of chair with SBA in prep for functional task utilizing heart hugger. Pt training as well as practiced LB dressing/bathing utilizing AD (Reacher and LH sponge) in order to assess independence and performance with during task, pt will benefit from sock aide.  Instructed pt on EC/WS and was issued visual handout for continued review for increased carryover. Pt was left sitting upright in chair with all needs left within reach. RN made aware of pt's participation and position. Progression toward goals:  []          Improving appropriately and progressing toward goals  [x]          Improving slowly and progressing toward goals  []          Not making progress toward goals and plan of care will be adjusted     PLAN:  Patient continues to benefit from skilled intervention to address the above impairments. Continue treatment per established plan of care. Discharge Recommendations:   Home Health with family assist prn  Further Equipment Recommendations for Discharge:  Shower chair      SUBJECTIVE:   Patient stated  I am doing a little better. \"     OBJECTIVE DATA SUMMARY:   Cognitive/Behavioral Status:  Neurologic State: Drowsy  Orientation Level: Oriented X4  Cognition: Follows commands  Safety/Judgement: Fall prevention    Functional Mobility and Transfers for ADLs:   Bed Mobility:     Scooting: Stand-by assistance     Balance:  Sitting: Intact    ADL Intervention:       Lower Body Bathing  Lower Body : Supervision (simulated)  Position Performed: Seated in chair  Cues: Verbal cues provided;Visual cues provided  Adaptive Equipment: Long handled sponge         Lower Body Dressing Assistance  Socks: Minimum assistance  Position Performed: Seated in chair  Cues: Physical assistance;Verbal cues provided;Visual cues provided  Adaptive Equipment Used: Reacher    Pain:  Pain level pre-treatment: 8/10 chest  Pain level post-treatment: 8/10  Pain Intervention(s): Medication (see MAR); Rest,  Repositioning   Response to intervention: Nurse notified, See doc flow    Activity Tolerance:    Fair   Please refer to the flowsheet for vital signs taken during this treatment.   After treatment:   [x]  Patient left in no apparent distress sitting up in chair  []  Patient left in no apparent distress in bed  [x]  Call bell left within reach  [x]  Nursing notified  []  Caregiver present  []  Bed alarm activated    COMMUNICATION/EDUCATION:   [x] Role of Occupational Therapy in the acute care setting  [x] Home safety education was provided and the patient/caregiver indicated understanding. [x] Patient/family have participated as able in working towards goals and plan of care. [x] Patient/family agree to work toward stated goals and plan of care. [] Patient understands intent and goals of therapy, but is neutral about his/her participation. [] Patient is unable to participate in goal setting and plan of care.       Thank you for this referral.  CORKY Poe  Time Calculation: 24 mins

## 2021-08-15 LAB
APTT PPP: 88.2 SEC (ref 23–36.4)
GLUCOSE BLD STRIP.AUTO-MCNC: 104 MG/DL (ref 70–110)
GLUCOSE BLD STRIP.AUTO-MCNC: 128 MG/DL (ref 70–110)
GLUCOSE BLD STRIP.AUTO-MCNC: 88 MG/DL (ref 70–110)
GLUCOSE BLD STRIP.AUTO-MCNC: 97 MG/DL (ref 70–110)

## 2021-08-15 PROCEDURE — 74011000250 HC RX REV CODE- 250: Performed by: PHYSICIAN ASSISTANT

## 2021-08-15 PROCEDURE — APPNB45 APP NON BILLABLE 31-45 MINUTES: Performed by: PHYSICIAN ASSISTANT

## 2021-08-15 PROCEDURE — 74011250637 HC RX REV CODE- 250/637: Performed by: PHYSICIAN ASSISTANT

## 2021-08-15 PROCEDURE — 82962 GLUCOSE BLOOD TEST: CPT

## 2021-08-15 PROCEDURE — 97116 GAIT TRAINING THERAPY: CPT

## 2021-08-15 PROCEDURE — 94762 N-INVAS EAR/PLS OXIMTRY CONT: CPT

## 2021-08-15 PROCEDURE — 99024 POSTOP FOLLOW-UP VISIT: CPT | Performed by: PHYSICIAN ASSISTANT

## 2021-08-15 PROCEDURE — 94668 MNPJ CHEST WALL SBSQ: CPT

## 2021-08-15 PROCEDURE — 74011636637 HC RX REV CODE- 636/637: Performed by: PHYSICIAN ASSISTANT

## 2021-08-15 PROCEDURE — 74011250637 HC RX REV CODE- 250/637: Performed by: SPECIALIST

## 2021-08-15 PROCEDURE — 94640 AIRWAY INHALATION TREATMENT: CPT

## 2021-08-15 PROCEDURE — 94667 MNPJ CHEST WALL 1ST: CPT

## 2021-08-15 PROCEDURE — 65620000000 HC RM CCU GENERAL

## 2021-08-15 PROCEDURE — 74011000250 HC RX REV CODE- 250: Performed by: SPECIALIST

## 2021-08-15 RX ORDER — FUROSEMIDE 20 MG/1
20 TABLET ORAL DAILY
Status: DISCONTINUED | OUTPATIENT
Start: 2021-08-15 | End: 2021-08-16 | Stop reason: HOSPADM

## 2021-08-15 RX ORDER — POTASSIUM CHLORIDE 20 MEQ/1
20 TABLET, EXTENDED RELEASE ORAL DAILY
Status: DISCONTINUED | OUTPATIENT
Start: 2021-08-15 | End: 2021-08-16 | Stop reason: HOSPADM

## 2021-08-15 RX ADMIN — INSULIN GLARGINE 20 UNITS: 100 INJECTION, SOLUTION SUBCUTANEOUS at 08:31

## 2021-08-15 RX ADMIN — FUROSEMIDE 20 MG: 20 TABLET ORAL at 13:26

## 2021-08-15 RX ADMIN — DOCUSATE SODIUM 100 MG: 100 CAPSULE, LIQUID FILLED ORAL at 17:08

## 2021-08-15 RX ADMIN — HYDROCODONE BITARTRATE AND ACETAMINOPHEN 2 TABLET: 5; 325 TABLET ORAL at 21:02

## 2021-08-15 RX ADMIN — GUAIFENESIN 1200 MG: 600 TABLET, EXTENDED RELEASE ORAL at 21:02

## 2021-08-15 RX ADMIN — AMIODARONE HYDROCHLORIDE 200 MG: 200 TABLET ORAL at 21:02

## 2021-08-15 RX ADMIN — HYDROCODONE BITARTRATE AND ACETAMINOPHEN 2 TABLET: 5; 325 TABLET ORAL at 00:31

## 2021-08-15 RX ADMIN — IPRATROPIUM BROMIDE 0.5 MG: 0.5 SOLUTION RESPIRATORY (INHALATION) at 00:53

## 2021-08-15 RX ADMIN — CHLORHEXIDINE GLUCONATE 0.12% ORAL RINSE 10 ML: 1.2 LIQUID ORAL at 17:08

## 2021-08-15 RX ADMIN — AMIODARONE HYDROCHLORIDE 200 MG: 200 TABLET ORAL at 08:31

## 2021-08-15 RX ADMIN — GUAIFENESIN 1200 MG: 600 TABLET, EXTENDED RELEASE ORAL at 08:31

## 2021-08-15 RX ADMIN — HYDROCODONE BITARTRATE AND ACETAMINOPHEN 2 TABLET: 5; 325 TABLET ORAL at 13:26

## 2021-08-15 RX ADMIN — FAMOTIDINE 20 MG: 20 TABLET ORAL at 08:31

## 2021-08-15 RX ADMIN — Medication 81 MG: at 08:31

## 2021-08-15 RX ADMIN — Medication 10 ML: at 21:02

## 2021-08-15 RX ADMIN — Medication 10 ML: at 13:27

## 2021-08-15 RX ADMIN — Medication 10 ML: at 08:32

## 2021-08-15 RX ADMIN — ARFORMOTEROL TARTRATE 15 MCG: 15 SOLUTION RESPIRATORY (INHALATION) at 08:15

## 2021-08-15 RX ADMIN — IPRATROPIUM BROMIDE 0.5 MG: 0.5 SOLUTION RESPIRATORY (INHALATION) at 08:15

## 2021-08-15 RX ADMIN — COLCHICINE 0.6 MG: 0.6 CAPSULE ORAL at 08:31

## 2021-08-15 RX ADMIN — POLYETHYLENE GLYCOL 3350 17 G: 17 POWDER, FOR SOLUTION ORAL at 08:29

## 2021-08-15 RX ADMIN — POTASSIUM CHLORIDE 20 MEQ: 1500 TABLET, EXTENDED RELEASE ORAL at 13:26

## 2021-08-15 RX ADMIN — METOPROLOL TARTRATE 12.5 MG: 25 TABLET ORAL at 08:31

## 2021-08-15 RX ADMIN — HYDROCODONE BITARTRATE AND ACETAMINOPHEN 2 TABLET: 5; 325 TABLET ORAL at 17:08

## 2021-08-15 RX ADMIN — MUPIROCIN: 20 OINTMENT TOPICAL at 17:08

## 2021-08-15 RX ADMIN — Medication 100 MG: at 08:31

## 2021-08-15 RX ADMIN — METOPROLOL TARTRATE 12.5 MG: 25 TABLET ORAL at 21:02

## 2021-08-15 RX ADMIN — THERA TABS 1 TABLET: TAB at 08:31

## 2021-08-15 RX ADMIN — RIVAROXABAN 20 MG: 20 TABLET, FILM COATED ORAL at 08:30

## 2021-08-15 RX ADMIN — ARFORMOTEROL TARTRATE 15 MCG: 15 SOLUTION RESPIRATORY (INHALATION) at 20:09

## 2021-08-15 RX ADMIN — AMIODARONE HYDROCHLORIDE 200 MG: 200 TABLET ORAL at 17:08

## 2021-08-15 RX ADMIN — IPRATROPIUM BROMIDE 0.5 MG: 0.5 SOLUTION RESPIRATORY (INHALATION) at 15:32

## 2021-08-15 RX ADMIN — DOCUSATE SODIUM 100 MG: 100 CAPSULE, LIQUID FILLED ORAL at 08:30

## 2021-08-15 RX ADMIN — BISACODYL 10 MG: 5 TABLET, COATED ORAL at 08:30

## 2021-08-15 RX ADMIN — FAMOTIDINE 20 MG: 20 TABLET ORAL at 17:08

## 2021-08-15 RX ADMIN — CHLORHEXIDINE GLUCONATE 0.12% ORAL RINSE 10 ML: 1.2 LIQUID ORAL at 08:29

## 2021-08-15 RX ADMIN — ATORVASTATIN CALCIUM 40 MG: 40 TABLET, FILM COATED ORAL at 21:04

## 2021-08-15 RX ADMIN — MUPIROCIN: 20 OINTMENT TOPICAL at 08:31

## 2021-08-15 NOTE — PROGRESS NOTES
Problem: Falls - Risk of  Goal: *Absence of Falls  Description: Document Jennifer Canales Fall Risk and appropriate interventions in the flowsheet. Outcome: Progressing Towards Goal  Note: Fall Risk Interventions:  Mobility Interventions: Bed/chair exit alarm, Communicate number of staff needed for ambulation/transfer, Patient to call before getting OOB         Medication Interventions: Evaluate medications/consider consulting pharmacy, Patient to call before getting OOB    Elimination Interventions: Call light in reach, Patient to call for help with toileting needs, Urinal in reach, Toileting schedule/hourly rounds              Problem: Patient Education: Go to Patient Education Activity  Goal: Patient/Family Education  Outcome: Progressing Towards Goal     Problem: Ventilator Management  Goal: *Adequate oxygenation and ventilation  Outcome: Progressing Towards Goal  Goal: *Patient maintains clear airway/free of aspiration  Outcome: Progressing Towards Goal  Goal: *Absence of infection signs and symptoms  Outcome: Progressing Towards Goal  Goal: *Normal spontaneous ventilation  Outcome: Progressing Towards Goal     Problem: Patient Education: Go to Patient Education Activity  Goal: Patient/Family Education  Outcome: Progressing Towards Goal     Problem: Pressure Injury - Risk of  Goal: *Prevention of pressure injury  Description: Document Ez Scale and appropriate interventions in the flowsheet. Outcome: Progressing Towards Goal  Note: Pressure Injury Interventions:  Sensory Interventions: Assess changes in LOC, Keep linens dry and wrinkle-free, Turn and reposition approx. every two hours (pillows and wedges if needed)         Activity Interventions: PT/OT evaluation, Increase time out of bed, Pressure redistribution bed/mattress(bed type)    Mobility Interventions: Turn and reposition approx.  every two hours(pillow and wedges), Pressure redistribution bed/mattress (bed type)    Nutrition Interventions: Document food/fluid/supplement intake    Friction and Shear Interventions: Foam dressings/transparent film/skin sealants                Problem: Patient Education: Go to Patient Education Activity  Goal: Patient/Family Education  Outcome: Progressing Towards Goal     Problem: Patient Education: Go to Patient Education Activity  Goal: Patient/Family Education  Outcome: Progressing Towards Goal     Problem: Patient Education: Go to Patient Education Activity  Goal: Patient/Family Education  Outcome: Progressing Towards Goal     Problem: Nutrition Deficit  Goal: *Optimize nutritional status  Outcome: Progressing Towards Goal

## 2021-08-15 NOTE — PROGRESS NOTES
CARDIAC SURGERY PROGRESS NOTE    8/15/2021  11:54 AM     Post Operative Day # 4     Chart, images and labs reviewed. Discussed with available staff. Interval History/Events of Past 24 hours:   Walks in bender without supplemental oxygen. Much better pain control. Tolerating a diet and passing flatus. No further nausea. He reports a new chest clicking. Subjective:  Patient seen and examined on rounds today. Clicking of his lower chest with twisting movement  Pain level: Controlled  Ambulating: well   Sleeping: well  Eating:  well  Sternal pain: Yes    BM: No    Objective:  Vital signs:   Visit Vitals  /67   Pulse 80   Temp 98.6 °F (37 °C)   Resp 18   Ht 5' 10.5\" (1.791 m)   Wt 121 kg (266 lb 12.1 oz)   SpO2 91%   BMI 37.73 kg/m²     Temp (24hrs), Av.4 °F (36.9 °C), Min:98 °F (36.7 °C), Max:98.9 °F (37.2 °C)    Admission Weight: Last Weight   Weight: 117.9 kg (260 lb) Weight: 121 kg (266 lb 12.1 oz)     Telemetry: SR    Physical Examination:     General:  Alert, oriented   Lungs: Fine rales sounds at bases otherwise clear to ascultation without wheezes or rhonchi. Chest:   Midline incision healing well. Unable to elicit sternal click with pressure or cough. Heart: regular rate and rhythm, no murmur. Abdomen: Obese. Soft and non-tender without masses. Bowel sounds present. Extremities: Warm and well perfused. Edema moderate. Incisions: clean, dry, no drainage. Neuro: No deficit. Beta-blocker: Yes  ASA: Yes   Statin: Yes  ACE-I: No  Diuretic: No     DVT Prophylaxis:   pneumatic compression boots: Yes  Compression stockings:  Yes  Enoxaparin:  No, on Xarelto    Chest tubes:  not present.   Air Leak:  NA    Pacing wires:  not present    DME needs: Rolling walker          Labs:  Lab Results   Component Value Date/Time    WBC 11.9 2021 03:30 AM    HCT 34.9 (L) 2021 03:30 AM     (L) 2021 03:30 AM      Lab Results   Component Value Date/Time     2021 03:30 AM    K 3.6 08/14/2021 03:30 AM     08/14/2021 03:30 AM    CO2 27 08/14/2021 03:30 AM     (H) 08/14/2021 03:30 AM    BUN 21 (H) 08/14/2021 03:30 AM    CREA 1.16 08/14/2021 03:30 AM    CREA 1.33 (H) 08/13/2021 08:25 AM    CREA 1.63 (H) 08/13/2021 05:09 AM     Assessment:  S/P  CABG x 3  Respiratory parameters stable  Cardiac status stable     Plans:  1. Continue aspirin, beta-blocker, statin and amiodarone prophylaxis. 2.  Continue Xarelto for PE. 3.  Add Lasix and potassium. 4.  Cathartics for BM. 5.  Remove Cordis  6. Likely home tomorrow    Heart Hugger use encouraged to mitigate pain and will also assist with deep breathing and incentive spirometry goals. Possible discharge 1 day. Marin Wade PA-C    PLEASE NOTE:  This document has been produced using voice recognition software. Unrecognized errors in transcription may be present.

## 2021-08-15 NOTE — PROGRESS NOTES
Problem: Mobility Impaired (Adult and Pediatric)  Goal: *Acute Goals and Plan of Care (Insert Text)  Description: Physical Therapy Goals  Initiated 8/12/2021 and to be accomplished within 7 day(s)  1. Patient will move from supine to sit and sit to supine  in bed with modified independence. 2.  Patient will transfer from bed to chair and chair to bed with modified independence using the least restrictive device. 3.  Patient will perform sit to stand with modified independence. 4.  Patient will ambulate with modified independence for 200 feet with the least restrictive device. 5.  Patient will ascend/descend 3 stairs with handrail(s) with supervision/set-up. PLOF: Patient was independent with functional mobility. He lives with spouse in single story home. Outcome: Progressing Towards Goal     PHYSICAL THERAPY TREATMENT    Patient: Chucky Linares (89 y.o. male)  Date: 8/15/2021  Diagnosis: CAD (coronary artery disease) [I25.10] <principal problem not specified>  Procedure(s) (LRB):  CORONARY ARTERY BYPASS GRAFT (CABG) TIMES THREE (N/A) 4 Days Post-Op  Precautions: Fall, Sternal  PLOF: see above    ASSESSMENT:  Pt received in bed in NAD, on RA and agreeable, able to recall sternal precautions. Pt stands with SBA however requires verbal cues for use of heart hugger. Pt first voids at toilet with good standing balance no need for UE support. Pt then progresses to ambulation of approx 125 ft with SBA and rollator including stair training x 2 steps with BHR and CGA for safety. Upon returning to room pt with moderate SOB, SpO2 noted to be 90%, recovering quicking to 93% on RA. Pt educated on PLB and left with LEs elevated and all needs met. [facility] and family support upon discharge.    Progression toward goals:   [x]      Improving appropriately and progressing toward goals  []      Improving slowly and progressing toward goals  []      Not making progress toward goals and plan of care will be adjusted     PLAN:  Patient continues to benefit from skilled intervention to address the above impairments. Continue treatment per established plan of care. Discharge Recommendations:  Home Health with family assist as needed   Further Equipment Recommendations for Discharge:  rolling walker     SUBJECTIVE:   Patient stated I am ready to bust out of here.     OBJECTIVE DATA SUMMARY:   Critical Behavior:  Neurologic State: Alert  Orientation Level: Oriented X4  Cognition: Follows commands  Safety/Judgement: Fall prevention  Functional Mobility Training:  Bed Mobility:  NT pt up in chair    Transfers:  Sit to Stand: Stand-by assistance  Stand to Sit: Stand-by assistance       Balance:  Sitting: Intact  Standing: Impaired; With support  Standing - Static: Good  Standing - Dynamic : Fair        Ambulation/Gait Training:  Distance (ft): 125 Feet (ft)  Assistive Device: Walker, rolling  Ambulation - Level of Assistance: Stand-by assistance        Gait Abnormalities: Decreased step clearance              Speed/Coreen: Pace decreased (<100 feet/min)  Step Length: Right shortened;Left shortened    Stairs:  Number of Stairs Trained: 2  Stairs - Level of Assistance: Contact guard assistance  Rail Use: Both    Pain:  Pain level pre-treatment: 0/10  Pain level post-treatment: mild however not quantified /10   Pain Intervention(s): Medication (see MAR); Rest, Ice, Repositioning\"   \"    Response to intervention: Nurse notified    Activity Tolerance:   Good    Please refer to the flowsheet for vital signs taken during this treatment. After treatment:   [x] Patient left in no apparent distress sitting up in chair  [] Patient left in no apparent distress in bed  [x] Call bell left within reach  [x] Nursing notified  [] Caregiver present  [] Bed alarm activated  [] SCDs applied      COMMUNICATION/EDUCATION:   [x]         Role of Physical Therapy in the acute care setting.   [x]         Fall prevention education was provided and the patient/caregiver indicated understanding. [x]         Patient/family have participated as able in working toward goals and plan of care. [x]         Patient/family agree to work toward stated goals and plan of care. []         Patient understands intent and goals of therapy, but is neutral about his/her participation.   []         Patient is unable to participate in stated goals/plan of care: ongoing with therapy staff.  []         Other:        Fannie Adame   Time Calculation: 23 mins

## 2021-08-16 VITALS
OXYGEN SATURATION: 96 % | DIASTOLIC BLOOD PRESSURE: 68 MMHG | SYSTOLIC BLOOD PRESSURE: 115 MMHG | HEIGHT: 71 IN | BODY MASS INDEX: 37.44 KG/M2 | RESPIRATION RATE: 16 BRPM | HEART RATE: 72 BPM | WEIGHT: 267.42 LBS | TEMPERATURE: 99 F

## 2021-08-16 LAB
ANION GAP SERPL CALC-SCNC: 4 MMOL/L (ref 3–18)
BUN SERPL-MCNC: 18 MG/DL (ref 7–18)
BUN/CREAT SERPL: 17 (ref 12–20)
CALCIUM SERPL-MCNC: 8.6 MG/DL (ref 8.5–10.1)
CHLORIDE SERPL-SCNC: 108 MMOL/L (ref 100–111)
CO2 SERPL-SCNC: 25 MMOL/L (ref 21–32)
CREAT SERPL-MCNC: 1.03 MG/DL (ref 0.6–1.3)
GLUCOSE BLD STRIP.AUTO-MCNC: 108 MG/DL (ref 70–110)
GLUCOSE BLD STRIP.AUTO-MCNC: 110 MG/DL (ref 70–110)
GLUCOSE SERPL-MCNC: 83 MG/DL (ref 74–99)
POTASSIUM SERPL-SCNC: 3.4 MMOL/L (ref 3.5–5.5)
SODIUM SERPL-SCNC: 137 MMOL/L (ref 136–145)

## 2021-08-16 PROCEDURE — APPNB45 APP NON BILLABLE 31-45 MINUTES: Performed by: PHYSICIAN ASSISTANT

## 2021-08-16 PROCEDURE — 74011000250 HC RX REV CODE- 250: Performed by: PHYSICIAN ASSISTANT

## 2021-08-16 PROCEDURE — 94668 MNPJ CHEST WALL SBSQ: CPT

## 2021-08-16 PROCEDURE — 74011250637 HC RX REV CODE- 250/637: Performed by: SPECIALIST

## 2021-08-16 PROCEDURE — 82962 GLUCOSE BLOOD TEST: CPT

## 2021-08-16 PROCEDURE — 97116 GAIT TRAINING THERAPY: CPT

## 2021-08-16 PROCEDURE — 74011250637 HC RX REV CODE- 250/637: Performed by: PHYSICIAN ASSISTANT

## 2021-08-16 PROCEDURE — 74011000250 HC RX REV CODE- 250: Performed by: SPECIALIST

## 2021-08-16 PROCEDURE — 99024 POSTOP FOLLOW-UP VISIT: CPT | Performed by: PHYSICIAN ASSISTANT

## 2021-08-16 PROCEDURE — 97535 SELF CARE MNGMENT TRAINING: CPT

## 2021-08-16 PROCEDURE — 94640 AIRWAY INHALATION TREATMENT: CPT

## 2021-08-16 PROCEDURE — 36415 COLL VENOUS BLD VENIPUNCTURE: CPT

## 2021-08-16 PROCEDURE — 99232 SBSQ HOSP IP/OBS MODERATE 35: CPT | Performed by: INTERNAL MEDICINE

## 2021-08-16 PROCEDURE — 74011636637 HC RX REV CODE- 636/637: Performed by: PHYSICIAN ASSISTANT

## 2021-08-16 PROCEDURE — 80048 BASIC METABOLIC PNL TOTAL CA: CPT

## 2021-08-16 RX ORDER — POLYETHYLENE GLYCOL 3350 17 G/17G
17 POWDER, FOR SOLUTION ORAL
Qty: 7 PACKET | Refills: 0 | Status: SHIPPED | OUTPATIENT
Start: 2021-08-16 | End: 2021-08-23

## 2021-08-16 RX ORDER — HYDROCODONE BITARTRATE AND ACETAMINOPHEN 5; 325 MG/1; MG/1
1-2 TABLET ORAL
Qty: 28 TABLET | Refills: 0 | Status: SHIPPED | OUTPATIENT
Start: 2021-08-16 | End: 2021-08-23

## 2021-08-16 RX ORDER — FUROSEMIDE 40 MG/1
40 TABLET ORAL DAILY
Qty: 30 TABLET | Refills: 0 | Status: SHIPPED | OUTPATIENT
Start: 2021-08-16 | End: 2021-09-03 | Stop reason: SDUPTHER

## 2021-08-16 RX ORDER — METOPROLOL TARTRATE 25 MG/1
12.5 TABLET, FILM COATED ORAL EVERY 12 HOURS
Qty: 30 TABLET | Refills: 2 | Status: SHIPPED | OUTPATIENT
Start: 2021-08-16 | End: 2021-11-19 | Stop reason: SDUPTHER

## 2021-08-16 RX ORDER — MAGNESIUM CITRATE
296 SOLUTION, ORAL ORAL
Status: COMPLETED | OUTPATIENT
Start: 2021-08-16 | End: 2021-08-16

## 2021-08-16 RX ORDER — POTASSIUM CHLORIDE 20 MEQ/1
20 TABLET, EXTENDED RELEASE ORAL DAILY
Qty: 30 TABLET | Refills: 0 | Status: SHIPPED | OUTPATIENT
Start: 2021-08-17 | End: 2021-08-24

## 2021-08-16 RX ADMIN — IPRATROPIUM BROMIDE 0.5 MG: 0.5 SOLUTION RESPIRATORY (INHALATION) at 00:08

## 2021-08-16 RX ADMIN — Medication 10 ML: at 05:41

## 2021-08-16 RX ADMIN — Medication 100 MG: at 08:23

## 2021-08-16 RX ADMIN — MAGNESIUM CITRATE 296 ML: 1.75 LIQUID ORAL at 10:13

## 2021-08-16 RX ADMIN — POLYETHYLENE GLYCOL 3350 17 G: 17 POWDER, FOR SOLUTION ORAL at 08:20

## 2021-08-16 RX ADMIN — POTASSIUM CHLORIDE 20 MEQ: 1500 TABLET, EXTENDED RELEASE ORAL at 08:22

## 2021-08-16 RX ADMIN — DOCUSATE SODIUM 100 MG: 100 CAPSULE, LIQUID FILLED ORAL at 08:21

## 2021-08-16 RX ADMIN — THERA TABS 1 TABLET: TAB at 08:22

## 2021-08-16 RX ADMIN — METOPROLOL TARTRATE 12.5 MG: 25 TABLET ORAL at 08:21

## 2021-08-16 RX ADMIN — GUAIFENESIN 1200 MG: 600 TABLET, EXTENDED RELEASE ORAL at 08:23

## 2021-08-16 RX ADMIN — COLCHICINE 0.6 MG: 0.6 CAPSULE ORAL at 08:22

## 2021-08-16 RX ADMIN — FUROSEMIDE 20 MG: 20 TABLET ORAL at 08:22

## 2021-08-16 RX ADMIN — IPRATROPIUM BROMIDE 0.5 MG: 0.5 SOLUTION RESPIRATORY (INHALATION) at 07:21

## 2021-08-16 RX ADMIN — INSULIN GLARGINE 20 UNITS: 100 INJECTION, SOLUTION SUBCUTANEOUS at 08:24

## 2021-08-16 RX ADMIN — HYDROCODONE BITARTRATE AND ACETAMINOPHEN 2 TABLET: 5; 325 TABLET ORAL at 06:00

## 2021-08-16 RX ADMIN — Medication 81 MG: at 08:24

## 2021-08-16 RX ADMIN — FAMOTIDINE 20 MG: 20 TABLET ORAL at 08:22

## 2021-08-16 RX ADMIN — CHLORHEXIDINE GLUCONATE 0.12% ORAL RINSE 10 ML: 1.2 LIQUID ORAL at 08:24

## 2021-08-16 RX ADMIN — MUPIROCIN: 20 OINTMENT TOPICAL at 08:24

## 2021-08-16 RX ADMIN — AMIODARONE HYDROCHLORIDE 200 MG: 200 TABLET ORAL at 08:23

## 2021-08-16 RX ADMIN — RIVAROXABAN 20 MG: 20 TABLET, FILM COATED ORAL at 08:22

## 2021-08-16 RX ADMIN — ARFORMOTEROL TARTRATE 15 MCG: 15 SOLUTION RESPIRATORY (INHALATION) at 07:21

## 2021-08-16 RX ADMIN — BISACODYL 10 MG: 5 TABLET, COATED ORAL at 08:23

## 2021-08-16 NOTE — PROGRESS NOTES
[facility]  -  Progress Note      Patient: Maru Sarmiento MRN: [id number]  SSN: [id number]    YOB: 1960  Age: 59 y.o. Sex: male      Admit Date: 8/11/2021    Assessment:     Hospital Problems  Date Reviewed: 8/6/2021        Codes Class Noted POA    Severe protein-calorie malnutrition ([address] (Chronic) ICD-10-CM: E43  ICD-9-CM: 262  8/12/2021 Yes        S/P CABG x 3 ICD-10-CM: Z95.1  ICD-9-CM: V45.81  8/11/2021 Unknown        CAD (coronary artery disease) ICD-10-CM: I25.10  ICD-9-CM: 414.00  6/9/2021 Unknown        SHILPI (obstructive sleep apnea) ICD-10-CM: G47.33  ICD-9-CM: 327.23  6/9/2021 Yes        Tobacco dependence ICD-10-CM: F17.200  ICD-9-CM: 305.1  6/9/2021 Yes        Hyperlipidemia ICD-10-CM: E78.5  ICD-9-CM: 272.4  6/9/2021 Yes        Essential hypertension ICD-10-CM: I10  ICD-9-CM: 401.9  9/13/2018 Yes            -CAD, s/p CABG x3 (08/11/21) LIMA to mLAD, SVG to R PLV and SVG to OM. s/p previous LCx stent (2018)   -Submassive PE, DVT, s/p IR guided thrombectomy, IVC filter placement (06/2021) with evidence of RV strain by ECHO. Significant improvement of RV function on repeat ECHO. Will remain on lifelong anticoagulation. On Xarelto as outpatient. -ECHO (07/26/21) EF 55-60%. Dilated RA.   -HTN, on isordil, Lopressor as outpatient. -HLD, on statin   -SHILPI on cpap.   -Tobacco Abuse      Primary Cardiologist: Dr. Alfred Cervantes:     Patient able to ambulate. He is scheduled to be discharged to home today. Continue cardiac medication regimen. He needs to discontinue tobacco use completely. We'll be available as needed. Thank you. Subjective:     No new complaints.      Objective:      Patient Vitals for the past 8 hrs:   Temp Pulse Resp BP SpO2   08/16/21 1200 99 °F (37.2 °C) 75 17 116/72 95 %   08/16/21 1100 -- 80 20 (!) 102/90 94 %   08/16/21 1040 -- 81 24 123/81 95 %   08/16/21 1000 -- 79 20 -- 94 %   08/16/21 0814 98.7 °F (37.1 °C) 75 21 114/69 95 % 08/16/21 0800 -- 74 15 92/66 94 %   08/16/21 0721 -- -- -- -- 97 %   08/16/21 0700 -- 74 19 -- 94 %   08/16/21 0623 -- 75 19 (!) 144/71 --   08/16/21 0600 -- 75 16 (!) 144/71 95 %         Patient Vitals for the past 96 hrs:   Weight   08/16/21 0600 121.3 kg (267 lb 6.7 oz)   08/15/21 0500 121 kg (266 lb 12.1 oz)   08/14/21 0615 121.4 kg (267 lb 10.2 oz)   08/13/21 0500 113.8 kg (250 lb 12.8 oz)         Intake/Output Summary (Last 24 hours) at 8/16/2021 1335  Last data filed at 8/16/2021 0600  Gross per 24 hour   Intake 720 ml   Output 1500 ml   Net -780 ml       Physical Exam:  General:  alert, cooperative, no distress, appears stated age  Neck:  no JVD  Lungs:  clear to auscultation bilaterally, anteriorly with diminished breath sounds posteriorly at the bases  Heart:  regular rate and rhythm  Abdomen:  no guarding or rigidity  Extremities:  no edema    Data Review:     Labs: Results:       Chemistry Recent Labs     08/16/21  0550 08/14/21  0330   GLU 83 108*    138   K 3.4* 3.6    107   CO2 25 27   BUN 18 21*   CREA 1.03 1.16   CA 8.6 8.3*   AGAP 4 4   BUCR 17 18      CBC w/Diff Recent Labs     08/14/21  0330   WBC 11.9   RBC 3.43*   HGB 11.5*   HCT 34.9*   *   GRANS 68   LYMPH 22   EOS 7*      Cardiac Enzymes No results found for: CPK, CK, CKMMB, CKMB, RCK3, CKMBT, CKNDX, CKND1, PALMA, TROPT, TROIQ, SIL, TROPT, TNIPOC, BNP, BNPP   Coagulation Recent Labs     08/14/21  2200 08/14/21  0330   APTT 88.2* 122.1*       Lipid Panel Lab Results   Component Value Date/Time    Cholesterol, total 120 06/10/2021 05:30 AM    HDL Cholesterol 33 (L) 06/10/2021 05:30 AM    LDL, calculated 69.6 06/10/2021 05:30 AM    VLDL, calculated 17.4 06/10/2021 05:30 AM    Triglyceride 87 06/10/2021 05:30 AM    CHOL/HDL Ratio 3.6 06/10/2021 05:30 AM      BNP No results found for: BNP, BNPP, XBNPT   Liver Enzymes No results for input(s): TP, ALB, TBIL, AP in the last 72 hours.     No lab exists for component: SGOT, GPT, DBIL   Digoxin    Thyroid Studies Lab Results   Component Value Date/Time    TSH 0.39 08/06/2021 01:58 PM

## 2021-08-16 NOTE — PROGRESS NOTES
Pt watched discharge video and all questions answered. Pt given all paper discharge instructions as well as filled prescriptions from outpatient pharmacy. PIV removed and patient escorted down to the front entrance in a wheelchair. Pt also had 4 large bowel movements prior to discharge.

## 2021-08-16 NOTE — PROGRESS NOTES
Problem: Falls - Risk of  Goal: *Absence of Falls  Description: Document Leslie Pillow Fall Risk and appropriate interventions in the flowsheet. Outcome: Progressing Towards Goal  Note: Fall Risk Interventions:  Mobility Interventions: Assess mobility with egress test, Communicate number of staff needed for ambulation/transfer, Patient to call before getting OOB, PT Consult for mobility concerns, PT Consult for assist device competence, Strengthening exercises (ROM-active/passive)         Medication Interventions: Evaluate medications/consider consulting pharmacy, Teach patient to arise slowly, Patient to call before getting OOB    Elimination Interventions: Call light in reach, Elevated toilet seat, Urinal in reach, Toilet paper/wipes in reach, Toileting schedule/hourly rounds              Problem: Patient Education: Go to Patient Education Activity  Goal: Patient/Family Education  Outcome: Progressing Towards Goal     Problem: Pressure Injury - Risk of  Goal: *Prevention of pressure injury  Description: Document Ez Scale and appropriate interventions in the flowsheet.   Outcome: Progressing Towards Goal  Note: Pressure Injury Interventions:  Sensory Interventions: Assess changes in LOC, Chair cushion, Check visual cues for pain, Float heels, Keep linens dry and wrinkle-free, Maintain/enhance activity level, Minimize linen layers, Pressure redistribution bed/mattress (bed type)         Activity Interventions: Increase time out of bed, Pressure redistribution bed/mattress(bed type), PT/OT evaluation, Chair cushion    Mobility Interventions: Chair cushion, Float heels, Pressure redistribution bed/mattress (bed type), PT/OT evaluation    Nutrition Interventions: Document food/fluid/supplement intake, Offer support with meals,snacks and hydration    Friction and Shear Interventions: Feet elevated on foot rest, Lift sheet, Lift team/patient mobility team, Minimize layers                Problem: Patient Education: Go to Patient Education Activity  Goal: Patient/Family Education  Outcome: Progressing Towards Goal

## 2021-08-16 NOTE — PROGRESS NOTES
Discharge order noted for today. Pt has been accepted to [facility] agency. Spoke with patient's wife Katy Greenberg and she is agreeable to the transition plan today. Transport has been arranged through patient's brotherr-in-law. Patient's home health  orders have been forwarded to [facility]  agency via [place].   Discharge information has been documented on the AVS.         Rui Ronquillo RN  Case Management [phone]

## 2021-08-16 NOTE — PROGRESS NOTES
Problem: Falls - Risk of  Goal: *Absence of Falls  Description: Document Mayra Pro Fall Risk and appropriate interventions in the flowsheet. Outcome: Progressing Towards Goal  Note: Fall Risk Interventions:  Mobility Interventions: Assess mobility with egress test, Communicate number of staff needed for ambulation/transfer, Utilize walker, cane, or other assistive device, PT Consult for mobility concerns, PT Consult for assist device competence         Medication Interventions: Assess postural VS orthostatic hypotension, Evaluate medications/consider consulting pharmacy    Elimination Interventions: Call light in reach, Urinal in reach, Toilet paper/wipes in reach, Stay With Me (per policy), Patient to call for help with toileting needs              Problem: Patient Education: Go to Patient Education Activity  Goal: Patient/Family Education  Outcome: Progressing Towards Goal     Problem: Pressure Injury - Risk of  Goal: *Prevention of pressure injury  Description: Document Ez Scale and appropriate interventions in the flowsheet.   Outcome: Progressing Towards Goal  Note: Pressure Injury Interventions:  Sensory Interventions: Assess changes in LOC, Avoid rigorous massage over bony prominences         Activity Interventions: Assess need for specialty bed, Pressure redistribution bed/mattress(bed type)    Mobility Interventions: Assess need for specialty bed, Pressure redistribution bed/mattress (bed type), Chair cushion    Nutrition Interventions: Document food/fluid/supplement intake, Discuss nutritional consult with provider    Friction and Shear Interventions: Apply protective barrier, creams and emollients, HOB 30 degrees or less

## 2021-08-16 NOTE — PROGRESS NOTES
Nella with [facility] accepted patient, with Anticipated Start of Care of 08/17/2021.            Yi Vergara RN  Case Management [phone]

## 2021-08-16 NOTE — DISCHARGE SUMMARY
CARDIAC SURGERY DISCHARGE SUMMARY    8/16/2021    CHIEF COMPLAINT: unstable angina    HISTORY OF PRESENT ILLNESS:  Samson Reyes is a 59 y.o. male patient of Dr. Nba Rangel who presented with unstable angina several weeks ago in setting of pulmonary embolism. Repeat echocardiography revealed an ejection fraction of 55-60%. Cardiac catheterization showed 3-vessel CAD. He was admitted for surgical coronary revascularization. PAST MEDICAL HISTORY:   Past Medical History:   Diagnosis Date    CAD (coronary artery disease)     Dupuytren's disease of palm     Hyperlipidemia     Hypertension     SHILPI (obstructive sleep apnea)     PE (pulmonary thromboembolism) ([address] 06/2021    Severe obesity (HCC)     Tobacco dependence    . PAST SURGICAL HISTORY:   Past Surgical History:   Procedure Laterality Date    HX CHOLECYSTECTOMY      HX CORONARY STENT PLACEMENT      HX HERNIA REPAIR      IR IVC FILTER PLACEMENT PERCUTANEOUS  6/15/2021    IR THROMBECTOMY MECH ART PRIMARY NON HETAL OR INTRACRANIAL  6/15/2021   . HOSPITAL COURSE:  He was admitted to the hospital and underwent CABG x 3  on 8/11/21 by Dr. Wilfrid Vyas. He was extubated on the first night following surgery and was eventually up and ambulating well and taking a diet well. Wires and tubes were removed. Notable postoperative events included a sternal click. Procedures:   Procedure(s):  CORONARY ARTERY BYPASS GRAFT (CABG) TIMES THREE      He is to be discharged to  Home today. At the time of discharge, his lungs were clear to auscultation bilaterally and the heart had a regular rate and rhythm. The sternum was stable and all wounds were well healed with no evidence of infection. There was mild pedal edema. Room air oximetry was 95%.     LABS:  Lab Results   Component Value Date/Time    WBC 11.9 08/14/2021 03:30 AM    HCT 34.9 (L) 08/14/2021 03:30 AM     (L) 08/14/2021 03:30 AM      Lab Results   Component Value Date/Time     08/16/2021 05:50 AM    K 3.4 (L) 08/16/2021 05:50 AM     08/16/2021 05:50 AM    CO2 25 08/16/2021 05:50 AM    GLU 83 08/16/2021 05:50 AM    BUN 18 08/16/2021 05:50 AM    CREA 1.03 08/16/2021 05:50 AM    CREA 1.16 08/14/2021 03:30 AM    CREA 1.33 (H) 08/13/2021 08:25 AM       Imaging:    ECHO ADULT FOLLOW-UP OR LIMITED    Result Date: 7/26/2021  · LV: Estimated LVEF is 55 - 60%. Visually measured ejection fraction. Normal cavity size and systolic function (ejection fraction normal). Mildly to moderately increased wall thickness. Wall motion: normal. · RV: Mildly dilated right ventricle. Mildly reduced systolic function. · RA: Dilated right atrium. · IVC: Moderately elevated central venous pressure (8 mmHg); IVC diameter is larger than 21 mm and collapses more than 50% with respiration. · Pericardium: Pericardial fat pad present. Echo Results  (Last 48 hours)    None            DISCHARGE DIAGNOSES:    1. CAD s/p CABG  2. hypertension  3. hypercholesterolemia  4. SHILPI on CPAP   5. Recent PE on xarelto    DISCHARGE DISPOSITION:  1. Activities: normal, with strict sternal precautions including no heavy lifting and with constant wearing of the sternal harness. 2. Diet: Cardiac Diet  3. Condition: good  4. Prognosis:  good    DISCHARGE INSTRUCTIONS:  He is to follow up with the cardiac surgeon in 7 days and in one month, and will see the primary care physician and cardiologist preferably within one month. The visiting nurses will see him once home. Follow-up appointments:      Follow-up Information     Follow up With Specialties Details Why Archana Foley MD Family Medicine On 8/23/2021 Appointment @ 11:30 am, Follow-up following hospital stay [address] [facility],[address]  [phone]      Amanda Lawler MD Cardiothoracic Surgery Go on 8/24/2021 Appointment @ 11:00 am [address] [place] [id number]  [phone]      Amanda Lawler MD Cardiothoracic Surgery Go on 9/7/2021 Appointment @ 10:00 am [address] [address]  [phone]      Ruta Kehr, MD Cardiology, Internal Medicine On 8/30/2021 [place] [id number], [address] [place] [address], [place], [address] [facility]. . ... Danny Tor Lentz AT 2:45pm [facility]  Estrellita Portillo [phone]  Your preferred agency, Chosen to continue managing healthcare needs [place]. [address]  [place] [phone]          DISCHARGE MEDICATIONS:    Current Discharge Medication List      START taking these medications    Details   polyethylene glycol (MIRALAX) 17 gram packet Take 1 Packet by mouth daily as needed for Constipation. Qty: 7 Packet, Refills: 0  Start date: 8/16/2021      potassium chloride (K-DUR, KLOR-CON) 20 mEq tablet Take 1 Tablet by mouth daily for 7 days. Then as directed for edema. Qty: 30 Tablet, Refills: 0  Start date: 8/17/2021, End date: 8/24/2021      HYDROcodone-acetaminophen (NORCO) 5-325 mg per tablet Take 1-2 Tablets by mouth every six (6) hours as needed for Pain for up to 7 days. Max Daily Amount: 8 Tablets. Qty: 28 Tablet, Refills: 0  Start date: 8/16/2021, End date: 8/23/2021    Associated Diagnoses: S/P CABG x 3         CONTINUE these medications which have CHANGED    Details   metoprolol tartrate (LOPRESSOR) 25 mg tablet Take 0.5 Tablets by mouth every twelve (12) hours. Qty: 30 Tablet, Refills: 2  Start date: 8/16/2021      furosemide (LASIX) 40 mg tablet Take 1 Tablet by mouth daily. Qty: 30 Tablet, Refills: 0  Start date: 8/16/2021         CONTINUE these medications which have NOT CHANGED    Details   atorvastatin (Lipitor) 40 mg tablet Take 40 mg by mouth daily. colchicine (Mitigare) 0.6 mg capsule Take 1 Capsule by mouth daily.   Qty: 30 Capsule, Refills: 2    Associated Diagnoses: Acute idiopathic gout, unspecified site      aspirin delayed-release 81 mg tablet Refills: 1      rivaroxaban (XARELTO) 20 mg tab tablet Take 1 Tablet by mouth daily. Qty: 90 Tablet, Refills: 1      tiotropium-olodateroL (Stiolto Respimat) 2.5-2.5 mcg/actuation inhaler Take 2 Puffs by inhalation daily.   Qty: 1 Inhaler, Refills: 0      nitroglycerin (NITROLINGUAL) 400 mcg/spray spray SPRAY ONE SPRAY UNDER THE TONGUE AS NEEDED MAY REPEAT UP TO 2 TIMES AS DIRECTED   Qty: 12 g, Refills: 5         STOP taking these medications       isosorbide mononitrate ER (IMDUR) 30 mg tablet Comments:   Reason for Stopping:         amiodarone (CORDARONE) 200 mg tablet Comments:   Reason for Stopping:         chlorhexidine (HIBICLENS) 4 % liquid Comments:   Reason for Stopping:               Evelio Akers PA-C  [facility]  [phone]

## 2021-08-16 NOTE — PROGRESS NOTES
Problem: Falls - Risk of  Goal: *Absence of Falls  Description: Document Alice Spence Fall Risk and appropriate interventions in the flowsheet.   8/16/2021 1530 by Westley Quintanilla RN  Outcome: Resolved/Met  Note: Fall Risk Interventions:  Mobility Interventions: Assess mobility with egress test, Communicate number of staff needed for ambulation/transfer, Utilize walker, cane, or other assistive device, PT Consult for mobility concerns, PT Consult for assist device competence         Medication Interventions: Assess postural VS orthostatic hypotension, Evaluate medications/consider consulting pharmacy    Elimination Interventions: Call light in reach, Urinal in reach, Toilet paper/wipes in reach, Stay With Me (per policy), Patient to call for help with toileting needs           8/16/2021 0954 by Westley Quintanilla RN  Outcome: Progressing Towards Goal  Note: Fall Risk Interventions:  Mobility Interventions: Assess mobility with egress test, Communicate number of staff needed for ambulation/transfer, Utilize walker, cane, or other assistive device, PT Consult for mobility concerns, PT Consult for assist device competence         Medication Interventions: Assess postural VS orthostatic hypotension, Evaluate medications/consider consulting pharmacy    Elimination Interventions: Call light in reach, Urinal in reach, Toilet paper/wipes in reach, Stay With Me (per policy), Patient to call for help with toileting needs              Problem: Patient Education: Go to Patient Education Activity  Goal: Patient/Family Education  8/16/2021 1530 by Westley Quintanilla RN  Outcome: Resolved/Met  8/16/2021 0954 by Westley Quintanilla RN  Outcome: Progressing Towards Goal     Problem: Ventilator Management  Goal: *Adequate oxygenation and ventilation  8/16/2021 1530 by Westley Quintanilla RN  Outcome: Resolved/Met  8/16/2021 0954 by Westley Quintanilla RN  Outcome: Progressing Towards Goal  Goal: *Patient maintains clear airway/free of aspiration  8/16/2021 1530 by Kristen Blanco RN  Outcome: Resolved/Met  8/16/2021 0954 by Kristen Blanco RN  Outcome: Progressing Towards Goal  Goal: *Absence of infection signs and symptoms  8/16/2021 1530 by Kristen Blanco RN  Outcome: Resolved/Met  8/16/2021 0954 by Kristen Blanco RN  Outcome: Progressing Towards Goal  Goal: *Normal spontaneous ventilation  8/16/2021 1530 by Kristen Blanco RN  Outcome: Resolved/Met  8/16/2021 0954 by Kristen Blanco RN  Outcome: Progressing Towards Goal     Problem: Patient Education: Go to Patient Education Activity  Goal: Patient/Family Education  8/16/2021 1530 by Kristen Blanco RN  Outcome: Resolved/Met  8/16/2021 0954 by Kristen Blanco RN  Outcome: Progressing Towards Goal     Problem: Pressure Injury - Risk of  Goal: *Prevention of pressure injury  Description: Document Ez Scale and appropriate interventions in the flowsheet.   8/16/2021 1530 by Kristen Blanco RN  Outcome: Resolved/Met  Note: Pressure Injury Interventions:  Sensory Interventions: Assess changes in LOC, Avoid rigorous massage over bony prominences         Activity Interventions: Assess need for specialty bed, Pressure redistribution bed/mattress(bed type)    Mobility Interventions: Assess need for specialty bed, Pressure redistribution bed/mattress (bed type), Chair cushion    Nutrition Interventions: Document food/fluid/supplement intake, Discuss nutritional consult with provider    Friction and Shear Interventions: Apply protective barrier, creams and emollients, HOB 30 degrees or less             8/16/2021 0954 by Kristen Blanco RN  Outcome: Progressing Towards Goal  Note: Pressure Injury Interventions:  Sensory Interventions: Assess changes in LOC, Avoid rigorous massage over bony prominences         Activity Interventions: Assess need for specialty bed, Pressure redistribution bed/mattress(bed type)    Mobility Interventions: Assess need for specialty bed, Pressure redistribution bed/mattress (bed type), Chair cushion    Nutrition Interventions: Document food/fluid/supplement intake, Discuss nutritional consult with provider    Friction and Shear Interventions: Apply protective barrier, creams and emollients, HOB 30 degrees or less                Problem: Patient Education: Go to Patient Education Activity  Goal: Patient/Family Education  8/16/2021 1530 by Gabi Mueller RN  Outcome: Resolved/Met  8/16/2021 0954 by Gabi Mueller RN  Outcome: Progressing Towards Goal     Problem: Patient Education: Go to Patient Education Activity  Goal: Patient/Family Education  8/16/2021 1530 by Gabi Mueller RN  Outcome: Resolved/Met  8/16/2021 0954 by Gabi Mueller RN  Outcome: Progressing Towards Goal     Problem: Patient Education: Go to Patient Education Activity  Goal: Patient/Family Education  8/16/2021 1530 by Gabi Mueller RN  Outcome: Resolved/Met  8/16/2021 0954 by Gabi Mueller RN  Outcome: Progressing Towards Goal     Problem: Nutrition Deficit  Goal: *Optimize nutritional status  8/16/2021 1530 by Gabi Mueller RN  Outcome: Resolved/Met  8/16/2021 0954 by Gabi Mueller RN  Outcome: Progressing Towards Goal

## 2021-08-16 NOTE — PROGRESS NOTES
Problem: Mobility Impaired (Adult and Pediatric)  Goal: *Acute Goals and Plan of Care (Insert Text)  Description: Physical Therapy Goals  Initiated 8/12/2021 and to be accomplished within 7 day(s)  1. Patient will move from supine to sit and sit to supine  in bed with modified independence. 2.  Patient will transfer from bed to chair and chair to bed with modified independence using the least restrictive device. 3.  Patient will perform sit to stand with modified independence. 4.  Patient will ambulate with modified independence for 200 feet with the least restrictive device. 5.  Patient will ascend/descend 3 stairs with handrail(s) with supervision/set-up. PLOF: Patient was independent with functional mobility. He lives with spouse in single story home. Outcome: Progressing Towards Goal   PHYSICAL THERAPY TREATMENT    Patient: Casie Robles (86 y.o. male)  Date: 8/16/2021  Diagnosis: CAD (coronary artery disease) [I25.10] <principal problem not specified>  Procedure(s) (LRB):  CORONARY ARTERY BYPASS GRAFT (CABG) TIMES THREE (N/A) 5 Days Post-Op  Precautions: Fall, Sternal  PLOF: see above    ASSESSMENT:  Pt up in recliner, cleared by nursing and agreeable to participate in PT. Pt verbalized sternal precautions however required occasional cues for using heart hugger during transfers. Pt ambulated 200 feet with RW and SBA with verbal cues to decrease UE support. Pt ascended/descended 4 steps with 1 railing and SBA. Pt required 3 standing rest breaks while mobilizing. Pt was on room air throughout session and O2 sat remained above 94%. Pt was left sitting up in the recliner with needs in reach and nursing notified.   Progression toward goals:   [x]      Improving appropriately and progressing toward goals  []      Improving slowly and progressing toward goals  []      Not making progress toward goals and plan of care will be adjusted     PLAN:  Patient continues to benefit from skilled intervention to address the above impairments. Continue treatment per established plan of care. Discharge Recommendations:  Home Health  Further Equipment Recommendations for Discharge:  rolling walker     SUBJECTIVE:   Patient stated I'm feeling pretty good.     OBJECTIVE DATA SUMMARY:   Critical Behavior:  Neurologic State: Alert  Orientation Level: Oriented X4  Cognition: Appropriate decision making, Appropriate for age attention/concentration, Appropriate safety awareness, Follows commands  Safety/Judgement: Fall prevention  Functional Mobility Training:  Transfers:  Sit to Stand: Supervision  Stand to Sit: Supervision  Balance:  Sitting: Intact  Standing: With support  Standing - Static: Good  Standing - Dynamic : Fair   Ambulation/Gait Training:  Distance (ft): 200 Feet (ft)  Assistive Device: Walker, rolling  Ambulation - Level of Assistance: Stand-by assistance  Gait Abnormalities: Decreased step clearance  Stairs:  Number of Stairs Trained: 4  Stairs - Level of Assistance: Stand-by assistance  Rail Use: Right     Therapeutic Exercises:         EXERCISE   Sets   Reps   Active Active Assist   Passive Self ROM   Comments   Ankle Pumps 1 10  [x] [] [] []    Quad Sets/Glut Sets    [] [] [] [] Hold for 5 secs   Hamstring Sets   [] [] [] []    Short Arc Quads   [] [] [] []    Heel Slides   [] [] [] []    Straight Leg Raises   [] [] [] []    Hip Add   [] [] [] [] Hold for 5 secs, w/ pillow squeeze   Long Arc Quads 1 10 [x] [] [] []    Seated Marching   [] [] [] []    Standing Marching   [] [] [] []       [] [] [] []        Pain:  Pain level pre-treatment: 0/10  Pain level post-treatment: 2/10   Pain Intervention(s):  Rest, Repositioning   Response to intervention: Nurse notified, See doc flow    Activity Tolerance:   fair  Please refer to the flowsheet for vital signs taken during this treatment.   After treatment:   [x] Patient left in no apparent distress sitting up in chair  [] Patient left in no apparent distress in bed  [x] Call bell left within reach  [x] Nursing notified  [] Caregiver present  [] Bed alarm activated  [] SCDs applied      COMMUNICATION/EDUCATION:   [x]         Role of Physical Therapy in the acute care setting. [x]         Fall prevention education was provided and the patient/caregiver indicated understanding. [x]         Patient/family have participated as able in working toward goals and plan of care. [x]         Patient/family agree to work toward stated goals and plan of care. []         Patient understands intent and goals of therapy, but is neutral about his/her participation.   []         Patient is unable to participate in stated goals/plan of care: ongoing with therapy staff.  []         Other:        Nasim Rodriguez, PT   Time Calculation: 23 mins

## 2021-08-16 NOTE — PROGRESS NOTES
Problem: Self Care Deficits Care Plan (Adult)  Goal: *Acute Goals and Plan of Care (Insert Text)  Description: Occupational Therapy Goals  Initiated 8/12/2021 within 7 day(s). 1.  Patient will perform upper body dressing with supervision/set-up. 2.  Patient will perform lower body dressing with supervision/set-up using AE prn.  3.  Patient will perform grooming task standing at sink for 4-7 minutes with supervision/set-up, F+ balance. 4.  Patient will perform toilet transfers with supervision/set-up. 5.  Patient will perform all aspects of toileting with supervision/set-up. 6.  Patient will recall precautions during selfcare tasks with minimal verbal cues   7. Patient will utilize energy conservation techniques during functional activities with verbal cues. Prior Level of Function: Patient was independent with self-care and functional mobility PTA. Outcome: Progressing Towards Goal   OCCUPATIONAL THERAPY TREATMENT    Patient: Yasir Mauricio (7 y.o. male)  Date: 8/16/2021  Diagnosis: CAD (coronary artery disease) [I25.10] <principal problem not specified>  Procedure(s) (LRB):  CORONARY ARTERY BYPASS GRAFT (CABG) TIMES THREE (N/A) 5 Days Post-Op  Precautions: Fall, Sternal    Chart, occupational therapy assessment, plan of care, and goals were reviewed. ASSESSMENT:  Pt seated on toilet upon entry. Requires min vc's to demo sternal precautions w/toileting ADL. Pt tolerates standing sinkside performing hand hygiene. Reviewed sternal precautions and importance of maintaining w/ADLs and functional transfers. Pt educated on compensatory strategies w/UB/LB dressing ADL to comply w/sternal precautions. Pt demonstrates good balance w/ADL item retrieval in prep for d/c home. Pt requires min vc's for safety w/functional mobility/transfers 2/2 impulsivity. HR 90s throughout session. Pt anxious for d/c home.    Progression toward goals:  [x]          Improving appropriately and progressing toward goals  [] Improving slowly and progressing toward goals  []          Not making progress toward goals and plan of care will be adjusted     PLAN:  Patient continues to benefit from skilled intervention to address the above impairments. Continue treatment per established plan of care. Discharge Recommendations:  Home Health  Further Equipment Recommendations for Discharge:  rolling walker     SUBJECTIVE:   Patient stated I still hear my chest clicking.     OBJECTIVE DATA SUMMARY:   Cognitive/Behavioral Status:  Neurologic State: Alert  Orientation Level: Oriented X4  Cognition: Follows commands  Safety/Judgement: Fall prevention    Functional Mobility and Transfers for ADLs:   Transfers:  Sit to Stand: Supervision  Bed to Chair: Supervision   Toilet Transfer : Supervision   Bathroom Mobility: Supervision/set up  Balance:  Sitting: Intact  Standing: Impaired; Without support  Standing - Static: Good  Standing - Dynamic : Fair (fair plus)    ADL Intervention:  Grooming  Position Performed: Standing  Washing Hands: Modified independent    Upper Body Dressing Assistance  Pullover Shirt: Supervision    Lower Body Dressing Assistance  Pants With Elastic Waist: Stand-by assistance    Toileting  Toileting Assistance: Supervision  Bowel Hygiene: Supervision  Clothing Management: Supervision    Pain:  Pain level pre-treatment: 0/10   Pain level post-treatment: 0/10    Activity Tolerance:    Good    Please refer to the flowsheet for vital signs taken during this treatment. After treatment:   [x]  Patient left in no apparent distress sitting on toilet  []  Patient left in no apparent distress in bed  []  Call bell left within reach  [x]  Tad Conn, present  []  Caregiver present  []  Bed alarm activated    COMMUNICATION/EDUCATION:   [] Role of Occupational Therapy in the acute care setting  [] Home safety education was provided and the patient/caregiver indicated understanding.   [] Patient/family have participated as able in working towards goals and plan of care. [x] Patient/family agree to work toward stated goals and plan of care. [] Patient understands intent and goals of therapy, but is neutral about his/her participation. [] Patient is unable to participate in goal setting and plan of care.       Thank you for this referral.  CORKY Vegas  Time Calculation: 38 mins

## 2021-08-16 NOTE — PROGRESS NOTES
[facility] care of the patient. 0921- Pt ambulated with RN for about 5 minutes with walker. Pt tolerated walk well and now resting comfortably in his recliner. 6221- Pt ambulating with physical therapy.

## 2021-08-16 NOTE — PROGRESS NOTES
1800-  Report received. 2102-  [place] 2 tab PO.  0000-  Pt resting. 0400-  Pt resting.  0600-  [place] 2 tab PO. Pt stated he feels a \"click\" in his chest when he coughs. MD to be notified. 0700-  Bedside and Verbal shift change report given to Arvin RN (oncoming nurse) by Amy Srivastava RN (offgoing nurse).  Report included the following information SBAR, Kardex, OR Summary, Procedure Summary, Intake/Output, MAR, Accordion, Recent Results and Cardiac Rhythm NSR.   '

## 2021-08-16 NOTE — PROGRESS NOTES
Physician Progress Note      PATIENT:               Colt Kinney  CSN #:                  [id number]  :                       1960  ADMIT DATE:       2021 6:50 AM  [address] [place] [employer] DATE:  RESPONDING  PROVIDER #:        Xuan Gonzalez MD          QUERY TEXT:    Dear Cardiothoracic surgery  Pt admitted with  s/p CABG x 3 . Noted documentation of Severe malnutrition   on 21 by ordered RDconsultant. If possible, please document in progress notes and discharge summary:    The medical record reflects the following:  Risk Factors:  s/p CABG x 3 Patient advanced to solid diet with decreased appetite and minimal po intake due to nausea/vomiting    Clinical Indicators:Per  RDPN  Malnutrition Status: Severe malnutrition  Nutrition Diagnosis: Unintended weight loss related to catabolic illness, inadequate protein-energy intake, early satiety as evidenced by weight loss greater than or equal to 10% in 6 months Malnutrition Assessment:  Malnutrition Status:  Severe malnutrition  Context:  Chronic illness   Findings of the 6 clinical characteristics of malnutrition:  Energy Intake:  7 - 75% or less est energy requirements for 1 month or longer Weight Loss:  7.00 - Greater than 10% over 6 months  ? Nutrition History and Allergies: Past medical history of ventral hernia, hypertension, hyperlipidemia, obesity, obstructive sleep apnea, pulmonary embolism, tobacco use, acute idiopathic gout admitted with coronary artery disease and surgical intervention. Weight history per chart review: 279 lb (19), 267 lb (21), 253 lb (21 and 21), 258 lb (21). Patient reports decreased appetite and poor meal intake x several months, improved over the past month PTA with 40 lb weight loss in 4 months, has gained 10 lb back in the past month PTA (30 lb, 11.5% net weight loss x 4 months). NKFA. ? Treatment: Monitor po intake and diet tolerance. Change supplements to Ensure Enlive TID.     Thank you  Marisa Amando BLACKMAN      Options provided:  -- Severe malnutrition   confirmed present on admission  -- Severe malnutrition   ruled out  -- Other - I will add my own diagnosis  -- Disagree - Not applicable / Not valid  -- Disagree - Clinically unable to determine / Unknown  -- Refer to Clinical Documentation Reviewer    PROVIDER RESPONSE TEXT:    Provider disagreed with this query. Mild malnutrition unrelated to surgery    Query created by:  Antolin Chacon on 8/13/2021 3:43 PM      Electronically signed by:  Светлана Huggins MD 8/16/2021 1:39 PM

## 2021-08-20 RX ORDER — TIOTROPIUM BROMIDE AND OLODATEROL 3.124; 2.736 UG/1; UG/1
2 SPRAY, METERED RESPIRATORY (INHALATION) DAILY
Qty: 1 INHALER | Refills: 5 | Status: SHIPPED | OUTPATIENT
Start: 2021-08-20 | End: 2022-03-13

## 2021-08-23 ENCOUNTER — OFFICE VISIT (OUTPATIENT)
Dept: FAMILY MEDICINE CLINIC | Age: 61
End: 2021-08-23
Payer: COMMERCIAL

## 2021-08-23 VITALS
HEART RATE: 80 BPM | BODY MASS INDEX: 34.94 KG/M2 | SYSTOLIC BLOOD PRESSURE: 117 MMHG | TEMPERATURE: 98.3 F | WEIGHT: 247 LBS | DIASTOLIC BLOOD PRESSURE: 72 MMHG | OXYGEN SATURATION: 98 %

## 2021-08-23 DIAGNOSIS — Z95.1 S/P CABG X 3: ICD-10-CM

## 2021-08-23 DIAGNOSIS — I25.10 CORONARY ARTERY DISEASE INVOLVING NATIVE CORONARY ARTERY WITHOUT ANGINA PECTORIS, UNSPECIFIED WHETHER NATIVE OR TRANSPLANTED HEART: ICD-10-CM

## 2021-08-23 DIAGNOSIS — E78.01 FAMILIAL HYPERCHOLESTEROLEMIA: Primary | ICD-10-CM

## 2021-08-23 DIAGNOSIS — F17.200 TOBACCO DEPENDENCE: ICD-10-CM

## 2021-08-23 DIAGNOSIS — K64.1 GRADE II HEMORRHOIDS: ICD-10-CM

## 2021-08-23 PROCEDURE — 99213 OFFICE O/P EST LOW 20 MIN: CPT | Performed by: FAMILY MEDICINE

## 2021-08-23 RX ORDER — HYDROCORTISONE ACETATE 25 MG/1
25 SUPPOSITORY RECTAL EVERY 12 HOURS
Qty: 14 SUPPOSITORY | Refills: 1 | Status: SHIPPED | OUTPATIENT
Start: 2021-08-23 | End: 2022-05-06

## 2021-08-23 RX ORDER — ATORVASTATIN CALCIUM 40 MG/1
40 TABLET, FILM COATED ORAL DAILY
Qty: 90 TABLET | Refills: 1 | Status: SHIPPED | OUTPATIENT
Start: 2021-08-23 | End: 2021-09-07 | Stop reason: SINTOL

## 2021-08-23 NOTE — PROGRESS NOTES
Subjective:   Les Fernandez is a 59 y.o. male who was seen for Hospital Follow Up    HPI the patient is a 58-year-old white male with known coronary artery disease status post 3 or 4 stents in the past on 8/11/2021 he had three-way bypass in [facility] he did extremely well. He has had no falls or injuries no rashes. He has been eating relatively well his bowel movements have been appropriate he has lost a tremendous amount of weight in the last several months. His ankles have been much better. He has had no falls or injuries. He has had no rashes. Bowel movements have been appropriate no chest pain or shortness of breath. He is had some incisional pain but otherwise is doing well he denies any anxiety or depression. Nobody at home has been sick patient    Home Medications    Medication Sig Start Date End Date Taking? Authorizing Provider   atorvastatin (Lipitor) 40 mg tablet Take 1 Tablet by mouth daily. 8/23/21  Yes Darion Ng MD   hydrocortisone (Anusol-HC) 25 mg supp Insert 1 Suppository into rectum every twelve (12) hours. 8/23/21  Yes Darion Ng MD   Stiolto Respimat 2.5-2.5 mcg/actuation inhaler TAKE 2 PUFFS BY INHALATION DAILY. 8/20/21  Yes Darion Ng MD   metoprolol tartrate (LOPRESSOR) 25 mg tablet Take 0.5 Tablets by mouth every twelve (12) hours. 8/16/21  Yes Laine Zuluaga PA-C   furosemide (LASIX) 40 mg tablet Take 1 Tablet by mouth daily. 8/16/21  Yes Garret Rodriguez PA-C   potassium chloride (K-DUR, KLOR-CON) 20 mEq tablet Take 1 Tablet by mouth daily for 7 days. Then as directed for edema. 8/17/21 8/24/21 Yes Laine Zuluaga PA-C   rivaroxaban (XARELTO) 20 mg tab tablet Take 1 Tablet by mouth daily. 7/20/21  Yes Darion Ng MD   colchicine (Mitigare) 0.6 mg capsule Take 1 Capsule by mouth daily.  6/25/21  Yes Darion Ng MD   nitroglycerin (NITROLINGUAL) 400 mcg/spray spray SPRAY ONE SPRAY UNDER THE TONGUE AS NEEDED MAY REPEAT UP TO 2 TIMES AS DIRECTED  5/23/21  Yes West Brennan MD   aspirin delayed-release 81 mg tablet  4/29/19  Yes Provider, Historical   polyethylene glycol (MIRALAX) 17 gram packet Take 1 Packet by mouth daily as needed for Constipation. Patient not taking: Reported on 8/23/2021 8/16/21   Chiki Leone PA-C   HYDROcodone-acetaminophen [facility]) 5-325 mg per tablet Take 1-2 Tablets by mouth every six (6) hours as needed for Pain for up to 7 days. Max Daily Amount: 8 Tablets. Patient not taking: Reported on 8/23/2021 8/16/21 8/23/21  Chiki Leone PA-C      Allergies   Allergen Reactions    Statins-Hmg-Coa Reductase Inhibitors Other (comments)     Other reaction(s): SEVERE PAIN PER PT    Gabapentin Other (comments)     \"Caused bad nightmares\"    Pcn [Penicillins] Hives     Social History     Tobacco Use    Smoking status: Current Every Day Smoker     Types: Cigarettes    Smokeless tobacco: Never Used   Substance Use Topics    Alcohol use: Yes    Drug use: No            Review of Systems   Constitutional: Negative. HENT: Negative. Eyes: Negative. Respiratory: Negative. Cardiovascular: Negative. Gastrointestinal: Negative. Endocrine: Negative. Genitourinary: Negative. Musculoskeletal: Negative. Allergic/Immunologic: Negative. Neurological: Negative. Hematological: Negative. Psychiatric/Behavioral: Negative.          Physical Exam   Objective:     Visit Vitals  /72 (BP 1 Location: Left arm, BP Patient Position: Sitting, BP Cuff Size: Adult)   Pulse 80   Temp 98.3 °F (36.8 °C) (Oral)   Wt 247 lb (112 kg)   SpO2 98%   BMI 34.94 kg/m²      General: alert, cooperative, no distress   Mental  status: normal mood, behavior, speech, dress, motor activity, and thought processes, able to follow commands   HENT: NCAT   Neck: no visualized mass   Resp: no respiratory distress   Neuro: no gross deficits   Skin: no discoloration or lesions of concern on visible areas   Psychiatric: normal affect, consistent with stated mood, no evidence of hallucinations   Afebrile. Vital signs are stable. The lungs are clear the cardiovascular exam showed a regular rate and rhythm. The abdomen is benign. The extremities are clear pleasant and cooperative in no significant distress. Assessment & Plan:     Coronary artery disease, status post 3 way bypass, tobacco dependence, hypertension, hyperlipidemia: I have refilled his cholesterol medicine. He has not been smoking for 8 days. He looks to be in excellent shape all of his wounds were evaluated and they were unremarkable. I have allowed him to ventilate regarding issues he was somewhat emotional today. We will follow up on an as-needed basis. He is seeing his cardiac surgeon tomorrow and cardiologist later. 712    Additional exam findings: We discussed the expected course, resolution and complications of the diagnosis(es) in detail. Medication risks, benefits, costs, interactions, and alternatives were discussed as indicated. I advised him to contact the office if his condition worsens, changes or fails to improve as anticipated. He expressed understanding with the diagnosis(es) and plan.

## 2021-08-23 NOTE — PROGRESS NOTES
Maru Sarmiento presents today for   Chief Complaint   Patient presents with   [facility] Follow Up       Is someone accompanying this pt? Yes wife     Is the patient using any DME equipment during [address]? no    Depression Screening:  3 most recent PHQ Screens 8/23/2021   Little interest or pleasure in doing things Not at all   Feeling down, depressed, irritable, or hopeless Not at all   Total Score PHQ 2 0       Learning Assessment:  Learning Assessment 7/26/2021   PRIMARY LEARNER Patient   PRIMARY LANGUAGE ENGLISH   LEARNER PREFERENCE PRIMARY DEMONSTRATION   ANSWERED BY Patient   RELATIONSHIP SELF       Abuse Screening:  Abuse Screening Questionnaire 8/23/2021   Do you ever feel afraid of your partner? N   Are you in a relationship with someone who physically or mentally threatens you? N   Is it safe for you to go home? Y         ADL  ADL Assessment 8/23/2021   Feeding yourself No Help Needed   Getting from bed to chair No Help Needed   Getting dressed No Help Needed   Bathing or showering No Help Needed   Walk across the room (includes cane/walker) No Help Needed   Using the telphone No Help Needed   Taking your medications No Help Needed   Preparing meals No Help Needed   Managing money (expenses/bills) No Help Needed   Moderately strenuous housework (laundry) No Help Needed   Shopping for personal items (toiletries/medicines) No Help Needed   Shopping for groceries No Help Needed   Driving No Help Needed   Climbing a flight of stairs No Help Needed   Getting to places beyond walking distances No Help Needed       Health Maintenance reviewed and discussed and ordered per Provider. Health Maintenance Due   Topic Date Due    Hepatitis C Screening  Never done    Pneumococcal 0-64 years (1 of 2 - PPSV23) Never done    COVID-19 Vaccine (1) Never done    DTaP/Tdap/Td series (1 - Tdap) Never done    Colorectal Cancer Screening Combo  Never done    Shingrix Vaccine Age 50> (1 of 2) Never done   . Coordination of Care:  1. Have you been to the ER, urgent care clinic since your last visit? Hospitalized since your last visit? Yes for open heart surgery     2. Have you seen or consulted any other health care providers outside of the [address] [place] [address] since your last visit? Include any pap smears or colon screening.

## 2021-08-24 ENCOUNTER — OFFICE VISIT (OUTPATIENT)
Dept: CARDIOTHORACIC SURGERY | Age: 61
End: 2021-08-24
Payer: COMMERCIAL

## 2021-08-24 VITALS
BODY MASS INDEX: 35.08 KG/M2 | RESPIRATION RATE: 18 BRPM | TEMPERATURE: 98.1 F | SYSTOLIC BLOOD PRESSURE: 112 MMHG | WEIGHT: 248 LBS | DIASTOLIC BLOOD PRESSURE: 75 MMHG | OXYGEN SATURATION: 99 % | HEART RATE: 82 BPM

## 2021-08-24 DIAGNOSIS — Z95.1 S/P CABG X 3: Primary | ICD-10-CM

## 2021-08-24 PROCEDURE — 99024 POSTOP FOLLOW-UP VISIT: CPT | Performed by: PHYSICIAN ASSISTANT

## 2021-08-24 RX ORDER — METHOCARBAMOL 500 MG/1
500 TABLET, FILM COATED ORAL
Qty: 28 TABLET | Refills: 0 | Status: SHIPPED | OUTPATIENT
Start: 2021-08-24 | End: 2021-09-03 | Stop reason: SDUPTHER

## 2021-08-27 ENCOUNTER — DOCUMENTATION ONLY (OUTPATIENT)
Dept: CARDIOTHORACIC SURGERY | Age: 61
End: 2021-08-27

## 2021-08-27 NOTE — PROGRESS NOTES
[facility]        Received communication from therapist who states the patient has gained 3 to 4 pounds after stopping Lasix on Tuesday after visit with our service. Chart review shows progress note stating continue Lasix, AVS stating stop Lasix. I spoke to patient and advised him to continue his Lasix 40 mg daily and potassium as previously prescribed. We will follow-up in 2 weeks as scheduled.

## 2021-09-03 ENCOUNTER — TELEPHONE (OUTPATIENT)
Dept: CARDIOTHORACIC SURGERY | Age: 61
End: 2021-09-03

## 2021-09-03 RX ORDER — METHOCARBAMOL 500 MG/1
500 TABLET, FILM COATED ORAL
Qty: 28 TABLET | Refills: 0 | Status: SHIPPED | OUTPATIENT
Start: 2021-09-03 | End: 2022-03-13

## 2021-09-03 RX ORDER — FUROSEMIDE 40 MG/1
40 TABLET ORAL DAILY
Qty: 30 TABLET | Refills: 0 | Status: SHIPPED | OUTPATIENT
Start: 2021-09-03 | End: 2021-09-07 | Stop reason: SDUPTHER

## 2021-09-03 RX ORDER — POTASSIUM CHLORIDE 20 MEQ/1
20 TABLET, EXTENDED RELEASE ORAL DAILY
Qty: 30 TABLET | Refills: 0 | Status: SHIPPED | OUTPATIENT
Start: 2021-09-03 | End: 2021-09-07 | Stop reason: SDUPTHER

## 2021-09-04 NOTE — TELEPHONE ENCOUNTER
Received msg from OT about 6lb weight gain. Pt reports increased appetite and endorses Leg edema which \"is better than it used to be\". He also is having back spasms which are helped by the robaxin previously prescribed. Asked pt to take Lasix/potassium twice daily for 3 days then daily. Robaxin refill prescribed. Plan to see pt in clinic next week.      Gretchen Foley PA-C  [facility]  [phone]

## 2021-09-07 ENCOUNTER — VIRTUAL VISIT (OUTPATIENT)
Dept: CARDIOTHORACIC SURGERY | Age: 61
End: 2021-09-07
Payer: COMMERCIAL

## 2021-09-07 DIAGNOSIS — Z95.1 S/P CABG X 3: Primary | ICD-10-CM

## 2021-09-07 PROCEDURE — 99024 POSTOP FOLLOW-UP VISIT: CPT | Performed by: PHYSICIAN ASSISTANT

## 2021-09-07 RX ORDER — FUROSEMIDE 40 MG/1
40 TABLET ORAL DAILY
Qty: 30 TABLET | Refills: 1 | Status: SHIPPED | OUTPATIENT
Start: 2021-09-07 | End: 2021-12-30

## 2021-09-07 RX ORDER — ROSUVASTATIN CALCIUM 10 MG/1
10 TABLET, COATED ORAL
Qty: 30 TABLET | Refills: 2 | Status: SHIPPED | OUTPATIENT
Start: 2021-09-07 | End: 2021-09-27

## 2021-09-07 RX ORDER — POTASSIUM CHLORIDE 20 MEQ/1
20 TABLET, EXTENDED RELEASE ORAL DAILY
Qty: 30 TABLET | Refills: 1 | OUTPATIENT
Start: 2021-09-07 | End: 2021-11-11

## 2021-09-07 NOTE — PATIENT INSTRUCTIONS
Stop Lipitor and start Crestor. Lasix and potassium prescription refilled for 1 pill each day. Follow-up with cardiology and PCP. Cardiac rehab referral placed. Okay to drive starting next week. Okay to start to increase lifting weight starting next week.

## 2021-09-07 NOTE — PROGRESS NOTES
Patient reports pain 7/10 related to his statins. Patient also reports left chest discomfort and swelling, no fever or chills. Patient stated that he is walking every day and tolerating very well.

## 2021-09-07 NOTE — PROGRESS NOTES
[facility] Progress Note      Pursuant to the emergency declaration under the [facility], [address] [place] [address] authority and the [employer] Act, this Virtual Visit was conducted with patient's (and/or legal guardian's) consent, to reduce the risk of exposure to COVID-19 and provide necessary medical care. A caregiver was present when appropriate. Services were provided through a video synchronous discussion virtually to substitute for in-person encounter. Today's date: 9/7/2021    Interval History: This is the 3-week follow-up visit after CABG x3 on 8/11/2021. He is progressing very well at home. He has been complaining of diffuse myalgias similar to when he took Lipitor years ago (now a current med). He states he was changed to Crestor and his pain abated. He denies any surgical incisional pain. His endurance and activity are increasing. He denies shortness of breath or cough. He has a good appetite. He is moving his bowels. He states he has not had any sternal clicking since he left the hospital.  He has no complaints about his incisions insofar as drainage, skin separation or redness. He does have some numbness and puffiness of his left anterior chest.  He has full range of motion of his upper extremities. There is no warmth or erythema of the area. It is not especially tender to palpation. Assessment:     Satisfactory progress after CABG x3  Myalgias associated with atorvastatin    Plan:     1. Atorvastatin changed to Crestor. Allergy list updated  2. Lasix and potassium refilled for daily use  3. Heating pad every 10 minutes as needed to left chest for edema resolution. Continue sternal precautions until next week and then can increase lifting weight. Contact our service with any sternal separation or clicking. Okay to drive after next week.   4. Cardiac rehab referral placed. 5.  No regular scheduled visits with our service. Follow-up with cardiology and PCP as scheduled. 6.  The patient is comfortable with and agrees with the plan. Subjective:     Numbness to left chest    Objective:     Admission Weight: Last Weight           Patient-Reported Vitals 9/7/2021   Patient-Reported Weight 253 lbs   Patient-Reported Height -   Patient-Reported Pulse 74   Patient-Reported Temperature -   Patient-Reported Systolic  113   Patient-Reported Diastolic 64        There were no vitals taken for this visit. BP Readings from Last 3 Encounters:   08/24/21 112/75   08/23/21 117/72   08/16/21 115/68     Wt Readings from Last 3 Encounters:   08/24/21 112.5 kg (248 lb)   08/23/21 112 kg (247 lb)   08/16/21 121.3 kg (267 lb 6.7 oz)       Physical Exam:  General: Very well-appearing. No apparent distress. Neck: No JVD or tracheal deviation. Lungs: Unable to be assessed. Chest: Skin edges approximated. No ecchymosis, erythema, or drainage. Left chest fullness not appreciated on video call. No apparent signs of trauma or infection. Heart: Not assessed  Abdomen: Not distended  Extremities: Possible trace pedal edema. Left leg incision healing well. Neuro: Seems to move all extremities x4 equally. No deficit appreciated. Shan Sumner PA-C    PLEASE NOTE:  This document has been produced using voice recognition software. Unrecognized errors in transcription may be present.

## 2021-09-27 ENCOUNTER — OFFICE VISIT (OUTPATIENT)
Dept: CARDIOLOGY CLINIC | Age: 61
End: 2021-09-27
Payer: COMMERCIAL

## 2021-09-27 VITALS
WEIGHT: 267 LBS | BODY MASS INDEX: 37.38 KG/M2 | HEART RATE: 91 BPM | OXYGEN SATURATION: 98 % | DIASTOLIC BLOOD PRESSURE: 70 MMHG | SYSTOLIC BLOOD PRESSURE: 116 MMHG | HEIGHT: 71 IN

## 2021-09-27 DIAGNOSIS — I10 ESSENTIAL HYPERTENSION WITH GOAL BLOOD PRESSURE LESS THAN 140/90: ICD-10-CM

## 2021-09-27 DIAGNOSIS — I25.10 CORONARY ARTERY DISEASE INVOLVING NATIVE CORONARY ARTERY WITHOUT ANGINA PECTORIS, UNSPECIFIED WHETHER NATIVE OR TRANSPLANTED HEART: Primary | ICD-10-CM

## 2021-09-27 DIAGNOSIS — E78.00 PURE HYPERCHOLESTEROLEMIA: ICD-10-CM

## 2021-09-27 PROCEDURE — 99214 OFFICE O/P EST MOD 30 MIN: CPT | Performed by: INTERNAL MEDICINE

## 2021-09-27 NOTE — PROGRESS NOTES
Cardiovascular Specialists    Mr. Yunior Carpio is a 59 y.o. male with a history of CAD, hypertension, hyperlipidemia, sleep apnea, pulmonary embolism, tobacco use disorder    Patient is here today for cardiac follow-up. Patient has history of coronary disease that is post left circumflex coronary stent more than 10 years ago. Patient was admitted to [facility] with chest pain concerning for angina in 06/2021. Patient underwent emergent cardiac catheterization which showed severe three-vessel coronary artery disease. Patient continues to have a chest pain despite appropriate medical management. Echocardiogram revealed RV strain. CT chest revealed submassive bilateral PE Patient underwent IR guided thromboembolectomy in 06/2021 followed by IVC filter placement. Patient was started on Xarelto. CAD, s/p CABG x3 (08/11/21) LIMA to mLAD, SVG to R PLV and SVG to OM. Since patient has gone home, he has been feeling much better. He denies any use of glycerin. Scheduled to start cardiac rehabilitation program early next month  He denies any presyncope or syncope. He denies PND or lower extremity swelling  He has some fatigue and tiredness  Denies any nausea, vomiting, abdominal pain, fever, chills, sputum production. No hematuria or other bleeding complaints    Past Medical History:   Diagnosis Date    CAD (coronary artery disease)     Dupuytren's disease of palm     Hyperlipidemia     Hypertension     SHILPI (obstructive sleep apnea)     PE [facility]pulmonary thromboembolism) [facility] [address] 06/2021    Severe obesity ([facility] [address]     Tobacco dependence        Review of Systems:  Cardiac symptoms as noted above in HPI. All others negative. Denies fatigue, malaise, skin rash, joint pain, blurring vision, photophobia, neck pain, hemoptysis, chronic cough, nausea, vomiting, hematuria, burning micturition, BRBPR, chronic headaches.     Current Outpatient Medications Medication Sig    furosemide (LASIX) 40 mg tablet Take 1 Tablet by mouth daily.  potassium chloride (K-DUR, KLOR-CON) 20 mEq tablet Take 1 Tablet by mouth daily. Or as directed.  rosuvastatin (CRESTOR) 10 mg tablet Take 1 Tablet by mouth nightly.  methocarbamoL (ROBAXIN) 500 mg tablet Take 1 Tablet by mouth four (4) times daily as needed for Muscle Spasm(s).  hydrocortisone (Anusol-HC) 25 mg supp Insert 1 Suppository into rectum every twelve (12) hours.  Stiolto Respimat 2.5-2.5 mcg/actuation inhaler TAKE 2 PUFFS BY INHALATION DAILY.  metoprolol tartrate (LOPRESSOR) 25 mg tablet Take 0.5 Tablets by mouth every twelve (12) hours.  rivaroxaban (XARELTO) 20 mg tab tablet Take 1 Tablet by mouth daily.  colchicine (Mitigare) 0.6 mg capsule Take 1 Capsule by mouth daily.  nitroglycerin (NITROLINGUAL) 400 mcg/spray spray SPRAY ONE SPRAY UNDER THE TONGUE AS NEEDED MAY REPEAT UP TO 2 TIMES AS DIRECTED     aspirin delayed-release 81 mg tablet      No current facility-administered medications for this visit. Past Surgical History:   Procedure Laterality Date    HX CHOLECYSTECTOMY      HX CORONARY STENT PLACEMENT      HX HERNIA REPAIR      IR IVC FILTER PLACEMENT PERCUTANEOUS  6/15/2021    IR THROMBECTOMY [facility] ART PRIMARY NON HETAL OR INTRACRANIAL  6/15/2021       Allergies and Sensitivities:  Allergies   Allergen Reactions    Gabapentin Other (comments)     \"Caused bad nightmares\"    Statins-Hmg-Coa Reductase Inhibitors Other (comments)     Myalgias with Lipitor. Tolerates Crestor    Pcn [Penicillins] Hives       Family History:  Family History   Problem Relation Age of Onset    Hypertension Mother     Diabetes Mother        Social History:  Social History     Tobacco Use    Smoking status: Former Smoker     Types: Cigarettes     Quit date: 2021     Years since quittin.1    Smokeless tobacco: Never Used   Substance Use Topics    Alcohol use: Yes    Drug use:  No He  reports that he quit smoking about 6 weeks ago. His smoking use included cigarettes. He has never used smokeless tobacco.  He  reports current alcohol use. Physical Exam:  BP Readings from Last 3 Encounters:   09/27/21 116/70   08/24/21 112/75   08/23/21 117/72         Pulse Readings from Last 3 Encounters:   09/27/21 91   08/24/21 82   08/23/21 80          Wt Readings from Last 3 Encounters:   09/27/21 121.1 kg (267 lb)   08/24/21 112.5 kg (248 lb)   08/23/21 112 kg (247 lb)       Constitutional: Oriented to person, place, and time. HENT: Head: Normocephalic and atraumatic. Neck: No JVD present. Carotid bruit is not appreciated. Cardiovascular: Regular rhythm. No murmur, gallop or rubs appreciated  Lung: Breath sounds normal. No respiratory distress. No ronchi or rales appreciated  Abdominal: No tenderness. No rebound and no guarding. Musculoskeletal: There is no significant lower extremity edema.  No cynosis    LABS:   @  Lab Results   Component Value Date/Time    WBC 11.9 08/14/2021 03:30 AM    Hemoglobin, POC 10.8 (L) 08/12/2021 02:18 AM    HGB 11.5 (L) 08/14/2021 03:30 AM    Hematocrit, POC 32 (L) 08/12/2021 02:18 AM    HCT 34.9 (L) 08/14/2021 03:30 AM    PLATELET 008 (L) 08/58/2657 03:30 AM    .7 (H) 08/14/2021 03:30 AM     Lab Results   Component Value Date/Time    Sodium 137 08/16/2021 05:50 AM    Potassium 3.4 (L) 08/16/2021 05:50 AM    Chloride 108 08/16/2021 05:50 AM    CO2 25 08/16/2021 05:50 AM    Glucose 83 08/16/2021 05:50 AM    BUN 18 08/16/2021 05:50 AM    Creatinine 1.03 08/16/2021 05:50 AM     Lipids Latest Ref Rng & Units 6/10/2021   Chol, Total <200 MG/   HDL 40 - 60 MG/DL 33(L)   LDL 0 - 100 MG/DL 69.6   Trig <150 MG/DL 87   Chol/HDL Ratio 0 - 5.0   3.6     Lab Results   Component Value Date/Time    ALT (SGPT) 18 08/06/2021 01:58 PM     Lab Results   Component Value Date/Time    Hemoglobin A1c 5.2 08/06/2021 01:58 PM     Lab Results   Component Value Date/Time TSH 0.39 08/06/2021 01:58 PM       EKG    ECHO (06/2021)  Left Ventricle Normal cavity size, wall thickness and systolic function (ejection fraction normal). The estimated EF is 50 - 55%. Abnormal left ventricular septal motion. Systolic flattening of the interventricular septum consistent with right ventricle pressure overload. There is mild (grade 1) left ventricular diastolic dysfunction. Wall Scoring The left ventricular wall motion is normal.            Left Atrium Normal cavity size. Right Ventricle Dilated right ventricle. Abnormal wall motion: free wall hypokinesis of the right ventricle. Reduced systolic function. The findings are consistent with Powell's sign . Right Atrium Normal cavity size. Aortic Valve Normal valve structure, trileaflet valve structure, no stenosis and no regurgitation. Mitral Valve Normal valve structure and no stenosis. Trace regurgitation. Tricuspid Valve No stenosis. Non-specific thickening. Mild to moderate regurgitation. Pulmonic Valve Pulmonic valve not well visualized, but normal doppler findings. Aorta Mildly dilated aortic root; diameter is 3.8 cm. Pulmonary Artery Pulmonary arterial systolic pressure (PASP) is 60 mmHg. Pulmonary hypertension found to be moderate. IVC/Hepatic Veins Moderately elevated central venous pressure (8 mmHg); IVC diameter is larger than 21 mm and collapses more than 50% with respiration. CATHETERIZATION (06/2021)  Left Main   Large-caliber vessel. Distal vessel approximately 50% stenosis with calcification that involves LAD and circumflex bifurcation   Left Anterior Descending   The vessel is calcified. The vessel is tortuous. Ostial, proximal and the mid LAD has diffuse calcified moderate disease with sequential eccentric 75% lesions. Mid-distal LAD has no significant obstructive disease Diagonal branch: Moderate disease. Medium caliber vessel   Left Circumflex   The vessel is calcified. The vessel is tortuous.  Proximal LCx stent has mild in-stent restenosis followed by hazy calcified borderline 70% stenosis. OM1: Small caliber vessel OM 2: Large bifurcating vessel. Calcified hazy 70% stenosis before bifurcation. Superior branch distally distally has subtotal occlusion with faint homocollateral. Inferior branch without any significant obstructive disease   Right Coronary Artery   Proximal-mid 100% in-stent occlusion. Previously known by cardiac catheterization in 2019. Evidence of good collaterals from left to right supplying distal RCA and RPDA     Left Ventricle The left ventricular size is normal. LV end diastolic pressure is normal. LV EDP is: 16. The estimated EF = grossly normal and Echocardiogram will be performed. . There are no wall motion abnormalities in the left ventricle. There is no evidence of mitral regurgitation. Aortic Valve There is no aortic valve stenosis. IMPRESSION & PLAN:  Mr. Senora Epley is 59 y.o. male with multiple medical problem    CAD:  Status post left circumflex coronary stent more than 10 years ago. Cardiac catheterization in 06/2021 showing three-vessel CAD. Plan was to proceed with CABG however patient also was found to have a submassive PE with right-sided dysfunction. CABG was deferred  CAD, s/p CABG x3 (08/11/21) LIMA to mLAD, SVG to R PLV and SVG to OM. Currently on aspirin, beta-blocker. Because of significant myalgia, he stopped taking Crestor. He denies any chest pain or chest tightness. He stopped aspirin intermittently because of some hemorrhoid related bleed which has resolved. I have asked him to start taking aspirin again    PE and DVT:  Patient has submassive PE and DVT in 06/2021 with evidence of RV strain by echo. patient has undergone IR guided thromboembolectomy, IVC filter placement during that time. And now on Xarelto  Lifelong anticoagulation will be needed  . Defer to pulmonary team    Hypertension:  /74.   Continue current medications    Hyperlipidemia:  Patient has significant myalgia from simvastatin, atorvastatin. He stopped taking Crestor as well. We will try Livalo 4 mg daily. If he cannot tolerate that, will need to consider PCSK9 inhibitor    This plan was discussed with patient who is in agreement. Thank you for allowing me to participate in patient care. Please feel free to call me if you have any question or concern. Ashtyn Dumont MD  Please note: This document has been produced using voice recognition software. Unrecognized errors in transcription may be present.

## 2021-09-27 NOTE — PROGRESS NOTES
Nelida Araujo presents today for   Chief Complaint   Patient presents with    Follow-up     3 months         Nelida Araujo preferred language for health care discussion is english/other. Is someone accompanying this pt? wife    Is the patient using any DME equipment during [address]? no    Depression Screening:  3 most recent PHQ Screens 9/27/2021   Little interest or pleasure in doing things Not at all   Feeling down, depressed, irritable, or hopeless Not at all   Total Score PHQ 2 0       Learning Assessment:  Learning Assessment 7/26/2021   PRIMARY LEARNER Patient   PRIMARY LANGUAGE ENGLISH   LEARNER PREFERENCE PRIMARY DEMONSTRATION   ANSWERED BY Patient   RELATIONSHIP SELF       Abuse Screening:  Abuse Screening Questionnaire 8/23/2021   Do you ever feel afraid of your partner? N   Are you in a relationship with someone who physically or mentally threatens you? N   Is it safe for you to go home? Y     Pt currently taking Anticoagulant therapy? Xarelto     Coordination of Care:  1. Have you been to the ER, urgent care clinic since your last visit? Hospitalized since your last visit? no    2. Have you seen or consulted any other health care providers outside of the [address] [place] [address] since your last visit? Include any pap smears or colon screening.  no

## 2021-09-30 ENCOUNTER — VIRTUAL VISIT (OUTPATIENT)
Dept: FAMILY MEDICINE CLINIC | Age: 61
End: 2021-09-30
Payer: COMMERCIAL

## 2021-09-30 DIAGNOSIS — M51.16 LUMBAR DISC DISEASE WITH RADICULOPATHY: Primary | ICD-10-CM

## 2021-09-30 PROCEDURE — 99441 PR PHYS/QHP TELEPHONE EVALUATION 5-10 MIN: CPT | Performed by: STUDENT IN AN ORGANIZED HEALTH CARE EDUCATION/TRAINING PROGRAM

## 2021-09-30 RX ORDER — CYCLOBENZAPRINE HCL 5 MG
5 TABLET ORAL
Qty: 90 TABLET | Refills: 0 | Status: SHIPPED | OUTPATIENT
Start: 2021-09-30 | End: 2022-03-13

## 2021-09-30 NOTE — PROGRESS NOTES
Leonard Player presents today for   Chief Complaint   Patient presents with    Back Pain       Virtual/telephone visit    Depression Screening:  3 most recent PHQ Screens 9/27/2021   Little interest or pleasure in doing things Not at all   Feeling down, depressed, irritable, or hopeless Not at all   Total Score PHQ 2 0       Learning Assessment:  Learning Assessment 7/26/2021   PRIMARY LEARNER Patient   PRIMARY LANGUAGE ENGLISH   LEARNER PREFERENCE PRIMARY DEMONSTRATION   ANSWERED BY Patient   RELATIONSHIP SELF       Fall Risk  No flowsheet data found. ADL  ADL Assessment 8/23/2021   Feeding yourself No Help Needed   Getting from bed to chair No Help Needed   Getting dressed No Help Needed   Bathing or showering No Help Needed   Walk across the room (includes cane/walker) No Help Needed   Using the telphone No Help Needed   Taking your medications No Help Needed   Preparing meals No Help Needed   Managing money (expenses/bills) No Help Needed   Moderately strenuous housework (laundry) No Help Needed   Shopping for personal items (toiletries/medicines) No Help Needed   Shopping for groceries No Help Needed   Driving No Help Needed   Climbing a flight of stairs No Help Needed   Getting to places beyond walking distances No Help Needed       Travel Screening:    Travel Screening      No screening recorded since 09/29/21 0000      Travel History   Travel since 08/30/21     No documented travel since 08/30/21          Health Maintenance reviewed and discussed and ordered per Provider. Health Maintenance Due   Topic Date Due    Hepatitis C Screening  Never done    COVID-19 Vaccine (1) Never done    DTaP/Tdap/Td series (1 - Tdap) Never done    Colorectal Cancer Screening Combo  Never done    Shingrix Vaccine Age 50> (1 of 2) Never done    Flu Vaccine (1) Never done   . Coordination of Care:  1. Have you been to the ER, urgent care clinic since your last visit? Hospitalized since your last visit? no    2. Have you seen or consulted any other health care providers outside of the [address] [place] [address] since your last visit? Include any pap smears or colon screening.  no

## 2021-09-30 NOTE — PROGRESS NOTES
Susan Draper is a 59 y.o. male who was seen by synchronous (real-time) audio technology on 9/30/2021. Consent: Susan Draper, who was seen by synchronous (real-time) audio technology, and/or his healthcare decision maker, is aware that this patient-initiated, Telehealth encounter on 9/30/2021 is a billable service, with coverage as determined by his insurance carrier. He is aware that he may receive a bill and has provided verbal consent to proceed: Yes. Subjective:   Susan Draper is a 59 y.o. male who was seen for lower back pain    Patient recently had CABG done and has been having lower back pain for the last 2 days. He normally sees the spine specialist for his chronic lumbar disc disease and has not had any steroid injections in a while. He would like to have some medication to help with the pain. He will go to cardiac rehab later this week. The pain is bilateral and radiates down to his legs. Home Medications    Medication Sig Start Date End Date Taking? Authorizing Provider   cyclobenzaprine (FLEXERIL) 5 mg tablet Take 1 Tablet by mouth three (3) times daily as needed for Muscle Spasm(s). 9/30/21  Yes Lj Jackson MD   pitavastatin calcium (Livalo) 4 mg tab tablet Take 1 Tablet by mouth daily. 9/27/21   Julio Pantoja MD   furosemide (LASIX) 40 mg tablet Take 1 Tablet by mouth daily. 9/7/21   Taylor Bloom PA-C   potassium chloride (K-DUR, KLOR-CON) 20 mEq tablet Take 1 Tablet by mouth daily. Or as directed. 9/7/21   Kristine Braun PA-C   methocarbamoL (ROBAXIN) 500 mg tablet Take 1 Tablet by mouth four (4) times daily as needed for Muscle Spasm(s). 9/3/21   Kofi Zuluaga PA-C   hydrocortisone (Anusol-HC) 25 mg supp Insert 1 Suppository into rectum every twelve (12) hours. 8/23/21   Jorge Sandoval MD   Stiolto Respimat 2.5-2.5 mcg/actuation inhaler TAKE 2 PUFFS BY INHALATION DAILY.   8/20/21   Jorge Sandoval MD   metoprolol tartrate (LOPRESSOR) 25 mg tablet Take 0.5 Tablets by mouth every twelve (12) hours. 21   Roxanne Zuluaga PA-C   rivaroxaban (XARELTO) 20 mg tab tablet Take 1 Tablet by mouth daily. 21   Dov Haynes MD   colchicine (Mitigare) 0.6 mg capsule Take 1 Capsule by mouth daily. 21   Dov Haynes MD   nitroglycerin (NITROLINGUAL) 400 mcg/spray spray SPRAY ONE SPRAY UNDER THE TONGUE AS NEEDED MAY REPEAT UP TO 2 TIMES AS DIRECTED  21   Dov Haynes MD   aspirin delayed-release 81 mg tablet  19   Provider, Historical      Allergies   Allergen Reactions    Gabapentin Other (comments)     \"Caused bad nightmares\"    Statins-Hmg-Coa Reductase Inhibitors Other (comments)     Myalgias with Lipitor. Tolerates Crestor    Pcn [Penicillins] Hives     Social History     Tobacco Use    Smoking status: Former Smoker     Types: Cigarettes     Quit date: 2021     Years since quittin.1    Smokeless tobacco: Never Used   Substance Use Topics    Alcohol use: Yes    Drug use: No            Review of Systems   Musculoskeletal: Positive for back pain. Objective: There were no vitals taken for this visit. General: alert, cooperative, no distress   Mental  status: normal mood, behavior, speech, thought processes, able to follow commands   Psychiatric: normal affect, consistent with stated mood, no evidence of hallucinations       Assessment & Plan:     1. Lumbar disc disease with radiculopathy  Will start flexeril. Follow up with spine specialist.          Time spent on phone: 7 minutes  712      We discussed the expected course, resolution and complications of the diagnosis(es) in detail. Medication risks, benefits, costs, interactions, and alternatives were discussed as indicated. I advised him to contact the office if his condition worsens, changes or fails to improve as anticipated. He expressed understanding with the diagnosis(es) and plan.      Carlos Garcia is a 59 y.o. male who was evaluated by an audio visit encounter for concerns as above. Patient identification was verified prior to start of the visit. A caregiver was present when appropriate. Due to this being a TeleHealth encounter (During [facility] public health emergency), evaluation of the following organ systems was limited: Vitals/Constitutional/EENT/Resp/CV/GI//MS/Neuro/Skin/Heme-Lymph-Imm. Pursuant to the emergency declaration under the [address] [place] [address] waiver authority and the [employer] Act, this Virtual  Visit was conducted, with patient's (and/or legal guardian's) consent, to reduce the patient's risk of exposure to COVID-19 and provide necessary medical care. Lisa Shahid is a 59 y.o. male who was seen by synchronous (real-time) audio technology on 9/30/2021. Consent: Lisa Shahid, who was seen by synchronous (real-time) audio technology, and/or his healthcare decision maker, is aware that this patient-initiated, Telehealth encounter on 9/30/2021 is a billable service, with coverage as determined by his insurance carrier. He is aware that he may receive a bill and has provided verbal consent to proceed: Yes. Ability to conduct physical exam was limited. I was in the office. The patient was at home.

## 2021-10-04 ENCOUNTER — TELEPHONE (OUTPATIENT)
Dept: CARDIOLOGY CLINIC | Age: 61
End: 2021-10-04

## 2021-10-04 NOTE — TELEPHONE ENCOUNTER
Tried calling patient to talk about his [employer] form that was faxed over. Not able to leave message on patient's phone due to busy line and left message on spouse's phone number listed.

## 2021-10-07 ENCOUNTER — TRANSCRIBE ORDER (OUTPATIENT)
Dept: SCHEDULING | Age: 61
End: 2021-10-07

## 2021-10-07 ENCOUNTER — OFFICE VISIT (OUTPATIENT)
Dept: ORTHOPEDIC SURGERY | Age: 61
End: 2021-10-07
Payer: COMMERCIAL

## 2021-10-07 VITALS
BODY MASS INDEX: 38.5 KG/M2 | TEMPERATURE: 97.1 F | HEIGHT: 71 IN | WEIGHT: 275 LBS | HEART RATE: 77 BPM | OXYGEN SATURATION: 99 %

## 2021-10-07 DIAGNOSIS — I26.90 SEPTIC PULMONARY EMBOLISM (HCC): Primary | ICD-10-CM

## 2021-10-07 DIAGNOSIS — I82.401 DEEP VEIN THROMBOSIS OF RIGHT LOWER LIMB (HCC): ICD-10-CM

## 2021-10-07 DIAGNOSIS — M72.0 DUPUYTREN'S CONTRACTURE OF BOTH HANDS: Primary | ICD-10-CM

## 2021-10-07 PROCEDURE — 99203 OFFICE O/P NEW LOW 30 MIN: CPT | Performed by: PHYSICIAN ASSISTANT

## 2021-10-07 NOTE — PROGRESS NOTES
Patient: Galina Bautista                MRN: [id number]       SSN: [id number]  YOB: 1960        AGE: 59 y.o. SEX: male          PCP: Alfonso Bae MD  10/07/21    Chief Complaint   Patient presents with    Hand Pain     bilateral, contractor       HISTORY:  Galina Bautista is a 59 y.o. male presents to the office bilateral hand pain. He was told by his cardiologist that he had Dupuytren's contracture of both hands. He recently underwent a triple bypass open and is on Xarelto 10 mg p.o. daily now with aspirin 81 mg as well. Patient is a  by trade. Both hands are difficult with pain at the base of his fourth and fifth digits with a developed flexion contracture of the left fifth digit and estimated 75 to 80 degrees. Patient has no history of injury to either hand. He is aware of the outpatient injection for the Dupuytren's contracture treatment.         Pain Assessment  10/7/2021   Location of Pain Hand   Location Modifiers Right;Left   Severity of Pain 8   Quality of Pain Aching;Dull   Quality of Pain Comment -   Duration of Pain Persistent   Frequency of Pain Constant   Aggravating Factors Other (Comment)   Aggravating Factors Comment using hands   Limiting Behavior Yes   Relieving Factors Nothing   Relieving Factors Comment -   Result of Injury No           Lab Results   Component Value Date/Time    Hemoglobin A1c 5.2 08/06/2021 01:58 PM     Weight Metrics 10/7/2021 9/27/2021 8/24/2021 8/23/2021 8/16/2021 8/11/2021 8/6/2021   Weight 275 lb 267 lb 248 lb 247 lb 267 lb 6.7 oz - 258 lb   BMI 38.9 kg/m2 37.77 kg/m2 35.08 kg/m2 34.94 kg/m2 - 37.83 kg/m2 36.5 kg/m2            Problem List Items Addressed This Visit     None      Visit Diagnoses     Dupuytren's contracture of both hands    -  Primary          PAST MEDICAL HISTORY:   Past Medical History:   Diagnosis Date    Bilateral hand pain     CAD (coronary artery disease)  Dupuytren's disease of palm     Hyperlipidemia     Hypertension     SHILPI (obstructive sleep apnea)     PE [facility]pulmonary [facility]olism) [address] [address] 06/2021    Severe obesity ([facility] [address]     Tobacco dependence        PAST SURGICAL HISTORY:   Past Surgical History:   Procedure Laterality Date    HX CHOLECYSTECTOMY      HX CORONARY STENT PLACEMENT      HX HERNIA REPAIR      IR IVC FILTER PLACEMENT PERCUTANEOUS  6/15/2021    IR THROMBECTOMY MECH ART PRIMARY NON HETAL OR INTRACRANIAL  6/15/2021    ID CARDIAC SURG PROCEDURE UNLIST      filter and triple bypass       ALLERGIES:   Allergies   Allergen Reactions    Gabapentin Other (comments)     \"Caused bad nightmares\"    Statins-Hmg-Coa Reductase Inhibitors Other (comments)     Myalgias with Lipitor. Tolerates Crestor    Pcn [Penicillins] Hives        CURRENT MEDICATIONS:  A list of medications prior to the time of admission include:  Prior to Admission medications    Medication Sig Start Date End Date Taking? Authorizing Provider   cyclobenzaprine (FLEXERIL) 5 mg tablet Take 1 Tablet by mouth three (3) times daily as needed for Muscle Spasm(s). 9/30/21  Yes Aydee Ruth MD   pitavastatin calcium (Livalo) 4 mg tab tablet Take 1 Tablet by mouth daily. 9/27/21  Yes Carmen Kinney MD   furosemide (LASIX) 40 mg tablet Take 1 Tablet by mouth daily. 9/7/21  Yes Joyce Dia PA-C   potassium chloride (K-DUR, KLOR-CON) 20 mEq tablet Take 1 Tablet by mouth daily. Or as directed. 9/7/21  Yes Joyce Dia PA-C   methocarbamoL (ROBAXIN) 500 mg tablet Take 1 Tablet by mouth four (4) times daily as needed for Muscle Spasm(s). 9/3/21  Yes Earle Zuluaga PA-C   hydrocortisone (Anusol-HC) 25 mg supp Insert 1 Suppository into rectum every twelve (12) hours. 8/23/21  Yes Chantal Alaniz MD   Stiolto Respimat 2.5-2.5 mcg/actuation inhaler TAKE 2 PUFFS BY INHALATION DAILY.   8/20/21  Yes Chantal Alaniz MD   metoprolol tartrate (LOPRESSOR) 25 mg tablet Take 0.5 Tablets by mouth every twelve (12) hours. 21  Yes Dorys Zuluaga PA-C   rivaroxaban (XARELTO) 20 mg tab tablet Take 1 Tablet by mouth daily. 21  Yes Vivi Rivera MD   colchicine (Mitigare) 0.6 mg capsule Take 1 Capsule by mouth daily. 21  Yes Vivi Rivera MD   nitroglycerin (NITROLINGUAL) 400 mcg/spray spray SPRAY ONE SPRAY UNDER THE TONGUE AS NEEDED MAY REPEAT UP TO 2 TIMES AS DIRECTED  21  Yes Vivi Rivera MD   aspirin delayed-release 81 mg tablet  19  Yes Provider, Historical       FAMILY HISTORY:   Family History   Problem Relation Age of Onset    Hypertension Mother     Diabetes Mother        SOCIAL HISTORY:   Social History     Socioeconomic History    Marital status:      Spouse name: Not on file    Number of children: Not on file    Years of education: Not on file    Highest education level: Not on file   Tobacco Use    Smoking status: Former Smoker     Types: Cigarettes     Quit date: 2021     Years since quittin.1    Smokeless tobacco: Never Used   Substance and Sexual Activity    Alcohol use: Yes    Drug use: No   Other Topics Concern     Social Determinants of Health     Financial Resource Strain:     Difficulty of Paying Living Expenses:    Food Insecurity:     Worried About Running Out of Food in the Last Year:     [address] in the Last Year:    Transportation Needs:     Lack of Transportation (Medical):  Lack of Transportation (Non-Medical):    Physical Activity:     Days of Exercise per Week:     Minutes of Exercise per Session:    Stress:     Feeling of Stress :    Social Connections:     Frequency of Communication with Friends and Family:     Frequency of Social Gatherings with Friends and Family:     Attends [employer] Services:     Active Member of Clubs or Organizations:     Attends Club or Organization Meetings:     Marital Status:        ROS:No CP, No SOB, No fever/chills nor night sweats.  No headaches, vision abnormalities to include double and oral loss of vision. No hearing abnormalities. Musculoskeletal pain per HPI. Pain is exacerbated positionally. Pt denies h/o spinal surgery, injections, or PT/chiropractor. Self treated with less than adequate relief on oral antiinflammatories. . Pt denies change in bowel or bladder habits. Pt denies fever, weight loss, or skin changes. PHYSICAL EXAM:    Visit Vitals  Pulse 77   Temp 97.1 °F (36.2 °C)   Ht 5' 10.5\" (1.791 m)   Wt 275 lb (124.7 kg)   SpO2 99%   BMI 38.90 kg/m²       Constitutional: Appears well-developed and well-nourished. No distress. Sitting comfortably in the exam room, interacting with conversation with pleasant affect. Gait is steady and patient exhibits no evidence of ataxia. Patient is able to ambulate without difficulty. No focal neurological deficit noted. No facial droop, slurred speech, or evidence of altered mentation noted on exam.   Skin: Skin over the head, neck, bilateral limbs, and trunk is warm and dry. No rash or erythema noted. Cranial Nerves II-XII grossly intact  HENT: NC/AT. Normal symmetry, bulk and tone of facial and neck musculature. Trachea midline. No discernible thyromegaly or masses. No involuntary movements. Lymphatic: No preauricular, submandibuar, anterior or posterior cervical lymphadenopathy. Psychiatric: The patient is awake, alert, and oriented to person, place and time. Behavior is normal. Thought content normal.   Cardiovascular: No clubbing, cyanosis. No edema bilateral lower extremities. Pulmonary: No tripoding nor accessory muscle recruitment. Breathing normally, no distress, no audible wheezing. Distal cap refill intact at 2/2 Lobo UE / LE. Neuro intact Lobo UE/LE to noxious stimuli        Ortho Specific exam:      Of bilateral hands reveal no warmth, or erythema.     Over the volar aspect of both hands associated at the base of the fourth and fifth digits extensive area of soft tissue swelling with fibrous tissue underlying the superficial skin consistent with Dupuytren's contracture deformities. Patient lacks full extension of the left fifth digit and has a 75 to 80 degrees flexion contracture noted. Right fourth and fifth digits full in their flexion plane as well as extension however there is deformity associated again the base of the fourth and fifth metacarpal phalangeal joint spaces consistent with Dupuytren's contracture. IMPRESSION:      ICD-10-CM ICD-9-CM    1. Dupuytren's contracture of both hands  M72.0 728.6         PLAN: Today he was cussed alternatives to care to include but not limited to open releases versus outpatient injections. I spoke to Dr. Luh Martinez concerning the patient's diagnoses and after discussing with the patient the election was made to see Dr. Luh Martinez outpatient for consideration for outpatient injection to dissolve the contractures. Patient would still need to be off of Xarelto for 3 days prior to the procedure and 3 after. His aspirin would also have to be stopped as well. The patient and his wife were made aware of that today. They will check with the cardiologist for further recommendations based on his anticoagulation needs status post triple bypass. Today all of their questions answered to their satisfaction. Additionally today we discussed the diagnosis of obesity and the importance of weight management for both cardiovascular health. The patient was recommended to decrease carbohydrate and sugar intake. Patient recommended a formal dietary consult which they will consider and return a call to our office. In light of the patient's osteoarthritic findings I am making a recommendation for aerobic exercise to include but not limited to stationary bicycle, elliptical, therapeutic walking with good shoes and or swimming. Patient should avoid any running or jumping.   If using the treadmill then recommendation for no elevation and no running or jogging. Walking is improved. No Narcotic indicated today. Patient given pain medication for short term acute pain relief. Goal is to treat patient according to above plan and to ultimately have patient off all pain medications once appropriate. If chronic pain management is required beyond what is expected for current orthopedic problem, will refer patient to pain management.  was reviewed and will be reviewed with every medication refill request.         Patient provided a reminder for a \"due or due soon\" health maintenance. I have asked the patient to schedule an appointment with their primary care provider for follow-up on general health maintenance concerns. Today all the patient's questions were answered to their satisfaction. Copies of x-rays reviewed if obtained this visit, and provided to patient. Dictation disclaimer:  Please note that this dictation was completed with [employer], the computer voice recognition software. Quite often unanticipated grammatical, syntax, homophones, and other interpretive errors are inadvertently transcribed by the computer software. Please disregard these errors. Please excuse any errors that have escaped final proofreading. Bobby BRODERICK, APC, MPAS, PA-C  [facility]

## 2021-10-14 ENCOUNTER — TELEPHONE (OUTPATIENT)
Dept: CARDIOTHORACIC SURGERY | Age: 61
End: 2021-10-14

## 2021-10-14 NOTE — TELEPHONE ENCOUNTER
Patient called and stated that he need a work note stating that he has not restrictions and can return back to work on 10/22/2021.

## 2021-10-14 NOTE — LETTER
NOTIFICATION RETURN TO WORK / SCHOOL    10/14/2021 12:57 PM    Mr. iWl Cook  [address] [address]      To Whom It May Concern:    Wil Cook is currently under the care of [address] He will return to work/school on: 10/22/2021 with no restrictions. If there are questions or concerns please have the patient contact our office at [phone].         Sincerely,          Favio Ball MD

## 2021-10-14 NOTE — LETTER
NOTIFICATION RETURN TO WORK / SCHOOL    10/15/2021 9:44 AM    Mr. Asad Leon  [id number] [place] [facility] [id number]      To Whom It May Concern:    Asad Leon is currently under the care of [address] He will return to work/school on: 10/22/2021 as a  with no restrictions. If there are questions or concerns please have the patient contact our office at [phone] or fax [phone].         Sincerely,          Radha Bettencourt MD

## 2021-10-15 ENCOUNTER — HOSPITAL ENCOUNTER (OUTPATIENT)
Dept: VASCULAR SURGERY | Age: 61
Discharge: HOME | End: 2021-10-15
Attending: RADIOLOGY
Payer: COMMERCIAL

## 2021-10-15 ENCOUNTER — HOSPITAL ENCOUNTER (OUTPATIENT)
Dept: CT IMAGING | Age: 61
Discharge: HOME | End: 2021-10-15
Attending: RADIOLOGY
Payer: COMMERCIAL

## 2021-10-15 DIAGNOSIS — I26.90 SEPTIC PULMONARY EMBOLISM (HCC): ICD-10-CM

## 2021-10-15 DIAGNOSIS — I82.401 DEEP VEIN THROMBOSIS OF RIGHT LOWER LIMB (HCC): ICD-10-CM

## 2021-10-15 LAB — CREAT UR-MCNC: 1.3 MG/DL (ref 0.6–1.3)

## 2021-10-15 PROCEDURE — 74011000636 HC RX REV CODE- 636: Performed by: RADIOLOGY

## 2021-10-15 PROCEDURE — 71275 CT ANGIOGRAPHY CHEST: CPT

## 2021-10-15 PROCEDURE — 82565 ASSAY OF CREATININE: CPT

## 2021-10-15 PROCEDURE — 93970 EXTREMITY STUDY: CPT

## 2021-10-15 RX ADMIN — IOPAMIDOL 80 ML: 755 INJECTION, SOLUTION INTRAVENOUS at 13:06

## 2021-10-15 NOTE — TELEPHONE ENCOUNTER
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

## 2021-11-11 ENCOUNTER — HOSPITAL ENCOUNTER (EMERGENCY)
Age: 61
Discharge: HOME | End: 2021-11-11
Attending: EMERGENCY MEDICINE
Payer: COMMERCIAL

## 2021-11-11 VITALS
DIASTOLIC BLOOD PRESSURE: 68 MMHG | RESPIRATION RATE: 18 BRPM | TEMPERATURE: 98.4 F | WEIGHT: 270 LBS | HEART RATE: 92 BPM | OXYGEN SATURATION: 100 % | BODY MASS INDEX: 38.65 KG/M2 | SYSTOLIC BLOOD PRESSURE: 131 MMHG | HEIGHT: 70 IN

## 2021-11-11 DIAGNOSIS — M96.843 SEROMA OF MUSCULOSKELETAL STRUCTURE AFTER NON-MUSCULOSKELETAL SYSTEM PROCEDURE: Primary | ICD-10-CM

## 2021-11-11 PROCEDURE — 74011250637 HC RX REV CODE- 250/637: Performed by: EMERGENCY MEDICINE

## 2021-11-11 PROCEDURE — 99283 EMERGENCY DEPT VISIT LOW MDM: CPT

## 2021-11-11 RX ORDER — HYDROCODONE BITARTRATE AND ACETAMINOPHEN 5; 325 MG/1; MG/1
1 TABLET ORAL
Status: COMPLETED | OUTPATIENT
Start: 2021-11-11 | End: 2021-11-11

## 2021-11-11 RX ORDER — LIDOCAINE 4 G/100G
PATCH TOPICAL
Qty: 10 PATCH | Refills: 0 | Status: SHIPPED | OUTPATIENT
Start: 2021-11-11 | End: 2022-03-13

## 2021-11-11 RX ORDER — SULFAMETHOXAZOLE AND TRIMETHOPRIM 800; 160 MG/1; MG/1
1 TABLET ORAL 2 TIMES DAILY
Qty: 14 TABLET | Refills: 0 | Status: SHIPPED | OUTPATIENT
Start: 2021-11-11 | End: 2021-11-18

## 2021-11-11 RX ADMIN — HYDROCODONE BITARTRATE AND ACETAMINOPHEN 1 TABLET: 5; 325 TABLET ORAL at 22:34

## 2021-11-12 NOTE — ED PROVIDER NOTES
EMERGENCY DEPARTMENT HISTORY AND PHYSICAL EXAM      Date: 11/11/2021  Patient Name: Brynn Rushing    History of Presenting Illness     Chief Complaint   Patient presents with    Leg Pain       History Provided By: Patient and Patient's Wife    HPI: Brynn Rushing, 59 y.o. male with a past medical history significant Extensive medical history including CABG, PEs presents to the ED with cc of Left inner thigh painful swelling getting worse in 4 weeks. States he was placed on anticoagulants and a Doppler of his leg was done recently to check for DVT in the leg. US showed a seroma. Patient states he has been working night shifts 12 hours a night and his swelling bothers him more in the past 4 weeks. It is worsened by prolonged standing. He cannot afford to stop working at this time and wants stronger medications like Dilaudid  as his doctor asked him to avoid NSAIDS. There are no other complaints, changes, or physical findings at this time. PCP: Joseph Lugo MD    No current facility-administered medications on file prior to encounter. Current Outpatient Medications on File Prior to Encounter   Medication Sig Dispense Refill    furosemide (LASIX) 40 mg tablet Take 1 Tablet by mouth daily. 30 Tablet 1    Stiolto Respimat 2.5-2.5 mcg/actuation inhaler TAKE 2 PUFFS BY INHALATION DAILY. 1 Inhaler 5    metoprolol tartrate (LOPRESSOR) 25 mg tablet Take 0.5 Tablets by mouth every twelve (12) hours. 30 Tablet 2    rivaroxaban (XARELTO) 20 mg tab tablet Take 1 Tablet by mouth daily. 90 Tablet 1    colchicine (Mitigare) 0.6 mg capsule Take 1 Capsule by mouth daily. 30 Capsule 2    nitroglycerin (NITROLINGUAL) 400 mcg/spray spray SPRAY ONE SPRAY UNDER THE TONGUE AS NEEDED MAY REPEAT UP TO 2 TIMES AS DIRECTED  12 g 5    aspirin delayed-release 81 mg tablet   1    cyclobenzaprine (FLEXERIL) 5 mg tablet Take 1 Tablet by mouth three (3) times daily as needed for Muscle Spasm(s).  90 Tablet 0    pitavastatin calcium (Livalo) 4 mg tab tablet Take 1 Tablet by mouth daily. (Patient not taking: Reported on 2021) 90 Tablet 3    [DISCONTINUED] potassium chloride (K-DUR, KLOR-CON) 20 mEq tablet Take 1 Tablet by mouth daily. Or as directed. 30 Tablet 1    methocarbamoL (ROBAXIN) 500 mg tablet Take 1 Tablet by mouth four (4) times daily as needed for Muscle Spasm(s). (Patient not taking: Reported on 2021) 28 Tablet 0    hydrocortisone (Anusol-HC) 25 mg supp Insert 1 Suppository into rectum every twelve (12) hours. (Patient not taking: Reported on 2021) 14 Suppository 1       Past History     Past Medical History:  Past Medical History:   Diagnosis Date    Bilateral hand pain     CAD (coronary artery disease)     Dupuytren's disease of palm     Hyperlipidemia     Hypertension     SHILPI (obstructive sleep apnea)     PE (pulmonary thromboembolism) ([address] 2021    Severe obesity (HCC)     Tobacco dependence        Past Surgical History:  Past Surgical History:   Procedure Laterality Date    HX CHOLECYSTECTOMY      HX CORONARY STENT PLACEMENT      HX HERNIA REPAIR      IR IVC FILTER PLACEMENT PERCUTANEOUS  6/15/2021    IR THROMBECTOMY MECH ART PRIMARY NON HETAL OR INTRACRANIAL  6/15/2021    OR CARDIAC SURG PROCEDURE UNLIST      filter and triple bypass       Family History:  Family History   Problem Relation Age of Onset    Hypertension Mother     Diabetes Mother        Social History:  Social History     Tobacco Use    Smoking status: Former Smoker     Types: Cigarettes     Quit date: 2021     Years since quittin.2    Smokeless tobacco: Never Used   Substance Use Topics    Alcohol use: Yes    Drug use: No       Allergies: Allergies   Allergen Reactions    Gabapentin Other (comments)     \"Caused bad nightmares\"    Statins-Hmg-Coa Reductase Inhibitors Other (comments)     Myalgias with Lipitor.   Tolerates Crestor    Pcn [Penicillins] Hives         Review of Systems Review of Systems   Constitutional: Negative. HENT: Negative. Respiratory: Negative. Cardiovascular: Negative. Gastrointestinal: Negative. Genitourinary: Negative. Musculoskeletal: Negative. Skin:        Left inner thigh painful swelling   Neurological: Negative. All other systems reviewed and are negative. Physical Exam     Physical Exam  Vitals and nursing note reviewed. Constitutional:       Appearance: Normal appearance. He is obese. HENT:      Head: Normocephalic. Eyes:      Extraocular Movements: Extraocular movements intact. Pupils: Pupils are equal, round, and reactive to light. Cardiovascular:      Rate and Rhythm: Normal rate and regular rhythm. Pulses: Normal pulses. Heart sounds: Normal heart sounds. Pulmonary:      Effort: Pulmonary effort is normal.      Breath sounds: Normal breath sounds. Abdominal:      Palpations: Abdomen is soft. Tenderness: There is no abdominal tenderness. Musculoskeletal:         General: Tenderness present. Normal range of motion. Cervical back: Normal range of motion and neck supple. Comments: Tender subcutaneous swelling medial aspect of left thigh, about 4 by 5 cm. Firm, mildly warm to touch. No associated lymphangitis. NV intact   Neurological:      Mental Status: He is alert. Lab and Diagnostic Study Results     Labs -   No results found for this or any previous visit (from the past 12 hour(s)). Radiologic Studies -   @lastxrresult@  CT Results  (Last 48 hours)    None        CXR Results  (Last 48 hours)    None            Medical Decision Making   - I am the first provider for this patient. - I reviewed the vital signs, available nursing notes, past medical history, past surgical history, family history and social history. - Initial assessment performed.  The patients presenting problems have been discussed, and they are in agreement with the care plan formulated and outlined with them. I have encouraged them to ask questions as they arise throughout their visit. Vital Signs-Reviewed the patient's vital signs. Patient Vitals for the past 12 hrs:   Pulse Resp BP SpO2   11/11/21 2153 92 18 131/68 100 %       Records Reviewed: Nursing Notes and Old Medical Records    The patient presents with       ED Course:          Provider Notes (Medical Decision Making):     MDM  Number of Diagnoses or Management Options  Risk of Complications, Morbidity, and/or Mortality  Presenting problems: low  Diagnostic procedures: low  Management options: low  General comments: Patient has had extensive workup including an US which was negative fir DVT and showed what appears to be a seroma. He needs to rest and elevate the leg but states he cannot go out of work as he took off for 6 months already. He is taking his anticoagulants and states he also has an IVC filter. Will start on lidocaine pain patch, Bactrim, support stockings, elevate leg and follow up with his doctors. He was called by Cardiac Rehab 2 days ago for an appointment but missed it. He will call in the morning to reestablish his schedule. Procedures   Medical Decision Makingedical Decision Making  Performed by: Citlali Brewster MD  PROCEDURES:  Procedures       Disposition   Disposition: DC- Adult Discharges: All of the diagnostic tests were reviewed and questions answered. Diagnosis, care plan and treatment options were discussed. The patient understands the instructions and will follow up as directed. The patients results have been reviewed with them. They have been counseled regarding their diagnosis. The patient and spouse/SO verbally convey understanding and agreement of the signs, symptoms, diagnosis, treatment and prognosis and additionally agrees to follow up as recommended with their PCP in 24 - 48 hours. They also agree with the care-plan and convey that all of their questions have been answered.   I have also put together some discharge instructions for them that include: 1) educational information regarding their diagnosis, 2) how to care for their diagnosis at home, as well a 3) list of reasons why they would want to return to the ED prior to their follow-up appointment, should their condition change. DISCHARGE PLAN:  1. Current Discharge Medication List      CONTINUE these medications which have NOT CHANGED    Details   furosemide (LASIX) 40 mg tablet Take 1 Tablet by mouth daily. Qty: 30 Tablet, Refills: 1      potassium chloride (K-DUR, KLOR-CON) 20 mEq tablet Take 1 Tablet by mouth daily. Or as directed. Qty: 30 Tablet, Refills: 1      Stiolto Respimat 2.5-2.5 mcg/actuation inhaler TAKE 2 PUFFS BY INHALATION DAILY. Qty: 1 Inhaler, Refills: 5      metoprolol tartrate (LOPRESSOR) 25 mg tablet Take 0.5 Tablets by mouth every twelve (12) hours. Qty: 30 Tablet, Refills: 2      rivaroxaban (XARELTO) 20 mg tab tablet Take 1 Tablet by mouth daily. Qty: 90 Tablet, Refills: 1      colchicine (Mitigare) 0.6 mg capsule Take 1 Capsule by mouth daily. Qty: 30 Capsule, Refills: 2    Associated Diagnoses: Acute idiopathic gout, unspecified site      nitroglycerin (NITROLINGUAL) 400 mcg/spray spray SPRAY ONE SPRAY UNDER THE TONGUE AS NEEDED MAY REPEAT UP TO 2 TIMES AS DIRECTED   Qty: 12 g, Refills: 5      aspirin delayed-release 81 mg tablet Refills: 1      cyclobenzaprine (FLEXERIL) 5 mg tablet Take 1 Tablet by mouth three (3) times daily as needed for Muscle Spasm(s). Qty: 90 Tablet, Refills: 0      pitavastatin calcium (Livalo) 4 mg tab tablet Take 1 Tablet by mouth daily. Qty: 90 Tablet, Refills: 3      methocarbamoL (ROBAXIN) 500 mg tablet Take 1 Tablet by mouth four (4) times daily as needed for Muscle Spasm(s). Qty: 28 Tablet, Refills: 0      hydrocortisone (Anusol-HC) 25 mg supp Insert 1 Suppository into rectum every twelve (12) hours.   Qty: 14 Suppository, Refills: 1    Associated Diagnoses: Grade II hemorrhoids           2. Follow-up Information     Follow up With Specialties Details Why Contact Info    Vania Guerirer MD Cardiology, Internal Medicine In 2 days  [facility]  [address] [place] [address] [address]  [phone]          3. Return to ED if worse   4. Current Discharge Medication List      START taking these medications    Details   trimethoprim-sulfamethoxazole (Bactrim DS) 160-800 mg per tablet Take 1 Tablet by mouth two (2) times a day for 7 days. Qty: 14 Tablet, Refills: 0  Start date: 11/11/2021, End date: 11/18/2021      lidocaine 4 % patch 1 patch q 12 hours for pain  Indications: Pain  Qty: 10 Patch, Refills: 0  Start date: 11/11/2021         STOP taking these medications       potassium chloride (K-DUR, KLOR-CON) 20 mEq tablet Comments:   Reason for Stopping:                 Diagnosis     Clinical Impression:   1. Seroma of musculoskeletal structure after non-musculoskeletal system procedure        Attestations:    Anahi Calderon MD    Please note that this dictation was completed with [employer], the computer voice recognition software. Quite often unanticipated grammatical, syntax, homophones, and other interpretive errors are inadvertently transcribed by the computer software. Please disregard these errors. Please excuse any errors that have escaped final proofreading. Thank you.

## 2021-11-12 NOTE — DISCHARGE INSTRUCTIONS
thigh high compression stockings. Rest and elevate leg. Warm compress to affected area as needed. Call your doctor for appointment.

## 2021-11-16 RX ORDER — METOPROLOL TARTRATE 25 MG/1
TABLET, FILM COATED ORAL
Qty: 30 TABLET | Refills: 1 | OUTPATIENT
Start: 2021-11-16

## 2021-11-19 NOTE — TELEPHONE ENCOUNTER
Requested Prescriptions     Pending Prescriptions Disp Refills    metoprolol tartrate (LOPRESSOR) 25 mg tablet 30 Tablet 2     Sig: Take 0.5 Tablets by mouth every twelve (12) hours.

## 2021-11-22 RX ORDER — METOPROLOL TARTRATE 25 MG/1
12.5 TABLET, FILM COATED ORAL EVERY 12 HOURS
Qty: 30 TABLET | Refills: 2 | Status: SHIPPED | OUTPATIENT
Start: 2021-11-22 | End: 2022-03-29

## 2021-12-10 ENCOUNTER — TELEPHONE (OUTPATIENT)
Dept: GENERAL RADIOLOGY | Age: 61
End: 2021-12-10

## 2021-12-10 DIAGNOSIS — Z86.711 HISTORY OF PULMONARY EMBOLISM: Primary | ICD-10-CM

## 2021-12-10 NOTE — TELEPHONE ENCOUNTER
Interventional Radiology Clinic Consultation Note    Patient: Qian Martínez               Sex: male          Date of Visit: 12/10/21       YOB: 1960      Age:  59 y.o. Referring Physician: Dr. Maria E Ureña          HPI:     Qian Martínez is a 59 y.o. male who has been seen in follow up of prior PE treated by Dr. Juan Olmedo with a pulmonary artery thrombectomy and IVC filter placement 6/15/21. Interview was performed with his wife over the phone as the patient works the night shift. Mrs. Jose Nation states that her  has not had any bleeding complications on Xarelto. He is compliant with his medication. She denies new dyspnea or chest pain. She reports that he was recently diagnosed with a leg seroma, but has otherwise not had any leg pain. He continues to follow with cardiothoracics and pulmonary. He has no known malignancy. Images (CT and LE PVL) 10/15/21 show resolution of PE and DVT. Past Medical History:   Diagnosis Date    Bilateral hand pain     CAD (coronary artery disease)     Dupuytren's disease of palm     Hyperlipidemia     Hypertension     SHILPI (obstructive sleep apnea)     PE (pulmonary thromboembolism) ([facility] [address] 06/2021    Severe obesity (HCC)     Tobacco dependence      Past Surgical History:   Procedure Laterality Date    HX CHOLECYSTECTOMY      HX CORONARY STENT PLACEMENT      HX HERNIA REPAIR      IR IVC FILTER PLACEMENT PERCUTANEOUS  6/15/2021    IR THROMBECTOMY MECH ART PRIMARY NON HETAL OR INTRACRANIAL  6/15/2021    GA CARDIAC SURG PROCEDURE UNLIST      filter and triple bypass     Family History   Problem Relation Age of Onset    Hypertension Mother     Diabetes Mother      Prior to Admission medications    Medication Sig Start Date End Date Taking? Authorizing Provider   metoprolol tartrate (LOPRESSOR) 25 mg tablet Take 0.5 Tablets by mouth every twelve (12) hours.  11/22/21   Myke Eagle MD   lidocaine 4 % patch 1 patch q 12 hours for pain Indications: Pain 11/11/21   Alea Torrez MD   cyclobenzaprine (FLEXERIL) 5 mg tablet Take 1 Tablet by mouth three (3) times daily as needed for Muscle Spasm(s). 9/30/21   Jessica Ortiz MD   pitavastatin calcium (Livalo) 4 mg tab tablet Take 1 Tablet by mouth daily. Patient not taking: Reported on 11/11/2021 9/27/21   Babita Warner MD   furosemide (LASIX) 40 mg tablet Take 1 Tablet by mouth daily. 9/7/21   Bud Mendoza PA-C   methocarbamoL (ROBAXIN) 500 mg tablet Take 1 Tablet by mouth four (4) times daily as needed for Muscle Spasm(s). Patient not taking: Reported on 11/11/2021 9/3/21   Lynne Herrera PA-C   hydrocortisone (Anusol-HC) 25 mg supp Insert 1 Suppository into rectum every twelve (12) hours. Patient not taking: Reported on 11/11/2021 8/23/21   Wisam Randolph MD   Stiolto Respimat 2.5-2.5 mcg/actuation inhaler TAKE 2 PUFFS BY INHALATION DAILY. 8/20/21   Wisam Randolph MD   rivaroxaban (XARELTO) 20 mg tab tablet Take 1 Tablet by mouth daily. 7/20/21   Wisam Randloph MD   colchicine (Mitigare) 0.6 mg capsule Take 1 Capsule by mouth daily. 6/25/21   Wisam Randolph MD   nitroglycerin (NITROLINGUAL) 400 mcg/spray spray SPRAY ONE SPRAY UNDER THE TONGUE AS NEEDED MAY REPEAT UP TO 2 TIMES AS DIRECTED  5/23/21   Wisam Randolph MD   aspirin delayed-release 81 mg tablet  4/29/19   Provider, Historical     Allergies   Allergen Reactions    Gabapentin Other (comments)     \"Caused bad nightmares\"    Statins-Hmg-Coa Reductase Inhibitors Other (comments)     Myalgias with Lipitor. Tolerates Crestor    Pcn [Penicillins] Hives     Review of Systems  Pertinent items are noted in the History of Present Illness. Physical Exam:   The physical exam was deferred    Assessment   History of PE  History of left lower extremity DVT    Recent images show resolution of PE and DVT. The patient has been tolerating [address]. Plan   Case and images reviewed by Dr. Chyna Benavides.      The patient has done very well post procedure. We will plan for an IVC filter retrieval at the patient's next earliest convenience. Appointment for Pulmonary medical visit 12/20/21    I have spent 25 minutes with the patient with greater than 50% of the time dedicated to the patient's counseling as well as the coordination of patient care.     Thank you,  JENNIFER Jose

## 2021-12-15 ENCOUNTER — TRANSCRIBE ORDER (OUTPATIENT)
Dept: INTERVENTIONAL RADIOLOGY/VASCULAR | Age: 61
End: 2021-12-15

## 2021-12-15 DIAGNOSIS — Z86.711 HISTORY OF PULMONARY EMBOLISM: Primary | ICD-10-CM

## 2021-12-30 RX ORDER — POTASSIUM CHLORIDE 20 MEQ/1
20 TABLET, EXTENDED RELEASE ORAL DAILY
Qty: 30 TABLET | Refills: 1 | Status: SHIPPED | OUTPATIENT
Start: 2021-12-30 | End: 2022-03-04

## 2021-12-30 RX ORDER — FUROSEMIDE 40 MG/1
40 TABLET ORAL DAILY
Qty: 30 TABLET | Refills: 1 | Status: SHIPPED | OUTPATIENT
Start: 2021-12-30 | End: 2022-03-04

## 2022-01-05 RX ORDER — RIVAROXABAN 20 MG/1
TABLET, FILM COATED ORAL
Qty: 90 TABLET | Refills: 1 | Status: SHIPPED | OUTPATIENT
Start: 2022-01-05 | End: 2022-05-06 | Stop reason: SDUPTHER

## 2022-01-10 ENCOUNTER — OFFICE VISIT (OUTPATIENT)
Dept: CARDIOLOGY CLINIC | Age: 62
End: 2022-01-10
Payer: COMMERCIAL

## 2022-01-10 VITALS
HEIGHT: 70 IN | HEART RATE: 78 BPM | BODY MASS INDEX: 38.65 KG/M2 | SYSTOLIC BLOOD PRESSURE: 128 MMHG | DIASTOLIC BLOOD PRESSURE: 84 MMHG | WEIGHT: 270 LBS | OXYGEN SATURATION: 98 %

## 2022-01-10 DIAGNOSIS — I25.10 CORONARY ARTERY DISEASE INVOLVING NATIVE CORONARY ARTERY WITHOUT ANGINA PECTORIS, UNSPECIFIED WHETHER NATIVE OR TRANSPLANTED HEART: Primary | ICD-10-CM

## 2022-01-10 DIAGNOSIS — E78.00 PURE HYPERCHOLESTEROLEMIA: ICD-10-CM

## 2022-01-10 PROCEDURE — 93000 ELECTROCARDIOGRAM COMPLETE: CPT | Performed by: INTERNAL MEDICINE

## 2022-01-10 PROCEDURE — 99213 OFFICE O/P EST LOW 20 MIN: CPT | Performed by: INTERNAL MEDICINE

## 2022-01-10 NOTE — LETTER
1/10/2022    Patient: Erlin Payne   YOB: 1960   Date of Visit: 1/10/2022     MD Estrellita Mott [phone]  [address] [facility] [id number]    Dear Yanick Santacruz MD,      Thank you for referring Mr. Kait Nix to [address] [place] [address] for evaluation. My notes for this consultation are attached. If you have questions, please do not hesitate to call me. I look forward to following your patient along with you.       Sincerely,    Kayla Lacy MD

## 2022-01-10 NOTE — PROGRESS NOTES
[facility]    Mr. Dilip Tucker is a 59 y.o. male with a history of CAD, hypertension, hyperlipidemia, sleep apnea, pulmonary embolism, tobacco use disorder    Patient is here today for cardiac follow-up. Patient has history of coronary disease that is post left circumflex coronary stent more than 10 years ago. Patient was [facility] with chest pain concerning for angina in 06/2021. Patient underwent emergent cardiac catheterization which showed severe three-vessel coronary artery disease. Patient continues to have a chest pain despite appropriate medical management. Echocardiogram revealed RV strain. CT chest revealed submassive bilateral PE Patient underwent IR guided thromboembolectomy in 06/2021 followed by IVC filter placement. Patient was started on Xarelto. CAD, s/p CABG x3 (08/11/21) LIMA to mLAD, SVG to R PLV and SVG to OM. Since last visit, patient is feeling much better. He does not have significant dyspnea anymore. He was not able to tolerate Livalo with significant myalgia  He denies any use of nitroglycerin. He denies any chest pain or chest tightness. He scheduled to have his IVC filter removed by IR department  Denies any nausea, vomiting, abdominal pain, fever, chills, sputum production. No hematuria or other bleeding complaints    Past Medical History:   Diagnosis Date    Bilateral hand pain     CAD (coronary artery disease)     Dupuytren's disease of palm     Hyperlipidemia     Hypertension     SHILPI (obstructive sleep apnea)     PE (pulmonary thromboembolism) ([facility] [address] 06/2021    Severe obesity ([facility] [address]     Tobacco dependence        Review of Systems:  Cardiac symptoms as noted above in HPI. All others negative.   Denies fatigue, malaise, skin rash, joint pain, blurring vision, photophobia, neck pain, hemoptysis, chronic cough, nausea, vomiting, hematuria, burning micturition, BRBPR, chronic headaches. Current Outpatient Medications   Medication Sig    Xarelto 20 mg tab tablet TAKE 1 TABLET BY MOUTH DAILY.  potassium chloride (K-DUR, KLOR-CON M20) 20 mEq tablet TAKE 1 TABLET BY MOUTH DAILY. OR AS DIRECTED.  furosemide (LASIX) 40 mg tablet TAKE 1 TABLET BY MOUTH DAILY.  metoprolol tartrate (LOPRESSOR) 25 mg tablet Take 0.5 Tablets by mouth every twelve (12) hours.  lidocaine 4 % patch 1 patch q 12 hours for pain  Indications: Pain    cyclobenzaprine (FLEXERIL) 5 mg tablet Take 1 Tablet by mouth three (3) times daily as needed for Muscle Spasm(s).  methocarbamoL (ROBAXIN) 500 mg tablet Take 1 Tablet by mouth four (4) times daily as needed for Muscle Spasm(s).  hydrocortisone (Anusol-HC) 25 mg supp Insert 1 Suppository into rectum every twelve (12) hours.  Stiolto Respimat 2.5-2.5 mcg/actuation inhaler TAKE 2 PUFFS BY INHALATION DAILY.  colchicine (Mitigare) 0.6 mg capsule Take 1 Capsule by mouth daily.  nitroglycerin (NITROLINGUAL) 400 mcg/spray spray SPRAY ONE SPRAY UNDER THE TONGUE AS NEEDED MAY REPEAT UP TO 2 TIMES AS DIRECTED     aspirin delayed-release 81 mg tablet     pitavastatin calcium (Livalo) 4 mg tab tablet Take 1 Tablet by mouth daily. (Patient not taking: Reported on 11/11/2021)     No current facility-administered medications for this visit. Past Surgical History:   Procedure Laterality Date    HX CHOLECYSTECTOMY      HX CORONARY STENT PLACEMENT      HX HERNIA REPAIR      IR IVC FILTER PLACEMENT PERCUTANEOUS  6/15/2021    IR THROMBECTOMY [facility] ART PRIMARY NON HETAL OR INTRACRANIAL  6/15/2021    ID CARDIAC SURG PROCEDURE UNLIST      filter and triple bypass       Allergies and Sensitivities:  Allergies   Allergen Reactions    Gabapentin Other (comments)     \"Caused bad nightmares\"    Statins-Hmg-Coa Reductase Inhibitors Other (comments)     Myalgias with Lipitor.   Tolerates Crestor    Pcn [Penicillins] Hives       Family History:  Family History   Problem Relation Age of Onset    Hypertension Mother     Diabetes Mother        Social History:  Social History     Tobacco Use    Smoking status: Former Smoker     Types: Cigarettes     Quit date: 2021     Years since quittin.4    Smokeless tobacco: Never Used   Substance Use Topics    Alcohol use: Yes    Drug use: No     He  reports that he quit smoking about 5 months ago. His smoking use included cigarettes. He has never used smokeless tobacco.  He  reports current alcohol use. Physical Exam:  BP Readings from Last 3 Encounters:   01/10/22 128/84   21 131/68   21 116/70         Pulse Readings from Last 3 Encounters:   01/10/22 78   21 92   10/07/21 77          Wt Readings from Last 3 Encounters:   01/10/22 122.5 kg (270 lb)   21 122.5 kg (270 lb)   10/07/21 124.7 kg (275 lb)       Constitutional: Oriented to person, place, and time. HENT: Head: Normocephalic and atraumatic. Neck: No JVD present. Carotid bruit is not appreciated. Cardiovascular: Regular rhythm. No murmur, gallop or rubs appreciated  Lung: Breath sounds normal. No respiratory distress. No ronchi or rales appreciated  Abdominal: No tenderness. No rebound and no guarding. Musculoskeletal: There is no significant lower extremity edema.  No cynosis    LABS:   @  Lab Results   Component Value Date/Time    WBC 11.9 2021 03:30 AM    Hemoglobin, POC 10.8 (L) 2021 02:18 AM    HGB 11.5 (L) 2021 03:30 AM    Hematocrit, POC 32 (L) 2021 02:18 AM    HCT 34.9 (L) 2021 03:30 AM    PLATELET 762 (L)  03:30 AM    .7 (H) 2021 03:30 AM     Lab Results   Component Value Date/Time    Sodium 137 2021 05:50 AM    Potassium 3.4 (L) 2021 05:50 AM    Chloride 108 2021 05:50 AM    CO2 25 2021 05:50 AM    Glucose 83 2021 05:50 AM    BUN 18 2021 05:50 AM    Creatinine 1.03 2021 05:50 AM     Lipids Latest Ref Rng & Units 6/10/2021   Chol, Total <200 MG/   HDL 40 - 60 MG/DL 33(L)   LDL 0 - 100 MG/DL 69.6   Trig <150 MG/DL 87   Chol/HDL Ratio 0 - 5.0   3.6     Lab Results   Component Value Date/Time    ALT (SGPT) 18 08/06/2021 01:58 PM     Lab Results   Component Value Date/Time    Hemoglobin A1c 5.2 08/06/2021 01:58 PM     Lab Results   Component Value Date/Time    TSH 0.39 08/06/2021 01:58 PM       EKG    ECHO (06/2021)  Left Ventricle Normal cavity size, wall thickness and systolic function (ejection fraction normal). The estimated EF is 50 - 55%. Abnormal left ventricular septal motion. Systolic flattening of the interventricular septum consistent with right ventricle pressure overload. There is mild (grade 1) left ventricular diastolic dysfunction. Wall Scoring The left ventricular wall motion is normal.            Left Atrium Normal cavity size. Right Ventricle Dilated right ventricle. Abnormal wall motion: free wall hypokinesis of the right ventricle. Reduced systolic function. The findings are consistent with Powell's sign . Right Atrium Normal cavity size. Aortic Valve Normal valve structure, trileaflet valve structure, no stenosis and no regurgitation. Mitral Valve Normal valve structure and no stenosis. Trace regurgitation. Tricuspid Valve No stenosis. Non-specific thickening. Mild to moderate regurgitation. Pulmonic Valve Pulmonic valve not well visualized, but normal doppler findings. Aorta Mildly dilated aortic root; diameter is 3.8 cm. Pulmonary Artery Pulmonary arterial systolic pressure (PASP) is 60 mmHg. Pulmonary hypertension found to be moderate. IVC/Hepatic Veins Moderately elevated central venous pressure (8 mmHg); IVC diameter is larger than 21 mm and collapses more than 50% with respiration. CATHETERIZATION (06/2021)  Left Main   Large-caliber vessel.  Distal vessel approximately 50% stenosis with calcification that involves LAD and circumflex bifurcation   Left Anterior Descending   The vessel is calcified. The vessel is tortuous. Ostial, proximal and the mid LAD has diffuse calcified moderate disease with sequential eccentric 75% lesions. Mid-distal LAD has no significant obstructive disease Diagonal branch: Moderate disease. Medium caliber vessel   Left Circumflex   The vessel is calcified. The vessel is tortuous. Proximal LCx stent has mild in-stent restenosis followed by hazy calcified borderline 70% stenosis. OM1: Small caliber vessel OM 2: Large bifurcating vessel. Calcified hazy 70% stenosis before bifurcation. Superior branch distally distally has subtotal occlusion with faint homocollateral. Inferior branch without any significant obstructive disease   Right Coronary Artery   Proximal-mid 100% in-stent occlusion. Previously known by cardiac catheterization in 2019. Evidence of good collaterals from left to right supplying distal RCA and RPDA     Left Ventricle The left ventricular size is normal. LV end diastolic pressure is normal. LV EDP is: 16. The estimated EF = grossly normal and Echocardiogram will be performed. . There are no wall motion abnormalities in the left ventricle. There is no evidence of mitral regurgitation. Aortic Valve There is no aortic valve stenosis. IMPRESSION & PLAN:  Mr. Carey Amarilys is 59 y.o. male with multiple medical problem    CAD:  Status post left circumflex coronary stent more than 10 years ago. Cardiac catheterization in 06/2021 showing three-vessel CAD. Plan was to proceed with CABG however patient also was found to have a submassive PE with right-sided dysfunction. CABG was deferred and eventually s/p CABG x3 (08/11/21) LIMA to mLAD, SVG to R PLV and SVG to OM. Currently on aspirin, beta-blocker  Because of significant myalgia, he stopped taking Crestor and also failed other statin medication. He denies any chest pain or chest tightness.     PE and DVT:  Patient has submassive PE and DVT in 06/2021 with evidence of RV strain by echo. patient has undergone IR guided thromboembolectomy, IVC filter placement during that time. And now on Xarelto  Lifelong anticoagulation will be needed Defer to pulmonary team  Is planning to have his IVC filter removed by IR team next week    Hypertension:  /84. Continue current medications    Hyperlipidemia:  Patient has significant myalgia from simvastatin, atorvastatin, Crestor and also Livalo. He did not tolerate Livalo since last visit  I will try PCSK9 inhibitor, Repatha 140 mg subcu once a month. Risk, benefit and side effect discussed    This plan was discussed with patient who is in agreement. Thank you for allowing me to participate in patient care. Please feel free to call me if you have any question or concern. Natalya Yin MD  Please note: This document has been produced using voice recognition software. Unrecognized errors in transcription may be present.

## 2022-01-10 NOTE — PROGRESS NOTES
Bill King presents today for   Chief Complaint   Patient presents with    Surgical Clearance     IR IVC filter placement       Bill King preferred language for health care discussion is english/other. Is someone accompanying this pt? wife    Is the patient using any DME equipment during [address]? no    Depression Screening:  3 most recent PHQ Screens 1/10/2022   Little interest or pleasure in doing things Not at all   Feeling down, depressed, irritable, or hopeless Not at all   Total Score PHQ 2 0       Learning Assessment:  Learning Assessment 7/26/2021   PRIMARY LEARNER Patient   PRIMARY LANGUAGE ENGLISH   LEARNER PREFERENCE PRIMARY DEMONSTRATION   ANSWERED BY Patient   RELATIONSHIP SELF       Abuse Screening:  Abuse Screening Questionnaire 1/10/2022   Do you ever feel afraid of your partner? N   Are you in a relationship with someone who physically or mentally threatens you? N   Is it safe for you to go home? Y       Fall Risk  No flowsheet data found. Pt currently taking Anticoagulant therapy? xarelto 20 mg     Pt currently taking Antiplatelet therapy ? Aspirin 81 mg      Coordination of Care:  1. Have you been to the ER, urgent care clinic since your last visit? Hospitalized since your last visit? no    2. Have you seen or consulted any other health care providers outside of the [address] [place] [address] since your last visit? Include any pap smears or colon screening.  no

## 2022-01-11 RX ORDER — EVOLOCUMAB 420 MG/3.5
140 KIT SUBCUTANEOUS
Qty: 3.5 ML | Refills: 3 | Status: SHIPPED | OUTPATIENT
Start: 2022-01-11 | End: 2022-01-17 | Stop reason: SDUPTHER

## 2022-01-17 ENCOUNTER — HOSPITAL ENCOUNTER (OUTPATIENT)
Dept: INTERVENTIONAL RADIOLOGY/VASCULAR | Age: 62
Discharge: HOME | End: 2022-01-17
Attending: RADIOLOGY | Admitting: RADIOLOGY
Payer: COMMERCIAL

## 2022-01-17 VITALS
SYSTOLIC BLOOD PRESSURE: 140 MMHG | OXYGEN SATURATION: 99 % | RESPIRATION RATE: 18 BRPM | DIASTOLIC BLOOD PRESSURE: 70 MMHG | HEART RATE: 80 BPM

## 2022-01-17 DIAGNOSIS — Z86.711 HISTORY OF PULMONARY EMBOLISM: ICD-10-CM

## 2022-01-17 LAB
ANION GAP SERPL CALC-SCNC: 5 MMOL/L (ref 3–18)
APTT PPP: 27.7 SEC (ref 23–36.4)
BUN SERPL-MCNC: 12 MG/DL (ref 7–18)
BUN/CREAT SERPL: 10 (ref 12–20)
CALCIUM SERPL-MCNC: 9 MG/DL (ref 8.5–10.1)
CHLORIDE SERPL-SCNC: 107 MMOL/L (ref 100–111)
CO2 SERPL-SCNC: 25 MMOL/L (ref 21–32)
CREAT SERPL-MCNC: 1.25 MG/DL (ref 0.6–1.3)
ERYTHROCYTE [DISTWIDTH] IN BLOOD BY AUTOMATED COUNT: 14.2 % (ref 11.6–14.5)
GLUCOSE SERPL-MCNC: 102 MG/DL (ref 74–99)
HCT VFR BLD AUTO: 44.4 % (ref 36–48)
HGB BLD-MCNC: 14.1 G/DL (ref 13–16)
INR PPP: 1.1 (ref 0.8–1.2)
MCH RBC QN AUTO: 27.3 PG (ref 24–34)
MCHC RBC AUTO-ENTMCNC: 31.8 G/DL (ref 31–37)
MCV RBC AUTO: 85.9 FL (ref 78–100)
NRBC # BLD: 0 K/UL (ref 0–0.01)
NRBC BLD-RTO: 0 PER 100 WBC
PLATELET # BLD AUTO: 289 K/UL (ref 135–420)
PMV BLD AUTO: 9.9 FL (ref 9.2–11.8)
POTASSIUM SERPL-SCNC: 3.8 MMOL/L (ref 3.5–5.5)
PROTHROMBIN TIME: 14.1 SEC (ref 11.5–15.2)
RBC # BLD AUTO: 5.17 M/UL (ref 4.35–5.65)
SODIUM SERPL-SCNC: 137 MMOL/L (ref 136–145)
WBC # BLD AUTO: 9.3 K/UL (ref 4.6–13.2)

## 2022-01-17 PROCEDURE — 77030011229 HC DIL VESL COON COOK -A

## 2022-01-17 PROCEDURE — 74011250636 HC RX REV CODE- 250/636: Performed by: RADIOLOGY

## 2022-01-17 PROCEDURE — 80048 BASIC METABOLIC PNL TOTAL CA: CPT

## 2022-01-17 PROCEDURE — C1893 INTRO/SHEATH, FIXED,NON-PEEL: HCPCS

## 2022-01-17 PROCEDURE — 85730 THROMBOPLASTIN TIME PARTIAL: CPT

## 2022-01-17 PROCEDURE — 74011000636 HC RX REV CODE- 636: Performed by: RADIOLOGY

## 2022-01-17 PROCEDURE — 74011250636 HC RX REV CODE- 250/636: Performed by: PHYSICIAN ASSISTANT

## 2022-01-17 PROCEDURE — 74011250636 HC RX REV CODE- 250/636

## 2022-01-17 PROCEDURE — 77001 FLUOROGUIDE FOR VEIN DEVICE: CPT

## 2022-01-17 PROCEDURE — 74011000250 HC RX REV CODE- 250: Performed by: RADIOLOGY

## 2022-01-17 PROCEDURE — 85610 PROTHROMBIN TIME: CPT

## 2022-01-17 PROCEDURE — C1773 RET DEV, INSERTABLE: HCPCS

## 2022-01-17 PROCEDURE — C1769 GUIDE WIRE: HCPCS

## 2022-01-17 PROCEDURE — 85027 COMPLETE CBC AUTOMATED: CPT

## 2022-01-17 RX ORDER — LORAZEPAM 2 MG/ML
1 INJECTION INTRAMUSCULAR
Status: DISCONTINUED | OUTPATIENT
Start: 2022-01-17 | End: 2022-01-17 | Stop reason: HOSPADM

## 2022-01-17 RX ORDER — MIDAZOLAM HYDROCHLORIDE 1 MG/ML
.5-2 INJECTION, SOLUTION INTRAMUSCULAR; INTRAVENOUS
Status: DISCONTINUED | OUTPATIENT
Start: 2022-01-17 | End: 2022-01-17 | Stop reason: HOSPADM

## 2022-01-17 RX ORDER — DIPHENHYDRAMINE HYDROCHLORIDE 50 MG/ML
INJECTION, SOLUTION INTRAMUSCULAR; INTRAVENOUS
Status: COMPLETED
Start: 2022-01-17 | End: 2022-01-17

## 2022-01-17 RX ORDER — EVOLOCUMAB 420 MG/3.5
420 KIT SUBCUTANEOUS
Qty: 3.5 ML | Refills: 3 | Status: SHIPPED | OUTPATIENT
Start: 2022-01-17 | End: 2022-05-19

## 2022-01-17 RX ORDER — SODIUM CHLORIDE 9 MG/ML
20 INJECTION, SOLUTION INTRAVENOUS CONTINUOUS
Status: DISCONTINUED | OUTPATIENT
Start: 2022-01-17 | End: 2022-01-17 | Stop reason: HOSPADM

## 2022-01-17 RX ORDER — FLUMAZENIL 0.1 MG/ML
0.2 INJECTION INTRAVENOUS
Status: DISCONTINUED | OUTPATIENT
Start: 2022-01-17 | End: 2022-01-17 | Stop reason: HOSPADM

## 2022-01-17 RX ORDER — LIDOCAINE HYDROCHLORIDE 10 MG/ML
30 INJECTION, SOLUTION EPIDURAL; INFILTRATION; INTRACAUDAL; PERINEURAL ONCE
Status: COMPLETED | OUTPATIENT
Start: 2022-01-17 | End: 2022-01-17

## 2022-01-17 RX ORDER — NALOXONE HYDROCHLORIDE 0.4 MG/ML
0.2 INJECTION, SOLUTION INTRAMUSCULAR; INTRAVENOUS; SUBCUTANEOUS
Status: DISCONTINUED | OUTPATIENT
Start: 2022-01-17 | End: 2022-01-17 | Stop reason: HOSPADM

## 2022-01-17 RX ORDER — FENTANYL CITRATE 50 UG/ML
12.5-5 INJECTION, SOLUTION INTRAMUSCULAR; INTRAVENOUS
Status: DISCONTINUED | OUTPATIENT
Start: 2022-01-17 | End: 2022-01-17 | Stop reason: HOSPADM

## 2022-01-17 RX ADMIN — MIDAZOLAM HYDROCHLORIDE 1 MG: 1 INJECTION, SOLUTION INTRAMUSCULAR; INTRAVENOUS at 12:30

## 2022-01-17 RX ADMIN — LORAZEPAM 1 MG: 2 INJECTION INTRAMUSCULAR; INTRAVENOUS at 10:51

## 2022-01-17 RX ADMIN — FENTANYL CITRATE 50 MCG: 50 INJECTION INTRAMUSCULAR; INTRAVENOUS at 12:30

## 2022-01-17 RX ADMIN — DIPHENHYDRAMINE HYDROCHLORIDE 25 MG: 50 INJECTION INTRAMUSCULAR; INTRAVENOUS at 12:45

## 2022-01-17 RX ADMIN — LIDOCAINE HYDROCHLORIDE 30 ML: 10 INJECTION, SOLUTION EPIDURAL; INFILTRATION; INTRACAUDAL; PERINEURAL at 12:25

## 2022-01-17 RX ADMIN — IOHEXOL 50 ML: 300 INJECTION, SOLUTION INTRAVENOUS at 12:45

## 2022-01-17 RX ADMIN — FENTANYL CITRATE 50 MCG: 50 INJECTION INTRAMUSCULAR; INTRAVENOUS at 12:25

## 2022-01-17 RX ADMIN — MIDAZOLAM HYDROCHLORIDE 1 MG: 1 INJECTION, SOLUTION INTRAMUSCULAR; INTRAVENOUS at 12:25

## 2022-01-17 NOTE — PROCEDURES
RADIOLOGY POST PROCEDURE NOTE     January 17, 2022       5:44 PM     Preoperative Diagnosis:   Submassive PE, DVT s/p thrombectomy and IVC filter placement. Completion of therapy. Postoperative Diagnosis:  Same. :  Dr. Lucía Dominiqeu    Assistant:  None. Type of Anesthesia: 1% plain lidocaine and IV moderate sedation with Versed and Fentanyl. Procedure/Description:  Image guided IVC filter removal.    Findings:   No bleeding. Estimated blood Loss:  Minimal    Specimen Removed:   no    Blood transfusions:  None. Implants:  None.     Complications: None    Condition: Stable    Discharge Plan:  discharge home     Ely Conway MD

## 2022-01-17 NOTE — PROGRESS NOTES
0828-Cath holding summary     Patient escorted to cath holding from waiting area ambulatory, alert and oriented x 4, voicing no complaints. Changed into gown and placed on monitor. NPO since MN. Lab results, med rec and H&P reviewed on chart. PIV x 1 inserted without difficulty. Family to bedside. 1201-TRANSFER - OUT REPORT:    Verbal report given to Daniele(name) on Budd Born  being transferred to IR/Angio(unit) for ordered procedure       Report consisted of patients Situation, Background, Assessment and   Recommendations(SBAR). Information from the following report(s) SBAR, Kardex, Intake/Output and MAR was reviewed with the receiving nurse. Lines:   Peripheral IV 01/17/22 Anterior;Proximal;Right Forearm (Active)        Opportunity for questions and clarification was provided. Patient transported with:   Tech      1238-Bedside and Verbal shift change report given to HIRAL Rowland (oncoming nurse) by Lachelle Palafox RN (offgoing nurse). Report included the following information SBAR, Kardex, MAR and Recent Results.

## 2022-01-17 NOTE — DISCHARGE INSTRUCTIONS
Patient Education        Vena Cava Filter Removal: What to Expect at Home  Your Recovery     A vena cava filter may help prevent blood clots from traveling to your lungs, where they may block blood flow. Your doctor removed your vena cava filter using a thin, flexible tube (catheter) that was put into your vein. A filter is removed to avoid problems that can happen if the filter is left in your vein for a long time. Vena cava filters can cause serious health problems if they break or become blocked with one or more blood clots. You may go home the same day after the procedure. You may be able to return to work or your normal routine in a day or two. After having a vena cava filter removed, you may feel tired and have some pain for several days. You may have a small bandage where the catheter was placed. This care sheet gives you a general idea about how long it will take for you to recover. But each person recovers at a different pace. Follow the steps below to feel better as quickly as possible. How can you care for yourself at home? Activity    · If the doctor gave you a sedative:  ? For 24 hours, don't do anything that requires attention to detail, such as going to work, making important decisions, or signing any legal documents. It takes time for the medicine's effects to completely wear off.  ? For your safety, do not drive or operate any machinery that could be dangerous. Wait until the medicine wears off and you can think clearly and react easily.     · Do not do strenuous exercise and do not lift, pull, or push anything heavy until your doctor says it is okay. This may be for a couple of days. You can walk around the house and do light activity, such as cooking.     · If the catheter was placed in your groin, try not to walk up stairs for the first couple of days. Diet    · You can eat your normal diet.  If your stomach is upset, try bland, low-fat foods like plain rice, broiled chicken, toast, and yogurt.     · If you had dye injected during the procedure, drink plenty of fluids to help your body flush out the dye. If you have kidney, heart, or liver disease and have to limit fluids, talk with your doctor before you increase the amount of fluids you drink. Medicines    · Your doctor will tell you if and when you can restart your medicines. You will also be given instructions about taking any new medicines.     · If you take aspirin or some other blood thinner, ask your doctor if and when to start taking it again. Make sure that you understand exactly what your doctor wants you to do.     · Be safe with medicines. Take pain medicines exactly as directed. ? If the doctor gave you a prescription medicine for pain, take it as prescribed. ? If you are not taking a prescription pain medicine, ask your doctor if you can take an over-the-counter medicine.     · If you think your pain medicine is making you sick to your stomach:  ? Take your medicine after meals (unless your doctor has told you not to). ? Ask your doctor for a different pain medicine. Care of the catheter site    · Keep a bandage over the spot where the catheter was inserted for the first day, or for as long as your doctor recommends.     · Put ice or a cold pack on the area for 10 to 20 minutes at a time to help with soreness or swelling. Do this every few hours. Put a thin cloth between the ice and your skin.     · You may shower 24 to 48 hours after the procedure, if your doctor okays it. Pat the incision dry.     · Do not soak the catheter site until it is healed. Don't take a bath for 1 week, or until your doctor tells you it is okay.     · Watch for bleeding from the site. A small amount of blood (up to the size of a quarter) on the bandage can be normal.     · If you are bleeding, lie down and press on the area for 15 minutes to try to make it stop. If the bleeding does not stop, call your doctor or seek immediate medical care. Follow-up care is a key part of your treatment and safety. Be sure to make and go to all appointments, and call your doctor if you are having problems. It's also a good idea to know your test results and keep a list of the medicines you take. When should you call for help? Call 911  anytime you think you may need emergency care. For example, call if:    · You passed out (lost consciousness).     · You have severe trouble breathing.     · You have chest pain, are short of breath, or cough up blood. Call your doctor now or seek immediate medical care if:    · You have pain that does not get better after you take pain medicine.     · You are bleeding through your dressing. A small amount of bleeding is normal.     · You have a fast-growing, painful lump at the catheter site.     · You have signs of infection, such as:  ? Increased pain, swelling, warmth, or redness. ? Red streaks leading from the incision. ? Pus draining from the incision. ? A fever.     · You have signs of a blood clot in your leg (called a deep vein thrombosis), such as:  ? Pain in your calf, back of the knee, thigh, or groin. ? Redness and swelling in your leg. Watch closely for any changes in your health, and be sure to contact your doctor if you have any problems. Where can you learn more? Go to http://www.gray.com/  Enter I536 in the search box to learn more about \"Vena Cava Filter Removal: What to Expect at Home. \"  Current as of: July 6, 2021               Content Version: 13.0  © 2006-2021 Healthwise[employer]. Care instructions adapted under license by [employer] (which disclaims liability or warranty for this information). If you have questions about a medical condition or this instruction, always ask your healthcare professional. [place] [address] any warranty or liability for your use of this information.     DISCHARGE SUMMARY from Nurse    PATIENT INSTRUCTIONS:    After general anesthesia or intravenous sedation, for 24 hours or while taking prescription Narcotics:  · Limit your activities  · Do not drive and operate hazardous machinery  · Do not make important personal or business decisions  · Do  not drink alcoholic beverages  · If you have not urinated within 8 hours after discharge, please contact your surgeon on call. Report the following to your surgeon:  · Excessive pain, swelling, redness or odor of or around the surgical area  · Temperature over 100.5  · Nausea and vomiting lasting longer than 4 hours or if unable to take medications  · Any signs of decreased circulation or nerve impairment to extremity: change in color, persistent  numbness, tingling, coldness or increase pain  · Any questions    What to do at Home:  Recommended activity: Activity as tolerated and no driving for today,     These are general instructions for a healthy lifestyle:    No smoking/ No tobacco products/ Avoid exposure to second hand smoke  Surgeon General's Warning:  Quitting smoking now greatly reduces serious risk to your health. Obesity, smoking, and sedentary lifestyle greatly increases your risk for illness    A healthy diet, regular physical exercise & weight monitoring are important for maintaining a healthy lifestyle    You may be retaining fluid if you have a history of heart failure or if you experience any of the following symptoms:  Weight gain of 3 pounds or more overnight or 5 pounds in a week, increased swelling in our hands or feet or shortness of breath while lying flat in bed. Please call your doctor as soon as you notice any of these symptoms; do not wait until your next office visit. The discharge information has been reviewed with the patient. The patient verbalized understanding.   Discharge medications reviewed with the patient and appropriate educational materials and side effects teaching were provided.   ___________________________________________________________________________________________________________________________________

## 2022-01-17 NOTE — H&P
lHistory and Physical    Patient: Zackary Hoang           Sex: male       DOA: 2022  YOB: 1960      Age:  59 y.o.     LOS:  LOS: 0 days        HPI:     Zackary Hoang is a 59 y.o. male who has history of DVT and PE, with new CT showing resolution of PE s/p thrombectomy and IVC filter 06/15/21. He is here today for IVC filter removal with moderate sedation. Past Medical History:   Diagnosis Date    Bilateral hand pain     CAD (coronary artery disease)     Dupuytren's disease of palm     Hyperlipidemia     Hypertension     SHILPI (obstructive sleep apnea)     PE (pulmonary thromboembolism) ([address] 2021    Severe obesity (HCC)     Tobacco dependence        Past Surgical History:   Procedure Laterality Date    HX CHOLECYSTECTOMY      HX CORONARY STENT PLACEMENT      HX HERNIA REPAIR      IR IVC FILTER PLACEMENT PERCUTANEOUS  6/15/2021    IR THROMBECTOMY MECH ART PRIMARY NON HETAL OR INTRACRANIAL  6/15/2021    NH CARDIAC SURG PROCEDURE UNLIST      filter and triple bypass       Family History   Problem Relation Age of Onset    Hypertension Mother     Diabetes Mother        Social History     Socioeconomic History    Marital status:    Tobacco Use    Smoking status: Former Smoker     Types: Cigarettes     Quit date: 2021     Years since quittin.4    Smokeless tobacco: Never Used   Substance and Sexual Activity    Alcohol use: Yes    Drug use: No       Prior to Admission medications    Medication Sig Start Date End Date Taking? Authorizing Provider   evolocumab (Repatha Pushtronex) 420 mg/3.5 mL Injt 140 mg by SubCUTAneous route every thirty (30) days. 22   Mildred Cee MD   Xarelto 20 mg tab tablet TAKE 1 TABLET BY MOUTH DAILY. 22   Marcos Pedro MD   potassium chloride (K-DUR, KLOR-CON M20) 20 mEq tablet TAKE 1 TABLET BY MOUTH DAILY. OR AS DIRECTED. 21   Marcos Pedro MD   furosemide (LASIX) 40 mg tablet TAKE 1 TABLET BY MOUTH DAILY. 12/30/21   Consuelo Hopkins MD   metoprolol tartrate (LOPRESSOR) 25 mg tablet Take 0.5 Tablets by mouth every twelve (12) hours. 11/22/21   Consuelo Hopkins MD   lidocaine 4 % patch 1 patch q 12 hours for pain  Indications: Pain 11/11/21   Mukul Chavarria MD   cyclobenzaprine (FLEXERIL) 5 mg tablet Take 1 Tablet by mouth three (3) times daily as needed for Muscle Spasm(s). 9/30/21   Consuelo Hopkins MD   pitavastatin calcium (Livalo) 4 mg tab tablet Take 1 Tablet by mouth daily. Patient not taking: Reported on 11/11/2021 9/27/21   Jeremy Jenkins MD   methocarbamoL (ROBAXIN) 500 mg tablet Take 1 Tablet by mouth four (4) times daily as needed for Muscle Spasm(s). 9/3/21   Juan Zuluaga PA-C   hydrocortisone (Anusol-HC) 25 mg supp Insert 1 Suppository into rectum every twelve (12) hours. 8/23/21   Mckinley Gross MD   Stiolto Respimat 2.5-2.5 mcg/actuation inhaler TAKE 2 PUFFS BY INHALATION DAILY. 8/20/21   Mckinley Gross MD   colchicine (Mitigare) 0.6 mg capsule Take 1 Capsule by mouth daily. 6/25/21   Mckinley Gross MD   nitroglycerin (NITROLINGUAL) 400 mcg/spray spray SPRAY ONE SPRAY UNDER THE TONGUE AS NEEDED MAY REPEAT UP TO 2 TIMES AS DIRECTED  5/23/21   Mckinley Gross MD   aspirin delayed-release 81 mg tablet  4/29/19   Provider, Historical       Allergies   Allergen Reactions    Gabapentin Other (comments)     \"Caused bad nightmares\"    Statins-Hmg-Coa Reductase Inhibitors Other (comments)     Myalgias with Lipitor. Tolerates Crestor    Pcn [Penicillins] Hives       Physical Exam:      There were no vitals taken for this visit. Physical Exam:  Mallampati 2 ASA 3  General: A&O x 4, NAD  Heart: RRR  Lungs: Normal work of breathing on room air    Labs Reviewed: All lab results for the last 24 hours reviewed. Assessment/Plan     The patient is an appropriate candidate to undergo the planned procedure and sedation.     JENNIFER Hayward

## 2022-01-17 NOTE — PROGRESS NOTES
[address] January 17, 2022       RE: Sherly Sosa      To Whom It May Concern,    This is to certify that Sherly Sosa may may return to work on 01/18/2022. NO Work 01/17/2022    Please feel free to contact my office if you have any questions or concerns. Thank you for your assistance in this matter.       Sincerely,  Juventino Foss RN

## 2022-01-17 NOTE — PROGRESS NOTES
TRANSFER - OUT REPORT:    Verbal report given to Saqib Aguilar RN(name) on Jesu Selby  being transferred to [facility](unit) for routine post - op       Report consisted of patients Situation, Background, Assessment and   Recommendations(SBAR). Information from the following report(s) SBAR, Kardex, Procedure Summary and MAR was reviewed with the receiving nurse. Lines:   Peripheral IV 01/17/22 Anterior;Proximal;Right Forearm (Active)        Opportunity for questions and clarification was provided.       Patient transported with:   Registered Nurse

## 2022-03-04 RX ORDER — POTASSIUM CHLORIDE 20 MEQ/1
20 TABLET, EXTENDED RELEASE ORAL DAILY
Qty: 30 TABLET | Refills: 1 | Status: SHIPPED | OUTPATIENT
Start: 2022-03-04 | End: 2022-04-28

## 2022-03-04 RX ORDER — FUROSEMIDE 40 MG/1
40 TABLET ORAL DAILY
Qty: 30 TABLET | Refills: 1 | Status: SHIPPED | OUTPATIENT
Start: 2022-03-04 | End: 2022-04-28

## 2022-03-13 ENCOUNTER — APPOINTMENT (OUTPATIENT)
Dept: CT IMAGING | Age: 62
End: 2022-03-13
Attending: EMERGENCY MEDICINE
Payer: COMMERCIAL

## 2022-03-13 ENCOUNTER — HOSPITAL ENCOUNTER (EMERGENCY)
Age: 62
Discharge: HOME | End: 2022-03-14
Attending: EMERGENCY MEDICINE
Payer: COMMERCIAL

## 2022-03-13 DIAGNOSIS — K43.9 HERNIA OF ABDOMINAL WALL: ICD-10-CM

## 2022-03-13 DIAGNOSIS — N20.0 RENAL STONE: Primary | ICD-10-CM

## 2022-03-13 DIAGNOSIS — R10.9 ABDOMINAL PAIN, UNSPECIFIED ABDOMINAL LOCATION: ICD-10-CM

## 2022-03-13 DIAGNOSIS — R07.9 CHEST PAIN, UNSPECIFIED TYPE: ICD-10-CM

## 2022-03-13 LAB
ALBUMIN SERPL-MCNC: 4.1 G/DL (ref 3.5–4.7)
ALBUMIN/GLOB SERPL: 1.3 {RATIO}
ALP SERPL-CCNC: 85 U/L (ref 38–126)
ALT SERPL-CCNC: 16 U/L (ref 3–72)
ANION GAP SERPL CALC-SCNC: 12 MMOL/L
APPEARANCE UR: CLEAR
AST SERPL W P-5'-P-CCNC: 18 U/L (ref 17–74)
BASOPHILS # BLD: 0.1 K/UL (ref 0–0.1)
BASOPHILS NFR BLD: 2 % (ref 0–2)
BILIRUB DIRECT SERPL-MCNC: 0.1 MG/DL (ref 0–0.3)
BILIRUB SERPL-MCNC: 0.6 MG/DL (ref 0.2–1)
BILIRUB UR QL: NEGATIVE
BUN SERPL-MCNC: 13 MG/DL (ref 9–21)
BUN/CREAT SERPL: 11
CA-I BLD-MCNC: 9.4 MG/DL (ref 8.5–10.5)
CHLORIDE SERPL-SCNC: 103 MMOL/L (ref 94–111)
CO2 SERPL-SCNC: 26 MMOL/L (ref 21–33)
COLOR UR: YELLOW
CREAT SERPL-MCNC: 1.2 MG/DL (ref 0.8–1.5)
D DIMER PPP FEU-MCNC: 0.61 UG/ML(FEU)
DIFFERENTIAL METHOD BLD: ABNORMAL
EOSINOPHIL # BLD: 0.2 K/UL (ref 0–0.4)
EOSINOPHIL NFR BLD: 2 % (ref 0–5)
ERYTHROCYTE [DISTWIDTH] IN BLOOD BY AUTOMATED COUNT: 16.5 % (ref 11.6–14.5)
GLOBULIN SER CALC-MCNC: 3.1 G/DL
GLUCOSE SERPL-MCNC: 113 MG/DL (ref 70–110)
GLUCOSE UR STRIP.AUTO-MCNC: NEGATIVE MG/DL
HCT VFR BLD AUTO: 43.9 % (ref 36–48)
HGB BLD-MCNC: 13.8 G/DL (ref 13–16)
HGB UR QL STRIP: NEGATIVE
IMM GRANULOCYTES # BLD AUTO: 0 K/UL (ref 0–0.04)
IMM GRANULOCYTES NFR BLD AUTO: 0 % (ref 0–0.5)
KETONES UR QL STRIP.AUTO: NEGATIVE MG/DL
LEUKOCYTE ESTERASE UR QL STRIP.AUTO: NEGATIVE
LIPASE SERPL-CCNC: 43 U/L (ref 10–57)
LYMPHOCYTES # BLD: 2.2 K/UL (ref 0.9–3.6)
LYMPHOCYTES NFR BLD: 29 % (ref 21–52)
MCH RBC QN AUTO: 29.1 PG (ref 24–34)
MCHC RBC AUTO-ENTMCNC: 31.4 G/DL (ref 31–37)
MCV RBC AUTO: 92.6 FL (ref 78–100)
MONOCYTES # BLD: 0.5 K/UL (ref 0.05–1.2)
MONOCYTES NFR BLD: 7 % (ref 3–10)
NEUTS SEG # BLD: 4.6 K/UL (ref 1.8–8)
NEUTS SEG NFR BLD: 60 % (ref 40–73)
NITRITE UR QL STRIP.AUTO: NEGATIVE
NRBC # BLD: 0 K/UL (ref 0–0.01)
NRBC BLD-RTO: 0 PER 100 WBC
PH UR STRIP: 6.5 [PH] (ref 5–8)
PLATELET # BLD AUTO: 231 K/UL (ref 135–420)
PMV BLD AUTO: 10.1 FL (ref 9.2–11.8)
POTASSIUM SERPL-SCNC: 3.8 MMOL/L (ref 3.2–5.1)
PROT SERPL-MCNC: 7.2 G/DL (ref 6.1–8.4)
PROT UR STRIP-MCNC: NEGATIVE MG/DL
RBC # BLD AUTO: 4.74 M/UL (ref 4.35–5.65)
SODIUM SERPL-SCNC: 141 MMOL/L (ref 135–145)
SP GR UR REFRACTOMETRY: 1.01 (ref 1–1.03)
UROBILINOGEN UR QL STRIP.AUTO: 0.2 EU/DL (ref 0.2–1)
WBC # BLD AUTO: 7.6 K/UL (ref 4.6–13.2)

## 2022-03-13 PROCEDURE — 83690 ASSAY OF LIPASE: CPT

## 2022-03-13 PROCEDURE — 93005 ELECTROCARDIOGRAM TRACING: CPT

## 2022-03-13 PROCEDURE — 74176 CT ABD & PELVIS W/O CONTRAST: CPT

## 2022-03-13 PROCEDURE — 81003 URINALYSIS AUTO W/O SCOPE: CPT

## 2022-03-13 PROCEDURE — 84484 ASSAY OF TROPONIN QUANT: CPT

## 2022-03-13 PROCEDURE — 80048 BASIC METABOLIC PNL TOTAL CA: CPT

## 2022-03-13 PROCEDURE — 85379 FIBRIN DEGRADATION QUANT: CPT

## 2022-03-13 PROCEDURE — 99285 EMERGENCY DEPT VISIT HI MDM: CPT

## 2022-03-13 PROCEDURE — 85025 COMPLETE CBC W/AUTO DIFF WBC: CPT

## 2022-03-13 PROCEDURE — 71275 CT ANGIOGRAPHY CHEST: CPT

## 2022-03-13 PROCEDURE — 36415 COLL VENOUS BLD VENIPUNCTURE: CPT

## 2022-03-13 PROCEDURE — 74011000636 HC RX REV CODE- 636: Performed by: EMERGENCY MEDICINE

## 2022-03-13 PROCEDURE — 80076 HEPATIC FUNCTION PANEL: CPT

## 2022-03-13 RX ADMIN — IOPAMIDOL 100 ML: 755 INJECTION, SOLUTION INTRAVENOUS at 22:16

## 2022-03-14 VITALS
SYSTOLIC BLOOD PRESSURE: 151 MMHG | HEART RATE: 80 BPM | TEMPERATURE: 98.8 F | OXYGEN SATURATION: 99 % | BODY MASS INDEX: 37.22 KG/M2 | WEIGHT: 260 LBS | DIASTOLIC BLOOD PRESSURE: 91 MMHG | HEIGHT: 70 IN | RESPIRATION RATE: 18 BRPM

## 2022-03-14 LAB
ATRIAL RATE: 79 BPM
CALCULATED P AXIS, ECG09: 65 DEGREES
CALCULATED R AXIS, ECG10: 7 DEGREES
CALCULATED T AXIS, ECG11: 63 DEGREES
DIAGNOSIS, 93000: NORMAL
P-R INTERVAL, ECG05: 173 MS
Q-T INTERVAL, ECG07: 371 MS
QRS DURATION, ECG06: 97 MS
QTC CALCULATION (BEZET), ECG08: 426 MS
TROPONIN I SERPL-MCNC: <0.02 NG/ML (ref 0.02–0.05)
VENTRICULAR RATE, ECG03: 79 BPM

## 2022-03-14 NOTE — ED PROVIDER NOTES
Pt c/o left upper abd pain, rad to l lower chest. No back pain. Pain mild/moderate. Currently mild. No rash. No injury. Rare non prod cough. No fever. No nausea. No diarrhea. Concerned bc h/o pe, on xarelto now. Nothing makes pain better/worse. no sob. No leg pain or swelling.             Past Medical History:   Diagnosis Date    Bilateral hand pain     CAD (coronary artery disease)     Dupuytren's disease of palm     Hyperlipidemia     Hypertension     SHILPI (obstructive sleep apnea)     PE (p[address] thromboembolism) ([facility] [address] 06/2021    Severe obesity (HCC)     Tobacco dependence        Past Surgical History:   Procedure Laterality Date    HX CHOLECYSTECTOMY      HX CORONARY STENT PLACEMENT      HX HERNIA REPAIR      IR IVC FILTER PLACEMENT PERCUTANEOUS  6/15/2021    IR REMOVE IVC FILTER W SI  1/17/2022    IR THROMBECTOMY MECH ART PRIMARY NON HETAL OR INTRACRANIAL  6/15/2021    PA CARDIAC SURG PROCEDURE UNLIST      filter and triple bypass         Family History:   Problem Relation Age of Onset    Hypertension Mother     Diabetes Mother        Social History     Socioeconomic History    Marital status:      Spouse name: Not on file    Number of children: Not on file    Years of education: Not on file    Highest education level: Not on file   Occupational History    Not on file   Tobacco Use    Smoking status: Current Every Day Smoker     Packs/day: 0.50     Types: Cigarettes    Smokeless tobacco: Never Used   Substance and Sexual Activity    Alcohol use: Yes     Comment: couple drinks a day    Drug use: No    Sexual activity: Not on file   Other Topics Concern     Service Not Asked    Blood Transfusions Not Asked    Caffeine Concern Not Asked    Occupational Exposure Not Asked    Hobby Hazards Not Asked    Sleep Concern Not Asked    Stress Concern Not Asked    Weight Concern Not Asked    Special Diet Not Asked    Back Care Not Asked    Exercise Not Asked    Bike Helmet Not Asked   [address] Not Asked    Self-Exams Not Asked   Social History Narrative    Not on file     Social Determinants of Health     Financial Resource Strain:     Difficulty of Paying Living Expenses: Not on file   Food Insecurity:     Worried About Running Out of Food in the Last Year: Not on file    Magaly of Food in the Last Year: Not on file   Transportation Needs:     Lack of Transportation (Medical): Not on file    Lack of Transportation (Non-Medical): Not on file   Physical Activity:     Days of Exercise per Week: Not on file    Minutes of Exercise per Session: Not on file   Stress:     Feeling of Stress : Not on file   Social Connections:     Frequency of Communication with Friends and Family: Not on file    Frequency of Social Gatherings with Friends and Family: Not on file    Attends Sabianist Services: Not on file    Active Member of [address] [place] [address] [employer] or Organizations: Not on file    Attends Club or Organization Meetings: Not on file    Marital Status: Not on file   Intimate Partner Violence:     Fear of Current or Ex-Partner: Not on file    Emotionally Abused: Not on file    Physically Abused: Not on file    Sexually Abused: Not on file   Housing Stability:     Unable to Pay for Housing in the Last Year: Not on file    Number of [place] in the Last Year: Not on file    Unstable Housing in the Last Year: Not on file         ALLERGIES: Gabapentin, Statins-hmg-coa reductase inhibitors, and Pcn [penicillins]    Review of Systems   Constitutional: Negative for diaphoresis and fever. HENT: Negative for congestion. Respiratory: Negative for cough and shortness of breath. Cardiovascular: Positive for chest pain. Gastrointestinal: Positive for abdominal pain. Negative for nausea. Musculoskeletal: Negative for back pain. Skin: Negative for rash. Neurological: Negative for dizziness. All other systems reviewed and are negative.       Vitals:    03/13/22 2222 03/13/22 2252 03/13/22 2340 03/13/22 2352   BP: (!) 153/73 (!) 165/90 (!) 158/85 (!) 137/92   Pulse: 89 82  88   Resp:       Temp:       SpO2: 98% 99%  100%   Weight:       Height:                Physical Exam  Vitals and nursing note reviewed. Constitutional:       Appearance: He is well-developed. HENT:      Head: Normocephalic and atraumatic. Eyes:      Conjunctiva/sclera: Conjunctivae normal.   Cardiovascular:      Rate and Rhythm: Normal rate and regular rhythm. Pulmonary:      Effort: Pulmonary effort is normal.      Breath sounds: No wheezing. Abdominal:      Palpations: Abdomen is soft. Tenderness: There is no abdominal tenderness. There is no guarding. Hernia: No hernia is present. Musculoskeletal:         General: No tenderness. Cervical back: Normal range of motion. Skin:     General: Skin is warm and dry. Capillary Refill: Capillary refill takes less than 2 seconds. Findings: No rash. Neurological:      Mental Status: He is alert and oriented to person, place, and time.    Psychiatric:         Mood and Affect: Mood normal.          MDM       Procedures    Vitals:  Patient Vitals for the past 12 hrs:   Temp Pulse Resp BP SpO2   03/13/22 2352 -- 88 -- (!) 137/92 100 %   03/13/22 2340 -- -- -- (!) 158/85 --   03/13/22 2252 -- 82 -- (!) 165/90 99 %   03/13/22 2222 -- 89 -- (!) 153/73 98 %   03/13/22 2115 98.8 °F (37.1 °C) 83 18 (!) 158/75 97 %         Medications ordered:   Medications   iopamidoL (ISOVUE-370) 76 % injection 100 mL (100 mL IntraVENous Given 3/13/22 2216)         Lab findings:  Recent Results (from the past 12 hour(s))   CBC WITH AUTOMATED DIFF    Collection Time: 03/13/22  9:17 PM   Result Value Ref Range    WBC 7.6 4.6 - 13.2 K/uL    RBC 4.74 4.35 - 5.65 M/uL    HGB 13.8 13.0 - 16.0 g/dL    HCT 43.9 36.0 - 48.0 %    MCV 92.6 78.0 - 100.0 FL    MCH 29.1 24.0 - 34.0 PG    MCHC 31.4 31.0 - 37.0 g/dL    RDW 16.5 (H) 11.6 - 14.5 %    PLATELET 644 837 - 654 K/uL    MPV 10.1 9.2 - 11.8 FL    NRBC 0.0 0.0  WBC    ABSOLUTE NRBC 0.00 0.00 - 0.01 K/uL    NEUTROPHILS 60 40 - 73 %    LYMPHOCYTES 29 21 - 52 %    MONOCYTES 7 3 - 10 %    EOSINOPHILS 2 0 - 5 %    BASOPHILS 2 0 - 2 %    IMMATURE GRANULOCYTES 0 0 - 0.5 %    ABS. NEUTROPHILS 4.6 1.8 - 8.0 K/UL    ABS. LYMPHOCYTES 2.2 0.9 - 3.6 K/UL    ABS. MONOCYTES 0.5 0.05 - 1.2 K/UL    ABS. EOSINOPHILS 0.2 0.0 - 0.4 K/UL    ABS. BASOPHILS 0.1 0.0 - 0.1 K/UL    ABS. IMM. GRANS. 0.0 0.00 - 0.04 K/UL    DF AUTOMATED     METABOLIC PANEL, BASIC    Collection Time: 03/13/22  9:17 PM   Result Value Ref Range    Sodium 141 135 - 145 mmol/L    Potassium 3.8 3.2 - 5.1 mmol/L    Chloride 103 94 - 111 mmol/L    CO2 26 21 - 33 mmol/L    Anion gap 12 mmol/L    Glucose 113 (H) 70 - 110 mg/dL    BUN 13 9 - 21 mg/dL    Creatinine 1.20 0.8 - 1.50 mg/dL    BUN/Creatinine ratio 11      GFR est AA >60 ml/min/1.73m2    GFR est non-AA >60 ml/min/1.73m2    Calcium 9.4 8.5 - 10.5 mg/dL   LIPASE    Collection Time: 03/13/22  9:17 PM   Result Value Ref Range    Lipase 43 10 - 57 U/L   HEPATIC FUNCTION PANEL    Collection Time: 03/13/22  9:17 PM   Result Value Ref Range    Protein, total 7.2 6.1 - 8.4 g/dL    Albumin 4.1 3.5 - 4.7 g/dL    Globulin 3.1 g/dL    A-G Ratio 1.3      Bilirubin, total 0.6 0.2 - 1.0 mg/dL    Bilirubin, direct 0.1 0.0 - 0.3 mg/dL    Alk.  phosphatase 85 38 - 126 U/L    AST (SGOT) 18 17 - 74 U/L    ALT (SGPT) 16 3 - 72 U/L   D DIMER    Collection Time: 03/13/22  9:17 PM   Result Value Ref Range    D DIMER 0.61 (H) <0.46 ug/ml(FEU)   TROPONIN I    Collection Time: 03/13/22  9:17 PM   Result Value Ref Range    Troponin-I, Qt. <0.02 (L) 0.02 - 0.05 ng/mL   URINALYSIS W/ RFLX MICROSCOPIC    Collection Time: 03/13/22 10:22 PM   Result Value Ref Range    Color Yellow      Appearance Clear      Specific gravity 1.006 1.005 - 1.030      pH (UA) 6.5 5.0 - 8.0      Protein Negative Negative mg/dL    Glucose Negative Negative mg/dL Ketone Negative Negative mg/dL    Bilirubin Negative Negative      Blood Negative Negative      Urobilinogen 0.2 0.2 - 1.0 EU/dL    Nitrites Negative Negative      Leukocyte Esterase Negative Negative             X-Ray, CT or other radiology findings or impressions:  CTA CHEST W OR W WO CONT   Final Result      1. No evidence of pulmonary embolism. 2.  No active cardiopulmonary disease. CT ABD PELV WO CONT   Final Result      Prominent left renal collecting system and ureter. There is a 1 cm calculus   within the left aspect of the urinary bladder near the left ureterovesicular   junction possibly recently passed. Correlation with current renal colic needed. Nonobstructing left renal calculus. Descending colon diverticulosis without diverticulitis. Supraumbilical hernia containing portions of bowel without evidence for current   bowel obstruction. Progress notes, Consult notes or additional Procedure notes:   1:01 AM pt feeling better, no pain while here. Abd soft. Long h/o reducible hernia per pt, no s/o obst.  Pt verb und of all findings, declines further ed eval.  req dc now. To dc per pt. Detailed ret inst given. Diagnosis:   1. Renal stone    2. Chest pain, unspecified type    3. Abdominal pain, unspecified abdominal location    4.  Hernia of abdominal wall        Disposition: home    Follow-up Information     Follow up With Specialties Details Why Contact Info    [facility] EMERGENCY DEPT Emergency Medicine Go to  As needed, If symptoms worsen [address]   [address]  [facility]  [phone]      [facility]  Schedule an appointment as soon as possible for a visit in 2 days  [address] Ellan Kanner, MD Surgery, Vascular Surgery, Cardiothoracic Surgery Schedule an appointment as soon as possible for a visit   [place] [address]  [phone]             Patient's Medications   Start Taking    No medications on file   Continue Taking    ASPIRIN DELAYED-RELEASE 81 MG TABLET    Take 81 mg by mouth daily. COLCHICINE (MITIGARE) 0.6 MG CAPSULE    Take 1 Capsule by mouth daily. EVOLOCUMAB (REPATHA PUSHTRONEX) 420 MG/3.5 ML INJT    420 mg by SubCUTAneous route every thirty (30) days. FUROSEMIDE (LASIX) 40 MG TABLET    TAKE 1 TABLET BY MOUTH DAILY. HYDROCORTISONE (ANUSOL-HC) 25 MG SUPP    Insert 1 Suppository into rectum every twelve (12) hours. METOPROLOL TARTRATE (LOPRESSOR) 25 MG TABLET    Take 0.5 Tablets by mouth every twelve (12) hours. NITROGLYCERIN (NITROLINGUAL) 400 MCG/SPRAY SPRAY    SPRAY ONE SPRAY UNDER THE TONGUE AS NEEDED MAY REPEAT UP TO 2 TIMES AS DIRECTED     PITAVASTATIN CALCIUM (LIVALO) 4 MG TAB TABLET    Take 1 Tablet by mouth daily. POTASSIUM CHLORIDE (K-DUR, KLOR-CON M20) 20 MEQ TABLET    TAKE 1 TABLET BY MOUTH DAILY. OR AS DIRECTED. XARELTO 20 MG TAB TABLET    TAKE 1 TABLET BY MOUTH DAILY. These Medications have changed    No medications on file   Stop Taking    CYCLOBENZAPRINE (FLEXERIL) 5 MG TABLET    Take 1 Tablet by mouth three (3) times daily as needed for Muscle Spasm(s). LIDOCAINE 4 % PATCH    1 patch q 12 hours for pain  Indications: Pain    METHOCARBAMOL (ROBAXIN) 500 MG TABLET    Take 1 Tablet by mouth four (4) times daily as needed for Muscle Spasm(s). STIOLTO RESPIMAT 2.5-2.5 MCG/ACTUATION INHALER    TAKE 2 PUFFS BY INHALATION DAILY.

## 2022-03-14 NOTE — ED TRIAGE NOTES
Pt. States he started with left sided rib cage pain three days ago. Pt. States he has had pain like this before and was diagnosed with a blood clot. Pt. also complains of poor appetite.

## 2022-03-18 PROBLEM — E78.5 HYPERLIPIDEMIA: Status: ACTIVE | Noted: 2021-06-09

## 2022-03-18 PROBLEM — Z95.1 S/P CABG X 3: Status: ACTIVE | Noted: 2021-08-11

## 2022-03-18 PROBLEM — E43 SEVERE PROTEIN-CALORIE MALNUTRITION (HCC): Status: ACTIVE | Noted: 2021-08-12

## 2022-03-18 PROBLEM — I26.01 SEPTIC PULMONARY EMBOLISM WITH ACUTE COR PULMONALE (HCC): Status: ACTIVE | Noted: 2021-06-14

## 2022-03-19 PROBLEM — K64.1 GRADE II HEMORRHOIDS: Status: ACTIVE | Noted: 2021-08-23

## 2022-03-19 PROBLEM — R07.9 CHEST PAIN: Status: ACTIVE | Noted: 2021-06-09

## 2022-03-19 PROBLEM — I20.0 UNSTABLE ANGINA (HCC): Status: ACTIVE | Noted: 2021-06-09

## 2022-03-19 PROBLEM — E66.01 SEVERE OBESITY (HCC): Status: ACTIVE | Noted: 2018-10-24

## 2022-03-19 PROBLEM — G47.33 OSA (OBSTRUCTIVE SLEEP APNEA): Status: ACTIVE | Noted: 2021-06-09

## 2022-03-19 PROBLEM — I10 ESSENTIAL HYPERTENSION: Status: ACTIVE | Noted: 2018-09-13

## 2022-03-19 PROBLEM — M10.00 ACUTE IDIOPATHIC GOUT: Status: ACTIVE | Noted: 2021-06-25

## 2022-03-20 PROBLEM — K43.9 VENTRAL HERNIA WITHOUT OBSTRUCTION OR GANGRENE: Status: ACTIVE | Noted: 2018-08-28

## 2022-03-20 PROBLEM — I25.10 CAD (CORONARY ARTERY DISEASE): Status: ACTIVE | Noted: 2021-06-09

## 2022-03-20 PROBLEM — Z95.5 PRESENCE OF DRUG COATED STENT IN LEFT CIRCUMFLEX CORONARY ARTERY: Status: ACTIVE | Noted: 2018-09-20

## 2022-03-20 PROBLEM — F17.200 TOBACCO DEPENDENCE: Status: ACTIVE | Noted: 2021-06-09

## 2022-04-27 NOTE — PROGRESS NOTES
Appears patient will need labs the middle of June for thyroid per note from Yadira Going on 3/15/22. CARDIAC SURGERY FOLLOW-UP NOTE    8/26/2021    Subjective:   Chucky Linares returns for a 7 day follow-up visit following CABG x 3 on 8/11/21 by Dr. Ricardo Quevedo:  Overall he is doing following open-heart surgery. Plans / Recommendations:   Cont Lasix and potassium for one week then as needed for swelling  Start taking Robaxin for back muscle spasms  Follow-up with Primary Care Provider and Cardiologist in the future as directed. Has regularly scheduled appointment with Cardiothoracic Surgery clinic in two weeks. No heavy lifting, and wear sternal harness all the time. Continue to refrain from smoking      Patient verbalizes understanding and agreement with the plan. Joshua Bang PA-C  [facility]  [phone]  _______________________________________________________________________________________________________________________________________________________  Subjective:  He feels well. He does have leg swelling and complains of muscle spasms in the back primarily. Secretions have subsided. He continues to refrain from smoking. Angina is absent. Shortness of breath is absent. Sternal pain is present, primarily with coughing  Stamina is improving. Eating well. Bowel movements regular. Smoking status: quit date 8/10/21    Current medications include:  Current Outpatient Medications   Medication Sig Dispense Refill    methocarbamoL (ROBAXIN) 500 mg tablet Take 1 Tablet by mouth four (4) times daily as needed for Muscle Spasm(s). 28 Tablet 0    atorvastatin (Lipitor) 40 mg tablet Take 1 Tablet by mouth daily. 90 Tablet 1    hydrocortisone (Anusol-HC) 25 mg supp Insert 1 Suppository into rectum every twelve (12) hours. 14 Suppository 1    Stiolto Respimat 2.5-2.5 mcg/actuation inhaler TAKE 2 PUFFS BY INHALATION DAILY. 1 Inhaler 5    metoprolol tartrate (LOPRESSOR) 25 mg tablet Take 0.5 Tablets by mouth every twelve (12) hours.  30 Tablet 2  furosemide (LASIX) 40 mg tablet Take 1 Tablet by mouth daily. 30 Tablet 0    rivaroxaban (XARELTO) 20 mg tab tablet Take 1 Tablet by mouth daily. 90 Tablet 1    aspirin delayed-release 81 mg tablet   1    colchicine (Mitigare) 0.6 mg capsule Take 1 Capsule by mouth daily. (Patient not taking: Reported on 8/24/2021) 30 Capsule 2    nitroglycerin (NITROLINGUAL) 400 mcg/spray spray SPRAY ONE SPRAY UNDER THE TONGUE AS NEEDED MAY REPEAT UP TO 2 TIMES AS DIRECTED  (Patient not taking: Reported on 8/24/2021) 12 g 5       Objective:   Vital signs:    BP Readings from Last 3 Encounters:   08/24/21 112/75   08/23/21 117/72   08/16/21 115/68     Wt Readings from Last 3 Encounters:   08/24/21 112.5 kg (248 lb)   08/23/21 112 kg (247 lb)   08/16/21 121.3 kg (267 lb 6.7 oz)     @TMAX(24)@  Wt Readings from Last 3 Encounters:   08/24/21 112.5 kg (248 lb)   08/23/21 112 kg (247 lb)   08/16/21 121.3 kg (267 lb 6.7 oz)     Ht Readings from Last 3 Encounters:   08/12/21 5' 10.5\" (1.791 m)   08/06/21 5' 10.5\" (1.791 m)   07/26/21 5' 10\" (1.778 m)     Respiratory exam: clear to auscultation bilaterally. Cardiac exam: regular rate and rhythm   Sternum: stable. Sternal wound: clean, dry, healing. Leg wounds: clean, dry, healing.   Pedal edema: moderate

## 2022-04-28 RX ORDER — METOPROLOL TARTRATE 25 MG/1
TABLET, FILM COATED ORAL
Qty: 30 TABLET | Refills: 0 | Status: SHIPPED | OUTPATIENT
Start: 2022-04-28 | End: 2022-05-06 | Stop reason: SDUPTHER

## 2022-04-28 RX ORDER — FUROSEMIDE 40 MG/1
40 TABLET ORAL DAILY
Qty: 30 TABLET | Refills: 1 | Status: SHIPPED | OUTPATIENT
Start: 2022-04-28 | End: 2022-05-06 | Stop reason: SDUPTHER

## 2022-04-28 RX ORDER — POTASSIUM CHLORIDE 20 MEQ/1
20 TABLET, EXTENDED RELEASE ORAL DAILY
Qty: 30 TABLET | Refills: 1 | Status: SHIPPED | OUTPATIENT
Start: 2022-04-28 | End: 2022-05-06 | Stop reason: SDUPTHER

## 2022-05-06 ENCOUNTER — OFFICE VISIT (OUTPATIENT)
Dept: FAMILY MEDICINE CLINIC | Age: 62
End: 2022-05-06
Payer: COMMERCIAL

## 2022-05-06 VITALS
SYSTOLIC BLOOD PRESSURE: 117 MMHG | WEIGHT: 267.9 LBS | BODY MASS INDEX: 38.35 KG/M2 | HEIGHT: 70 IN | DIASTOLIC BLOOD PRESSURE: 70 MMHG | OXYGEN SATURATION: 96 % | TEMPERATURE: 98.3 F | HEART RATE: 76 BPM

## 2022-05-06 DIAGNOSIS — I25.10 CORONARY ARTERY DISEASE INVOLVING NATIVE CORONARY ARTERY WITHOUT ANGINA PECTORIS, UNSPECIFIED WHETHER NATIVE OR TRANSPLANTED HEART: ICD-10-CM

## 2022-05-06 DIAGNOSIS — I10 ESSENTIAL HYPERTENSION: ICD-10-CM

## 2022-05-06 DIAGNOSIS — Z12.11 SCREEN FOR COLON CANCER: Primary | ICD-10-CM

## 2022-05-06 DIAGNOSIS — E78.01 FAMILIAL HYPERCHOLESTEROLEMIA: ICD-10-CM

## 2022-05-06 DIAGNOSIS — R35.0 URINARY FREQUENCY: ICD-10-CM

## 2022-05-06 DIAGNOSIS — Z00.00 PREVENTATIVE HEALTH CARE: ICD-10-CM

## 2022-05-06 DIAGNOSIS — M67.479 GANGLION CYST OF FOOT: ICD-10-CM

## 2022-05-06 DIAGNOSIS — K64.1 GRADE II HEMORRHOIDS: ICD-10-CM

## 2022-05-06 DIAGNOSIS — M10.00 ACUTE IDIOPATHIC GOUT, UNSPECIFIED SITE: ICD-10-CM

## 2022-05-06 PROCEDURE — 99214 OFFICE O/P EST MOD 30 MIN: CPT | Performed by: STUDENT IN AN ORGANIZED HEALTH CARE EDUCATION/TRAINING PROGRAM

## 2022-05-06 PROCEDURE — 99396 PREV VISIT EST AGE 40-64: CPT | Performed by: STUDENT IN AN ORGANIZED HEALTH CARE EDUCATION/TRAINING PROGRAM

## 2022-05-06 RX ORDER — TAMSULOSIN HYDROCHLORIDE 0.4 MG/1
0.4 CAPSULE ORAL DAILY
Qty: 90 CAPSULE | Refills: 1 | Status: SHIPPED | OUTPATIENT
Start: 2022-05-06 | End: 2022-10-07

## 2022-05-06 RX ORDER — COLCHICINE 0.6 MG/1
0.6 CAPSULE ORAL DAILY
Qty: 90 CAPSULE | Refills: 1 | Status: SHIPPED | OUTPATIENT
Start: 2022-05-06 | End: 2022-05-12

## 2022-05-06 RX ORDER — CYCLOBENZAPRINE HCL 5 MG
5 TABLET ORAL
Qty: 90 TABLET | Refills: 1 | Status: SHIPPED | OUTPATIENT
Start: 2022-05-06 | End: 2022-08-07

## 2022-05-06 RX ORDER — POTASSIUM CHLORIDE 20 MEQ/1
20 TABLET, EXTENDED RELEASE ORAL DAILY
Qty: 90 TABLET | Refills: 1 | Status: SHIPPED | OUTPATIENT
Start: 2022-05-06

## 2022-05-06 RX ORDER — FUROSEMIDE 40 MG/1
40 TABLET ORAL DAILY
Qty: 90 TABLET | Refills: 1 | Status: SHIPPED | OUTPATIENT
Start: 2022-05-06

## 2022-05-06 RX ORDER — METOPROLOL TARTRATE 25 MG/1
TABLET, FILM COATED ORAL
Qty: 45 TABLET | Refills: 1 | Status: SHIPPED | OUTPATIENT
Start: 2022-05-06 | End: 2022-08-15

## 2022-05-06 NOTE — PROGRESS NOTES
Wisam Falcon presents today for   Chief Complaint   Patient presents with    Physical       Is someone accompanying this pt? No     Is the patient using any DME equipment during OV? No     Depression Screening:  3 most recent PHQ Screens 5/6/2022   Little interest or pleasure in doing things Not at all   Feeling down, depressed, irritable, or hopeless Not at all   Total Score PHQ 2 0       Learning Assessment:  Learning Assessment 7/26/2021   PRIMARY LEARNER Patient   PRIMARY LANGUAGE ENGLISH   LEARNER PREFERENCE PRIMARY DEMONSTRATION   ANSWERED BY Patient   RELATIONSHIP SELF       Fall Risk  No flowsheet data found. Health Maintenance reviewed and discussed and ordered per Provider. Health Maintenance Due   Topic Date Due    Hepatitis C Screening  Never done    COVID-19 Vaccine (1) Never done    Pneumococcal 0-64 years (1 - PCV) Never done    DTaP/Tdap/Td series (1 - Tdap) Never done    Colorectal Cancer Screening Combo  Never done    Shingrix Vaccine Age 50> (1 of 2) Never done   . Coordination of Care:    1. \"Have you been to the ER, urgent care clinic since your last visit? Hospitalized since your last visit? \" No    2. \"Have you seen or consulted any other health care providers outside of the [address] [place] [address] since your last visit? \" No     3. For patients aged 39-70: Has the patient had a colonoscopy? Yes - Care Gap present. Rooming MA/LPN to request most recent results [facility]  Order pended. If the patient is female:    4. For patients aged 41-77: Has the patient had a mammogram within the past 2 years? NA - based on age/sex    5. For patients aged 21-65: Has the patient had a pap smear?  NA - based on age/sex

## 2022-05-09 RX ORDER — COLCHICINE 0.6 MG/1
0.6 TABLET ORAL DAILY
COMMUNITY
End: 2022-05-09 | Stop reason: SDUPTHER

## 2022-05-12 RX ORDER — COLCHICINE 0.6 MG/1
0.6 TABLET ORAL DAILY
Qty: 90 TABLET | Refills: 1 | Status: SHIPPED | OUTPATIENT
Start: 2022-05-12

## 2022-05-12 NOTE — PROGRESS NOTES
Subjective:   Souleymane Madison is a 59 y.o. male who was seen for Physical    Patient is here for a yearly physical and routine follow up. He has been having some urinary frequency. Denies any hematuria or dysuria. No bleeding complications while on xarelto in which he takes it for history of PE. Denies any chest pain or shortness of breath. He did complain of a cyst on his foot. Denies any drainage from the cyst.     Home Medications    Medication Sig Start Date End Date Taking? Authorizing Provider   colchicine 0.6 mg tablet Take 1 Tablet by mouth daily. 5/12/22   Felicita Young MD   furosemide (LASIX) 40 mg tablet Take 1 Tablet by mouth daily. 5/6/22  Yes Felicita Young MD   potassium chloride (K-DUR, KLOR-CON M20) 20 mEq tablet Take 1 Tablet by mouth daily. Or as directed. 5/6/22  Yes Felicita Young MD   metoprolol tartrate (LOPRESSOR) 25 mg tablet TAKE 1/2 TABLET BY MOUTH EVERY TWELVE (12) HOURS. 5/6/22  Yes Felicita Young MD   rivaroxaban (Xarelto) 20 mg tab tablet Take 1 Tablet by mouth daily. 5/6/22  Yes Felicita Young MD   cyclobenzaprine (FLEXERIL) 5 mg tablet Take 1 Tablet by mouth three (3) times daily as needed for Muscle Spasm(s). 5/6/22  Yes Felicita Young MD   tamsulosin (Flomax) 0.4 mg capsule Take 1 Capsule by mouth daily. 5/6/22  Yes Felicita Young MD   evolocumab (Repatha Pushtronex) 420 mg/3.5 mL Injt 420 mg by SubCUTAneous route every thirty (30) days. 1/17/22  Yes Ricardo Blevins MD   nitroglycerin (NITROLINGUAL) 400 mcg/spray spray SPRAY ONE SPRAY UNDER THE TONGUE AS NEEDED MAY REPEAT UP TO 2 TIMES AS DIRECTED  5/23/21  Yes Lalitha Gutierrez MD   aspirin delayed-release 81 mg tablet Take 81 mg by mouth daily. 4/29/19  Yes Provider, Historical      Allergies   Allergen Reactions    Gabapentin Other (comments)     \"Caused bad nightmares\"    Statins-Hmg-Coa Reductase Inhibitors Other (comments)     Myalgias with Lipitor.   Tolerates Crestor    Pcn [Penicillins] Hives     Social History     Tobacco Use    Smoking status: Current Every Day Smoker     Packs/day: 0.50     Types: Cigarettes    Smokeless tobacco: Never Used   Substance Use Topics    Alcohol use: Yes     Comment: couple drinks a day    Drug use: No        Review of Systems   All other systems reviewed and are negative. Objective:     Visit Vitals  /70 (BP 1 Location: Left upper arm, BP Patient Position: Sitting, BP Cuff Size: Large adult long)   Pulse 76   Temp 98.3 °F (36.8 °C) (Temporal)   Ht 5' 10\" (1.778 m)   Wt 267 lb 14.4 oz (121.5 kg)   SpO2 96%   BMI 38.44 kg/m²        General: alert, oriented, not in distress  Chest/Lungs: clear breath sounds, no wheezing or crackles  Heart: normal rate, regular rhythm, no murmur  Abdomen: soft, non-distended, non-tender, normal bowel sounds, no organomegaly, no masses  Extremities:no edema, cyst on sole of foot, no erythema  Skin: no active skin lesions  Rectal: Enlarged prostate, no nodules palpated      Assessment & Plan:     1. Essential hypertension  Controlled. Continue metoprolol, lasix    2. Coronary artery disease involving native coronary artery without angina pectoris, unspecified whether native or transplanted heart  S/p CABG. Continue aspirin, repatha, metoprolol  - MAGNESIUM  - PHOSPHORUS    3. Familial hypercholesterolemia  Continue repatha    4. Grade II hemorrhoids  Check iron panel and CBC. Referral for colonoscopy  - CBC WITH AUTOMATED DIFF  - METABOLIC PANEL, COMPREHENSIVE  - IRON PROFILE  - FERRITIN    5. Screen for colon cancer  - REFERRAL FOR COLONOSCOPY    6. Acute idiopathic gout, unspecified site  Controlled. Continue colchicine    7. Urinary frequency  Will start flomax. Check PSA  - PSA, DIAGNOSTIC (PROSTATE SPECIFIC AG)    8. Ganglion cyst of foot  - REFERRAL TO PODIATRY    9. Preventative health care  Yearly physical done. Due for colonoscopy with hx of internal hemorrhoids.               I have discussed the diagnosis with the patient and the intended plan as seen in the above orders. The patient has received an after-visit summary and questions were answered concerning future plans. I have discussed medication side effects and warnings with the patient as well. I have reviewed the plan of care with the patient, accepted their input and they are in agreement with the treatment goals. Previous lab and imaging results were reviewed by me.        Froylan Gómez MD  May 12, 2022

## 2022-05-19 RX ORDER — EVOLOCUMAB 420 MG/3.5
KIT SUBCUTANEOUS
Qty: 3.5 ML | Refills: 3 | Status: SHIPPED | OUTPATIENT
Start: 2022-05-19 | End: 2022-09-16

## 2022-05-26 ENCOUNTER — HOSPITAL ENCOUNTER (OUTPATIENT)
Dept: LAB | Age: 62
Discharge: HOME | End: 2022-05-26
Payer: COMMERCIAL

## 2022-05-26 ENCOUNTER — TRANSCRIBE ORDER (OUTPATIENT)
Dept: REGISTRATION | Age: 62
End: 2022-05-26

## 2022-05-26 ENCOUNTER — HOSPITAL ENCOUNTER (OUTPATIENT)
Dept: GENERAL RADIOLOGY | Age: 62
Discharge: HOME | End: 2022-05-26
Attending: PODIATRIST
Payer: COMMERCIAL

## 2022-05-26 DIAGNOSIS — L03.119: ICD-10-CM

## 2022-05-26 DIAGNOSIS — L03.119: Primary | ICD-10-CM

## 2022-05-26 LAB
ALBUMIN SERPL-MCNC: 3.7 G/DL (ref 3.4–5)
ALBUMIN/GLOB SERPL: 1 {RATIO} (ref 0.8–1.7)
ALP SERPL-CCNC: 98 U/L (ref 45–117)
ALT SERPL-CCNC: 24 U/L (ref 16–61)
ANION GAP SERPL CALC-SCNC: 3 MMOL/L (ref 3–18)
AST SERPL W P-5'-P-CCNC: 23 U/L (ref 10–38)
BASOPHILS # BLD: 0.1 K/UL (ref 0–0.1)
BASOPHILS NFR BLD: 2 % (ref 0–2)
BILIRUB SERPL-MCNC: 0.5 MG/DL (ref 0.2–1)
BUN SERPL-MCNC: 12 MG/DL (ref 7–18)
BUN/CREAT SERPL: 9 (ref 12–20)
CA-I BLD-MCNC: 9.3 MG/DL (ref 8.5–10.1)
CHLORIDE SERPL-SCNC: 106 MMOL/L (ref 100–111)
CO2 SERPL-SCNC: 28 MMOL/L (ref 21–32)
CREAT SERPL-MCNC: 1.39 MG/DL (ref 0.6–1.3)
DIFFERENTIAL METHOD BLD: ABNORMAL
EOSINOPHIL # BLD: 0.3 K/UL (ref 0–0.4)
EOSINOPHIL NFR BLD: 4 % (ref 0–5)
ERYTHROCYTE [DISTWIDTH] IN BLOOD BY AUTOMATED COUNT: 14.1 % (ref 11.6–14.5)
GLOBULIN SER CALC-MCNC: 3.8 G/DL (ref 2–4)
GLUCOSE SERPL-MCNC: 115 MG/DL (ref 74–99)
HCT VFR BLD AUTO: 45.3 % (ref 36–48)
HGB BLD-MCNC: 14.1 G/DL (ref 13–16)
IMM GRANULOCYTES # BLD AUTO: 0.1 K/UL (ref 0–0.04)
IMM GRANULOCYTES NFR BLD AUTO: 1 % (ref 0–0.5)
LYMPHOCYTES # BLD: 2.1 K/UL (ref 0.9–3.6)
LYMPHOCYTES NFR BLD: 29 % (ref 21–52)
MAGNESIUM SERPL-MCNC: 2.2 MG/DL (ref 1.6–2.6)
MCH RBC QN AUTO: 29 PG (ref 24–34)
MCHC RBC AUTO-ENTMCNC: 31.1 G/DL (ref 31–37)
MCV RBC AUTO: 93.2 FL (ref 78–100)
MONOCYTES # BLD: 0.6 K/UL (ref 0.05–1.2)
MONOCYTES NFR BLD: 8 % (ref 3–10)
NEUTS SEG # BLD: 4.2 K/UL (ref 1.8–8)
NEUTS SEG NFR BLD: 56 % (ref 40–73)
NRBC # BLD: 0 K/UL (ref 0–0.01)
NRBC BLD-RTO: 0 PER 100 WBC
PHOSPHATE SERPL-MCNC: 2.8 MG/DL (ref 2.5–4.9)
PLATELET # BLD AUTO: 229 K/UL (ref 135–420)
PMV BLD AUTO: 10.3 FL (ref 9.2–11.8)
POTASSIUM SERPL-SCNC: 4.1 MMOL/L (ref 3.5–5.5)
PROT SERPL-MCNC: 7.5 G/DL (ref 6.4–8.2)
RBC # BLD AUTO: 4.86 M/UL (ref 4.35–5.65)
SODIUM SERPL-SCNC: 137 MMOL/L (ref 136–145)
WBC # BLD AUTO: 7.5 K/UL (ref 4.6–13.2)

## 2022-05-26 PROCEDURE — 80053 COMPREHEN METABOLIC PANEL: CPT

## 2022-05-26 PROCEDURE — 36415 COLL VENOUS BLD VENIPUNCTURE: CPT

## 2022-05-26 PROCEDURE — 84153 ASSAY OF PSA TOTAL: CPT

## 2022-05-26 PROCEDURE — 82728 ASSAY OF FERRITIN: CPT

## 2022-05-26 PROCEDURE — 83735 ASSAY OF MAGNESIUM: CPT

## 2022-05-26 PROCEDURE — 84100 ASSAY OF PHOSPHORUS: CPT

## 2022-05-26 PROCEDURE — 83540 ASSAY OF IRON: CPT

## 2022-05-26 PROCEDURE — 73630 X-RAY EXAM OF FOOT: CPT

## 2022-05-26 PROCEDURE — 85025 COMPLETE CBC W/AUTO DIFF WBC: CPT

## 2022-05-27 LAB
FERRITIN SERPL-MCNC: 14 NG/ML (ref 8–388)
IRON SATN MFR SERPL: 9 % (ref 20–50)
IRON SERPL-MCNC: 37 UG/DL (ref 50–175)
PSA SERPL-MCNC: 19.2 NG/ML (ref 0–4)
TIBC SERPL-MCNC: 425 UG/DL (ref 250–450)

## 2022-06-18 DIAGNOSIS — R97.20 ELEVATED PSA: Primary | ICD-10-CM

## 2022-06-18 RX ORDER — FERROUS SULFATE 325(65) MG
325 TABLET, DELAYED RELEASE (ENTERIC COATED) ORAL
Qty: 180 TABLET | Refills: 0 | Status: SHIPPED | OUTPATIENT
Start: 2022-06-18 | End: 2022-08-15

## 2022-07-11 ENCOUNTER — OFFICE VISIT (OUTPATIENT)
Dept: CARDIOLOGY CLINIC | Age: 62
End: 2022-07-11
Payer: COMMERCIAL

## 2022-07-11 VITALS
WEIGHT: 266.8 LBS | OXYGEN SATURATION: 96 % | DIASTOLIC BLOOD PRESSURE: 87 MMHG | HEART RATE: 95 BPM | HEIGHT: 70 IN | RESPIRATION RATE: 16 BRPM | SYSTOLIC BLOOD PRESSURE: 138 MMHG | BODY MASS INDEX: 38.2 KG/M2

## 2022-07-11 DIAGNOSIS — E78.00 PURE HYPERCHOLESTEROLEMIA: ICD-10-CM

## 2022-07-11 DIAGNOSIS — Z86.711 HISTORY OF PULMONARY EMBOLISM: ICD-10-CM

## 2022-07-11 DIAGNOSIS — I10 ESSENTIAL HYPERTENSION WITH GOAL BLOOD PRESSURE LESS THAN 140/90: ICD-10-CM

## 2022-07-11 DIAGNOSIS — I25.10 CORONARY ARTERY DISEASE DUE TO LIPID RICH PLAQUE: ICD-10-CM

## 2022-07-11 DIAGNOSIS — I25.10 CORONARY ARTERY DISEASE DUE TO LIPID RICH PLAQUE: Primary | ICD-10-CM

## 2022-07-11 DIAGNOSIS — I25.83 CORONARY ARTERY DISEASE DUE TO LIPID RICH PLAQUE: ICD-10-CM

## 2022-07-11 DIAGNOSIS — E78.00 PURE HYPERCHOLESTEROLEMIA: Primary | ICD-10-CM

## 2022-07-11 DIAGNOSIS — I25.83 CORONARY ARTERY DISEASE DUE TO LIPID RICH PLAQUE: Primary | ICD-10-CM

## 2022-07-11 PROCEDURE — 99214 OFFICE O/P EST MOD 30 MIN: CPT | Performed by: INTERNAL MEDICINE

## 2022-07-11 RX ORDER — TIOTROPIUM BROMIDE AND OLODATEROL 3.124; 2.736 UG/1; UG/1
SPRAY, METERED RESPIRATORY (INHALATION)
COMMUNITY
Start: 2022-06-16 | End: 2022-08-16 | Stop reason: SDUPTHER

## 2022-07-11 NOTE — PROGRESS NOTES
Verbal order and read back per Mirlande Andersen MD  - order lipid panel  Patient made aware of need for fasting lab work. Aware lab order in system. Patient will have lab work done at a [facility] facility.

## 2022-07-11 NOTE — LETTER
7/11/2022    Patient: Melissa Bennett   YOB: 1960   Date of Visit: 7/11/2022     Petrona Daniels MD  [address]  [address] [address]  [place]    Dear Petrona Daniels MD,      Thank you for referring Mr. Iona Camacho to [address] [place] [address] for evaluation. My notes for this consultation are attached. If you have questions, please do not hesitate to call me. I look forward to following your patient along with you.       Sincerely,    Dell Alvarez MD

## 2022-07-11 NOTE — PROGRESS NOTES
Kailee Duran presents today for   Chief Complaint   Patient presents with    Follow-up     6 month       Kailee Duran preferred language for health care discussion is english/other. Is someone accompanying this pt? no    Is the patient using any DME equipment during [address]? no    Depression Screening:  3 most recent PHQ Screens 7/11/2022   Little interest or pleasure in doing things Not at all   Feeling down, depressed, irritable, or hopeless Not at all   Total Score PHQ 2 0       Learning Assessment:  Learning Assessment 7/11/2022   PRIMARY LEARNER Patient   PRIMARY LANGUAGE ENGLISH   LEARNER PREFERENCE PRIMARY DEMONSTRATION   ANSWERED BY patient   RELATIONSHIP SELF       Abuse Screening:  Abuse Screening Questionnaire 7/11/2022   Do you ever feel afraid of your partner? N   Are you in a relationship with someone who physically or mentally threatens you? N   Is it safe for you to go home? Y       Fall Risk  No flowsheet data found. Pt currently taking Anticoagulant therapy? xarelto 20 mg daily    Pt currently taking Antiplatelet therapy ? Aspirin 81 mg daily      Coordination of Care:  1. Have you been to the ER, urgent care clinic since your last visit? Hospitalized since your last visit? no    2. Have you seen or consulted any other health care providers outside of the [address] [place] [address] since your last visit? Include any pap smears or colon screening.  no

## 2022-07-11 NOTE — PROGRESS NOTES
[facility]    Mr. Lia Thorne is a 59 y.o. male with a history of CAD, hypertension, hyperlipidemia, sleep apnea, pulmonary embolism, tobacco use disorder    Patient is here today for cardiac follow-up. Patient has history of coronary disease that is post left circumflex coronary stent more than 10 years ago. Patient was admitted to [facility] with chest pain concerning for angina in 06/2021. Patient underwent emergent cardiac catheterization which showed severe three-vessel coronary artery disease. Patient continues to have a chest pain despite appropriate medical management. Echocardiogram revealed RV strain. CT chest revealed submassive bilateral PE Patient underwent IR guided thromboembolectomy in 06/2021 followed by IVC filter placement. Patient was started on Xarelto. CAD, s/p CABG x3 (08/11/21) LIMA to mLAD, SVG to R PLV and SVG to OM. Denies any resting or exertional chest pain or chest pressure to suggest angina or any dyspnea to suggest heart failure. No presyncope or syncope  Denies any PND or LE edema. Taking all medications regularly. Denies any nausea, vomiting, abdominal pain, fever, chills, sputum production. No hematuria or other bleeding complaints    Past Medical History:   Diagnosis Date    Bilateral hand pain     CAD (coronary artery disease)     Dupuytren's disease of palm     Hyperlipidemia     Hypertension     SHILPI (obstructive sleep apnea)     PE (pulmonary thromboembolism) ([address] 06/2021    Severe obesity ([address]     Tobacco dependence        Review of Systems:  Cardiac symptoms as noted above in HPI. All others negative. Denies fatigue, malaise, skin rash, joint pain, blurring vision, photophobia, neck pain, hemoptysis, chronic cough, nausea, vomiting, hematuria, burning micturition, BRBPR, chronic headaches.     Current Outpatient Medications   Medication Sig    Stiolto Respimat 2.5-2.5 mcg/actuation inhaler     ferrous sulfate (IRON) 325 mg (65 mg iron) EC tablet Take 1 Tablet by mouth three (3) times daily (with meals). (Patient not taking: Reported on 7/11/2022)    Repatha Pushtronex 420 mg/3.5 mL Injt INJECT 420MG UNDER THE SKIN AS INSTRUCTED ONCE EVERY 30 DAYS    colchicine 0.6 mg tablet Take 1 Tablet by mouth daily.  furosemide (LASIX) 40 mg tablet Take 1 Tablet by mouth daily.  potassium chloride (K-DUR, KLOR-CON M20) 20 mEq tablet Take 1 Tablet by mouth daily. Or as directed.  metoprolol tartrate (LOPRESSOR) 25 mg tablet TAKE 1/2 TABLET BY MOUTH EVERY TWELVE (12) HOURS.  rivaroxaban (Xarelto) 20 mg tab tablet Take 1 Tablet by mouth daily.  cyclobenzaprine (FLEXERIL) 5 mg tablet Take 1 Tablet by mouth three (3) times daily as needed for Muscle Spasm(s).  tamsulosin (Flomax) 0.4 mg capsule Take 1 Capsule by mouth daily.  nitroglycerin (NITROLINGUAL) 400 mcg/spray spray SPRAY ONE SPRAY UNDER THE TONGUE AS NEEDED MAY REPEAT UP TO 2 TIMES AS DIRECTED     aspirin delayed-release 81 mg tablet Take 81 mg by mouth daily. No current facility-administered medications for this visit. Past Surgical History:   Procedure Laterality Date    HX CHOLECYSTECTOMY      HX CORONARY STENT PLACEMENT      HX HERNIA REPAIR      IR IVC FILTER PLACEMENT PERCUTANEOUS  6/15/2021    IR REMOVE IVC FILTER W SI  1/17/2022    IR THROMBECTOMY MECH ART PRIMARY NON HETAL OR INTRACRANIAL  6/15/2021    GA CARDIAC SURG PROCEDURE UNLIST      filter and triple bypass       Allergies and Sensitivities:  Allergies   Allergen Reactions    Gabapentin Other (comments)     \"Caused bad nightmares\"    Statins-Hmg-Coa Reductase Inhibitors Other (comments)     Myalgias with Lipitor.   Tolerates Crestor    Ferrous Sulfate Other (comments)     Ferrous Sulfate (IRON) --Abdominal pain , redness in face, fever    Pcn [Penicillins] Hives       Family History:  Family History   Problem Relation Age of Onset    Hypertension Mother     Diabetes Mother        Social History:  Social History     Tobacco Use    Smoking status: Current Every Day Smoker     Packs/day: 0.50     Years: 40.00     Pack years: 20.00     Types: Cigarettes    Smokeless tobacco: Never Used   Vaping Use    Vaping Use: Never used   Substance Use Topics    Alcohol use: Yes     Comment: couple drinks a day    Drug use: No     He  reports that he has been smoking cigarettes. He has a 20.00 pack-year smoking history. He has never used smokeless tobacco.  He  reports current alcohol use. Physical Exam:  BP Readings from Last 3 Encounters:   07/11/22 138/87   05/06/22 117/70   03/14/22 (!) 151/91         Pulse Readings from Last 3 Encounters:   07/11/22 95   05/06/22 76   03/14/22 80          Wt Readings from Last 3 Encounters:   07/11/22 121 kg (266 lb 12.8 oz)   05/06/22 121.5 kg (267 lb 14.4 oz)   03/13/22 117.9 kg (260 lb)       Constitutional: Oriented to person, place, and time. HENT: Head: Normocephalic and atraumatic. Neck: No JVD present. Carotid bruit is not appreciated. Cardiovascular: Regular rhythm. No murmur, gallop or rubs appreciated  Lung: Breath sounds normal. No respiratory distress. No ronchi or rales appreciated  Abdominal: No tenderness. No rebound and no guarding. Musculoskeletal: There is no significant lower extremity edema.  No cynosis    LABS:   @  Lab Results   Component Value Date/Time    WBC 7.5 05/26/2022 03:36 PM    Hemoglobin, POC 10.8 (L) 08/12/2021 02:18 AM    HGB 14.1 05/26/2022 03:36 PM    Hematocrit, POC 32 (L) 08/12/2021 02:18 AM    HCT 45.3 05/26/2022 03:36 PM    PLATELET 341 93/19/8174 03:36 PM    MCV 93.2 05/26/2022 03:36 PM     Lab Results   Component Value Date/Time    Sodium 137 05/26/2022 03:36 PM    Potassium 4.1 05/26/2022 03:36 PM    Chloride 106 05/26/2022 03:36 PM    CO2 28 05/26/2022 03:36 PM    Glucose 115 (H) 05/26/2022 03:36 PM    BUN 12 05/26/2022 03:36 PM    Creatinine 1.39 (H) 05/26/2022 03:36 PM     Lipids Latest Ref Rng & Units 6/10/2021   Chol, Total <200 MG/   HDL 40 - 60 MG/DL 33(L)   LDL 0 - 100 MG/DL 69.6   Trig <150 MG/DL 87   Chol/HDL Ratio 0 - 5.0   3.6     Lab Results   Component Value Date/Time    ALT (SGPT) 24 05/26/2022 03:36 PM     Lab Results   Component Value Date/Time    Hemoglobin A1c 5.2 08/06/2021 01:58 PM     Lab Results   Component Value Date/Time    TSH 0.39 08/06/2021 01:58 PM       EKG    ECHO (06/2021)  Left Ventricle Normal cavity size, wall thickness and systolic function (ejection fraction normal). The estimated EF is 50 - 55%. Abnormal left ventricular septal motion. Systolic flattening of the interventricular septum consistent with right ventricle pressure overload. There is mild (grade 1) left ventricular diastolic dysfunction. Wall Scoring The left ventricular wall motion is normal.            Left Atrium Normal cavity size. Right Ventricle Dilated right ventricle. Abnormal wall motion: free wall hypokinesis of the right ventricle. Reduced systolic function. The findings are consistent with Powell's sign . Right Atrium Normal cavity size. Aortic Valve Normal valve structure, trileaflet valve structure, no stenosis and no regurgitation. Mitral Valve Normal valve structure and no stenosis. Trace regurgitation. Tricuspid Valve No stenosis. Non-specific thickening. Mild to moderate regurgitation. Pulmonic Valve Pulmonic valve not well visualized, but normal doppler findings. Aorta Mildly dilated aortic root; diameter is 3.8 cm. Pulmonary Artery Pulmonary arterial systolic pressure (PASP) is 60 mmHg. Pulmonary hypertension found to be moderate. IVC/Hepatic Veins Moderately elevated central venous pressure (8 mmHg); IVC diameter is larger than 21 mm and collapses more than 50% with respiration. CATHETERIZATION (06/2021)  Left Main   Large-caliber vessel.  Distal vessel approximately 50% stenosis with calcification that involves LAD and circumflex bifurcation   Left Anterior Descending   The vessel is calcified. The vessel is tortuous. Ostial, proximal and the mid LAD has diffuse calcified moderate disease with sequential eccentric 75% lesions. Mid-distal LAD has no significant obstructive disease Diagonal branch: Moderate disease. Medium caliber vessel   Left Circumflex   The vessel is calcified. The vessel is tortuous. Proximal LCx stent has mild in-stent restenosis followed by hazy calcified borderline 70% stenosis. OM1: Small caliber vessel OM 2: Large bifurcating vessel. Calcified hazy 70% stenosis before bifurcation. Superior branch distally distally has subtotal occlusion with faint homocollateral. Inferior branch without any significant obstructive disease   Right Coronary Artery   Proximal-mid 100% in-stent occlusion. Previously known by cardiac catheterization in 2019. Evidence of good collaterals from left to right supplying distal RCA and RPDA     Left Ventricle The left ventricular size is normal. LV end diastolic pressure is normal. LV EDP is: 16. The estimated EF = grossly normal and Echocardiogram will be performed. . There are no wall motion abnormalities in the left ventricle. There is no evidence of mitral regurgitation. Aortic Valve There is no aortic valve stenosis. IMPRESSION & PLAN:  Mr. Karli Escalona is 59 y.o. male with multiple medical problem    CAD:  Status post left circumflex coronary stent more than 10 years ago. Cardiac catheterization in 06/2021 showing three-vessel CAD. Plan was to proceed with CABG however patient also was found to have a submassive PE with right-sided dysfunction. CABG was deferred and eventually s/p CABG x3 (08/11/21) LIMA to mLAD, SVG to R PLV and SVG to OM. Currently on aspirin, beta-blocker  He denies any chest pain or chest tightness. PE and DVT:  Patient has submassive PE and DVT in 06/2021 with evidence of RV strain by echo.  patient has undergone IR guided thromboembolectomy, IVC filter placement during that time. And now on Xarelto  Lifelong anticoagulation will be needed Defer to pulmonary team  S/P removal of IVC filter in 2022. Hypertension:  /87. Continue current medications    Hyperlipidemia:  Patient has significant myalgia from simvastatin, atorvastatin, Crestor and also Livalo. He did not tolerate Livalo since last visit  I will try PCSK9 inhibitor, Repatha 140 mg subcu once a month. Had lipid profile at PCP recently. Will try to obtain. This plan was discussed with patient who is in agreement. Thank you for allowing me to participate in patient care. Please feel free to call me if you have any question or concern. Marciano Renae MD  Please note: This document has been produced using voice recognition software. Unrecognized errors in transcription may be present.

## 2022-08-07 RX ORDER — CYCLOBENZAPRINE HCL 5 MG
5 TABLET ORAL
Qty: 90 TABLET | Refills: 1 | Status: SHIPPED | OUTPATIENT
Start: 2022-08-07

## 2022-08-15 RX ORDER — METOPROLOL TARTRATE 25 MG/1
TABLET, FILM COATED ORAL
Qty: 45 TABLET | Refills: 1 | Status: SHIPPED | OUTPATIENT
Start: 2022-08-15 | End: 2022-10-04

## 2022-08-15 RX ORDER — FERROUS SULFATE TAB 325 MG (65 MG ELEMENTAL FE) 325 (65 FE) MG
TAB ORAL
Qty: 180 TABLET | Refills: 0 | Status: SHIPPED | OUTPATIENT
Start: 2022-08-15 | End: 2022-08-31

## 2022-08-18 RX ORDER — TIOTROPIUM BROMIDE AND OLODATEROL 3.124; 2.736 UG/1; UG/1
2 SPRAY, METERED RESPIRATORY (INHALATION) DAILY
Qty: 4 G | Refills: 1 | Status: SHIPPED | OUTPATIENT
Start: 2022-08-18 | End: 2022-10-17 | Stop reason: SDUPTHER

## 2022-09-07 NOTE — PROGRESS NOTES
Form completed and given to Flor to take to [facility] to obtain Dr. Cynthia Escobedo Rhofade Counseling: Rhofade is a topical medication which can decrease superficial blood flow where applied. Side effects are uncommon and include stinging, redness and allergic reactions.

## 2022-09-09 ENCOUNTER — TELEPHONE (OUTPATIENT)
Dept: CARDIOLOGY CLINIC | Age: 62
End: 2022-09-09

## 2022-09-09 NOTE — TELEPHONE ENCOUNTER
Attempted to contact pt at  number, no answer. Lvm for pt to return call to office at [phone]. Will continue to try to contact pt.        Re; need appt at [facility] for [facility] -prostate biopsy- interruption of xarelto Dr Melanie Dubon ([facility]) scheduled 9/28/22

## 2022-09-12 NOTE — TELEPHONE ENCOUNTER
Attempted to contact pt at  number, no answer. Unable to lvm, busy signal. Will continue to try to contact pt.

## 2022-09-13 NOTE — TELEPHONE ENCOUNTER
Attempted to contact pt at  number, no answer. Busy signal.  Will continue to try to contact pt. Will send address on file advising.

## 2022-09-16 RX ORDER — EVOLOCUMAB 420 MG/3.5
KIT SUBCUTANEOUS
Qty: 3.5 ML | Refills: 3 | Status: SHIPPED | OUTPATIENT
Start: 2022-09-16

## 2022-09-23 ENCOUNTER — OFFICE VISIT (OUTPATIENT)
Dept: CARDIOLOGY CLINIC | Age: 62
End: 2022-09-23
Payer: COMMERCIAL

## 2022-09-23 VITALS
WEIGHT: 269 LBS | BODY MASS INDEX: 38.6 KG/M2 | RESPIRATION RATE: 18 BRPM | OXYGEN SATURATION: 97 % | SYSTOLIC BLOOD PRESSURE: 142 MMHG | HEART RATE: 84 BPM | DIASTOLIC BLOOD PRESSURE: 82 MMHG

## 2022-09-23 DIAGNOSIS — E78.00 PURE HYPERCHOLESTEROLEMIA: ICD-10-CM

## 2022-09-23 DIAGNOSIS — I10 ESSENTIAL HYPERTENSION WITH GOAL BLOOD PRESSURE LESS THAN 140/90: ICD-10-CM

## 2022-09-23 DIAGNOSIS — Z01.818 PRE-OP EVALUATION: ICD-10-CM

## 2022-09-23 DIAGNOSIS — I25.83 CORONARY ARTERY DISEASE DUE TO LIPID RICH PLAQUE: Primary | ICD-10-CM

## 2022-09-23 DIAGNOSIS — I25.10 CORONARY ARTERY DISEASE DUE TO LIPID RICH PLAQUE: Primary | ICD-10-CM

## 2022-09-23 PROCEDURE — 99214 OFFICE O/P EST MOD 30 MIN: CPT | Performed by: INTERNAL MEDICINE

## 2022-09-23 NOTE — PROGRESS NOTES
[facility]    Mr. Corazon Casey is a 57 y.o. male with a history of CAD, hypertension, hyperlipidemia, sleep apnea, pulmonary embolism, tobacco use disorder    Patient is here today for cardiac follow-up. Patient has history of coronary disease that is post left circumflex coronary stent   Patient was admitted to [facility] with chest pain concerning for angina in 06/2021. Patient underwent emergent cardiac catheterization which showed severe three-vessel coronary artery disease. Patient continues to have a chest pain despite appropriate medical management. Echocardiogram revealed RV strain. CT chest revealed submassive bilateral PE Patient underwent IR guided thromboembolectomy in 06/2021 followed by IVC filter placement. Patient was started on Xarelto. CAD, s/p CABG x3 (08/11/21) LIMA to mLAD, SVG to R PLV and SVG to OM. Denies any resting or exertional chest pain or chest pressure to suggest angina or any dyspnea to suggest heart failure. No presyncope or syncope  Denies any PND or LE edema. Taking all medications regularly. Patient has urinary problems and needs a prostate biopsy. This is scheduled next week. Denies any nausea, vomiting, abdominal pain, fever, chills, sputum production. No hematuria or other bleeding complaints    Past Medical History:   Diagnosis Date    Bilateral hand pain     CAD (coronary artery disease)     Dupuytren's disease of palm     Hyperlipidemia     Hypertension     SHILPI (obstructive sleep apnea)     PE (pulmonary thromboembolism) ([facility] [address] 06/2021    Severe obesity (HCC)     Tobacco dependence        Review of Systems:  Cardiac symptoms as noted above in HPI. All others negative.     Current Outpatient Medications   Medication Sig    Repatha Pushtronex 420 mg/3.5 mL Injt INJECT 420MG UNDER THE SKIN AS INSTRUCTED ONCE EVERY 30 DAYS    metoprolol tartrate (LOPRESSOR) 25 mg tablet TAKE 1/2 TABLET BY MOUTH EVERY TWELVE (12) HOURS. furosemide (LASIX) 40 mg tablet Take 1 Tablet by mouth daily. potassium chloride (K-DUR, KLOR-CON M20) 20 mEq tablet Take 1 Tablet by mouth daily. Or as directed. rivaroxaban (Xarelto) 20 mg tab tablet Take 1 Tablet by mouth daily. nitroglycerin (NITROLINGUAL) 400 mcg/spray spray SPRAY ONE SPRAY UNDER THE TONGUE AS NEEDED MAY REPEAT UP TO 2 TIMES AS DIRECTED     aspirin delayed-release 81 mg tablet Take 81 mg by mouth daily. levoFLOXacin (Levaquin) 750 mg tablet Take 2 hours prior to biopsy    Stiolto Respimat 2.5-2.5 mcg/actuation inhaler Take 2 Puffs by inhalation daily. (Patient not taking: No sig reported)    cyclobenzaprine (FLEXERIL) 5 mg tablet TAKE 1 TABLET BY MOUTH THREE (3) TIMES DAILY AS NEEDED FOR MUSCLE SPASM(S). colchicine 0.6 mg tablet Take 1 Tablet by mouth daily. (Patient not taking: No sig reported)    tamsulosin (Flomax) 0.4 mg capsule Take 1 Capsule by mouth daily. No current facility-administered medications for this visit. Past Surgical History:   Procedure Laterality Date    HX CHOLECYSTECTOMY      HX CORONARY STENT PLACEMENT      HX HERNIA REPAIR      IR IVC FILTER PLACEMENT PERCUTANEOUS  6/15/2021    IR REMOVE IVC FILTER W SI  1/17/2022    IR THROMBECTOMY MEC ART PRIMARY NON HETAL OR INTRACRANIAL  6/15/2021    GA CARDIAC SURG PROCEDURE UNLIST      filter and triple bypass       Allergies and Sensitivities:  Allergies   Allergen Reactions    Gabapentin Other (comments)     \"Caused bad nightmares\"    Statins-Hmg-Coa Reductase Inhibitors Other (comments)     Myalgias with Lipitor.   Tolerates Crestor    Ferrous Sulfate Other (comments)     Ferrous Sulfate (IRON) --Abdominal pain , redness in face, fever    Pcn [Penicillins] Hives       Family History:  Family History   Problem Relation Age of Onset    Hypertension Mother     Diabetes Mother        Social History:  Social History     Tobacco Use    Smoking status: Every Day Packs/day: 0.50     Years: 40.00     Pack years: 20.00     Types: Cigarettes    Smokeless tobacco: Never   Vaping Use    Vaping Use: Never used   Substance Use Topics    Alcohol use: Yes     Comment: couple drinks a day    Drug use: No     He  reports that he has been smoking cigarettes. He has a 20.00 pack-year smoking history. He has never used smokeless tobacco.  He  reports current alcohol use. Physical Exam:  BP Readings from Last 3 Encounters:   09/23/22 (!) 140/82   07/11/22 138/87   05/06/22 117/70         Pulse Readings from Last 3 Encounters:   09/23/22 84   07/11/22 95   05/06/22 76          Wt Readings from Last 3 Encounters:   09/23/22 122 kg (269 lb)   07/11/22 121 kg (266 lb 12.8 oz)   05/06/22 121.5 kg (267 lb 14.4 oz)       Constitutional: Oriented to person, place, and time. HENT: Head: Normocephalic and atraumatic. Neck: No JVD present. Carotid bruit is not appreciated. Cardiovascular: Regular rhythm. No murmur, gallop or rubs appreciated  Lung: Breath sounds normal. No respiratory distress. No ronchi or rales appreciated  Abdominal: No tenderness. No rebound and no guarding. Musculoskeletal: There is no significant lower extremity edema.  No cynosis    LABS:   @  Lab Results   Component Value Date/Time    WBC 7.5 05/26/2022 03:36 PM    Hemoglobin, POC 10.8 (L) 08/12/2021 02:18 AM    HGB 14.1 05/26/2022 03:36 PM    Hematocrit, POC 32 (L) 08/12/2021 02:18 AM    HCT 45.3 05/26/2022 03:36 PM    PLATELET 521 29/35/7860 03:36 PM    MCV 93.2 05/26/2022 03:36 PM     Lab Results   Component Value Date/Time    Sodium 137 05/26/2022 03:36 PM    Potassium 4.1 05/26/2022 03:36 PM    Chloride 106 05/26/2022 03:36 PM    CO2 28 05/26/2022 03:36 PM    Glucose 115 (H) 05/26/2022 03:36 PM    BUN 12 05/26/2022 03:36 PM    Creatinine 1.39 (H) 05/26/2022 03:36 PM     Lipids Latest Ref Rng & Units 6/10/2021   Chol, Total <200 MG/   HDL 40 - 60 MG/DL 33(L)   LDL 0 - 100 MG/DL 69.6   Trig <150 MG/DL 87 Chol/HDL Ratio 0 - 5.0   3.6     Lab Results   Component Value Date/Time    ALT (SGPT) 24 05/26/2022 03:36 PM     Lab Results   Component Value Date/Time    Hemoglobin A1c 5.2 08/06/2021 01:58 PM     Lab Results   Component Value Date/Time    TSH 0.39 08/06/2021 01:58 PM       EKG    ECHO (06/2021)  Left Ventricle Normal cavity size, wall thickness and systolic function (ejection fraction normal). The estimated EF is 50 - 55%. Abnormal left ventricular septal motion. Systolic flattening of the interventricular septum consistent with right ventricle pressure overload. There is mild (grade 1) left ventricular diastolic dysfunction. Wall Scoring The left ventricular wall motion is normal.              CATHETERIZATION (06/2021)  Left Main   Large-caliber vessel. Distal vessel approximately 50% stenosis with calcification that involves LAD and circumflex bifurcation   Left Anterior Descending   The vessel is calcified. The vessel is tortuous. Ostial, proximal and the mid LAD has diffuse calcified moderate disease with sequential eccentric 75% lesions. Mid-distal LAD has no significant obstructive disease Diagonal branch: Moderate disease. Medium caliber vessel   Left Circumflex   The vessel is calcified. The vessel is tortuous. Proximal LCx stent has mild in-stent restenosis followed by hazy calcified borderline 70% stenosis. OM1: Small caliber vessel OM 2: Large bifurcating vessel. Calcified hazy 70% stenosis before bifurcation. Superior branch distally distally has subtotal occlusion with faint homocollateral. Inferior branch without any significant obstructive disease   Right Coronary Artery   Proximal-mid 100% in-stent occlusion. Previously known by cardiac catheterization in 2019.  Evidence of good collaterals from left to right supplying distal RCA and RPDA     Left Ventricle The left ventricular size is normal. LV end diastolic pressure is normal. LV EDP is: 16. The estimated EF = grossly normal and Echocardiogram will be performed. . There are no wall motion abnormalities in the left ventricle. There is no evidence of mitral regurgitation. Aortic Valve There is no aortic valve stenosis. IMPRESSION & PLAN:  Mr. David Elias is 57 y.o. male with multiple medical problem    CAD:  Status post left circumflex coronary stent more than 10 years ago. Cardiac catheterization in 06/2021 showing three-vessel CAD. Plan was to proceed with CABG however patient also was found to have a submassive PE with right-sided dysfunction. CABG was deferred and eventually s/p CABG x3 (08/11/21) LIMA to mLAD, SVG to R PLV and SVG to OM. Currently on aspirin, metoprolol  He denies any chest pain or chest tightness or use of any nitroglycerin    PE and DVT:  Patient has submassive PE and DVT in 06/2021 with evidence of RV strain by echo. patient has undergone IR guided thromboembolectomy, IVC filter placement during that time. And now on Xarelto. Lifelong anticoagulation will be needed Defer to pulmonary team  S/P removal of IVC filter in 2022. CT scan in 10/20/2021 and 03/20/2022 without any PE    Hypertension: /82. Continue current medications    Hyperlipidemia: Patient has significant myalgia from simvastatin, atorvastatin, Crestor and also Livalo. He did not tolerate Livalo since last visit  Continue PCSK9 inhibitor, Repatha 420 mg subcu once a month. Patient is scheduled to have a prostate biopsy. Currently patient does not have any unstable coronary symptoms or decompensated heart failure. Recommend continue periprocedural he and other cardiac medications are tolerated. Xarelto can be stopped 2 days prior to procedure from cardiac standpoint however patient is on Xarelto for history of submassive PE. I would defer this decision about Xarelto to pulmonary team    This plan was discussed with patient who is in agreement. Thank you for allowing me to participate in patient care.  Please feel free to call me if you have any question or concern. Poncho Valencia MD  Please note: This document has been produced using voice recognition software. Unrecognized errors in transcription may be present.

## 2022-09-23 NOTE — PATIENT INSTRUCTIONS
Verbal order and read back per Jeanne Melendez MD  Low to intermediate risk for prostate biopsy. My recommendation is to continue aspirin 81 mg perioperatively. Xarelto instructions deferred to pulmonology.

## 2022-09-23 NOTE — LETTER
9/23/2022    Patient: Stacie Clemente   YOB: 1960   Date of Visit: 9/23/2022     David Henning MD  [address]  [address] [address]  [address] [facility] [address]    Dear David Henning MD,      Thank you for referring Mr. Rosa Isela Garcia to [address] [place] [address] for evaluation. My notes for this consultation are attached. If you have questions, please do not hesitate to call me. I look forward to following your patient along with you.       Sincerely,    Aneesh Oliveira MD

## 2022-09-23 NOTE — PROGRESS NOTES
Identified pt with two pt identifiers(name and ). Reviewed record in preparation for visit and have obtained necessary documentation. Cleve Nunez presents today for   Chief Complaint   Patient presents with    Surgical Clearance     Prostate biopsy- interruption of xarelto       Pt denied  DIZZINESS, SOB, CHEST PAIN/ PRESSURE, FATIGUE/WEAKNESS, HEADACHES, SWELLING. Cleve Nunez preferred language for health care discussion is english/other. Personal Protective Equipment:   Personal Protective Equipment was used including: mask-surgical and hands-gloves. Patient was placed on no precaution(s). Patient was masked. Precautions:   Patient currently on None  Patient currently roomed with door closed. Is someone accompanying this pt?  wife    Is the patient using any DME equipment during [address]? no    Depression Screening:  3 most recent PHQ Screens 2022   Little interest or pleasure in doing things Not at all   Feeling down, depressed, irritable, or hopeless Not at all   Total Score PHQ 2 0       Learning Assessment:  Learning Assessment 2022   PRIMARY LEARNER Patient   PRIMARY LANGUAGE ENGLISH   LEARNER PREFERENCE PRIMARY DEMONSTRATION   ANSWERED BY patient   RELATIONSHIP SELF       Abuse Screening:  Abuse Screening Questionnaire 2022   Do you ever feel afraid of your partner? N   Are you in a relationship with someone who physically or mentally threatens you? N   Is it safe for you to go home? Y       Fall Risk  completed    Pt currently taking Anticoagulant therapy? no  Pt currently taking Antiplatelet therapy? yes    Coordination of Care:  1. Have you been to the ER, urgent care clinic since your last visit? Hospitalized since your last visit? no    2. Have you seen or consulted any other health care providers outside of the [address] [place] [address] since your last visit? Include any pap smears or colon screening.  Urology      Please see Red banners under Allergies and Med Rec to remove outside inquires. All correct information has been verified with patient and added to chart.      Medication's patient's would liked removed has been marked not taking to be removed per Verbal order and read back per Prasanth Schwartz MD

## 2022-10-04 RX ORDER — METOPROLOL TARTRATE 25 MG/1
TABLET, FILM COATED ORAL
Qty: 45 TABLET | Refills: 1 | Status: SHIPPED | OUTPATIENT
Start: 2022-10-04

## 2022-10-06 ENCOUNTER — TELEPHONE (OUTPATIENT)
Dept: FAMILY MEDICINE CLINIC | Age: 62
End: 2022-10-06

## 2022-10-07 RX ORDER — TAMSULOSIN HYDROCHLORIDE 0.4 MG/1
0.4 CAPSULE ORAL DAILY
Qty: 90 CAPSULE | Refills: 1 | Status: SHIPPED | OUTPATIENT
Start: 2022-10-07

## 2022-10-17 RX ORDER — TIOTROPIUM BROMIDE AND OLODATEROL 3.124; 2.736 UG/1; UG/1
2 SPRAY, METERED RESPIRATORY (INHALATION) DAILY
Qty: 4 G | Refills: 0 | Status: SHIPPED | OUTPATIENT
Start: 2022-10-17

## 2022-10-31 ENCOUNTER — TELEPHONE (OUTPATIENT)
Dept: CARDIOLOGY CLINIC | Age: 62
End: 2022-10-31

## 2022-10-31 NOTE — TELEPHONE ENCOUNTER
Patient is not currently scheduled but needs to have teeth extracted.  Is on antibiotics for it and needs provider recommendation in regards to that as wall as how long he is supposed to be off blood thinner (Xarelto)

## 2022-11-01 NOTE — TELEPHONE ENCOUNTER
Contacted pt at [facility]. Two patient Identifiers confirmed. Advised pt per Dr Alma Rosa Gomez. Pt inquiring about prophylaxis. Dentist doesn't prescribe abx. Pt verbalized understanding.

## 2022-11-02 NOTE — TELEPHONE ENCOUNTER
Contacted pt at [facility] . Two patient Identifiers confirmed. Advised pt per Dr Donna Collado deferred to pulmonary team.  Pt is not scheduled to have extractions x 2 with Dr Arnie Prater 11/14/2022. Pt stated his his CABG he would like to know if he needs abx. Will review with Dr Loli Gonzalez once in office and advise. Pt verbalized understanding.

## 2022-11-03 NOTE — TELEPHONE ENCOUNTER
Contacted pt at [facility] number. Two patient Identifiers confirmed. Advised pt per Dr Amanuel Prajapati. Pt verbalized understanding.

## 2022-11-14 ENCOUNTER — HOSPITAL ENCOUNTER (OUTPATIENT)
Dept: LAB | Age: 62
Discharge: HOME | End: 2022-11-14
Payer: COMMERCIAL

## 2022-11-14 ENCOUNTER — OFFICE VISIT (OUTPATIENT)
Dept: FAMILY MEDICINE CLINIC | Age: 62
End: 2022-11-14
Payer: COMMERCIAL

## 2022-11-14 VITALS
WEIGHT: 271.3 LBS | OXYGEN SATURATION: 98 % | TEMPERATURE: 98.1 F | DIASTOLIC BLOOD PRESSURE: 76 MMHG | BODY MASS INDEX: 38.84 KG/M2 | SYSTOLIC BLOOD PRESSURE: 139 MMHG | HEIGHT: 70 IN | HEART RATE: 88 BPM

## 2022-11-14 DIAGNOSIS — C61 PROSTATE CANCER (HCC): ICD-10-CM

## 2022-11-14 DIAGNOSIS — M54.42 CHRONIC BILATERAL LOW BACK PAIN WITH BILATERAL SCIATICA: ICD-10-CM

## 2022-11-14 DIAGNOSIS — E78.2 MIXED HYPERLIPIDEMIA: Primary | ICD-10-CM

## 2022-11-14 DIAGNOSIS — M54.41 CHRONIC BILATERAL LOW BACK PAIN WITH BILATERAL SCIATICA: ICD-10-CM

## 2022-11-14 DIAGNOSIS — J44.9 CHRONIC OBSTRUCTIVE PULMONARY DISEASE, UNSPECIFIED COPD TYPE (HCC): ICD-10-CM

## 2022-11-14 DIAGNOSIS — G89.29 CHRONIC BILATERAL LOW BACK PAIN WITH BILATERAL SCIATICA: ICD-10-CM

## 2022-11-14 DIAGNOSIS — Z12.2 SCREENING FOR LUNG CANCER: ICD-10-CM

## 2022-11-14 DIAGNOSIS — E78.00 PURE HYPERCHOLESTEROLEMIA: ICD-10-CM

## 2022-11-14 DIAGNOSIS — Z12.11 COLON CANCER SCREENING: ICD-10-CM

## 2022-11-14 DIAGNOSIS — I25.10 CORONARY ARTERY DISEASE DUE TO LIPID RICH PLAQUE: ICD-10-CM

## 2022-11-14 DIAGNOSIS — I25.83 CORONARY ARTERY DISEASE DUE TO LIPID RICH PLAQUE: ICD-10-CM

## 2022-11-14 PROBLEM — R07.9 CHEST PAIN: Status: RESOLVED | Noted: 2021-06-09 | Resolved: 2022-11-14

## 2022-11-14 PROBLEM — E43 SEVERE PROTEIN-CALORIE MALNUTRITION (HCC): Chronic | Status: RESOLVED | Noted: 2021-08-12 | Resolved: 2022-11-14

## 2022-11-14 PROBLEM — I26.01 SEPTIC PULMONARY EMBOLISM WITH ACUTE COR PULMONALE (HCC): Status: RESOLVED | Noted: 2021-06-14 | Resolved: 2022-11-14

## 2022-11-14 PROBLEM — N18.31 STAGE 3A CHRONIC KIDNEY DISEASE (HCC): Status: ACTIVE | Noted: 2022-11-14

## 2022-11-14 PROBLEM — E43 SEVERE PROTEIN-CALORIE MALNUTRITION (HCC): Status: RESOLVED | Noted: 2021-08-12 | Resolved: 2022-11-14

## 2022-11-14 PROBLEM — E55.9 VITAMIN D DEFICIENCY: Status: ACTIVE | Noted: 2022-11-14

## 2022-11-14 PROCEDURE — 3078F DIAST BP <80 MM HG: CPT | Performed by: STUDENT IN AN ORGANIZED HEALTH CARE EDUCATION/TRAINING PROGRAM

## 2022-11-14 PROCEDURE — 99214 OFFICE O/P EST MOD 30 MIN: CPT | Performed by: STUDENT IN AN ORGANIZED HEALTH CARE EDUCATION/TRAINING PROGRAM

## 2022-11-14 PROCEDURE — 36415 COLL VENOUS BLD VENIPUNCTURE: CPT

## 2022-11-14 PROCEDURE — 80061 LIPID PANEL: CPT

## 2022-11-14 PROCEDURE — 3074F SYST BP LT 130 MM HG: CPT | Performed by: STUDENT IN AN ORGANIZED HEALTH CARE EDUCATION/TRAINING PROGRAM

## 2022-11-14 RX ORDER — DULOXETIN HYDROCHLORIDE 60 MG/1
60 CAPSULE, DELAYED RELEASE ORAL DAILY
Qty: 90 CAPSULE | Refills: 1 | Status: SHIPPED | OUTPATIENT
Start: 2022-11-14

## 2022-11-14 RX ORDER — TIOTROPIUM BROMIDE AND OLODATEROL 3.124; 2.736 UG/1; UG/1
2 SPRAY, METERED RESPIRATORY (INHALATION) DAILY
Qty: 4 EACH | Refills: 0 | Status: SHIPPED | OUTPATIENT
Start: 2022-11-14 | End: 2022-11-14 | Stop reason: SDUPTHER

## 2022-11-14 RX ORDER — TIOTROPIUM BROMIDE AND OLODATEROL 3.124; 2.736 UG/1; UG/1
2 SPRAY, METERED RESPIRATORY (INHALATION) DAILY
Qty: 4 EACH | Refills: 3 | Status: SHIPPED | OUTPATIENT
Start: 2022-11-14 | End: 2022-11-18 | Stop reason: SDUPTHER

## 2022-11-14 NOTE — PROGRESS NOTES
Subjective:   Edinson Celeste is a 57 y.o. male who was seen for Medication Refill (New to provider /)    Reports he has three ruptured discs in his back resulting in chronic back pain. Patient reports neuropathy and sciatica symptoms down his lower extremities. Patient also reports he has Dupuytren contracture. Reports he has had steroid injections in his back before and resulted in hospitalization given abnormal side effect. Patient also reports history of abdominal hernia and tells me he is not a surgical candidate to get that fixed or his back because of his heart history of CABG x3. Patient was also recently diagnosed with prostate cancer. He is scheduled to get a PET scan in the next few weeks. Reports he needs refill on his COPD medication. In addition he is due for colonoscopy. Reports abnormal bowel movements to include alternating between diarrhea and constipation and sometimes rectal bleeding. Reports he does have a history of hemorrhoids and has had colonic polyps in the past.    Home Medications    Medication Sig Start Date End Date Taking? Authorizing Provider   ergocalciferol (ERGOCALCIFEROL) 1,250 mcg (50,000 unit) capsule Take 1 Capsule by mouth every seven (7) days. 11/14/22  Yes Tim Wade PA-C   Stiolto Respimat 2.5-2.5 mcg/actuation inhaler TAKE 2 PUFFS BY INHALATION DAILY. 11/14/22  Yes Mar Franco MD   tamsulosin (FLOMAX) 0.4 mg capsule TAKE 1 CAPSULE BY MOUTH DAILY. 10/7/22  Yes Jackelyn Paris MD   metoprolol tartrate (LOPRESSOR) 25 mg tablet TAKE 1/2 TABLET BY MOUTH EVERY TWELVE (12) HOURS. 10/4/22  Yes Jackelyn Paris MD   Repatha Pushtronex 420 mg/3.5 mL Injt INJECT 420MG UNDER THE SKIN AS INSTRUCTED ONCE EVERY 30 DAYS 9/16/22  Yes Willi Johnson MD   colchicine 0.6 mg tablet Take 1 Tablet by mouth daily. 5/12/22  Yes Jackelyn Paris MD   furosemide (LASIX) 40 mg tablet Take 1 Tablet by mouth daily.  5/6/22  Yes Jackelyn Paris MD   potassium chloride (K-DUR, KLOR-CON M20) 20 mEq tablet Take 1 Tablet by mouth daily. Or as directed. 5/6/22  Yes Kamila Garcia MD   nitroglycerin (NITROLINGUAL) 400 mcg/spray spray SPRAY ONE SPRAY UNDER THE TONGUE AS NEEDED MAY REPEAT UP TO 2 TIMES AS DIRECTED  5/23/21  Yes Luís Og MD   Stiolto Respimat 2.5-2.5 mcg/actuation inhaler Take 2 Puffs by inhalation daily. 10/17/22 11/14/22  Mau Sanchez MD   Xarelto 20 mg tab tablet TAKE 1 TABLET BY MOUTH DAILY. Patient not taking: No sig reported 10/7/22 11/14/22  Kamila Garcia MD   levoFLOXacin (Levaquin) 750 mg tablet Take 2 hours prior to biopsy  Patient not taking: Reported on 11/10/2022 9/28/22 11/14/22  Argelia Negron MD   cyclobenzaprine (FLEXERIL) 5 mg tablet TAKE 1 TABLET BY MOUTH THREE (3) TIMES DAILY AS NEEDED FOR MUSCLE SPASM(S). Patient not taking: Reported on 11/14/2022 8/7/22 11/14/22  Kamila Garcia MD   aspirin delayed-release 81 mg tablet Take 81 mg by mouth daily. Patient not taking: No sig reported 4/29/19 11/14/22  Provider, Historical      Allergies   Allergen Reactions    Gabapentin Other (comments)     \"Caused bad nightmares\"    Statins-Hmg-Coa Reductase Inhibitors Other (comments)     Myalgias with Lipitor. Tolerates Crestor    Ferrous Sulfate Other (comments)     Ferrous Sulfate (IRON) --Abdominal pain , redness in face, fever    Pcn [Penicillins] Hives     Social History     Tobacco Use    Smoking status: Every Day     Packs/day: 0.50     Years: 40.00     Pack years: 20.00     Types: Cigarettes    Smokeless tobacco: Never   Vaping Use    Vaping Use: Never used   Substance Use Topics    Alcohol use: Yes     Comment: couple drinks a day    Drug use: No        Review of Systems   As noted above in HPI.       Objective:   Visit Vitals  Ht 5' 10\" (1.778 m)   Wt 271 lb 4.8 oz (123.1 kg)   BMI 38.93 kg/m²        General: alert, oriented, not in distress  Chest/Lungs: clear breath sounds, no wheezing or crackles  Heart: normal rate, regular rhythm, no murmur  Extremities: no focal deformities, no edema  Skin: no active skin lesions      Assessment & Plan:     1. Mixed hyperlipidemia  Patient was off of statin medication given myalgias. Has been off for over a month. Reports he was getting Repatha injections currently. Plan was to hold off on those as well until his next cholesterol check. He got cholesterol checked this morning results are not back yet. If cholesterol still elevated would recommend continuing Repatha injections. In fact would recommend he continue regardless given his strong past history of heart disease to include CABG x3    2. Prostate cancer [facility])  Patient was recently diagnosed with prostate cancer. Followed by urology. Was biopsy confirmed. Has plan for PET scan in the next upcoming weeks. Reports his urologist told him he is not a surgical candidate. 3. Colon cancer screening  Due. He is not a good candidate for Cologuard given history of hemorrhoids as well as current episodic rectal bleeding. Will refer to GI for colonoscopy  - REFERRAL TO GASTROENTEROLOGY    4. Screening for lung cancer  Patient is due for low-dose lung CT screen given history of smoking. However he is planning on getting a PET scan in the upcoming weeks given prostate cancer diagnosis which should take care of lung cancer screening as well. 5. Chronic obstructive pulmonary disease, unspecified COPD type ([facility] [address]  Stable. Will refill  - Stiolto Respimat 2.5-2.5 mcg/actuation inhaler; Take 2 Puffs by inhalation daily. Dispense: 4 Each; Refill: 3    6. Chronic bilateral low back pain with bilateral sciatica  Uncontrolled. Will start on cymbalta. Has tried PT in the past.  - DULoxetine (CYMBALTA) 60 mg capsule; Take 1 Capsule by mouth daily. Indications: neuropathic pain, chronic back pain  Dispense: 90 Capsule; Refill: 1         Follow-up and Dispositions    Return in about 3 months (around 2/14/2023).                  I have discussed the diagnosis with the patient and the intended plan as seen in the above orders. The patient has received an after-visit summary and questions were answered concerning future plans. I have discussed medication side effects and warnings with the patient as well. I have reviewed the plan of care with the patient, accepted their input and they are in agreement with the treatment goals. Previous lab and imaging results were reviewed by me.        Savannah Gloria MD  November 14, 2022

## 2022-11-14 NOTE — PROGRESS NOTES
Samira Colby presents today for   Chief Complaint   Patient presents with    Medication Refill     New to provider          Is someone accompanying this pt? Chiki Miles     Is the patient using any DME equipment during OV? No     Depression Screening:  3 most recent PHQ Screens 11/14/2022   Little interest or pleasure in doing things Not at all   Feeling down, depressed, irritable, or hopeless More than half the days   Total Score PHQ 2 2       Learning Assessment:  Learning Assessment 7/11/2022   PRIMARY LEARNER Patient   PRIMARY LANGUAGE ENGLISH   LEARNER PREFERENCE PRIMARY DEMONSTRATION   ANSWERED BY patient   RELATIONSHIP SELF       Fall Risk  Fall Risk Assessment, last 12 mths 9/23/2022   Able to walk? Yes   Fall in past 12 months? 0   Do you feel unsteady? 0   Are you worried about falling 0       ADL  ADL Assessment 8/23/2021   Feeding yourself No Help Needed   Getting from bed to chair No Help Needed   Getting dressed No Help Needed   Bathing or showering No Help Needed   Walk across the room (includes cane/walker) No Help Needed   Using the telphone No Help Needed   Taking your medications No Help Needed   Preparing meals No Help Needed   Managing money (expenses/bills) No Help Needed   Moderately strenuous housework (laundry) No Help Needed   Shopping for personal items (toiletries/medicines) No Help Needed   Shopping for groceries No Help Needed   Driving No Help Needed   Climbing a flight of stairs No Help Needed   Getting to places beyond walking distances No Help Needed       Health Maintenance reviewed and discussed and ordered per Provider. Health Maintenance Due   Topic Date Due    Hepatitis C Screening  Never done    COVID-19 Vaccine (1) Never done    DTaP/Tdap/Td series (1 - Tdap) Never done    Colorectal Cancer Screening Combo  Never done    Low dose CT lung screening  Never done    Flu Vaccine (1) Never done   . Coordination of Care:  1.  \"Have you been to the ER, urgent care clinic since your last visit? Hospitalized since your last visit? \" No    2. \"Have you seen or consulted any other health care providers outside of the [address] [place] [address] since your last visit? \" Yes Where: [facility]   Reason for visit: Prostate Cancer       3. For patients aged 39-70: Has the patient had a colonoscopy?  No

## 2022-11-15 LAB
CHOLEST SERPL-MCNC: 174 MG/DL
HDLC SERPL-MCNC: 33 MG/DL (ref 40–60)
HDLC SERPL: 5.3 {RATIO} (ref 0–5)
LDLC SERPL CALC-MCNC: 112.8 MG/DL (ref 0–100)
LIPID PROFILE,FLP: ABNORMAL
TRIGL SERPL-MCNC: 141 MG/DL (ref ?–150)
VLDLC SERPL CALC-MCNC: 28.2 MG/DL

## 2022-11-16 ENCOUNTER — TELEPHONE (OUTPATIENT)
Dept: CARDIOLOGY CLINIC | Age: 62
End: 2022-11-16

## 2022-11-16 NOTE — TELEPHONE ENCOUNTER
----- Message from Donn Murray MD sent at 11/15/2022 10:58 AM EST -----  Please call the patient regarding his abnormal result.   LDL getting worse  Needs to work on diet and exercise to lose weight  Make sure patient is on lipid-lowering agents

## 2022-11-16 NOTE — TELEPHONE ENCOUNTER
Contacted  pts EC at [facility] number. Two patient Identifiers confirmed. Advised pt per Dr Roberto Berman recommendations. Pts EC verbalized understanding.

## 2022-11-18 DIAGNOSIS — J44.9 CHRONIC OBSTRUCTIVE PULMONARY DISEASE, UNSPECIFIED COPD TYPE (HCC): ICD-10-CM

## 2022-11-18 RX ORDER — TIOTROPIUM BROMIDE AND OLODATEROL 3.124; 2.736 UG/1; UG/1
2 SPRAY, METERED RESPIRATORY (INHALATION) DAILY
Qty: 4 G | Refills: 1 | Status: SHIPPED | OUTPATIENT
Start: 2022-11-18

## 2022-11-18 NOTE — TELEPHONE ENCOUNTER
Current prescription is requiring prior authorization for quantity limit (will cover 1 cartridge per 30 days). Previous prescription was for 4 grams.

## 2022-12-02 ENCOUNTER — HOSPITAL ENCOUNTER (EMERGENCY)
Age: 62
Discharge: HOME | End: 2022-12-02
Attending: FAMILY MEDICINE
Payer: COMMERCIAL

## 2022-12-02 ENCOUNTER — APPOINTMENT (OUTPATIENT)
Dept: GENERAL RADIOLOGY | Age: 62
End: 2022-12-02
Attending: FAMILY MEDICINE
Payer: COMMERCIAL

## 2022-12-02 VITALS
HEART RATE: 76 BPM | HEIGHT: 70 IN | SYSTOLIC BLOOD PRESSURE: 126 MMHG | DIASTOLIC BLOOD PRESSURE: 72 MMHG | WEIGHT: 260 LBS | BODY MASS INDEX: 37.22 KG/M2 | TEMPERATURE: 98.3 F | OXYGEN SATURATION: 96 % | RESPIRATION RATE: 18 BRPM

## 2022-12-02 DIAGNOSIS — S52.122A DISPLACED FRACTURE OF HEAD OF LEFT RADIUS, INITIAL ENCOUNTER FOR CLOSED FRACTURE: Primary | ICD-10-CM

## 2022-12-02 LAB
ALBUMIN SERPL-MCNC: 3.5 G/DL (ref 3.4–5)
ALBUMIN/GLOB SERPL: 1.1 {RATIO} (ref 0.8–1.7)
ALP SERPL-CCNC: 86 U/L (ref 45–117)
ALT SERPL-CCNC: 27 U/L (ref 16–61)
ANION GAP SERPL CALC-SCNC: 8 MMOL/L (ref 3–18)
AST SERPL W P-5'-P-CCNC: 18 U/L (ref 10–38)
BASOPHILS # BLD: 0.1 K/UL (ref 0–0.1)
BASOPHILS NFR BLD: 1 % (ref 0–2)
BILIRUB SERPL-MCNC: 0.4 MG/DL (ref 0.2–1)
BUN SERPL-MCNC: 20 MG/DL (ref 7–18)
BUN/CREAT SERPL: 16 (ref 12–20)
CA-I BLD-MCNC: 8.9 MG/DL (ref 8.5–10.1)
CHLORIDE SERPL-SCNC: 105 MMOL/L (ref 100–111)
CO2 SERPL-SCNC: 26 MMOL/L (ref 21–32)
CREAT SERPL-MCNC: 1.27 MG/DL (ref 0.6–1.3)
DIFFERENTIAL METHOD BLD: ABNORMAL
EOSINOPHIL # BLD: 0.2 K/UL (ref 0–0.4)
EOSINOPHIL NFR BLD: 2 % (ref 0–5)
ERYTHROCYTE [DISTWIDTH] IN BLOOD BY AUTOMATED COUNT: 13.4 % (ref 11.6–14.5)
GLOBULIN SER CALC-MCNC: 3.3 G/DL (ref 2–4)
GLUCOSE SERPL-MCNC: 105 MG/DL (ref 74–99)
HCT VFR BLD AUTO: 41.4 % (ref 36–48)
HGB BLD-MCNC: 13.3 G/DL (ref 13–16)
IMM GRANULOCYTES # BLD AUTO: 0.1 K/UL (ref 0–0.04)
IMM GRANULOCYTES NFR BLD AUTO: 1 % (ref 0–0.5)
LYMPHOCYTES # BLD: 2.3 K/UL (ref 0.9–3.6)
LYMPHOCYTES NFR BLD: 27 % (ref 21–52)
MCH RBC QN AUTO: 30.2 PG (ref 24–34)
MCHC RBC AUTO-ENTMCNC: 32.1 G/DL (ref 31–37)
MCV RBC AUTO: 93.9 FL (ref 78–100)
MONOCYTES # BLD: 0.8 K/UL (ref 0.05–1.2)
MONOCYTES NFR BLD: 10 % (ref 3–10)
NEUTS SEG # BLD: 5.2 K/UL (ref 1.8–8)
NEUTS SEG NFR BLD: 59 % (ref 40–73)
NRBC # BLD: 0 K/UL (ref 0–0.01)
NRBC BLD-RTO: 0 PER 100 WBC
PLATELET # BLD AUTO: 211 K/UL (ref 135–420)
PMV BLD AUTO: 10.2 FL (ref 9.2–11.8)
POTASSIUM SERPL-SCNC: 3.6 MMOL/L (ref 3.5–5.5)
PROT SERPL-MCNC: 6.8 G/DL (ref 6.4–8.2)
RBC # BLD AUTO: 4.41 M/UL (ref 4.35–5.65)
SODIUM SERPL-SCNC: 139 MMOL/L (ref 136–145)
WBC # BLD AUTO: 8.7 K/UL (ref 4.6–13.2)

## 2022-12-02 PROCEDURE — 73110 X-RAY EXAM OF WRIST: CPT

## 2022-12-02 PROCEDURE — 73502 X-RAY EXAM HIP UNI 2-3 VIEWS: CPT

## 2022-12-02 PROCEDURE — 96375 TX/PRO/DX INJ NEW DRUG ADDON: CPT

## 2022-12-02 PROCEDURE — 74011250636 HC RX REV CODE- 250/636: Performed by: FAMILY MEDICINE

## 2022-12-02 PROCEDURE — 74011250637 HC RX REV CODE- 250/637: Performed by: FAMILY MEDICINE

## 2022-12-02 PROCEDURE — 80053 COMPREHEN METABOLIC PANEL: CPT

## 2022-12-02 PROCEDURE — 85025 COMPLETE CBC W/AUTO DIFF WBC: CPT

## 2022-12-02 PROCEDURE — 36415 COLL VENOUS BLD VENIPUNCTURE: CPT

## 2022-12-02 PROCEDURE — 96374 THER/PROPH/DIAG INJ IV PUSH: CPT

## 2022-12-02 PROCEDURE — 99284 EMERGENCY DEPT VISIT MOD MDM: CPT

## 2022-12-02 RX ORDER — ONDANSETRON 2 MG/ML
4 INJECTION INTRAMUSCULAR; INTRAVENOUS
Status: COMPLETED | OUTPATIENT
Start: 2022-12-02 | End: 2022-12-02

## 2022-12-02 RX ORDER — FENTANYL CITRATE 50 UG/ML
50 INJECTION, SOLUTION INTRAMUSCULAR; INTRAVENOUS
Status: COMPLETED | OUTPATIENT
Start: 2022-12-02 | End: 2022-12-02

## 2022-12-02 RX ORDER — HYDROMORPHONE HYDROCHLORIDE 1 MG/ML
0.5 INJECTION, SOLUTION INTRAMUSCULAR; INTRAVENOUS; SUBCUTANEOUS
Status: COMPLETED | OUTPATIENT
Start: 2022-12-02 | End: 2022-12-02

## 2022-12-02 RX ORDER — ONDANSETRON 4 MG/1
4 TABLET, FILM COATED ORAL
Qty: 20 TABLET | Refills: 0 | Status: SHIPPED | OUTPATIENT
Start: 2022-12-02

## 2022-12-02 RX ORDER — TRAMADOL HYDROCHLORIDE 50 MG/1
50 TABLET ORAL
Status: COMPLETED | OUTPATIENT
Start: 2022-12-02 | End: 2022-12-02

## 2022-12-02 RX ORDER — MORPHINE SULFATE 4 MG/ML
4 INJECTION, SOLUTION INTRAMUSCULAR; INTRAVENOUS
Status: COMPLETED | OUTPATIENT
Start: 2022-12-02 | End: 2022-12-02

## 2022-12-02 RX ORDER — OXYCODONE HYDROCHLORIDE 5 MG/1
5 TABLET ORAL
Qty: 20 TABLET | Refills: 0 | Status: SHIPPED | OUTPATIENT
Start: 2022-12-02 | End: 2022-12-08

## 2022-12-02 RX ADMIN — MORPHINE SULFATE 4 MG: 4 INJECTION, SOLUTION INTRAMUSCULAR; INTRAVENOUS at 21:18

## 2022-12-02 RX ADMIN — ONDANSETRON 4 MG: 2 INJECTION INTRAMUSCULAR; INTRAVENOUS at 21:22

## 2022-12-02 RX ADMIN — HYDROMORPHONE HYDROCHLORIDE 0.5 MG: 1 INJECTION, SOLUTION INTRAMUSCULAR; INTRAVENOUS; SUBCUTANEOUS at 22:00

## 2022-12-02 RX ADMIN — TRAMADOL HYDROCHLORIDE 50 MG: 50 TABLET, COATED ORAL at 20:04

## 2022-12-02 RX ADMIN — FENTANYL CITRATE 50 MCG: 50 INJECTION, SOLUTION INTRAMUSCULAR; INTRAVENOUS at 20:46

## 2022-12-02 NOTE — ED TRIAGE NOTES
Pt ambulatory to room with steady gait, states he started a new medication on Monday. States that he had [place] couple of drinks\" with dinner, went to bathroom, got dizzy and fell. C/O left wrist and left hip pain, swelling noted to left wrist, cap refill < 2 sec.

## 2022-12-02 NOTE — Clinical Note
[facility] EMERGENCY DEPT  [address] [address] [phone]    Work/School Note    Date: 12/2/2022    To Whom It May concern:    Estrellita Norris was seen and treated today in the emergency room by the following provider(s):  Attending Provider: Josh Jordan is excused from work/school on 12/2/2022 through 12/4/2022. He is medically clear to return to work/school on 12/5/2022.          Sincerely,          Paula Adjutant, DO

## 2022-12-03 NOTE — ED PROVIDER NOTES
Patient presents to the ED for fall injury, resulting in left wrist pain and left hip pain. Symptoms began suddenly, just prior to arrival. He reports getting up too quickly, became dizzy, and fell. He reports being started on a new medication for his prostate and thinks that caused his dizziness. He also admits to ETOH use this evening. He reports mild left hip pain but 10/10 left wrist pain associated with swelling. He denies numbness or tingling. He reports he cannot move his wrist due to pain. His dizziness has resolved at this time. He denies fever, chills, body aches, chest pain, SOB, n/v/d, abdominal pain, syncope, or focal neuro deficit.         Past Medical History:   Diagnosis Date    Bilateral hand pain     CAD (coronary artery disease)     Dupuytren's disease of palm     Hyperlipidemia     Hypertension     SHILPI (obstructive sleep apnea)     PE (pulmonary thromboembolism) ([facility] [address] 06/2021    Septic pulmonary embolism with acute cor pulmonale (HCC) 6/14/2021    Severe obesity (HCC)     Tobacco dependence        Past Surgical History:   Procedure Laterality Date    HX CHOLECYSTECTOMY      HX CORONARY STENT PLACEMENT      HX HERNIA REPAIR      IR IVC FILTER PLACEMENT PERCUTANEOUS  6/15/2021    IR REMOVE IVC FILTER W SI  1/17/2022    IR THROMBECTOMY MECH ART PRIMARY NON HETAL OR INTRACRANIAL  6/15/2021    MI CARDIAC SURG PROCEDURE UNLIST      filter and triple bypass         Family History:   Problem Relation Age of Onset    Hypertension Mother     Diabetes Mother        Social History     Socioeconomic History    Marital status:      Spouse name: Not on file    Number of children: Not on file    Years of education: Not on file    Highest education level: Not on file   Occupational History    Not on file   Tobacco Use    Smoking status: Every Day     Packs/day: 0.50     Years: 40.00     Pack years: 20.00     Types: Cigarettes    Smokeless tobacco: Never   Vaping Use    Vaping Use: Never used   Substance and Sexual Activity    Alcohol use: Yes     Comment: couple drinks a day    Drug use: No    Sexual activity: Not on file   Other Topics Concern     Service Not Asked    Blood Transfusions Not Asked    Caffeine Concern Not Asked    Occupational Exposure Not Asked    Hobby Hazards Not Asked    Sleep Concern Not Asked    Stress Concern Not Asked    Weight Concern Not Asked    Special Diet Not Asked    Back Care Not Asked    Exercise Not Asked    Bike Helmet Not Asked    Seat Belt Not Asked    Self-Exams Not Asked   Social History Narrative    Not on file     Social Determinants of Health     Financial Resource Strain: Not on file   Food Insecurity: Not on file   Transportation Needs: Not on file   Physical Activity: Inactive    Days of Exercise per Week: 0 days    Minutes of Exercise per Session: 0 min   Stress: Not on file   Social Connections: Not on file   Intimate Partner Violence: Not At Risk    Fear of Current or Ex-Partner: No    Emotionally Abused: No    Physically Abused: No    Sexually Abused: No   Housing Stability: Not on file         ALLERGIES: Gabapentin, Statins-hmg-coa reductase inhibitors, Ferrous sulfate, and Pcn [penicillins]    Review of Systems   Constitutional: Negative. HENT: Negative. Respiratory: Negative. Cardiovascular: Negative. Gastrointestinal: Negative. Musculoskeletal:  Positive for joint swelling. Neurological:  Positive for dizziness and weakness. All other systems reviewed and are negative. Vitals:    12/02/22 1850 12/02/22 2046   BP: (!) 142/74 138/73   Pulse: 83 87   Resp: 20 18   Temp: 98.3 °F (36.8 °C)    SpO2: 97% 97%   Weight: 117.9 kg (260 lb)    Height: 5' 10\" (1.778 m)             Physical Exam  Vitals and nursing note reviewed. Constitutional:       General: He is not in acute distress. Appearance: Normal appearance. He is obese. He is not ill-appearing, toxic-appearing or diaphoretic. HENT:      Head: Normocephalic and atraumatic. Right Ear: External ear normal.      Left Ear: External ear normal.      Nose: Nose normal.      Mouth/Throat:      Mouth: Mucous membranes are moist.   Eyes:      General: No scleral icterus. Right eye: No discharge. Left eye: No discharge. Extraocular Movements: Extraocular movements intact. Cardiovascular:      Rate and Rhythm: Normal rate and regular rhythm. Pulses: Normal pulses. Pulmonary:      Effort: Pulmonary effort is normal. No respiratory distress. Breath sounds: Normal breath sounds. No stridor. No wheezing, rhonchi or rales. Abdominal:      General: Abdomen is flat. Bowel sounds are normal. There is no distension. Palpations: Abdomen is soft. Tenderness: There is no abdominal tenderness. There is no guarding or rebound. Musculoskeletal:         General: Swelling, tenderness and deformity present. Right wrist: Normal.      Left wrist: Swelling, deformity, tenderness and bony tenderness present. Normal pulse. Cervical back: Neck supple. No tenderness. Skin:     General: Skin is warm and dry. Neurological:      General: No focal deficit present. Mental Status: He is alert and oriented to person, place, and time. Cranial Nerves: No cranial nerve deficit. Sensory: No sensory deficit.       Gait: Gait normal.      Comments: Gait is steady but mildly antalgic        MDM  Number of Diagnoses or Management Options  Diagnosis management comments: Fracture, contusion, hypovolemia, ETOH intoxication, medication adverse effect       Amount and/or Complexity of Data Reviewed  Clinical lab tests: ordered and reviewed  Tests in the radiology section of CPT®: ordered and reviewed  Review and summarize past medical records: yes  Independent visualization of images, tracings, or specimens: yes    Risk of Complications, Morbidity, and/or Mortality  Presenting problems: moderate  Diagnostic procedures: moderate  Management options: low  General comments: Evaluation consistent with displaced radial fracture. Dr. Rikki López, consulted, recommended splinting and will see patient in follow up next week for continued management.  Stable for discharge with outpatient management    Patient Progress  Patient progress: improved         Procedures

## 2022-12-03 NOTE — ED NOTES
I have reviewed discharge instructions with the patient. The patient verbalized understanding. Pt ambulated with steady gait to car from wheelchair, alert and oriented, respiration regular and unlabored.

## 2022-12-06 ENCOUNTER — HOSPITAL ENCOUNTER (INPATIENT)
Age: 62
LOS: 2 days | Discharge: HOME | End: 2022-12-08
Attending: ORTHOPAEDIC SURGERY | Admitting: INTERNAL MEDICINE
Payer: COMMERCIAL

## 2022-12-06 ENCOUNTER — OFFICE VISIT (OUTPATIENT)
Dept: ORTHOPEDIC SURGERY | Age: 62
End: 2022-12-06
Payer: COMMERCIAL

## 2022-12-06 ENCOUNTER — APPOINTMENT (OUTPATIENT)
Dept: NON INVASIVE DIAGNOSTICS | Age: 62
End: 2022-12-06
Attending: NURSE PRACTITIONER
Payer: COMMERCIAL

## 2022-12-06 ENCOUNTER — APPOINTMENT (OUTPATIENT)
Dept: GENERAL RADIOLOGY | Age: 62
End: 2022-12-06
Attending: INTERNAL MEDICINE
Payer: COMMERCIAL

## 2022-12-06 VITALS — BODY MASS INDEX: 37.94 KG/M2 | WEIGHT: 265 LBS | HEIGHT: 70 IN

## 2022-12-06 DIAGNOSIS — M25.532 LEFT WRIST PAIN: Primary | ICD-10-CM

## 2022-12-06 PROBLEM — S62.109A WRIST FRACTURE: Status: ACTIVE | Noted: 2022-12-06

## 2022-12-06 LAB
ATRIAL RATE: 85 BPM
BASOPHILS # BLD: 0.1 K/UL (ref 0–0.1)
BASOPHILS NFR BLD: 1 % (ref 0–2)
CALCULATED P AXIS, ECG09: 65 DEGREES
CALCULATED R AXIS, ECG10: -19 DEGREES
CALCULATED T AXIS, ECG11: 69 DEGREES
DIAGNOSIS, 93000: NORMAL
DIFFERENTIAL METHOD BLD: ABNORMAL
ECHO AO ROOT DIAM: 3.9 CM
ECHO AO ROOT INDEX: 1.66 CM/M2
ECHO AV AREA PEAK VELOCITY: 2.5 CM2
ECHO AV AREA VTI: 2.8 CM2
ECHO AV AREA/BSA PEAK VELOCITY: 1.1 CM2/M2
ECHO AV AREA/BSA VTI: 1.2 CM2/M2
ECHO AV MEAN GRADIENT: 4 MMHG
ECHO AV MEAN VELOCITY: 1 M/S
ECHO AV PEAK GRADIENT: 7 MMHG
ECHO AV PEAK VELOCITY: 1.4 M/S
ECHO AV VELOCITY RATIO: 0.57
ECHO AV VTI: 25.4 CM
ECHO LA DIAMETER INDEX: 2.3 CM/M2
ECHO LA DIAMETER: 5.4 CM
ECHO LA TO AORTIC ROOT RATIO: 1.38
ECHO LA VOL 2C: 45 ML (ref 18–58)
ECHO LA VOL 4C: 48 ML (ref 18–58)
ECHO LA VOL BP: 48 ML (ref 18–58)
ECHO LA VOL/BSA BIPLANE: 20 ML/M2 (ref 16–34)
ECHO LA VOLUME AREA LENGTH: 53 ML
ECHO LA VOLUME INDEX A2C: 19 ML/M2 (ref 16–34)
ECHO LA VOLUME INDEX A4C: 20 ML/M2 (ref 16–34)
ECHO LA VOLUME INDEX AREA LENGTH: 23 ML/M2 (ref 16–34)
ECHO LV E' LATERAL VELOCITY: 14 CM/S
ECHO LV E' SEPTAL VELOCITY: 12 CM/S
ECHO LV FRACTIONAL SHORTENING: 32 % (ref 28–44)
ECHO LV INTERNAL DIMENSION DIASTOLE INDEX: 2.43 CM/M2
ECHO LV INTERNAL DIMENSION DIASTOLIC: 5.7 CM (ref 4.2–5.9)
ECHO LV INTERNAL DIMENSION SYSTOLIC INDEX: 1.66 CM/M2
ECHO LV INTERNAL DIMENSION SYSTOLIC: 3.9 CM
ECHO LV IVSD: 1 CM (ref 0.6–1)
ECHO LV MASS 2D: 211.7 G (ref 88–224)
ECHO LV MASS INDEX 2D: 90.1 G/M2 (ref 49–115)
ECHO LV POSTERIOR WALL DIASTOLIC: 0.9 CM (ref 0.6–1)
ECHO LV RELATIVE WALL THICKNESS RATIO: 0.32
ECHO LVOT AREA: 4.5 CM2
ECHO LVOT AV VTI INDEX: 0.63
ECHO LVOT DIAM: 2.4 CM
ECHO LVOT MEAN GRADIENT: 2 MMHG
ECHO LVOT PEAK GRADIENT: 3 MMHG
ECHO LVOT PEAK VELOCITY: 0.8 M/S
ECHO LVOT STROKE VOLUME INDEX: 31 ML/M2
ECHO LVOT SV: 72.8 ML
ECHO LVOT VTI: 16.1 CM
ECHO MV A VELOCITY: 0.89 M/S
ECHO MV AREA VTI: 2.5 CM2
ECHO MV E DECELERATION TIME (DT): 178.3 MS
ECHO MV E VELOCITY: 0.99 M/S
ECHO MV E/A RATIO: 1.11
ECHO MV E/E' LATERAL: 7.07
ECHO MV E/E' RATIO (AVERAGED): 7.66
ECHO MV E/E' SEPTAL: 8.25
ECHO MV LVOT VTI INDEX: 1.8
ECHO MV MAX VELOCITY: 1.1 M/S
ECHO MV MEAN GRADIENT: 2 MMHG
ECHO MV MEAN VELOCITY: 0.7 M/S
ECHO MV PEAK GRADIENT: 5 MMHG
ECHO MV VTI: 29 CM
ECHO PV MAX VELOCITY: 1 M/S
ECHO PV PEAK GRADIENT: 4 MMHG
ECHO RV INTERNAL DIMENSION: 3 CM
EOSINOPHIL # BLD: 0.2 K/UL (ref 0–0.4)
EOSINOPHIL NFR BLD: 2 % (ref 0–5)
ERYTHROCYTE [DISTWIDTH] IN BLOOD BY AUTOMATED COUNT: 13.4 % (ref 11.6–14.5)
HCT VFR BLD AUTO: 41.3 % (ref 36–48)
HGB BLD-MCNC: 13.5 G/DL (ref 13–16)
IMM GRANULOCYTES # BLD AUTO: 0.1 K/UL (ref 0–0.04)
IMM GRANULOCYTES NFR BLD AUTO: 1 % (ref 0–0.5)
LYMPHOCYTES # BLD: 2.4 K/UL (ref 0.9–3.6)
LYMPHOCYTES NFR BLD: 27 % (ref 21–52)
MCH RBC QN AUTO: 30.7 PG (ref 24–34)
MCHC RBC AUTO-ENTMCNC: 32.7 G/DL (ref 31–37)
MCV RBC AUTO: 93.9 FL (ref 78–100)
MONOCYTES # BLD: 0.8 K/UL (ref 0.05–1.2)
MONOCYTES NFR BLD: 9 % (ref 3–10)
NEUTS SEG # BLD: 5.3 K/UL (ref 1.8–8)
NEUTS SEG NFR BLD: 60 % (ref 40–73)
NRBC # BLD: 0 K/UL (ref 0–0.01)
NRBC BLD-RTO: 0 PER 100 WBC
P-R INTERVAL, ECG05: 186 MS
PLATELET # BLD AUTO: 211 K/UL (ref 135–420)
PMV BLD AUTO: 10.1 FL (ref 9.2–11.8)
Q-T INTERVAL, ECG07: 369 MS
QRS DURATION, ECG06: 97 MS
QTC CALCULATION (BEZET), ECG08: 439 MS
RBC # BLD AUTO: 4.4 M/UL (ref 4.35–5.65)
VENTRICULAR RATE, ECG03: 85 BPM
WBC # BLD AUTO: 8.8 K/UL (ref 4.6–13.2)

## 2022-12-06 PROCEDURE — 85025 COMPLETE CBC W/AUTO DIFF WBC: CPT

## 2022-12-06 PROCEDURE — 71045 X-RAY EXAM CHEST 1 VIEW: CPT

## 2022-12-06 PROCEDURE — 65270000029 HC RM PRIVATE

## 2022-12-06 PROCEDURE — 74011000250 HC RX REV CODE- 250: Performed by: INTERNAL MEDICINE

## 2022-12-06 PROCEDURE — 93005 ELECTROCARDIOGRAM TRACING: CPT

## 2022-12-06 PROCEDURE — 74011250636 HC RX REV CODE- 250/636: Performed by: NURSE PRACTITIONER

## 2022-12-06 PROCEDURE — 74011250637 HC RX REV CODE- 250/637: Performed by: NURSE PRACTITIONER

## 2022-12-06 PROCEDURE — 74011250637 HC RX REV CODE- 250/637: Performed by: INTERNAL MEDICINE

## 2022-12-06 PROCEDURE — 93306 TTE W/DOPPLER COMPLETE: CPT

## 2022-12-06 PROCEDURE — 36415 COLL VENOUS BLD VENIPUNCTURE: CPT

## 2022-12-06 RX ORDER — METOPROLOL TARTRATE 25 MG/1
25 TABLET, FILM COATED ORAL 2 TIMES DAILY
Status: DISCONTINUED | OUTPATIENT
Start: 2022-12-06 | End: 2022-12-08 | Stop reason: HOSPADM

## 2022-12-06 RX ORDER — IBUPROFEN 200 MG
1 TABLET ORAL DAILY
Status: DISCONTINUED | OUTPATIENT
Start: 2022-12-06 | End: 2022-12-08 | Stop reason: HOSPADM

## 2022-12-06 RX ORDER — SODIUM CHLORIDE 0.9 % (FLUSH) 0.9 %
5-40 SYRINGE (ML) INJECTION EVERY 8 HOURS
Status: DISCONTINUED | OUTPATIENT
Start: 2022-12-06 | End: 2022-12-08 | Stop reason: HOSPADM

## 2022-12-06 RX ORDER — ACETAMINOPHEN 650 MG/1
650 SUPPOSITORY RECTAL
Status: DISCONTINUED | OUTPATIENT
Start: 2022-12-06 | End: 2022-12-08 | Stop reason: HOSPADM

## 2022-12-06 RX ORDER — TAMSULOSIN HYDROCHLORIDE 0.4 MG/1
0.4 CAPSULE ORAL DAILY
Status: DISCONTINUED | OUTPATIENT
Start: 2022-12-07 | End: 2022-12-08 | Stop reason: HOSPADM

## 2022-12-06 RX ORDER — SODIUM CHLORIDE 0.9 % (FLUSH) 0.9 %
5-40 SYRINGE (ML) INJECTION AS NEEDED
Status: DISCONTINUED | OUTPATIENT
Start: 2022-12-06 | End: 2022-12-08 | Stop reason: HOSPADM

## 2022-12-06 RX ORDER — ONDANSETRON 4 MG/1
4 TABLET, ORALLY DISINTEGRATING ORAL
Status: DISCONTINUED | OUTPATIENT
Start: 2022-12-06 | End: 2022-12-08 | Stop reason: HOSPADM

## 2022-12-06 RX ORDER — HYDROCODONE BITARTRATE AND ACETAMINOPHEN 5; 325 MG/1; MG/1
1 TABLET ORAL
Status: DISCONTINUED | OUTPATIENT
Start: 2022-12-06 | End: 2022-12-06

## 2022-12-06 RX ORDER — ONDANSETRON 2 MG/ML
4 INJECTION INTRAMUSCULAR; INTRAVENOUS
Status: DISCONTINUED | OUTPATIENT
Start: 2022-12-06 | End: 2022-12-08 | Stop reason: HOSPADM

## 2022-12-06 RX ORDER — OXYCODONE HYDROCHLORIDE 5 MG/1
10 TABLET ORAL
Status: DISCONTINUED | OUTPATIENT
Start: 2022-12-06 | End: 2022-12-08 | Stop reason: HOSPADM

## 2022-12-06 RX ORDER — POLYETHYLENE GLYCOL 3350 17 G/17G
17 POWDER, FOR SOLUTION ORAL DAILY PRN
Status: DISCONTINUED | OUTPATIENT
Start: 2022-12-06 | End: 2022-12-08 | Stop reason: HOSPADM

## 2022-12-06 RX ORDER — ACETAMINOPHEN 325 MG/1
650 TABLET ORAL
Status: DISCONTINUED | OUTPATIENT
Start: 2022-12-06 | End: 2022-12-08 | Stop reason: HOSPADM

## 2022-12-06 RX ORDER — TRAZODONE HYDROCHLORIDE 50 MG/1
50 TABLET ORAL ONCE
Status: COMPLETED | OUTPATIENT
Start: 2022-12-06 | End: 2022-12-06

## 2022-12-06 RX ORDER — OXYCODONE HYDROCHLORIDE 5 MG/1
5 TABLET ORAL
Status: DISCONTINUED | OUTPATIENT
Start: 2022-12-06 | End: 2022-12-06

## 2022-12-06 RX ORDER — ENOXAPARIN SODIUM 150 MG/ML
1 INJECTION SUBCUTANEOUS EVERY 12 HOURS
Status: DISPENSED | OUTPATIENT
Start: 2022-12-07 | End: 2022-12-08

## 2022-12-06 RX ORDER — DULOXETIN HYDROCHLORIDE 30 MG/1
60 CAPSULE, DELAYED RELEASE ORAL DAILY
Status: DISCONTINUED | OUTPATIENT
Start: 2022-12-07 | End: 2022-12-08 | Stop reason: HOSPADM

## 2022-12-06 RX ORDER — HEPARIN SODIUM 5000 [USP'U]/ML
5000 INJECTION, SOLUTION INTRAVENOUS; SUBCUTANEOUS EVERY 8 HOURS
Status: DISCONTINUED | OUTPATIENT
Start: 2022-12-06 | End: 2022-12-06

## 2022-12-06 RX ADMIN — METOPROLOL TARTRATE 25 MG: 25 TABLET, FILM COATED ORAL at 17:39

## 2022-12-06 RX ADMIN — HYDROCODONE BITARTRATE AND ACETAMINOPHEN 1 TABLET: 5; 325 TABLET ORAL at 16:08

## 2022-12-06 RX ADMIN — SODIUM CHLORIDE, PRESERVATIVE FREE 10 ML: 5 INJECTION INTRAVENOUS at 17:29

## 2022-12-06 RX ADMIN — TRAZODONE HYDROCHLORIDE 50 MG: 50 TABLET ORAL at 22:44

## 2022-12-06 RX ADMIN — SODIUM CHLORIDE, PRESERVATIVE FREE 10 ML: 5 INJECTION INTRAVENOUS at 22:11

## 2022-12-06 RX ADMIN — HEPARIN SODIUM 5000 UNITS: 5000 INJECTION INTRAVENOUS; SUBCUTANEOUS at 20:38

## 2022-12-06 RX ADMIN — OXYCODONE 5 MG: 5 TABLET ORAL at 20:38

## 2022-12-06 RX ADMIN — OXYCODONE 5 MG: 5 TABLET ORAL at 22:44

## 2022-12-06 NOTE — ASSESSMENT & PLAN NOTE
-XR Displaced distal radius fracture  -direct admission  -consult cardiologist for surgical clearance  -pain management  -holding Xarelto

## 2022-12-06 NOTE — PROGRESS NOTES
Name: Luzma Hendricks    : 1960     Service Dept: [facility]    Chief Complaint   Patient presents with    Wrist Pain        Visit Vitals  Ht 5' 10\" (1.778 m)   Wt 265 lb (120.2 kg)   BMI 38.02 kg/m²        Allergies   Allergen Reactions    Gabapentin Other (comments)     \"Caused bad nightmares\"    Statins-Hmg-Coa Reductase Inhibitors Other (comments)     Myalgias with Lipitor. Tolerates Crestor    Ferrous Sulfate Other (comments)     Ferrous Sulfate (IRON) --Abdominal pain , redness in face, fever    Pcn [Penicillins] Hives        Current Outpatient Medications   Medication Sig Dispense Refill    ondansetron hcl (Zofran) 4 mg tablet Take 1 Tablet by mouth every eight (8) hours as needed for Nausea or Vomiting. 20 Tablet 0    oxyCODONE IR (Roxicodone) 5 mg immediate release tablet Take 1 Tablet by mouth every six (6) hours as needed for Pain for up to 5 days. Max Daily Amount: 20 mg. 20 Tablet 0    Stiolto Respimat 2.5-2.5 mcg/actuation inhaler Take 2 Puffs by inhalation daily. 4 g 1    relugolix (Orgovyx) 120 mg tab Take 3 Tablets by mouth daily. 3 Tabs on Day 1, then 1 tab daily. 30 Each 0    relugolix (Orgovyx) 120 mg tab Take 120 mg by mouth daily. 30 Each 11    ergocalciferol (ERGOCALCIFEROL) 1,250 mcg (50,000 unit) capsule Take 1 Capsule by mouth every seven (7) days. 8 Capsule 0    DULoxetine (CYMBALTA) 60 mg capsule Take 1 Capsule by mouth daily. Indications: neuropathic pain, chronic back pain 90 Capsule 1    tamsulosin (FLOMAX) 0.4 mg capsule TAKE 1 CAPSULE BY MOUTH DAILY. 90 Capsule 1    metoprolol tartrate (LOPRESSOR) 25 mg tablet TAKE 1/2 TABLET BY MOUTH EVERY TWELVE (12) HOURS. 45 Tablet 1    Repatha Pushtronex 420 mg/3.5 mL Injt INJECT 420MG UNDER THE SKIN AS INSTRUCTED ONCE EVERY 30 DAYS 3.5 mL 3    colchicine 0.6 mg tablet Take 1 Tablet by mouth daily. 90 Tablet 1    furosemide (LASIX) 40 mg tablet Take 1 Tablet by mouth daily.  90 Tablet 1 potassium chloride (K-DUR, KLOR-CON M20) 20 mEq tablet Take 1 Tablet by mouth daily. Or as directed.  90 Tablet 1    nitroglycerin (NITROLINGUAL) 400 mcg/spray spray SPRAY ONE SPRAY UNDER THE TONGUE AS NEEDED MAY REPEAT UP TO 2 TIMES AS DIRECTED  12 g 5      Patient Active Problem List   Diagnosis Code    Severe [facility] ([facility] E66.01    CAD (coronary artery disease) I25.10    SHILPI (obstructive sleep apnea) G47.33    Tobacco dependence F17.200    Hyperlipidemia E78.5    Acute idiopathic gout M10.00    Essential hypertension I10    Presence of drug coated stent in left circumflex coronary artery Z95.5    Ventral hernia without obstruction or gangrene K43.9    S/P CABG x 3 Z95.1    Grade II hemorrhoids K64.1    Prostate cancer ([facility] C61    Vitamin D deficiency E55.9    Stage 3a chronic kidney disease (HCC) N18.31      Family History   Problem Relation Age of Onset    Hypertension Mother     Diabetes Mother       Social History     Socioeconomic History    Marital status:    Tobacco Use    Smoking status: Every Day     Packs/day: 0.50     Years: 40.00     Pack years: 20.00     Types: Cigarettes    Smokeless tobacco: Never   Vaping Use    Vaping Use: Never used   Substance and Sexual Activity    Alcohol use: Yes     Comment: couple drinks a day    Drug use: No     Social Determinants of Health     Physical Activity: Inactive    Days of Exercise per Week: 0 days    Minutes of Exercise per Session: 0 min      Past Surgical History:   Procedure Laterality Date    HX CHOLECYSTECTOMY      HX CORONARY STENT PLACEMENT      HX HERNIA REPAIR      IR IVC FILTER PLACEMENT PERCUTANEOUS  6/15/2021    IR REMOVE IVC FILTER W SI  1/17/2022    IR THROMBECTOMY MECH ART PRIMARY NON HETAL OR INTRACRANIAL  6/15/2021    CT CARDIAC SURG PROCEDURE UNLIST      filter and triple bypass      Past Medical History:   Diagnosis Date    Bilateral hand pain     CAD (coronary artery disease)     Dupuytren's disease of palm     Hyperlipidemia Hypertension     SHILPI (obstructive sleep apnea)     PE (pulmonary thromboembolism) ([facility] [address] 06/2021    Septic pulmonary embolism with acute cor pulmonale (HCC) 6/14/2021    Severe obesity (HCC)     Tobacco dependence         I have reviewed and agree with [facility] and ROS and intake form in chart and the record furthermore I have reviewed prior medical record(s) regarding this patients care during this appointment. Review of Systems:   Patient is a pleasant appearing individual, appropriately dressed, well hydrated, well nourished, who is alert, appropriately oriented for age, and in no acute distress with a normal gait and normal affect who does not appear to be in any significant pain. Physical Exam:  Left wrist and hand - grossly neurovascularly intact, good cap refill, positive point tenderness in the area of the fracture(s) , positive soft tissue swelling, decreased range of motion and strength, skin intact. Right wrist and hand- Grossly neurovascularly intact, good cap refill, full range of motion, no weakness, no swelling, no point tenderness, no skin lesions. Encounter Diagnoses     ICD-10-CM ICD-9-CM   1. Left wrist pain  M25.532 719.43       HPI:  The patient is here with a chief complaint of left wrist pain, throbbing, burning pain. Pain is 9/10. X-rays of the left wrist are positive for displaced extra-articular left distal radius fracture. Assessment/Plan:  Plan will be for left distal radius closed reduction and pinning. We are going to get him admitted to the hospital for some medical workup and get him set up for procedure and go from there. As part of continued conservative pain management options the patient was advised to utilize Tylenol or OTC NSAIDS as long as it is not medically contraindicated. Return to Office: Follow-up and Dispositions    Return for admitted. Scribed by Kwame Warren LPN as dictated by [facility] [place] CELSO Dillon MD.  Documentation True and Accepted Shahbaz Shepherd MD

## 2022-12-06 NOTE — LETTER
12/7/2022    Patient: Pamela Sanchez   YOB: 1960   Date of Visit: 12/6/2022     MD Tyree [facility]no [facility] [address] [place] [address]    Dear Theresa Wagoner MD,      Thank you for referring Mr. Elsy Navarrete to [address] for evaluation. My notes for this consultation are attached. If you have questions, please do not hesitate to call me. I look forward to following your patient along with you.       Sincerely,    Bradley Lyman MD

## 2022-12-06 NOTE — PROGRESS NOTES
1845- Bedside shift change report given to Alma Rosa Dick RN (oncoming nurse) by Jessie Alvarado RN (offgoing nurse). Report included the following information SBAR, Kardex, ED Summary, Intake/Output, MAR, Accordion, Recent Results, and Cardiac Rhythm SR w/ PVCs . Received patient resting in bed, watching tv. Bed in lowest and locked position. 1915- Assessment completed at this time. Patient found sitting on side of bed, appears anxious at times during admissions. Patient has gown half off body, when asked if he needs assistance putting back on, patient denies stating he is having sweats d/t \"jonesing\" for a cigarette. Nicoderm patch verified to R arm, patient states the never work for him. This RN noted increasing anxiety as assessment progresses, patient states he is worried about not being able to sleep tonight. When asking about patient pain, patient states the PRN pain medication given to him here has not been helpful, and requests a heavier medication. Patient asking why he is not receiving PRN oxycodone 5mg IR as prescribed by Dr. Javier Ward. This RN to inquire about PRN anxiety and PRN pain medication. 65- Provider on unit at this time, orders clarified at this time. New orders received at this time. 2230- Patient states pain is not managed at this point, states he is still experiencing 9/10 pain. Provider on unit, new orders received at this time for increased pain medication and one time dose PRN medication for anxiety at this time. 56- RN assists patient with CPAP mask at this time. 0982- Reassessment completed at this time. No changes noted. Pillow placed under L arm for comfort. 7486- Patient resting in bed, no evidence of distress or discomfort at this time, respirations even and unlabored. CPAP machine on. 12- Phlebo at bedside, morning labs drawn at this time. 8003- DVT prophylaxis orders reviewed and verified with BENJIE Horvath NP at this time.  As per provider, 0600 lovenox order to be given, and 1800 lovenox to be confirmed with day shift provider. 9215- Bedside shift change report given to Ernesto Barksdale LPN (oncoming nurse) by Ze Peterson RN (offgoing nurse).  Report included the following information SBAR, Kardex, ED Summary, Intake/Output, MAR, Accordion, Recent Results, and Cardiac Rhythm SR .

## 2022-12-06 NOTE — PROGRESS NOTES
Problem: Falls - Risk of  Goal: *Absence of Falls  Description: Document Darlen Byram Fall Risk and appropriate interventions in the flowsheet.   Outcome: Progressing Towards Goal  Note: Fall Risk Interventions:            Medication Interventions: Teach patient to arise slowly         History of Falls Interventions: Door open when patient unattended, Room close to nurse's station         Problem: Patient Education: Go to Patient Education Activity  Goal: Patient/Family Education  Outcome: Progressing Towards Goal     Problem: General Medical Care Plan  Goal: *Vital signs within specified parameters  Outcome: Progressing Towards Goal  Goal: *Labs within defined limits  Outcome: Progressing Towards Goal  Goal: *Absence of infection signs and symptoms  Outcome: Progressing Towards Goal  Goal: *Optimal pain control at patient's stated goal  Outcome: Progressing Towards Goal  Goal: *Skin integrity maintained  Outcome: Progressing Towards Goal  Goal: *Fluid volume balance  Outcome: Progressing Towards Goal  Goal: *Optimize nutritional status  Outcome: Progressing Towards Goal  Goal: *Anxiety reduced or absent  Outcome: Progressing Towards Goal  Goal: *Progressive mobility and function (eg: ADL's)  Outcome: Progressing Towards Goal     Problem: Patient Education: Go to Patient Education Activity  Goal: Patient/Family Education  Outcome: Progressing Towards Goal     Problem: Pain  Goal: *Control of Pain  Outcome: Progressing Towards Goal  Goal: *PALLIATIVE CARE:  Alleviation of Pain  Outcome: Progressing Towards Goal     Problem: Patient Education: Go to Patient Education Activity  Goal: Patient/Family Education  Outcome: Progressing Towards Goal

## 2022-12-06 NOTE — PROGRESS NOTES
Problem: Falls - Risk of  Goal: *Absence of Falls  Description: Document Sofia Fothergill Fall Risk and appropriate interventions in the flowsheet.   Outcome: Progressing Towards Goal  Note: Fall Risk Interventions:            Medication Interventions: Teach patient to arise slowly         History of Falls Interventions: Room close to nurse's station, Investigate reason for fall, Door open when patient unattended         Problem: Patient Education: Go to Patient Education Activity  Goal: Patient/Family Education  Outcome: Progressing Towards Goal     Problem: General Medical Care Plan  Goal: *Vital signs within specified parameters  Outcome: Progressing Towards Goal  Goal: *Labs within defined limits  Outcome: Progressing Towards Goal  Goal: *Absence of infection signs and symptoms  Outcome: Progressing Towards Goal  Goal: *Optimal pain control at patient's stated goal  Outcome: Progressing Towards Goal  Goal: *Skin integrity maintained  Outcome: Progressing Towards Goal  Goal: *Fluid volume balance  Outcome: Progressing Towards Goal  Goal: *Optimize nutritional status  Outcome: Progressing Towards Goal  Goal: *Anxiety reduced or absent  Outcome: Progressing Towards Goal  Goal: *Progressive mobility and function (eg: ADL's)  Outcome: Progressing Towards Goal     Problem: Patient Education: Go to Patient Education Activity  Goal: Patient/Family Education  Outcome: Progressing Towards Goal     Problem: Pain  Goal: *Control of Pain  Outcome: Progressing Towards Goal  Goal: *PALLIATIVE CARE:  Alleviation of Pain  Outcome: Progressing Towards Goal     Problem: Patient Education: Go to Patient Education Activity  Goal: Patient/Family Education  Outcome: Progressing Towards Goal

## 2022-12-06 NOTE — PROGRESS NOTES
1528:  Bedside shift change report given to GERARD Guevara, RN (oncoming nurse) by Yamini Ta. Ousmane Cho, RN, Nursing Supervisor (offgoing nurse). Report included the following information SBAR, Kardex, and Med Rec Status. Initial assessment complete, see flowsheet. Patient is A&OX4, ambulated independently onto unit after direct admission. He c/o 10/10 pain in the left arm with admitting NP, awaiting orders. He has no other s/s of distress. RT is at bedside for EKG, phlebotomy at bedside for lab collect. 1600:  Cardiology at bedside speaking with patient. Echo at bedside awaiting start of echocardiogram.    1800:  Patient is resting, watching TV. He expresses no needs at this time. CBWR.    1900:  Bedside shift change report given to SÁNCHEZ Montague (oncoming nurse) by Janeth Morgan RN (offgoing nurse).  Report included the following information SBAR, Kardex, Intake/Output, MAR, Recent Results, and Cardiac Rhythm SR .

## 2022-12-06 NOTE — H&P
History and Physical    Subjective:     Tracy Murphy is a 57 y.o. with a past medical history for coronary artery disease with a three-vessel CABG, hyperlipidemia, hypertension, pulmonary embolism, obstructive sleep apnea, tobacco abuse, and alcohol use, patient presents to this facility as a direct admission for Dr. Ferrari [facility] orthopedic surgeon. Patient reports that he had a fall on Friday night and injured his left wrist, x-ray shown displaced distal radius fracture. Patient assessed at the bedside, patient is alert and oriented x4, there are no acute signs or symptoms of distress noted at this time, patient is complaining of left arm pain rating pain 8/10. Cardiology asked to evaluate the patient for surgical clearance due to patient extensive cardiac history. At this time patient denies chest pain, shortness of breath, nausea, vomiting, abdominal pain, and back pain. Patient agrees to admission for displaced distal radius fracture with plans for surgical intervention with orthopedic surgeon. Admit patient to telemetry.     Past Medical History:   Diagnosis Date    Bilateral hand pain     CAD (coronary artery disease)     Dupuytren's disease of palm     Hyperlipidemia     Hypertension     SHILPI (obstructive sleep apnea)     PE (pulmonary thromboembolism) ([address] 06/2021    Septic pulmonary embolism with acute cor pulmonale ([address] 6/14/2021    Severe obesity (HCC)     Tobacco dependence       Past Surgical History:   Procedure Laterality Date    HX CHOLECYSTECTOMY      HX CORONARY STENT PLACEMENT      HX HERNIA REPAIR      IR IVC FILTER PLACEMENT PERCUTANEOUS  6/15/2021    IR REMOVE IVC FILTER W SI  1/17/2022    IR THROMBECTOMY MECH ART PRIMARY NON HETAL OR INTRACRANIAL  6/15/2021    [place] CARDIAC SURG PROCEDURE UNLIST      filter and triple bypass     Family History   Problem Relation Age of Onset    Hypertension Mother     Diabetes Mother       Social History     Tobacco Use    Smoking status: Every Day Packs/day: 0.50     Years: 40.00     Pack years: 20.00     Types: Cigarettes    Smokeless tobacco: Never   Substance Use Topics    Alcohol use: Yes     Comment: couple drinks a day       Prior to Admission medications    Medication Sig Start Date End Date Taking? Authorizing Provider   rivaroxaban (Xarelto) 10 mg tablet Take 10 mg by mouth daily. Yes Provider, Omar   ondansetron hcl (Zofran) 4 mg tablet Take 1 Tablet by mouth every eight (8) hours as needed for Nausea or Vomiting. 12/2/22  Yes Luis Angel Medina DO   oxyCODONE IR (Roxicodone) 5 mg immediate release tablet Take 1 Tablet by mouth every six (6) hours as needed for Pain for up to 5 days. Max Daily Amount: 20 mg. 12/2/22 12/7/22 Yes Hussein Medina DO   Stiolto Respimat 2.5-2.5 mcg/actuation inhaler Take 2 Puffs by inhalation daily. 11/18/22  Yes Can Murphy MD   relugolix (Orgovyx) 120 mg tab Take 3 Tablets by mouth daily. 3 Tabs on Day 1, then 1 tab daily. 11/15/22  Yes Charli Irvin MD   relugolix (Orgovyx) 120 mg tab Take 120 mg by mouth daily. 11/15/22  Yes Charli Irvin MD   ergocalciferol (ERGOCALCIFEROL) 1,250 mcg (50,000 unit) capsule Take 1 Capsule by mouth every seven (7) days. 11/14/22  Yes Aundrea Ghosh PA-C   DULoxetine (CYMBALTA) 60 mg capsule Take 1 Capsule by mouth daily. Indications: neuropathic pain, chronic back pain 11/14/22  Yes Can Murphy MD   tamsulosin (FLOMAX) 0.4 mg capsule TAKE 1 CAPSULE BY MOUTH DAILY. 10/7/22  Yes Damaris Cole MD   metoprolol tartrate (LOPRESSOR) 25 mg tablet TAKE 1/2 TABLET BY MOUTH EVERY TWELVE (12) HOURS. 10/4/22  Yes Damaris Cole MD   Repatha Pushtronex 420 mg/3.5 mL Injt INJECT 420MG UNDER THE SKIN AS INSTRUCTED ONCE EVERY 30 DAYS 9/16/22  Yes Vania Hernandez MD   furosemide (LASIX) 40 mg tablet Take 1 Tablet by mouth daily. 5/6/22  Yes Damaris Cole MD   potassium chloride (K-DUR, KLOR-CON M20) 20 mEq tablet Take 1 Tablet by mouth daily. Or as directed. 5/6/22  Yes Jeanine Pride MD     Allergies   Allergen Reactions    Gabapentin Other (comments)     \"Caused bad nightmares\"    Statins-Hmg-Coa Reductase Inhibitors Other (comments)     Myalgias with Lipitor. Tolerates Crestor    Ferrous Sulfate Other (comments)     Ferrous Sulfate (IRON) --Abdominal pain , redness in face, fever    Pcn [Penicillins] Hives          REVIEW OF SYSTEMS:       Total of 12 systems reviewed as follows:    Positive = Red  Constitutional: Negative for malaise/fatigue and weakness, negative for fever and chills   HENT: Negative for ear pain, headaches, negative for loss of sense of taste and smell   Eyes: Negative for blurred vision and double vision   Skin: Negative for itching, negative for open areas   Cardiovascular: Negative for chest pain, palpitations, negative for swelling   Respiratory: Negative for shortness or breath, negative for cough, negative for sputum production   Gastrointestinal: Negative for abdominal pain, constipation, nausea, vomiting, and diarrhea   Genitourinary: Negative for dysuria, frequency, and hematuria   Musculoskeletal: Positive for joint pain and myalgias   Neurological: Negative for dizziness, seizures, and headaches   Psychiatric: Negative for depression and anxiousness        Objective:   VITALS:    Visit Vitals  BP (!) 165/81   Pulse 84   Temp 98.6 °F (37 °C)   Resp 19   Ht 5' 10\" (1.778 m)   Wt 120.2 kg (265 lb)   SpO2 95%   BMI 38.02 kg/m²       PHYSICAL EXAM:  Positive = Red  Constitutional: Alert and oriented x 3 and no noted acute distress appears to be stated age.    HENT: Atraumatic, nose midline, oropharynx clear ad moist, trachea midline, no supraclavicular   Eyes: Conjunctiva normal and pupils equal   Skin: Dry, intact, warm, and dry   Cardiovascular: Regular rate and rhythm, normal heart sounds, no murmurs, pulses palpable, trace BLE edema   Respiratory: Lungs clear throughout, no wheezes, rales, or rhonchi, effort normal   Gastrointestinal: Appearance normal, bowel sounds are normal, bowl soft and non-tender   Genitourinary: Deferred   Musculoskeletal: Limited ROM to left wrist   Neurological: Alert and oriented x 3, awake. No facial droop. No slurred speech. Hand grasps equal. Strength 5/5 in all extremities. Intact sensations   Psychiatric: Affect normal, Answers questions appropriately     __________________________________________________  Care Plan discussed with:    Comments   Patient X    Family      RN     Care Manager                    Consultant:      _______________________________________________________________________  Expected  Disposition:   Home with Family X   HH/PT/OT/RN    SNF/LTC    YADIRA    ________________________________________________________________________    Labs:  Recent Results (from the past 24 hour(s))   EKG, 12 LEAD, INITIAL    Collection Time: 12/06/22  3:29 PM   Result Value Ref Range    Ventricular Rate 85 BPM    Atrial Rate 85 BPM    P-R Interval 186 ms    QRS Duration 97 ms    Q-T Interval 369 ms    QTC Calculation (Bezet) 439 ms    Calculated P Axis 65 degrees    Calculated R Axis -19 degrees    Calculated T Axis 69 degrees    Diagnosis       Sinus rhythm  Left atrial enlargement  Borderline left axis deviation  ST elevation, consider inferior injury    Confirmed by CASSI Hugo ([id number]) on 12/6/2022 5:01:10 PM     CBC WITH AUTOMATED DIFF    Collection Time: 12/06/22  3:30 PM   Result Value Ref Range    WBC 8.8 4.6 - 13.2 K/uL    RBC 4.40 4.35 - 5.65 M/uL    HGB 13.5 13.0 - 16.0 g/dL    HCT 41.3 36.0 - 48.0 %    MCV 93.9 78.0 - 100.0 FL    MCH 30.7 24.0 - 34.0 PG    MCHC 32.7 31.0 - 37.0 g/dL    RDW 13.4 11.6 - 14.5 %    PLATELET 825 629 - 809 K/uL    MPV 10.1 9.2 - 11.8 FL    NRBC 0.0 0.0  WBC    ABSOLUTE NRBC 0.00 0.00 - 0.01 K/uL    NEUTROPHILS 60 40 - 73 %    LYMPHOCYTES 27 21 - 52 %    MONOCYTES 9 3 - 10 %    EOSINOPHILS 2 0 - 5 %    BASOPHILS 1 0 - 2 %    IMMATURE GRANULOCYTES 1 (H) 0 - 0.5 %    ABS. NEUTROPHILS 5.3 1.8 - 8.0 K/UL    ABS. LYMPHOCYTES 2.4 0.9 - 3.6 K/UL    ABS. MONOCYTES 0.8 0.05 - 1.2 K/UL    ABS. EOSINOPHILS 0.2 0.0 - 0.4 K/UL    ABS. BASOPHILS 0.1 0.0 - 0.1 K/UL    ABS. IMM.  GRANS. 0.1 (H) 0.00 - 0.04 K/UL    DF AUTOMATED     ECHO ADULT COMPLETE    Collection Time: 12/06/22  4:41 PM   Result Value Ref Range    IVSd 1.0 0.6 - 1.0 cm    LVIDd 5.7 4.2 - 5.9 cm    LVIDs 3.9 cm    LVOT Diameter 2.4 cm    LVPWd 0.9 0.6 - 1.0 cm    LVOT Peak Gradient 3 mmHg    LVOT Mean Gradient 2 mmHg    LVOT SV 72.8 ml    LVOT Peak Velocity 0.8 m/s    LVOT VTI 16.1 cm    RVIDd 3.0 cm    LA Diameter 5.4 cm    LA Volume A/L 53 mL    LA Volume 2C 45 18 - 58 mL    LA Volume 4C 48 18 - 58 mL    LA Volume BP 48 18 - 58 mL    AV Area by Peak Velocity 2.5 cm2    AV Area by VTI 2.8 cm2    AV Peak Gradient 7 mmHg    AV Mean Gradient 4 mmHg    AV Peak Velocity 1.4 m/s    AV Mean Velocity 1.0 m/s    AV VTI 25.4 cm    MV A Velocity 0.89 m/s    MV E Wave Deceleration Time 178.3 ms    MV E Velocity 0.99 m/s    LV E' Lateral Velocity 14 cm/s    LV E' Septal Velocity 12 cm/s    MV Area by VTI 2.5 cm2    MV Peak Gradient 5 mmHg    MV Mean Gradient 2 mmHg    MV Max Velocity 1.1 m/s    MV Mean Velocity 0.7 m/s    MV VTI 29.0 cm    PV Peak Gradient 4 mmHg    PV Max Velocity 1.0 m/s    Aortic Root 3.9 cm    Fractional Shortening 2D 32 28 - 44 %    LVIDd Index 2.43 cm/m2    LVIDs Index 1.66 cm/m2    LV RWT Ratio 0.32     LV Mass 2D 211.7 88 - 224 g    LV Mass 2D Index 90.1 49 - 115 g/m2    MV E/A 1.11     E/E' Ratio (Averaged) 7.66     E/E' Lateral 7.07     E/E' Septal 8.25     LA Volume Index BP 20 16 - 34 ml/m2    LA Volume Index A/L 23 16 - 34 mL/m2    LVOT Stroke Volume Index 31.0 mL/m2    LVOT Area 4.5 cm2    LA Volume Index 2C 19 16 - 34 mL/m2    LA Volume Index 4C 20 16 - 34 mL/m2    LA Size Index 2.30 cm/m2    LA/AO Root Ratio 1.38     Ao Root Index 1.66 cm/m2    AV Velocity Ratio 0.57     LVOT:AV VTI Index 0.63     SORIN/BSA VTI 1.2 cm2/m2    SORIN/BSA Peak Velocity 1.1 cm2/m2    MV:LVOT VTI Index 1.80        Imaging:  XR CHEST PORT    Result Date: 12/6/2022  EXAM: XR CHEST PORT CLINICAL INDICATION/HISTORY: CAD -Additional: None COMPARISON: 8/14/2021 TECHNIQUE: Frontal view of the chest _______________ FINDINGS: HEART AND MEDIASTINUM: Normal cardiac size and mediastinal contours. Prior median sternotomy and coronary arterial bypass grafting. LUNGS AND PLEURAL SPACES: No focal pneumonic consolidation, pneumothorax, or pleural effusion. BONY THORAX AND SOFT TISSUES: No acute osseous abnormality _______________     No acute findings in the chest.       Assessment & Plan:     Wrist fracture  -XR Displaced distal radius fracture  -direct admission  -consult cardiologist for surgical clearance  -pain management  -holding Xarelto    CAD  -patient wit a history of CABG 3 vessel  -continue metoprolol   -ECHO ordered for cardiology clearance    Hypertension  -chronic, above goal today  -continue Metoprolol    Hyperlipidemia  -patient did not tolerated statin, he receives , Repatha 420 mg subcu once a month     History of Pulmonary Embolism  6/2021: IR guided thromboembolectomy, IVC filter placement during that time, IVC removed in 2022, patient on Xarelto  -Xarelto on hold at this time, will cover with SubQ Lovenox, will D/C day before surgery    Prostate Cancer  -continue Orgovyx (patient supplied) 120 mg daily     Obstructive Sleep Apnea  -chronic  -CPAP machine at the bedside, RT consulted    Tobacco Abuse  -nicotine patch ordered  -smoking cessation education provided    Alcohol Use  -monitor CIWA every 4 hours  -will implement Ativan/CIWA protocol if needed      TOTAL TIME:  45 Minutes    Code Status: Full    Prophylaxis:  Lovenox, will D/C day before surgery    Electronically Signed : ZEYNEP [address]    Please note that this dictation was completed with Smule, the computer voice recognition software.   Quite often unanticipated grammatical, syntax, homophones, and other interpretive errors are inadvertently transcribed by the computer software. Please disregard these errors. Please excuse any errors that have escaped final proofreading. Thank you.

## 2022-12-06 NOTE — H&P (VIEW-ONLY)
Name: Missael Kern    : 1960     Service Dept: [address] [facility]    Chief Complaint   Patient presents with    Wrist Pain        Visit Vitals  Ht 5' 10\" (1.778 m)   Wt 265 lb (120.2 kg)   BMI 38.02 kg/m²        Allergies   Allergen Reactions    Gabapentin Other (comments)     \"Caused bad nightmares\"    Statins-Hmg-Coa Reductase Inhibitors Other (comments)     Myalgias with Lipitor. Tolerates Crestor    Ferrous Sulfate Other (comments)     Ferrous Sulfate (IRON) --Abdominal pain , redness in face, fever    Pcn [Penicillins] Hives        Current Outpatient Medications   Medication Sig Dispense Refill    ondansetron hcl (Zofran) 4 mg tablet Take 1 Tablet by mouth every eight (8) hours as needed for Nausea or Vomiting. 20 Tablet 0    oxyCODONE IR (Roxicodone) 5 mg immediate release tablet Take 1 Tablet by mouth every six (6) hours as needed for Pain for up to 5 days. Max Daily Amount: 20 mg. 20 Tablet 0    Stiolto Respimat 2.5-2.5 mcg/actuation inhaler Take 2 Puffs by inhalation daily. 4 g 1    relugolix (Orgovyx) 120 mg tab Take 3 Tablets by mouth daily. 3 Tabs on Day 1, then 1 tab daily. 30 Each 0    relugolix (Orgovyx) 120 mg tab Take 120 mg by mouth daily. 30 Each 11    ergocalciferol (ERGOCALCIFEROL) 1,250 mcg (50,000 unit) capsule Take 1 Capsule by mouth every seven (7) days. 8 Capsule 0    DULoxetine (CYMBALTA) 60 mg capsule Take 1 Capsule by mouth daily. Indications: neuropathic pain, chronic back pain 90 Capsule 1    tamsulosin (FLOMAX) 0.4 mg capsule TAKE 1 CAPSULE BY MOUTH DAILY. 90 Capsule 1    metoprolol tartrate (LOPRESSOR) 25 mg tablet TAKE 1/2 TABLET BY MOUTH EVERY TWELVE (12) HOURS. 45 Tablet 1    Repatha Pushtronex 420 mg/3.5 mL Injt INJECT 420MG UNDER THE SKIN AS INSTRUCTED ONCE EVERY 30 DAYS 3.5 mL 3    colchicine 0.6 mg tablet Take 1 Tablet by mouth daily. 90 Tablet 1    furosemide (LASIX) 40 mg tablet Take 1 Tablet by mouth daily.  90 Tablet 1 potassium chloride (K-DUR, KLOR-CON M20) 20 mEq tablet Take 1 Tablet by mouth daily. Or as directed.  90 Tablet 1    nitroglycerin (NITROLINGUAL) 400 mcg/spray spray SPRAY ONE SPRAY UNDER THE TONGUE AS NEEDED MAY REPEAT UP TO 2 TIMES AS DIRECTED  12 g 5      Patient Active Problem List   Diagnosis Code    Severe obesity ([facility] E66.01    CAD (coronary artery disease) I25.10    SHILPI (obstructive sleep apnea) G47.33    Tobacco dependence F17.200    Hyperlipidemia E78.5    Acute idiopathic gout M10.00    Essential hypertension I10    Presence of drug coated stent in left circumflex coronary artery Z95.5    Ventral hernia without obstruction or gangrene K43.9    S/P CABG x 3 Z95.1    Grade II hemorrhoids K64.1    Prostate cancer ([facility] C61    Vitamin D deficiency E55.9    Stage 3a chronic kidney disease (HCC) N18.31      Family History   Problem Relation Age of Onset    Hypertension Mother     Diabetes Mother       Social History     Socioeconomic History    Marital status:    Tobacco Use    Smoking status: Every Day     Packs/day: 0.50     Years: 40.00     Pack years: 20.00     Types: Cigarettes    Smokeless tobacco: Never   Vaping Use    Vaping Use: Never used   Substance and Sexual Activity    Alcohol use: Yes     Comment: couple drinks a day    Drug use: No     Social Determinants of Health     Physical Activity: Inactive    Days of Exercise per Week: 0 days    Minutes of Exercise per Session: 0 min      Past Surgical History:   Procedure Laterality Date    HX CHOLECYSTECTOMY      HX CORONARY STENT PLACEMENT      HX HERNIA REPAIR      IR IVC FILTER PLACEMENT PERCUTANEOUS  6/15/2021    IR REMOVE IVC FILTER W SI  1/17/2022    IR THROMBECTOMY MECH ART PRIMARY NON HETAL OR INTRACRANIAL  6/15/2021    [place] CARDIAC SURG PROCEDURE UNLIST      filter and triple bypass      Past Medical History:   Diagnosis Date    Bilateral hand pain     CAD (coronary artery disease)     Dupuytren's disease of palm     Hyperlipidemia Hypertension     SHILPI (obstructive sleep apnea)     PE (pulmonary thromboembolism) ([facility] 06/2021    Septic pulmonary embolism with acute cor pulmonale (HCC) 6/14/2021    Severe obesity (HCC)     Tobacco dependence         I have reviewed and agree with [address] and JAEL and intake form in chart and the record furthermore I have reviewed prior medical record(s) regarding this patients care during this appointment. Review of Systems:   Patient is a pleasant appearing individual, appropriately dressed, well hydrated, well nourished, who is alert, appropriately oriented for age, and in no acute distress with a normal gait and normal affect who does not appear to be in any significant pain. Physical Exam:  Left wrist and hand - grossly neurovascularly intact, good cap refill, positive point tenderness in the area of the fracture(s) , positive soft tissue swelling, decreased range of motion and strength, skin intact. Right wrist and hand- Grossly neurovascularly intact, good cap refill, full range of motion, no weakness, no swelling, no point tenderness, no skin lesions. Encounter Diagnoses     ICD-10-CM ICD-9-CM   1. Left wrist pain  M25.532 719.43       HPI:  The patient is here with a chief complaint of left wrist pain, throbbing, burning pain. Pain is 9/10. X-rays of the left wrist are positive for displaced extra-articular left distal radius fracture. Assessment/Plan:  Plan will be for left distal radius closed reduction and pinning. We are going to get him admitted to the hospital for some medical workup and get him set up for procedure and go from there. As part of continued conservative pain management options the patient was advised to utilize Tylenol or OTC NSAIDS as long as it is not medically contraindicated. Return to Office: Follow-up and Dispositions    Return for admitted. Scribed by Monika Keane LPN as dictated by [facility] [place] CELSO Baxter MD.  Documentation True and Accepted Shahbaz Schaffer MD

## 2022-12-06 NOTE — CONSULTS
CONSULTATION    REASON FOR CONSULT:  Surgical Risk Stratification    REQUESTING PROVIDER:  Dr. Adam Charles:  Left wrist/hand pain        HISTORY OF PRESENT ILLNESS:  Tiney Burkitt is a 57y.o. year-old male with past medical history significant for Prostate Cancer, HTN, HLD, SHILPI (compliance with CPAP), Elevated BMI, PPD smoker x40 years, Daily ETOH use, CAD s/p 3v CABG 8/2021, and submassive bilateral PE with thrombectomy and LLE DVT and IVC filter placement 2021 with subsequent removal of filter 2022 who is admitted awaiting orthopedic surgery for displaced distal radius fracture. Patient reports that he completed cardiac rehab post CABG and has remained active without difficulty for past 16 months. 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 He denies any recent cardiac sx other than dizziness, but reports it only started after initiation of new medication for prostate cancer and only occurs with sudden position changes. He reports that is what happened the other night when he fell leading to wrist fx (got up out of bed abruptly without adjusting and got dizzy and fell. No LOC). Records from hospital admission course thus far reviewed.       Telemetry Review: SR      PAST MEDICAL HISTORY:    Past Medical History:   Diagnosis Date    Bilateral hand pain     CAD (coronary artery disease)     Dupuytren's disease of palm     Hyperlipidemia     Hypertension     SHILPI (obstructive sleep apnea)     PE (pulmonary thromboembolism) ([facility] [address] 06/2021    Septic pulmonary embolism with acute cor pulmonale (HCC) 6/14/2021    Severe obesity (HCC)     Tobacco dependence        PAST SURGICAL HISTORY:   Past Surgical History:   Procedure Laterality Date    HX CHOLECYSTECTOMY      HX CORONARY STENT PLACEMENT      HX HERNIA REPAIR      IR IVC FILTER PLACEMENT PERCUTANEOUS  6/15/2021    IR REMOVE IVC FILTER W SI  1/17/2022    IR THROMBECTOMY [facility] ART PRIMARY NON HETAL OR INTRACRANIAL  6/15/2021    IL CARDIAC SURG PROCEDURE UNLIST      filter and triple bypass       ALLERGIES:    Allergies   Allergen Reactions    Gabapentin Other (comments)     \"Caused bad nightmares\"    Statins-Hmg-Coa Reductase Inhibitors Other (comments)     Myalgias with Lipitor. Tolerates Crestor    Ferrous Sulfate Other (comments)     Ferrous Sulfate (IRON) --Abdominal pain , redness in face, fever    Pcn [Penicillins] Hives       FAMILY HISTORY:    Family History   Problem Relation Age of Onset    Hypertension Mother     Diabetes Mother        SOCIAL HISTORY:    Tobacco: PPD x>40 years  Drugs: Denies  ETOH: 2 liquor drinks/night    HOME MEDICATIONS:    Prior to Admission Medications   Prescriptions Last Dose Informant Patient Reported? Taking? DULoxetine (CYMBALTA) 60 mg capsule 12/6/2022  No Yes   Sig: Take 1 Capsule by mouth daily. Indications: neuropathic pain, chronic back pain   Repatha Pushtronex 420 mg/3.5 mL Injt 11/29/2022  No Yes   Sig: INJECT 420MG UNDER THE SKIN AS INSTRUCTED ONCE EVERY 30 DAYS   Stiolto Respimat 2.5-2.5 mcg/actuation inhaler 12/6/2022  No Yes   Sig: Take 2 Puffs by inhalation daily. ergocalciferol (ERGOCALCIFEROL) 1,250 mcg (50,000 unit) capsule 11/29/2022  No Yes   Sig: Take 1 Capsule by mouth every seven (7) days. furosemide (LASIX) 40 mg tablet 12/6/2022  No Yes   Sig: Take 1 Tablet by mouth daily. metoprolol tartrate (LOPRESSOR) 25 mg tablet 12/6/2022  No Yes   Sig: TAKE 1/2 TABLET BY MOUTH EVERY TWELVE (12) HOURS.   ondansetron hcl (Zofran) 4 mg tablet 12/6/2022  No Yes   Sig: Take 1 Tablet by mouth every eight (8) hours as needed for Nausea or Vomiting. oxyCODONE IR (Roxicodone) 5 mg immediate release tablet 12/6/2022  No Yes   Sig: Take 1 Tablet by mouth every six (6) hours as needed for Pain for up to 5 days.  Max Daily Amount: 20 mg.   potassium chloride (K-DUR, KLOR-CON M20) 20 mEq tablet 12/6/2022 No Yes   Sig: Take 1 Tablet by mouth daily. Or as directed. relugolix (Orgovyx) 120 mg tab Not Taking  No No   Sig: Take 3 Tablets by mouth daily. 3 Tabs on Day 1, then 1 tab daily. Patient not taking: Reported on 12/6/2022   relugolix (Orgovyx) 120 mg tab 12/6/2022  No Yes   Sig: Take 120 mg by mouth daily. rivaroxaban (Xarelto) 10 mg tablet 12/6/2022  Yes Yes   Sig: Take 10 mg by mouth daily. tamsulosin (FLOMAX) 0.4 mg capsule 12/6/2022  No Yes   Sig: TAKE 1 CAPSULE BY MOUTH DAILY. Facility-Administered Medications: None       REVIEW OF SYSTEMS:  Complete review of systems performed, pertinents noted above, all other systems are negative. Patient Vitals for the past 24 hrs:   Temp Pulse Resp BP SpO2   12/06/22 1600 -- 84 -- -- 95 %   12/06/22 1528 98.6 °F (37 °C) 85 19 (!) 165/81 95 %       PHYSICAL EXAMINATION:    General: Well nourished male sitting up in bed, NAD, A&O  HEENT: Normocephalic, PERRL, no drainage  Neck: Supple, Trachea midline, No JVD  RESP: CTA bilaterally. + Symmetrical chest movement. No SOB or distress. On RA  Cardiovascular: RRR no MRG  PVS: No rubor, cyanosis, trace pitting BLE edema with moderate varicosities, Radial, DP, PT pulses equal bilaterally  ABD: obese, soft, NT, Normoactive BS  Derm: Warm/Dry/Intact with no lesions, normal turgor  Neuro: A&O PPTS, cranial nerves II- XII grossly intact via interaction with patient. No focal deficits  PSYCH: No anxiety or agitation  Msk: +left wrist splint, + swelling, neurovascular intact      Electrocardiogram performed earlier reviewed, it shows SR,borderline axis deviation. THERE IS NO INFERIOR ST ELEVATION, there is wander lead III     Recent labs results and imaging reviewed.       Recent Results (from the past 24 hour(s))   EKG, 12 LEAD, INITIAL    Collection Time: 12/06/22  3:29 PM   Result Value Ref Range    Ventricular Rate 85 BPM    Atrial Rate 85 BPM    P-R Interval 186 ms    QRS Duration 97 ms    Q-T Interval 369 ms QTC Calculation (Bezet) 439 ms    Calculated P Axis 65 degrees    Calculated R Axis -19 degrees    Calculated T Axis 69 degrees    Diagnosis       Sinus rhythm  Left atrial enlargement  Borderline left axis deviation  ST elevation, consider inferior injury     CBC WITH AUTOMATED DIFF    Collection Time: 12/06/22  3:30 PM   Result Value Ref Range    WBC 8.8 4.6 - 13.2 K/uL    RBC 4.40 4.35 - 5.65 M/uL    HGB 13.5 13.0 - 16.0 g/dL    HCT 41.3 36.0 - 48.0 %    MCV 93.9 78.0 - 100.0 FL    MCH 30.7 24.0 - 34.0 PG    MCHC 32.7 31.0 - 37.0 g/dL    RDW 13.4 11.6 - 14.5 %    PLATELET 834 205 - 246 K/uL    MPV 10.1 9.2 - 11.8 FL    NRBC 0.0 0.0  WBC    ABSOLUTE NRBC 0.00 0.00 - 0.01 K/uL    NEUTROPHILS 60 40 - 73 %    LYMPHOCYTES 27 21 - 52 %    MONOCYTES 9 3 - 10 %    EOSINOPHILS 2 0 - 5 %    BASOPHILS 1 0 - 2 %    IMMATURE GRANULOCYTES 1 (H) 0 - 0.5 %    ABS. NEUTROPHILS 5.3 1.8 - 8.0 K/UL    ABS. LYMPHOCYTES 2.4 0.9 - 3.6 K/UL    ABS. MONOCYTES 0.8 0.05 - 1.2 K/UL    ABS. EOSINOPHILS 0.2 0.0 - 0.4 K/UL    ABS. BASOPHILS 0.1 0.0 - 0.1 K/UL    ABS. IMM.  GRANS. 0.1 (H) 0.00 - 0.04 K/UL    DF AUTOMATED     ECHO ADULT COMPLETE    Collection Time: 12/06/22  3:49 PM   Result Value Ref Range    IVSd 1.0 0.6 - 1.0 cm    LVIDd 5.7 4.2 - 5.9 cm    LVIDs 3.9 cm    LVOT Diameter 2.4 cm    LVPWd 0.9 0.6 - 1.0 cm    LVOT Peak Gradient 3 mmHg    LVOT Mean Gradient 2 mmHg    LVOT SV 69.9 ml    LVOT Peak Velocity 0.8 m/s    LVOT VTI 16.1 cm    RVIDd 3.0 cm    LA Diameter 5.4 cm    LA Volume A/L 53 mL    LA Volume 2C 45 18 - 58 mL    LA Volume 4C 48 18 - 58 mL    LA Volume BP 48 18 - 58 mL    AV Area by Peak Velocity 2.5 cm2    AV Area by VTI 2.8 cm2    AV Peak Gradient 7 mmHg    AV Mean Gradient 4 mmHg    AV Peak Velocity 1.4 m/s    AV Mean Velocity 1.0 m/s    AV VTI 25.4 cm    MV A Velocity 0.89 m/s    MV E Wave Deceleration Time 178.3 ms    MV E Velocity 0.99 m/s    LV E' Lateral Velocity 14 cm/s    LV E' Septal Velocity 12 cm/s MV Area by VTI 2.4 cm2    MV Peak Gradient 5 mmHg    MV Mean Gradient 2 mmHg    MV Max Velocity 1.1 m/s    MV Mean Velocity 0.7 m/s    MV VTI 29.0 cm    PV Peak Gradient 4 mmHg    PV Max Velocity 1.0 m/s    Aortic Root 3.9 cm       XR Results (maximum last 3): Results from [place] encounter on 12/06/22    XR CHEST PORT    Impression  No acute findings in the chest.      Results from [place] encounter on 12/02/22    XR HIP LT W OR WO PELV 2-3 VWS    Impression  1. No fracture or dislocation identified. XR WRIST LT AP/LAT/OBL MIN 3V    Impression  1. Displaced distal radius fracture. CT Results (maximum last 3): Results from [place] encounter on 03/13/22    CT ABD PELV WO CONT    Impression  Prominent left renal collecting system and ureter. There is a 1 cm calculus  within the left aspect of the urinary bladder near the left ureterovesicular  junction possibly recently passed. Correlation with current renal colic needed. Nonobstructing left renal calculus. Descending colon diverticulosis without diverticulitis. Supraumbilical hernia containing portions of bowel without evidence for current  bowel obstruction. CTA CHEST W OR W WO CONT    Impression  1. No evidence of pulmonary embolism. 2.  No active cardiopulmonary disease. Results from [place] encounter on 10/15/21    CTA CHEST W OR W WO CONT    Impression  1. The extensive pulmonary emboli on prior CT are interval resolved. No new or  recurrent pulmonary embolism seen. 2.  No acute pulmonary or chest finding. Thank you for your referral.      MRI Results (maximum last 3): No results found for this or any previous visit. Nuclear Medicine Results (maximum last 3): No results found for this or any previous visit. US Results (maximum last 3): No results found for this or any previous visit. VAS/US Results (maximum last 3):   No results found for this or any previous visit. Current Facility-Administered Medications:     sodium chloride (NS) flush 5-40 mL, 5-40 mL, IntraVENous, Q8H, Wilfred Rodriguez MD    sodium chloride (NS) flush 5-40 mL, 5-40 mL, IntraVENous, PRN, Jennifer Modi MD    acetaminophen (TYLENOL) tablet 650 mg, 650 mg, Oral, Q6H PRN **OR** acetaminophen (TYLENOL) suppository 650 mg, 650 mg, Rectal, Q6H PRN, Jennifer Modi MD    polyethylene glycol (MIRALAX) packet 17 g, 17 g, Oral, DAILY PRN, Jennifer Modi MD    ondansetron (ZOFRAN ODT) tablet 4 mg, 4 mg, Oral, Q8H PRN **OR** ondansetron (ZOFRAN) injection 4 mg, 4 mg, IntraVENous, Q6H PRN, Jennifer Modi MD    nicotine (NICODERM CQ) 21 mg/24 hr patch 1 Patch, 1 Patch, TransDERmal, DAILY, Wilfred Rodriguez MD, 1 Patch at 12/06/22 1607    metoprolol tartrate (LOPRESSOR) tablet 25 mg, 25 mg, Oral, BID, Jennifer Modi MD    [START ON 12/7/2022] tamsulosin (FLOMAX) capsule 0.4 mg, 0.4 mg, Oral, DAILY, Jennifer Modi MD    [START ON 12/7/2022] tiotropium-olodateroL (STIOLTO RESPIMAT) 2.5-2.5 mcg/actuation inhaler 2 Puff, 2 Puff, Inhalation, DAILY, Jennifer Modi MD    [START ON 12/7/2022] DULoxetine (CYMBALTA) capsule 60 mg, 60 mg, Oral, DAILY, Wilfred Rodriguez MD    HYDROcodone-acetaminophen (NORCO) 5-325 mg per tablet 1 Tablet, 1 Tablet, Oral, Q4H PRN, SCHUYLER ErwinP, 1 Tablet at 12/06/22 1608          Case discussed with collaborating physician Dr. Everlena Litten and our impression and recommendations are as follows:   CAD: PCI Lcx many years ago. S/p CABG 3v (LIMA-mLAD, SVG-RPLV, SVG-OM) on 8/11/21. Completed cardiac rehab. Has been doing well and been sx free since surgery. No recent Cardiac Sx. Is able to complete >6 mets without difficulty or sx. Continue ASA and home BB throughout perioperative period. Continue GDMT and risk factor modification. Note prior intolerance to statins and currently does monthly repatha injections.    PE/DVT:  Had bilateral submassive PE and DVT (Left popliteal and 1 posterior tibial, none in right) in 6/2021 with evidence of Right heart strain. S/p IR thrombectomy bilaterally 6/2021 and Had IVC filter placed  that time, but that was removed earlier this year. 1/17/22. CTA 3/13/22 negative for PE or right heart strain. Venous duplex 10/15/21 negative for DVT. Will obtain repeat TTE today to evaluate right heart function in preparation for surgical risk assessment, but would suspect it to be normal. Patient is on lifelong DOAC-Xarelto. Hgb stable. This will need to be held for 48 hours prior to surgical intervention and patient will require bridge therapy with Lovenox 1mg/kg [id number] or Heparin gtt (per the Orthopedic Surgeon preference). Bridge therapy to continue until surgeon is agreeable for restarting Xarelto. HTN: BP is above goal, but patient also has pain. Continue home medication regimen and continue to monitor. HLD:  On monthly repatha injections. Tobacco Abuse: smoking cessation recommended  ETOH use: daily use. Monitor for withdrawal. Further per HM team.  SHILPI: Encourage compliance with CPAP. OFFICIAL TTE REPORT TO BE AVAILABLE 12/6 AM.  PATIENT DOES NOT REQUIRE ISCHEMIC EVALUATION GIVEN THIS IS NOT CONSIDERED ELECTIVE SURGERY, IS OVERALL LOW RISK SURGERY ON AN EXTREMITY, AND HE IS ABLE TO BE ACTIVE >6 METS WITHOUT SX. MED RECS AS ABOVE. GIVEN PRIOR HX AND COMORBIDITIES HE IS LOW TO INTERMEDIATE RISK FROM CARDIAC EVENTS OR COMPLICATIONS AND MAY MOVE FORWARD WITH NO FURTHER CHANGES/TESTING (OTHER THAN ALREADY NOTED ABOVE). THIS WAS ALL EXPLAINED IN DETAIL AND HE VERBALIZES UNDERSTANDING. Thank you for involving us in the care of this patient. Please do not hesitate to call if additional questions arise.  If after hours please call [phone]

## 2022-12-06 NOTE — PATIENT INSTRUCTIONS
Broken Hand: Care Instructions  Your Care Instructions  A hand can break (fracture) during sports, a fall, or other accidents. The break may happen when your hand twists, is hit, or is used to protect you in a fall. Fractures can range from a small, hairline crack, to a bone or bones broken into two or more pieces. Your treatment depends on how bad the break is. Your doctor may have put your hand in a brace, splint, or cast to allow it to heal or to keep it stable until you see another doctor. It may take weeks or months for your hand to heal. You can help it heal with some care at home. You heal best when you take good care of yourself. Eat a variety of healthy foods, and don't smoke. Follow-up care is a key part of your treatment and safety. Be sure to make and go to all appointments, and call your doctor if you are having problems. It's also a good idea to know your test results and keep a list of the medicines you take. How can you care for yourself at home? Put ice or a cold pack on your hand for 10 to 20 minutes at a time. Try to do this every 1 to 2 hours for the next 3 days (when you are awake). Put a thin cloth between the ice and your cast or splint. Keep your cast or splint dry. Follow the cast care instructions your doctor gives you. If you have a splint, do not take it off unless your doctor tells you to. Be safe with medicines. Take pain medicines exactly as directed. If the doctor gave you a prescription medicine for pain, take it as prescribed. If you are not taking a prescription pain medicine, ask your doctor if you can take an over-the-counter medicine. Prop up your hand on pillows when you sit or lie down in the first few days after the injury. Keep your hand higher than the level of your heart. This will help reduce swelling. Follow instructions for exercises to keep your arm strong.   Wiggle your uninjured fingers often to reduce swelling and stiffness, but do not use that hand to grasp or carry anything. When should you call for help? Call your doctor now or seek immediate medical care if:    You have new or worse pain. Your hand or fingers are cool or pale or change color. Your cast or splint feels too tight. You have tingling, weakness, or numbness in your hand or fingers. Watch closely for changes in your health, and be sure to contact your doctor if:    You do not get better as expected. You have problems with your cast or splint. Where can you learn more? Go to http://www.samuel.com/  Enter Q241 in the search box to learn more about \"Broken Hand: Care Instructions. \"  Current as of: March 9, 2022               Content Version: 13.4  © 2006-2022 [employer], [place]. Care instructions adapted under license by [employer] (which disclaims liability or warranty for this information). If you have questions about a medical condition or this instruction, always ask your healthcare professional. [address] any warranty or liability for your use of this information.

## 2022-12-07 ENCOUNTER — APPOINTMENT (OUTPATIENT)
Dept: VASCULAR SURGERY | Age: 62
End: 2022-12-07
Attending: INTERNAL MEDICINE
Payer: COMMERCIAL

## 2022-12-07 LAB
ANION GAP SERPL CALC-SCNC: 9 MMOL/L (ref 3–18)
BUN SERPL-MCNC: 23 MG/DL (ref 7–18)
BUN/CREAT SERPL: 21 (ref 12–20)
CA-I BLD-MCNC: 8.8 MG/DL (ref 8.5–10.1)
CHLORIDE SERPL-SCNC: 102 MMOL/L (ref 100–111)
CO2 SERPL-SCNC: 27 MMOL/L (ref 21–32)
CREAT SERPL-MCNC: 1.09 MG/DL (ref 0.6–1.3)
ERYTHROCYTE [DISTWIDTH] IN BLOOD BY AUTOMATED COUNT: 13.5 % (ref 11.6–14.5)
GLUCOSE BLD STRIP.AUTO-MCNC: 121 MG/DL (ref 70–110)
GLUCOSE BLD STRIP.AUTO-MCNC: 149 MG/DL (ref 70–110)
GLUCOSE BLD STRIP.AUTO-MCNC: 299 MG/DL (ref 70–110)
GLUCOSE SERPL-MCNC: 94 MG/DL (ref 74–99)
HCT VFR BLD AUTO: 37.5 % (ref 36–48)
HGB BLD-MCNC: 12 G/DL (ref 13–16)
MCH RBC QN AUTO: 29.3 PG (ref 24–34)
MCHC RBC AUTO-ENTMCNC: 32 G/DL (ref 31–37)
MCV RBC AUTO: 91.7 FL (ref 78–100)
NRBC # BLD: 0 K/UL (ref 0–0.01)
NRBC BLD-RTO: 0 PER 100 WBC
PERFORMED BY, TECHID: ABNORMAL
PLATELET # BLD AUTO: 214 K/UL (ref 135–420)
PMV BLD AUTO: 10.2 FL (ref 9.2–11.8)
POTASSIUM SERPL-SCNC: 3.7 MMOL/L (ref 3.5–5.5)
RBC # BLD AUTO: 4.09 M/UL (ref 4.35–5.65)
SODIUM SERPL-SCNC: 138 MMOL/L (ref 136–145)
WBC # BLD AUTO: 7.7 K/UL (ref 4.6–13.2)

## 2022-12-07 PROCEDURE — 36415 COLL VENOUS BLD VENIPUNCTURE: CPT

## 2022-12-07 PROCEDURE — 74011250637 HC RX REV CODE- 250/637: Performed by: NURSE PRACTITIONER

## 2022-12-07 PROCEDURE — 94761 N-INVAS EAR/PLS OXIMETRY MLT: CPT

## 2022-12-07 PROCEDURE — 74011250636 HC RX REV CODE- 250/636: Performed by: NURSE PRACTITIONER

## 2022-12-07 PROCEDURE — 93970 EXTREMITY STUDY: CPT

## 2022-12-07 PROCEDURE — 82962 GLUCOSE BLOOD TEST: CPT

## 2022-12-07 PROCEDURE — 80048 BASIC METABOLIC PNL TOTAL CA: CPT

## 2022-12-07 PROCEDURE — 65270000029 HC RM PRIVATE

## 2022-12-07 PROCEDURE — 74011250637 HC RX REV CODE- 250/637: Performed by: INTERNAL MEDICINE

## 2022-12-07 PROCEDURE — 74011000250 HC RX REV CODE- 250: Performed by: INTERNAL MEDICINE

## 2022-12-07 PROCEDURE — 83036 HEMOGLOBIN GLYCOSYLATED A1C: CPT

## 2022-12-07 PROCEDURE — 94640 AIRWAY INHALATION TREATMENT: CPT

## 2022-12-07 PROCEDURE — 85027 COMPLETE CBC AUTOMATED: CPT

## 2022-12-07 RX ORDER — INSULIN LISPRO 100 [IU]/ML
INJECTION, SOLUTION INTRAVENOUS; SUBCUTANEOUS
Status: DISCONTINUED | OUTPATIENT
Start: 2022-12-07 | End: 2022-12-08

## 2022-12-07 RX ORDER — POTASSIUM CHLORIDE 750 MG/1
40 TABLET, EXTENDED RELEASE ORAL
Status: COMPLETED | OUTPATIENT
Start: 2022-12-07 | End: 2022-12-07

## 2022-12-07 RX ADMIN — DULOXETINE HYDROCHLORIDE 60 MG: 30 CAPSULE, DELAYED RELEASE ORAL at 09:58

## 2022-12-07 RX ADMIN — OXYCODONE 10 MG: 5 TABLET ORAL at 07:57

## 2022-12-07 RX ADMIN — TIOTROPIUM BROMIDE AND OLODATEROL 2 PUFF: 3.124; 2.736 SPRAY, METERED RESPIRATORY (INHALATION) at 07:37

## 2022-12-07 RX ADMIN — POTASSIUM CHLORIDE 40 MEQ: 750 TABLET, EXTENDED RELEASE ORAL at 09:58

## 2022-12-07 RX ADMIN — METOPROLOL TARTRATE 25 MG: 25 TABLET, FILM COATED ORAL at 18:12

## 2022-12-07 RX ADMIN — OXYCODONE 10 MG: 5 TABLET ORAL at 19:53

## 2022-12-07 RX ADMIN — OXYCODONE 10 MG: 5 TABLET ORAL at 14:24

## 2022-12-07 RX ADMIN — SODIUM CHLORIDE, PRESERVATIVE FREE 10 ML: 5 INJECTION INTRAVENOUS at 06:42

## 2022-12-07 RX ADMIN — TAMSULOSIN HYDROCHLORIDE 0.4 MG: 0.4 CAPSULE ORAL at 09:58

## 2022-12-07 RX ADMIN — ENOXAPARIN SODIUM 120 MG: 150 INJECTION SUBCUTANEOUS at 06:41

## 2022-12-07 RX ADMIN — SODIUM CHLORIDE, PRESERVATIVE FREE 10 ML: 5 INJECTION INTRAVENOUS at 21:23

## 2022-12-07 RX ADMIN — METOPROLOL TARTRATE 25 MG: 25 TABLET, FILM COATED ORAL at 09:58

## 2022-12-07 RX ADMIN — SODIUM CHLORIDE, PRESERVATIVE FREE 10 ML: 5 INJECTION INTRAVENOUS at 14:35

## 2022-12-07 NOTE — PROGRESS NOTES
Progress Note    Patient: Zackary Hoang MRN: [id number]  SSN: [id number]    YOB: 1960  Age: 57 y.o. Sex: male      Admit Date: 12/6/2022    LOS: 1 day     Subjective:     Patient seen and examined. Zackary Hoang is a 57y.o. year-old male with past medical history significant for Prostate Cancer, HTN, HLD, SHILPI (compliance with CPAP), Elevated BMI, PPD smoker x40 years, Daily ETOH use, CAD s/p 3v CABG 8/2021, and submassive bilateral PE with thrombectomy and LLE DVT and IVC filter placement 2021 with subsequent removal of filter 2022 who is followed for surgical risk stratification. This am patient has no cardiac or new complaints. Continues to endorse wrist/arm pain. Telemetry Review: SR, no arrhthymias notes    Review of Symptoms:   Review of Systems   Constitutional: Negative. HENT: Negative. Eyes: Negative. Cardiovascular: Negative. Respiratory: Negative. Endocrine: Negative. Hematologic/Lymphatic: Negative. Musculoskeletal:  Positive for joint pain and joint swelling. Gastrointestinal: Negative. Genitourinary: Negative. Neurological: Negative. Psychiatric/Behavioral: Negative. Objective:     Vitals:    12/07/22 0324 12/07/22 0730 12/07/22 0742 12/07/22 0800   BP: (!) 146/80 (!) 151/83     Pulse: 73 76  76   Resp: 18 18     Temp: 98.6 °F (37 °C) 98.7 °F (37.1 °C)     SpO2: 93% 94% 96%    Weight:       Height:            Intake and Output:  Current Shift: No intake/output data recorded. Last three shifts: 12/05 1901 - 12/07 0700  In: 5 [P.O.:720]  Out: 650 [Urine:650]    Physical Exam:   General: Well nourished male lying in bed, NAD, A&O  HEENT: Normocephalic, PERRL, no drainage  Neck: Supple, Trachea midline, No JVD  RESP: CTA bilaterally. + Symmetrical chest movement. No SOB or distress.  On RA  Cardiovascular: RRR no MRG  PVS: No rubor, cyanosis, trace pitting BLE edema with moderate varicosities, Radial, DP, PT pulses equal bilaterally  ABD: obese, soft, NT, Normoactive BS  Derm: Warm/Dry/Intact with no lesions, normal turgor  Neuro: A&O PPTS, cranial nerves II- XII grossly intact via interaction with patient.  No focal deficits  PSYCH: No anxiety or agitation  Msk: +left wrist splint, + swelling, neurovascular intact distally      Lab/Data Review:  BMP:   Lab Results   Component Value Date/Time     12/07/2022 03:00 AM    K 3.7 12/07/2022 03:00 AM     12/07/2022 03:00 AM    CO2 27 12/07/2022 03:00 AM    AGAP 9 12/07/2022 03:00 AM    GLU 94 12/07/2022 03:00 AM    BUN 23 (H) 12/07/2022 03:00 AM    CREA 1.09 12/07/2022 03:00 AM     CMP:   Lab Results   Component Value Date/Time     12/07/2022 03:00 AM    K 3.7 12/07/2022 03:00 AM     12/07/2022 03:00 AM    CO2 27 12/07/2022 03:00 AM    AGAP 9 12/07/2022 03:00 AM    GLU 94 12/07/2022 03:00 AM    BUN 23 (H) 12/07/2022 03:00 AM    CREA 1.09 12/07/2022 03:00 AM    CA 8.8 12/07/2022 03:00 AM     CBC:   Lab Results   Component Value Date/Time    WBC 7.7 12/07/2022 03:00 AM    HGB 12.0 (L) 12/07/2022 03:00 AM    HCT 37.5 12/07/2022 03:00 AM     12/07/2022 03:00 AM            Current Facility-Administered Medications:     potassium chloride (KLOR-CON M10) tablet 40 mEq, 40 mEq, Oral, NOW, El Blunt, NP    sodium chloride (NS) flush 5-40 mL, 5-40 mL, IntraVENous, Q8H, Wilfred Rodriguez MD, 10 mL at 12/07/22 2266    sodium chloride (NS) flush 5-40 mL, 5-40 mL, IntraVENous, PRN, Chadd Pichardo MD    acetaminophen (TYLENOL) tablet 650 mg, 650 mg, Oral, Q6H PRN **OR** acetaminophen (TYLENOL) suppository 650 mg, 650 mg, Rectal, Q6H PRN, Chadd Pichardo MD    polyethylene glycol (MIRALAX) packet 17 g, 17 g, Oral, DAILY PRN, Wilfred Israel MD    ondansetron (ZOFRAN ODT) tablet 4 mg, 4 mg, Oral, Q8H PRN **OR** ondansetron (ZOFRAN) injection 4 mg, 4 mg, IntraVENous, Q6H PRN, Chadd Pichardo MD    nicotine (NICODERM CQ) 21 mg/24 hr patch 1 Patch, 1 Patch, TransDERmal, Terrance John MD, 1 Patch at 12/07/22 0759    metoprolol tartrate (LOPRESSOR) tablet 25 mg, 25 mg, Oral, BID, Ben Vitale MD, 25 mg at 12/06/22 1739    tamsulosin (FLOMAX) capsule 0.4 mg, 0.4 mg, Oral, DAILY, Ben Vitale MD    tiotropium-olodateroL (STIOLTO RESPIMAT) 2.5-2.5 mcg/actuation inhaler 2 Puff, 2 Puff, Inhalation, DAILY, Ben Vitale MD, 2 Puff at 12/07/22 0737    DULoxetine (CYMBALTA) capsule 60 mg, 60 mg, Oral, DAILY, Ben Vitale MD    relugolix tab 120 mg - PATIENT SUPPLIED (Patient Supplied), 120 mg, Oral, DAILY, Soniya Lindsey ACNP, 120 mg at 12/06/22 1915    enoxaparin (LOVENOX) injection 120 mg, 1 mg/kg, SubCUTAneous, Q12H, Soniya Lindsey ACNP, 120 mg at 12/07/22 0641    oxyCODONE IR (ROXICODONE) tablet 10 mg, 10 mg, Oral, Q6H PRN, Jordan JOAQUIN NP, 10 mg at 12/07/22 2010      Assessment:     Principal Problem:    Wrist fracture (12/6/2022)        Plan:     Case discussed with Collaborating physician Dr. Bridget Artis and our recommendations are as follows:     CAD: PCI Lcx many years ago. S/p CABG 3v (LIMA-mLAD, SVG-RPLV, SVG-OM) on 8/11/21. Completed cardiac rehab. Has been doing well and been sx free since surgery. No recent Cardiac Sx. Is able to complete >6 mets without difficulty or sx. Continue ASA and home BB throughout perioperative period. Continue GDMT and risk factor modification. Note prior intolerance to statins and currently does monthly repatha injections that can continue @ discharge. PE/DVT:  Had bilateral submassive PE and DVT (Left popliteal and 1 posterior tibial, none in right) in 6/2021 with evidence of Right heart strain. S/p IR thrombectomy bilaterally 6/2021 and Had IVC filter placed  @ that time, but that was removed earlier this year (1/17/22). CTA 3/13/22 negative for PE or right heart strain. Venous duplex 10/15/21 negative for DVT. TTE 12/6/22 negative for strain or long-standing changes.  Patient is on lifelong DOAC-Xarelto. Hgb stable. This will need to be held for 48 hours prior to surgical intervention and patient will require bridge therapy with Lovenox 1mg/kg [id number] or Heparin gtt (per the Orthopedic Surgeon preference). Bridge therapy to continue until surgeon is agreeable for restarting Xarelto. HTN: BP is close to goal, but patient also has pain. Continue home medication regimen and continue to monitor. Avoid aggressive management or titration given orthostatic hypotension (which is what caused fall). HLD:  On monthly repatha injections. Can continue at discharge. Tobacco Abuse: smoking cessation recommended  ETOH use: daily use. Monitor for withdrawal. Further per HM team.  SHILPI: Encourage compliance with CPAP. TTE 12/06/22 showing normal RV size/systolic function. EF 55-60%. No significant abnormalities. PATIENT DOES NOT REQUIRE ISCHEMIC EVALUATION GIVEN THIS IS NOT CONSIDERED ELECTIVE SURGERY, IS OVERALL LOW RISK SURGERY ON AN EXTREMITY, AND HE IS ABLE TO BE ACTIVE >6 METS WITHOUT SX. MED RECS AS ABOVE. GIVEN PRIOR HX AND COMORBIDITIES HE IS LOW TO INTERMEDIATE RISK FROM CARDIAC EVENTS OR COMPLICATIONS AND MAY MOVE FORWARD WITH NO FURTHER CHANGES/TESTING (OTHER THAN ALREADY NOTED ABOVE). THIS WAS ALL EXPLAINED IN DETAIL AND HE VERBALIZES UNDERSTANDING. Will sign off @ this time. Call if further input needed. Thank you for involving us in the care of this patient. Please do not hesitate to call if additional questions arise.  If after hours please call [phone]    Signed By: Christ Cogan, NP     December 7, 2022

## 2022-12-07 NOTE — PROGRESS NOTES
Patient was instructed on proper technique of mdi with spacer. He did a great job. Patient has a smokers cough congested but non productive.  Patient has home cpap that he was not wearing when I entered the room,

## 2022-12-07 NOTE — PROGRESS NOTES
.Bedside shift change report given to PEDRO Guerrero LPN (oncoming nurse) by Kae Quan. Joel Crouch RN (offgoing nurse). Report included the following information SBAR, Kardex, Intake/Output, MAR, Recent Results, and Quality Measures.

## 2022-12-07 NOTE — PROGRESS NOTES
Problem: Falls - Risk of  Goal: *Absence of Falls  Description: Document Paige Marking Fall Risk and appropriate interventions in the flowsheet.   12/6/2022 2219 by Marcelle Boone RN  Outcome: Progressing Towards Goal  Note: Fall Risk Interventions:            Medication Interventions: Patient to call before getting OOB, Teach patient to arise slowly         History of Falls Interventions: Room close to nurse's station, Vital signs minimum Q4HRs X 24 hrs (comment for end date), Door open when patient unattended      12/6/2022 1858 by Marcelle Boone RN  Outcome: Progressing Towards Goal  Note: Fall Risk Interventions:            Medication Interventions: Teach patient to arise slowly         History of Falls Interventions: Door open when patient unattended, Room close to nurse's station         Problem: Patient Education: Go to Patient Education Activity  Goal: Patient/Family Education  12/6/2022 2219 by Marcelle Boone RN  Outcome: Progressing Towards Goal  12/6/2022 1858 by Marcelle Boone RN  Outcome: Progressing Towards Goal     Problem: General Medical Care Plan  Goal: *Vital signs within specified parameters  12/6/2022 2219 by Marcelle Boone RN  Outcome: Progressing Towards Goal  12/6/2022 1858 by Marcelle Boone RN  Outcome: Progressing Towards Goal  Goal: *Labs within defined limits  12/6/2022 2219 by Marcelle Boone RN  Outcome: Progressing Towards Goal  12/6/2022 1858 by Marcelle Boone RN  Outcome: Progressing Towards Goal  Goal: *Absence of infection signs and symptoms  12/6/2022 2219 by Marcelle Boone RN  Outcome: Progressing Towards Goal  12/6/2022 [address] [facility] by Marcelle Boone RN  Outcome: Progressing Towards Goal  Goal: *Optimal pain control at patient's stated goal  12/6/2022 2219 by Marcelle Boone RN  Outcome: Progressing Towards Goal  12/6/2022 [address] [facility] by Marcelle Boone RN  Outcome: Progressing Towards Goal  Goal: *Skin integrity maintained  12/6/2022 2219 by Marcelle Boone RN  Outcome: Progressing Towards Goal  12/6/2022 1858 by Edson Arellano, RN  Outcome: Progressing Towards Goal  Goal: *Fluid volume balance  12/6/2022 2219 by Edson Arellano, RN  Outcome: Progressing Towards Goal  12/6/2022 1858 by Edson Arellano, RN  Outcome: Progressing Towards Goal  Goal: *Optimize nutritional status  12/6/2022 2219 by Edson Arellano, RN  Outcome: Progressing Towards Goal  12/6/2022 1858 by Edson Arellano, RN  Outcome: Progressing Towards Goal  Goal: *Anxiety reduced or absent  12/6/2022 2219 by Edson Arellano, RN  Outcome: Progressing Towards Goal  12/6/2022 1858 by Edson Arellano, RN  Outcome: Progressing Towards Goal  Goal: *Progressive mobility and function (eg: ADL's)  12/6/2022 2219 by Edson Arellano, RN  Outcome: Progressing Towards Goal  12/6/2022 1858 by Edson Arellano, RN  Outcome: Progressing Towards Goal     Problem: Patient Education: Go to Patient Education Activity  Goal: Patient/Family Education  12/6/2022 2219 by Edson Arellano, RN  Outcome: Progressing Towards Goal  12/6/2022 1858 by Edson Arellano, RN  Outcome: Progressing Towards Goal     Problem: Pain  Goal: *Control of Pain  12/6/2022 2219 by Edson Arellano, RN  Outcome: Progressing Towards Goal  12/6/2022 1858 by Edson Arellano, RN  Outcome: Progressing Towards Goal  Goal: *PALLIATIVE CARE:  Alleviation of Pain  12/6/2022 2219 by Edson Arellano, RN  Outcome: Progressing Towards Goal  12/6/2022 1858 by Edson Arellano, RN  Outcome: Progressing Towards Goal     Problem: Patient Education: Go to Patient Education Activity  Goal: Patient/Family Education  12/6/2022 2219 by Edson Arellano, RN  Outcome: Progressing Towards Goal  12/6/2022 1858 by Edson Arellano, RN  Outcome: Progressing Towards Goal

## 2022-12-07 NOTE — PROGRESS NOTES
Problem: Falls - Risk of  Goal: *Absence of Falls  Description: Document Valerie Shay Fall Risk and appropriate interventions in the flowsheet.   Outcome: Progressing Towards Goal  Note: Fall Risk Interventions:            Medication Interventions: Teach patient to arise slowly         History of Falls Interventions: Room close to nurse's station         Problem: Patient Education: Go to Patient Education Activity  Goal: Patient/Family Education  Outcome: Progressing Towards Goal     Problem: Pain  Goal: *Control of Pain  Outcome: Progressing Towards Goal

## 2022-12-07 NOTE — PROGRESS NOTES
Hospitalist Progress Note             Date of Service:  2022  NAME:  Edson Sales  :  1960  MRN:  [id number]    Assessment & Plan:      Wrist fracture  -XR Displaced distal radius fracture  -direct admission  -consult cardiologist for surgical clearance  -pain management  -holding Xarelto  -- cardio clearance note entered  -- resume xarelto after surgery  -- PVL w/o acute large mobile clot, from this pov I see no need for furtehr intervention or optimization, received lovenox 1mg/kg dosing this am, will hold po dose in anticipation of surgery in am     CAD  -patient wit a history of CABG 3 vessel  -continue metoprolol   -ECHO- reviewed, no acute or severe findings     Hypertension  -chronic, above goal today  -continue Metoprolol     Hyperlipidemia  -patient did not tolerated statin, he receives , Repatha 420 mg subcu once a month     History of Pulmonary Embolism  2021: IR guided thromboembolectomy, IVC filter placement during that time, IVC removed in , patient on Xarelto  -Xarelto on hold at this time, will cover with SubQ Lovenox, will D/C day before surgery     Prostate Cancer  -continue Orgovyx (patient supplied) 120 mg daily      Obstructive Sleep Apnea  -chronic  -CPAP machine at the bedside, RT consulted     Tobacco Abuse  -nicotine patch ordered  -smoking cessation education provided     Alcohol Use  -monitor CIWA every 4 hours  -will implement Ativan/CIWA protocol if needed    One elevated BS reading, no hx DM  - start ins ss for now  - check [facility] Problems  Date Reviewed: 2022            Codes Class Noted POA    * (Principal) Wrist fracture ICD-10-CM: S62.109A  ICD-9-CM: 814.00  2022 Yes             Review of Systems:   A comprehensive review of systems was negative except for that written in the HPI. Vital Signs:    Last 24hrs VS reviewed since prior progress note. Most recent are:  Visit Vitals  BP (!) 151/83 (BP 1 Location: Right upper arm, BP Patient Position: Lying)   Pulse 72   Temp 98.7 °F (37.1 °C)   Resp 18   Ht 5' 10\" (1.778 m)   Wt 120.2 kg (265 lb)   SpO2 96%   BMI 38.02 kg/m²         Intake/Output Summary (Last 24 hours) at 12/7/2022 1409  Last data filed at 12/7/2022 0059  Gross per 24 hour   Intake 720 ml   Output 650 ml   Net 70 ml        Physical Examination:             General:          Alert, cooperative, no distress, appears stated age. HEENT:           Atraumatic, anicteric sclerae, pink conjunctivae                          No oral ulcers, mucosa moist, throat clear, dentition fair  Neck:               Supple, symmetrical  Lungs:             Clear to auscultation bilaterally. No Wheezing or Rhonchi. No rales. Chest wall:      No tenderness  No Accessory muscle use. Heart:              Regular  rhythm,  No  murmur   No edema  Abdomen:        Soft, non-tender. Not distended. Bowel sounds normal  Extremities:     No cyanosis. No clubbing,                            Skin turgor normal, Capillary refill normal  Skin:                Not pale. Not Jaundiced  No rashes   Psych:             Not anxious or agitated. Neurologic:      Alert, moves all extremities, answers questions appropriately and responds to commands     Poor dentition  L wrist in bandage and tender       Data Review:    Review and/or order of clinical lab test  Review and/or order of tests in the radiology section of CPT  Review and/or order of tests in the medicine section of CPT      Labs:     Recent Labs     12/07/22  0300 12/06/22  1530   WBC 7.7 8.8   HGB 12.0* 13.5   HCT 37.5 41.3    211     Recent Labs     12/07/22  0300      K 3.7      CO2 27   BUN 23*   CREA 1.09   GLU 94   CA 8.8     No results for input(s): ALT, AP, TBIL, TBILI, TP, ALB, GLOB, GGT, AML, LPSE in the last 72 hours.     No lab exists for component: SGOT, GPT, AMYP, HLPSE  No results for input(s): INR, PTP, APTT, INREXT in the last 72 hours. No results for input(s): FE, TIBC, PSAT, FERR in the last 72 hours. Lab Results   Component Value Date/Time    Folate 5.8 08/12/2021 03:50 AM      No results for input(s): PH, PCO2, PO2 in the last 72 hours. No results for input(s): CPK, CKNDX, TROIQ in the last 72 hours.     No lab exists for component: CPKMB  Lab Results   Component Value Date/Time    Cholesterol, total 174 11/14/2022 12:34 PM    HDL Cholesterol 33 (L) 11/14/2022 12:34 PM    LDL, calculated 112.8 (H) 11/14/2022 12:34 PM    Triglyceride 141 11/14/2022 12:34 PM    CHOL/HDL Ratio 5.3 (H) 11/14/2022 12:34 PM     Lab Results   Component Value Date/Time    Glucose (POC) 299 (H) 12/07/2022 11:13 AM    Glucose (POC) 110 08/16/2021 11:33 AM    Glucose (POC) 108 08/16/2021 07:04 AM    Glucose (POC) 104 08/15/2021 09:07 PM    Glucose (POC) 97 08/15/2021 04:44 PM    Glucose (POC) 128 (H) 08/15/2021 11:11 AM    Glucose, POC 95 08/12/2021 02:18 AM    Glucose, POC 88 08/11/2021 11:14 PM    Glucose,  (H) 08/11/2021 12:46 PM    Glucose,  (H) 08/11/2021 12:03 PM    Glucose,  (H) 08/11/2021 11:38 AM     Lab Results   Component Value Date/Time    Color Yellow 03/13/2022 10:22 PM    Appearance Clear 03/13/2022 10:22 PM    Specific gravity 1.006 03/13/2022 10:22 PM    pH (UA) 6.5 03/13/2022 10:22 PM    Protein Negative 03/13/2022 10:22 PM    Glucose Negative 03/13/2022 10:22 PM    Ketone Negative 03/13/2022 10:22 PM    Bilirubin Negative 03/13/2022 10:22 PM    Urobilinogen 0.2 03/13/2022 10:22 PM    Nitrites Negative 03/13/2022 10:22 PM    Leukocyte Esterase Negative 03/13/2022 10:22 PM    Epithelial cells FEW 06/09/2021 05:45 PM    Bacteria FEW (A) 06/09/2021 05:45 PM    WBC 0 to 3 06/09/2021 05:45 PM    RBC 21 to 35 06/09/2021 05:45 PM         Medications Reviewed:     Current Facility-Administered Medications   Medication Dose Route Frequency    insulin lispro (HUMALOG) injection SubCUTAneous AC&HS    sodium chloride (NS) flush 5-40 mL  5-40 mL IntraVENous Q8H    sodium chloride (NS) flush 5-40 mL  5-40 mL IntraVENous PRN    acetaminophen (TYLENOL) tablet 650 mg  650 mg Oral Q6H PRN    Or    acetaminophen (TYLENOL) suppository 650 mg  650 mg Rectal Q6H PRN    polyethylene glycol (MIRALAX) packet 17 g  17 g Oral DAILY PRN    ondansetron (ZOFRAN ODT) tablet 4 mg  4 mg Oral Q8H PRN    Or    ondansetron (ZOFRAN) injection 4 mg  4 mg IntraVENous Q6H PRN    nicotine (NICODERM CQ) 21 mg/24 hr patch 1 Patch  1 Patch TransDERmal DAILY    metoprolol tartrate (LOPRESSOR) tablet 25 mg  25 mg Oral BID    tamsulosin (FLOMAX) capsule 0.4 mg  0.4 mg Oral DAILY    tiotropium-olodateroL (STIOLTO RESPIMAT) 2.5-2.5 mcg/actuation inhaler 2 Puff  2 Puff Inhalation DAILY    DULoxetine (CYMBALTA) capsule 60 mg  60 mg Oral DAILY    relugolix tab 120 mg - PATIENT SUPPLIED (Patient Supplied)  120 mg Oral DAILY    [Held by provider] enoxaparin (LOVENOX) injection 120 mg  1 mg/kg SubCUTAneous Q12H    oxyCODONE IR (ROXICODONE) tablet 10 mg  10 mg Oral Q6H PRN     ______________________________________________________________________  EXPECTED LENGTH OF STAY: - - -  ACTUAL LENGTH OF STAY:          1                 Tommie Maynard MD

## 2022-12-08 ENCOUNTER — ANESTHESIA (OUTPATIENT)
Dept: SURGERY | Age: 62
End: 2022-12-08
Payer: COMMERCIAL

## 2022-12-08 ENCOUNTER — ANESTHESIA EVENT (OUTPATIENT)
Dept: SURGERY | Age: 62
End: 2022-12-08
Payer: COMMERCIAL

## 2022-12-08 ENCOUNTER — APPOINTMENT (OUTPATIENT)
Dept: GENERAL RADIOLOGY | Age: 62
End: 2022-12-08
Attending: ORTHOPAEDIC SURGERY
Payer: COMMERCIAL

## 2022-12-08 VITALS
OXYGEN SATURATION: 100 % | SYSTOLIC BLOOD PRESSURE: 135 MMHG | TEMPERATURE: 97 F | HEART RATE: 72 BPM | HEIGHT: 70 IN | WEIGHT: 265 LBS | DIASTOLIC BLOOD PRESSURE: 83 MMHG | BODY MASS INDEX: 37.94 KG/M2 | RESPIRATION RATE: 18 BRPM

## 2022-12-08 LAB
ANION GAP SERPL CALC-SCNC: 8 MMOL/L (ref 3–18)
BASOPHILS # BLD: 0.1 K/UL (ref 0–0.1)
BASOPHILS NFR BLD: 1 % (ref 0–2)
BUN SERPL-MCNC: 20 MG/DL (ref 7–18)
BUN/CREAT SERPL: 19 (ref 12–20)
CA-I BLD-MCNC: 8.9 MG/DL (ref 8.5–10.1)
CHLORIDE SERPL-SCNC: 104 MMOL/L (ref 100–111)
CO2 SERPL-SCNC: 27 MMOL/L (ref 21–32)
CREAT SERPL-MCNC: 1.03 MG/DL (ref 0.6–1.3)
DIFFERENTIAL METHOD BLD: ABNORMAL
EOSINOPHIL # BLD: 0.3 K/UL (ref 0–0.4)
EOSINOPHIL NFR BLD: 4 % (ref 0–5)
ERYTHROCYTE [DISTWIDTH] IN BLOOD BY AUTOMATED COUNT: 13.6 % (ref 11.6–14.5)
EST. AVERAGE GLUCOSE BLD GHB EST-MCNC: 105 MG/DL
GLUCOSE BLD STRIP.AUTO-MCNC: 99 MG/DL (ref 70–110)
GLUCOSE SERPL-MCNC: 102 MG/DL (ref 74–99)
HBA1C MFR BLD: 5.3 % (ref 4.2–5.6)
HCT VFR BLD AUTO: 38.3 % (ref 36–48)
HGB BLD-MCNC: 12.1 G/DL (ref 13–16)
IMM GRANULOCYTES # BLD AUTO: 0.1 K/UL (ref 0–0.04)
IMM GRANULOCYTES NFR BLD AUTO: 1 % (ref 0–0.5)
LYMPHOCYTES # BLD: 2.3 K/UL (ref 0.9–3.6)
LYMPHOCYTES NFR BLD: 33 % (ref 21–52)
MAGNESIUM SERPL-MCNC: 2.2 MG/DL (ref 1.6–2.6)
MCH RBC QN AUTO: 30.3 PG (ref 24–34)
MCHC RBC AUTO-ENTMCNC: 31.6 G/DL (ref 31–37)
MCV RBC AUTO: 95.8 FL (ref 78–100)
MONOCYTES # BLD: 0.7 K/UL (ref 0.05–1.2)
MONOCYTES NFR BLD: 11 % (ref 3–10)
NEUTS SEG # BLD: 3.4 K/UL (ref 1.8–8)
NEUTS SEG NFR BLD: 50 % (ref 40–73)
NRBC # BLD: 0 K/UL (ref 0–0.01)
NRBC BLD-RTO: 0 PER 100 WBC
PERFORMED BY, TECHID: NORMAL
PHOSPHATE SERPL-MCNC: 3.7 MG/DL (ref 2.5–4.9)
PLATELET # BLD AUTO: 211 K/UL (ref 135–420)
PMV BLD AUTO: 10.2 FL (ref 9.2–11.8)
POTASSIUM SERPL-SCNC: 4 MMOL/L (ref 3.5–5.5)
RBC # BLD AUTO: 4 M/UL (ref 4.35–5.65)
SODIUM SERPL-SCNC: 139 MMOL/L (ref 136–145)
WBC # BLD AUTO: 6.8 K/UL (ref 4.6–13.2)

## 2022-12-08 PROCEDURE — 84100 ASSAY OF PHOSPHORUS: CPT

## 2022-12-08 PROCEDURE — 80048 BASIC METABOLIC PNL TOTAL CA: CPT

## 2022-12-08 PROCEDURE — 83735 ASSAY OF MAGNESIUM: CPT

## 2022-12-08 PROCEDURE — BW1J1ZZ FLUOROSCOPY OF UPPER EXTREMITY USING LOW OSMOLAR CONTRAST: ICD-10-PCS | Performed by: ORTHOPAEDIC SURGERY

## 2022-12-08 PROCEDURE — 76942 ECHO GUIDE FOR BIOPSY: CPT | Performed by: NURSE ANESTHETIST, CERTIFIED REGISTERED

## 2022-12-08 PROCEDURE — 77030041680 HC PNCL ELECSURG SMK EVAC CNMD -B: Performed by: ORTHOPAEDIC SURGERY

## 2022-12-08 PROCEDURE — 77030000032 HC CUF TRNQT ZIMM -B: Performed by: ORTHOPAEDIC SURGERY

## 2022-12-08 PROCEDURE — 74011000250 HC RX REV CODE- 250: Performed by: INTERNAL MEDICINE

## 2022-12-08 PROCEDURE — 77030002982 HC SUT POLYSRB J&J -A: Performed by: ORTHOPAEDIC SURGERY

## 2022-12-08 PROCEDURE — 0PSJ34Z REPOSITION LEFT RADIUS WITH INTERNAL FIXATION DEVICE, PERCUTANEOUS APPROACH: ICD-10-PCS | Performed by: ORTHOPAEDIC SURGERY

## 2022-12-08 PROCEDURE — 36415 COLL VENOUS BLD VENIPUNCTURE: CPT

## 2022-12-08 PROCEDURE — 94640 AIRWAY INHALATION TREATMENT: CPT

## 2022-12-08 PROCEDURE — 74011000250 HC RX REV CODE- 250: Performed by: NURSE ANESTHETIST, CERTIFIED REGISTERED

## 2022-12-08 PROCEDURE — 73100 X-RAY EXAM OF WRIST: CPT

## 2022-12-08 PROCEDURE — 77030031139 HC SUT VCRL2 J&J -A: Performed by: ORTHOPAEDIC SURGERY

## 2022-12-08 PROCEDURE — 76060000032 HC ANESTHESIA 0.5 TO 1 HR: Performed by: ORTHOPAEDIC SURGERY

## 2022-12-08 PROCEDURE — 85025 COMPLETE CBC W/AUTO DIFF WBC: CPT

## 2022-12-08 PROCEDURE — A4565 SLINGS: HCPCS | Performed by: ORTHOPAEDIC SURGERY

## 2022-12-08 PROCEDURE — 77030018673: Performed by: ORTHOPAEDIC SURGERY

## 2022-12-08 PROCEDURE — 74011250636 HC RX REV CODE- 250/636: Performed by: NURSE ANESTHETIST, CERTIFIED REGISTERED

## 2022-12-08 PROCEDURE — 82962 GLUCOSE BLOOD TEST: CPT

## 2022-12-08 PROCEDURE — 94761 N-INVAS EAR/PLS OXIMETRY MLT: CPT

## 2022-12-08 PROCEDURE — 2709999900 HC NON-CHARGEABLE SUPPLY: Performed by: ORTHOPAEDIC SURGERY

## 2022-12-08 PROCEDURE — 76010000138 HC OR TIME 0.5 TO 1 HR: Performed by: ORTHOPAEDIC SURGERY

## 2022-12-08 PROCEDURE — 74011250637 HC RX REV CODE- 250/637: Performed by: NURSE PRACTITIONER

## 2022-12-08 PROCEDURE — 64415 NJX AA&/STRD BRCH PLXS IMG: CPT | Performed by: NURSE ANESTHETIST, CERTIFIED REGISTERED

## 2022-12-08 PROCEDURE — 74011250637 HC RX REV CODE- 250/637: Performed by: INTERNAL MEDICINE

## 2022-12-08 DEVICE — WIRE FIXATION DMND PT 2 END 0.063X9 IN KIRSCHNER: Type: IMPLANTABLE DEVICE | Site: WRIST | Status: FUNCTIONAL

## 2022-12-08 RX ORDER — PROPOFOL 10 MG/ML
INJECTION, EMULSION INTRAVENOUS AS NEEDED
Status: DISCONTINUED | OUTPATIENT
Start: 2022-12-08 | End: 2022-12-09 | Stop reason: HOSPADM

## 2022-12-08 RX ORDER — CLINDAMYCIN PHOSPHATE 900 MG/50ML
900 INJECTION, SOLUTION INTRAVENOUS ONCE
Status: DISCONTINUED | OUTPATIENT
Start: 2022-12-08 | End: 2022-12-08 | Stop reason: HOSPADM

## 2022-12-08 RX ORDER — MIDAZOLAM HYDROCHLORIDE 1 MG/ML
INJECTION, SOLUTION INTRAMUSCULAR; INTRAVENOUS
Status: SHIPPED | OUTPATIENT
Start: 2022-12-08 | End: 2022-12-08

## 2022-12-08 RX ORDER — BUPIVACAINE HYDROCHLORIDE 5 MG/ML
INJECTION, SOLUTION EPIDURAL; INTRACAUDAL
Status: SHIPPED | OUTPATIENT
Start: 2022-12-08 | End: 2022-12-08

## 2022-12-08 RX ORDER — OXYCODONE AND ACETAMINOPHEN 5; 325 MG/1; MG/1
1 TABLET ORAL
Status: CANCELLED | OUTPATIENT
Start: 2022-12-08

## 2022-12-08 RX ORDER — FENTANYL CITRATE 50 UG/ML
INJECTION, SOLUTION INTRAMUSCULAR; INTRAVENOUS
Status: SHIPPED | OUTPATIENT
Start: 2022-12-08 | End: 2022-12-08

## 2022-12-08 RX ORDER — DEXAMETHASONE SODIUM PHOSPHATE 4 MG/ML
INJECTION, SOLUTION INTRA-ARTICULAR; INTRALESIONAL; INTRAMUSCULAR; INTRAVENOUS; SOFT TISSUE
Status: SHIPPED | OUTPATIENT
Start: 2022-12-08 | End: 2022-12-08

## 2022-12-08 RX ORDER — OXYCODONE AND ACETAMINOPHEN 5; 325 MG/1; MG/1
2 TABLET ORAL
Status: CANCELLED | OUTPATIENT
Start: 2022-12-08

## 2022-12-08 RX ADMIN — DEXAMETHASONE SODIUM PHOSPHATE 4 MG: 4 INJECTION, SOLUTION INTRAMUSCULAR; INTRAVENOUS at 12:15

## 2022-12-08 RX ADMIN — METOPROLOL TARTRATE 25 MG: 25 TABLET, FILM COATED ORAL at 09:45

## 2022-12-08 RX ADMIN — PROPOFOL 50 MG: 10 INJECTION, EMULSION INTRAVENOUS at 12:50

## 2022-12-08 RX ADMIN — TAMSULOSIN HYDROCHLORIDE 0.4 MG: 0.4 CAPSULE ORAL at 09:45

## 2022-12-08 RX ADMIN — BUPIVACAINE HYDROCHLORIDE 20 ML: 5 INJECTION, SOLUTION EPIDURAL; INTRACAUDAL; PERINEURAL at 12:15

## 2022-12-08 RX ADMIN — PROPOFOL 100 MG: 10 INJECTION, EMULSION INTRAVENOUS at 12:59

## 2022-12-08 RX ADMIN — DULOXETINE HYDROCHLORIDE 60 MG: 30 CAPSULE, DELAYED RELEASE ORAL at 09:45

## 2022-12-08 RX ADMIN — OXYCODONE 10 MG: 5 TABLET ORAL at 01:49

## 2022-12-08 RX ADMIN — PROPOFOL 100 MG: 10 INJECTION, EMULSION INTRAVENOUS at 12:55

## 2022-12-08 RX ADMIN — PROPOFOL 100 MG: 10 INJECTION, EMULSION INTRAVENOUS at 12:52

## 2022-12-08 RX ADMIN — FENTANYL CITRATE 100 MCG: 50 INJECTION, SOLUTION INTRAMUSCULAR; INTRAVENOUS at 11:58

## 2022-12-08 RX ADMIN — TIOTROPIUM BROMIDE AND OLODATEROL 2 PUFF: 3.124; 2.736 SPRAY, METERED RESPIRATORY (INHALATION) at 07:14

## 2022-12-08 RX ADMIN — PROPOFOL 200 MG: 10 INJECTION, EMULSION INTRAVENOUS at 12:57

## 2022-12-08 RX ADMIN — MIDAZOLAM HYDROCHLORIDE 2 MG: 2 INJECTION, SOLUTION INTRAMUSCULAR; INTRAVENOUS at 11:58

## 2022-12-08 RX ADMIN — SODIUM CHLORIDE, PRESERVATIVE FREE 10 ML: 5 INJECTION INTRAVENOUS at 06:26

## 2022-12-08 NOTE — OP NOTES
Operative Note    Patient: Bruce Corona MRN: [id number]  Surgery Date: 12/8/2022  [unfilled]          Procedure  Primary Surgeon    LEFT DISTAL RADIUS CLOSED REDUCTION & Shahbaz Vyas MD    * Panel 2 does not exist *  * Panel 2 does not exist *    * Panel 3 does not exist *  * Panel 3 does not exist *     Surgeon(s) and Role:     * Bhakti Tompkins MD - Primary    Other OR Staff/Assistants:  Circ-1: Wojciech Nguyen RN  Scrub Tech-1: [facility] Lucia  Surg Asst-1: Tara Agustin    1st Assistant Tasks:  Closing    Pre-operative Diagnosis:  Closed fracture of distal end of left radius, unspecified fracture morphology, initial encounter [O06.669A]    Post-operative Diagnosis: same as preop diagnosis    Anesthesia Type: General     Findings: Extra-articular left distal radius fracture    Complications: No    EBL: 2cc    Specimens: None    Implants:   Implants       No active implants to display in this view. Operative procedure: Left distal radius closed reduction and pinning    Operative note: Left upper extremity was prepped and draped in sterile fashion after adequate anesthesia was given. Timeout was performed. Subsequently manipulation performed for close reduction of the left wrist.  2 K wires were inserted in a retrograde fashion into the styloid and confirming good position on AP and lateral radiographs under fluoroscopy. Burrall splint was placed after final x-rays were taken. Patient was taken to PACU in stable condition. Of note fluoroscopy was utilized for approximately 15 seconds for close reduction and placement of hardware of the left distal radius.

## 2022-12-08 NOTE — DISCHARGE INSTRUCTIONS
Your first meal home should be clear liquids     Start antibiotics day after surgery if you are prescribed antibiotics unless otherwise instructed. Start Pain meds, the night you have surgery as needed. No alcohol while on pain meds postop. An Ice bag should be applied to your operative site for at least 20 minutes four times a day. DO NOT USE HEAT-this may cause increased swelling and discomfort. You may move your fingers as tolerated. If you have a brace or a rigid splint on then your dressings will need to remain in place until your follow-up appointment. If you do not have a rigid splint or a brace on then you may remove the dressings 1 week after surgery. If you need to shower please cover the entire dressing with an impervious bag to prevent it from getting wet. If you were not given a sling or a rigid brace you may drive once you feel comfortable. And when not taking any narcotic pain medication. A sling may be provided for your comfort. Wear it at all times. Swelling and discoloration may be present post-operatively. This is normal.    If your job requires little physical activity you may return as you feel able. If your job requires excessive lifting or use of affected extremity, please discuss return to work date with your nurse or physician. If you need refill of pain medication, call the office 2 business days prior to running out of medication. Please call during regular business hours, Medication refill requests will not be addressed during non-business hours. Please do not page the on-call provider for pain medication refills after hours.       Things to watch for:             Increased swelling of the surgical site             Spreading of redness around the incision site             Drainage of pus from the incision site             Developing a fever of 101.5 °F or higher             If any of these symptoms occur you have any questions please contact our office at [phone]. If you need to talk to Dr. Kaley Ba or his staff after hours please call the office and have the on-call service get in touch with the provider on call that day. Please note pain medications are not refilled after hours or on weekends. If Dr. Kaley Ba or his staff do not call you back within 30 minutes. Please tell the  to try again. Phone: [phone]  [email or web address]

## 2022-12-08 NOTE — PROGRESS NOTES
0700:  Bedside shift change report given to GERARD Armstrong RN (oncoming nurse) by Franny Stratton. Kate Vick RN (offgoing nurse). Report included the following information SBAR, Kardex, Intake/Output, MAR, Recent Results, and Cardiac Rhythm NSR .     0730:  Initial assessment complete, see flowsheet. Patient is A&OX4, calm and cooperative this morning. He has been NPO since midnight pending his surgery today. He has no c/o pain or s/s of distress at this time. He is still trying to rest, CPAP on at this time. Patient educated to call if needs arise, CBWR.    4553:  Preop checklist completed. OR nurses at bedside to transfer patient. Consent verified. 1000:  Patient off the unit for surgery. 1315:  TRANSFER - IN REPORT:    Verbal report received from MERCY Gunderson Cera, RN on [facility]  being received from PACU for routine post - op      Report consisted of patients Situation, Background, Assessment and   Recommendations(SBAR). Information from the following report(s) SBAR, Kardex, OR Summary, and MAR was reviewed with the receiving nurse. Assessment completed upon patients arrival to unit and care assumed. 1419:  Patient is cleared by hospitalist for discharge home. Discharge instructions reviewed and signed with the patient with opportunities for questions and concerns provided and IV removed by [facility], [address]:  Patient discharged home.

## 2022-12-08 NOTE — ANESTHESIA PREPROCEDURE EVALUATION
Relevant Problems   No relevant active problems       Anesthetic History   No history of anesthetic complications            Review of Systems / Medical History  Patient summary reviewed, nursing notes reviewed and pertinent labs reviewed    Pulmonary        Sleep apnea  Smoker         Neuro/Psych   Within defined limits           Cardiovascular    Hypertension          CAD, CABG and hyperlipidemia    Exercise tolerance: >4 METS  Comments: Xarelto on hold. Cardiac risk stratification done. Low risk due to extremity surgery.    GI/Hepatic/Renal  Within defined limits              Endo/Other        Morbid obesity     Other Findings   Comments: Severe obesity (HCC)Hypertension  Dupuytren's disease of palm  Severe obesity (HCC)  Tobacco dependence  SHILPI (obstructive sleep apnea)  CAD (coronary artery disease)  Hyperlipidemia  PE (pulmonary thromboembolism) (HCC)  Bilateral hand pain  Septic pulmonary embolism with acute cor pulmonale (HCC)           Physical Exam    Airway  Mallampati: II  TM Distance: 4 - 6 cm  Neck ROM: normal range of motion   Mouth opening: Normal     Cardiovascular  Regular rate and rhythm,  S1 and S2 normal,  no murmur, click, rub, or gallop  Rhythm: regular  Rate: normal         Dental  No notable dental hx       Pulmonary  Breath sounds clear to auscultation               Abdominal  GI exam deferred       Other Findings            Anesthetic Plan    ASA: 3  Anesthesia type: regional and MAC - supraclavicular block      Post-op pain plan if not by surgeon: peripheral nerve block single    Induction: Intravenous  Anesthetic plan and risks discussed with: Patient

## 2022-12-08 NOTE — PROGRESS NOTES
1900  Bedside shift change report given to LAM Scott RN (oncoming nurse) by Prasanna Shelton LPN (offgoing nurse). Report included the following information SBAR, Kardex, Intake/Output, MAR, Recent Results, Med Rec Status, and Cardiac Rhythm NSR .     1930 PM assessment done. See flow sheet. Pt sitting on side of bed aaox3. Pt has a cast on his left wrist. Neurovascular circ checks WNL. 1953  Oxycodone IR 2 tabs po given for c/o left wrist pain. 0000  Pt resting quietly with eyes closed with his own cpap on. Resp easy and nonlabored. No distress noted. CBWR. Pt NPO after midnight. 0149  Oxycodone IR 2tabs po given for c/o pain. 0300 Pt standing on side of bed voiding in urinal.     0500   Rounding complete at this time. Pt. Resting quietly with call bell in reach. VSS. Blood pressure 135/80, pulse 73, temperature 97.9 °F (36.6 °C), resp. rate 16, height 5' 10\" (1.778 m), weight 120.2 kg (265 lb), SpO2 96 %.     0700  Bedside shift report given to oncjean nurse.

## 2022-12-08 NOTE — PROGRESS NOTES
.Bedside shift change report given to SHON Camejo RN (oncoming nurse) by Machelle Kamara LPN (offgoing nurse). Report included the following information SBAR, Kardex, Intake/Output, MAR, Recent Results, and Quality Measures\.

## 2022-12-08 NOTE — ACP (ADVANCE CARE PLANNING)
Advance Care Planning     General Advance Care Planning (ACP) Conversation      Date of Conversation: 12/6/2022  Conducted with: Patient with Decision Making Capacity    Healthcare Decision Maker:   No healthcare decision makers have been documented.    Click here to complete [address] including selection of the Healthcare Decision Maker Relationship (ie \"Primary\")      Content/Action Overview:   Has NO ACP documents/care preferences - information provided, considering goals and options  Reviewed DNR/DNI and patient elects Full Code (Attempt Resuscitation)         Length of Voluntary ACP Conversation in minutes:  <16 minutes (Non-Billable)    Anisa Bishop

## 2022-12-08 NOTE — DISCHARGE SUMMARY
Discharge Summary       PATIENT ID: Jesu Selby  MRN: [id number]   YOB: 1960    DATE OF ADMISSION: 12/6/2022  2:58 PM    DATE OF DISCHARGE: 12/08/22    PRIMARY CARE PROVIDER: Shweta Lawrence MD     ATTENDING PHYSICIAN: Brock Saleem MD  DISCHARGING PROVIDER: Brock Saleem MD        CONSULTATIONS: IP CONSULT TO CARDIOLOGY  IP CONSULT TO ORTHOPEDIC SURGERY    PROCEDURES/SURGERIES: Procedure(s):  LEFT DISTAL RADIUS CLOSED REDUCTION & PINNING    ADMITTING DIAGNOSES & HOSPITAL COURSE:   Jesu Selby is a 57 y.o. with a past medical history for coronary artery disease with a three-vessel CABG, hyperlipidemia, hypertension, pulmonary embolism, obstructive sleep apnea, tobacco abuse, and alcohol use, patient presents to this facility as a direct admission for Dr. Amanuel Prajapati orthopedic surgeon. Patient reports that he had a fall on Friday night and injured his left wrist, x-ray shown displaced distal radius fracture. Patient assessed at the bedside, patient is alert and oriented x4, there are no acute signs or symptoms of distress noted at this time, patient is complaining of left arm pain rating pain 8/10. Cardiology asked to evaluate the patient for surgical clearance due to patient extensive cardiac history. At this time patient denies chest pain, shortness of breath, nausea, vomiting, abdominal pain, and back pain. Patient agrees to admission for displaced distal radius fracture with plans for surgical intervention with orthopedic surgeon. Admit patient to telemetry.         DISCHARGE DIAGNOSES / PLAN:      Assessment & Plan:      Wrist fracture  -XR Displaced distal radius fracture  -direct admission  -consult cardiologist for surgical clearance  -pain management  -holding Xarelto  -- cardio clearance note entered  -- resume xarelto after surgery  -- PVL w/o acute large mobile clot, from this pov I see no need for furtehr intervention or optimization, received lovenox 1mg/kg dosing this am, will hold po dose in anticipation of surgery in am     CAD  -patient wit a history of CABG 3 vessel  -continue metoprolol   -ECHO- reviewed, no acute or severe findings     Hypertension  -chronic, above goal today  -continue Metoprolol     Hyperlipidemia  -patient did not tolerated statin, he receives , Repatha 420 mg subcu once a month     History of Pulmonary Embolism  6/2021: IR guided thromboembolectomy, IVC filter placement during that time, IVC removed in 2022, patient on Xarelto  -Xarelto on hold at this time, will cover with SubQ Lovenox, will D/C day before surgery     Prostate Cancer  -continue Orgovyx (patient supplied) 120 mg daily      Obstructive Sleep Apnea  -chronic  -CPAP machine at the bedside, RT consulted     Tobacco Abuse  -nicotine patch ordered  -smoking cessation education provided     Alcohol Use  -monitor CIWA every 4 hours  -will implement Ativan/CIWA protocol if needed     One elevated BS reading, no hx DM  - start ins ss for now  - check a1c    Day of dc  Seen this am  Finishing up downstairs after srugery  Will allow him to come up, wake up fully, ensure pain controlled then dc with follow up with ortho as instructed by ortho. Resume xarelto tonight or tomorrow am based on time of day he usually takes it. FOLLOW UP APPOINTMENTS:    Follow-up Information       Follow up With Specialties Details Why [address], [address] [facility], [place] Nurse Practitioner Follow up on 12/16/2022 Isidro Hatchet, NP, X rays Required Next Visit [address] [facility], [address]   [address]  [facility]  [phone]                 DIET: Regular Diet        DISCHARGE MEDICATIONS:  Current Discharge Medication List        CONTINUE these medications which have NOT CHANGED    Details   rivaroxaban (XARELTO) 20 mg tab tablet Take 20 mg by mouth daily.       ondansetron hcl (Zofran) 4 mg tablet Take 1 Tablet by mouth every eight (8) hours as needed for Nausea or Vomiting. Qty: 20 Tablet, Refills: 0      Stiolto Respimat 2.5-2.5 mcg/actuation inhaler Take 2 Puffs by inhalation daily. Qty: 4 g, Refills: 1    Associated Diagnoses: Chronic obstructive pulmonary disease, unspecified COPD type (HCC)      relugolix (Orgovyx) 120 mg tab Take 120 mg by mouth daily. Qty: 30 Each, Refills: 11    Associated Diagnoses: Prostate cancer ([facility] [address]      ergocalciferol (ERGOCALCIFEROL) 1,250 mcg (50,000 unit) capsule Take 1 Capsule by mouth every seven (7) days. Qty: 8 Capsule, Refills: 0      DULoxetine (CYMBALTA) 60 mg capsule Take 1 Capsule by mouth daily. Indications: neuropathic pain, chronic back pain  Qty: 90 Capsule, Refills: 1    Associated Diagnoses: Chronic bilateral low back pain with bilateral sciatica      tamsulosin (FLOMAX) 0.4 mg capsule TAKE 1 CAPSULE BY MOUTH DAILY. Qty: 90 Capsule, Refills: 1      metoprolol tartrate (LOPRESSOR) 25 mg tablet TAKE 1/2 TABLET BY MOUTH EVERY TWELVE (12) HOURS. Qty: 45 Tablet, Refills: 1      Repatha Pushtronex 420 mg/3.5 mL Injt INJECT 420MG UNDER THE SKIN AS INSTRUCTED ONCE EVERY 30 DAYS  Qty: 3.5 mL, Refills: 3      furosemide (LASIX) 40 mg tablet Take 1 Tablet by mouth daily. Qty: 90 Tablet, Refills: 1      potassium chloride (K-DUR, KLOR-CON M20) 20 mEq tablet Take 1 Tablet by mouth daily. Or as directed. Qty: 90 Tablet, Refills: 1           STOP taking these medications       oxyCODONE IR (Roxicodone) 5 mg immediate release tablet Comments:   Reason for Stopping:                 NOTIFY YOUR PHYSICIAN FOR ANY OF THE FOLLOWING:   Fever over 101 degrees for 24 hours. Chest pain, shortness of breath, fever, chills, nausea, vomiting, diarrhea, change in mentation, falling, weakness, bleeding. Severe pain or pain not relieved by medications. Or, any other signs or symptoms that you may have questions about.     DISPOSITION:home    Home With:   OT  PT  [place]  RN       Long term SNF/Inpatient Rehab    Independent/assisted living    Hospice    Other:       PATIENT CONDITION AT DISCHARGE:     Functional status    Poor     Deconditioned    x Independent      Cognition    x Lucid     Forgetful     Dementia      Catheters/lines (plus indication)    [place]     PICC     PEG    x None      Code status   x  Full code     DNR      PHYSICAL EXAMINATION AT DISCHARGE:  General:          Alert, cooperative, no distress, appears stated age. HEENT:           Atraumatic, anicteric sclerae, pink conjunctivae                          No oral ulcers, mucosa moist, throat clear, dentition fair  Neck:               Supple, symmetrical  Lungs:             Clear to auscultation bilaterally. No Wheezing or Rhonchi. No rales. Chest wall:      No tenderness  No Accessory muscle use. Heart:              Regular  rhythm,  No  murmur   No edema  Abdomen:        Soft, non-tender. Not distended. Bowel sounds normal  Extremities:     No cyanosis. No clubbing,                            Skin turgor normal, Capillary refill normal  Skin:                Not pale. Not Jaundiced  No rashes   Psych:             Not anxious or agitated.   Neurologic:      Alert, moves all extremities, answers questions appropriately and responds to commands      Poor dentition  L wrist in bandage and tender      CHRONIC MEDICAL DIAGNOSES:  Problem List as of 12/8/2022 Date Reviewed: 12/7/2022            Codes Class Noted - Resolved    * (Principal) Wrist fracture ICD-10-CM: S62.109A  ICD-9-CM: 814.00  12/6/2022 - Present        Prostate cancer ([facility] ICD-10-CM: C61  ICD-9-CM: 185  11/14/2022 - Present        Vitamin D deficiency ICD-10-CM: E55.9  ICD-9-CM: 268.9  11/14/2022 - Present        Stage 3a chronic kidney disease ([facility] ICD-10-CM: N18.31  ICD-9-CM: 585.3  11/14/2022 - Present        Grade II hemorrhoids ICD-10-CM: K64.1  ICD-9-CM: 455.0  8/23/2021 - Present        S/P CABG x 3 ICD-10-CM: Z95.1  ICD-9-CM: V45.81  8/11/2021 - Present        Acute idiopathic gout ICD-10-CM: M10.00  ICD-9-CM: 274.01  6/25/2021 - Present        CAD (coronary artery disease) ICD-10-CM: I25.10  ICD-9-CM: 414.00  6/9/2021 - Present        SHILPI (obstructive sleep apnea) ICD-10-CM: G47.33  ICD-9-CM: 327.23  6/9/2021 - Present        Tobacco dependence ICD-10-CM: F17.200  ICD-9-CM: 305.1  6/9/2021 - Present        Hyperlipidemia ICD-10-CM: E78.5  ICD-9-CM: 272.4  6/9/2021 - Present        Severe obesity ([facility] [address] ICD-10-CM: E66.01  ICD-9-CM: 278.01  10/24/2018 - Present        Presence of drug coated stent in left circumflex coronary artery ICD-10-CM: Z95.5  ICD-9-CM: V45.82  9/20/2018 - Present    Overview Signed 8/10/2021  1:16 PM by Albania Billingsley PA-C     Formatting of this note might be different from the original.  8 x 3 ALEX proximal stent by Dr Javier Encarnacion 9/19/18             Essential hypertension ICD-10-CM: I10  ICD-9-CM: 401.9  9/13/2018 - Present        Ventral hernia without obstruction or gangrene ICD-10-CM: K43.9  ICD-9-CM: 553.20  8/28/2018 - Present    Overview Signed 8/10/2021  1:16 PM by Albania Billingsley PA-C     Formatting of this note might be different from the original.  Added automatically from request for surgery [id number]             RESOLVED: Severe protein-calorie malnutrition ([facility] [address] (Chronic) ICD-10-CM: E43  ICD-9-CM: 262  8/12/2021 - 11/14/2022        RESOLVED: Septic pulmonary embolism with acute cor pulmonale ([facility] [address] ICD-10-CM: I26.01  ICD-9-CM: 415.12, 415.0  6/14/2021 - 11/14/2022        RESOLVED: Chest pain ICD-10-CM: R07.9  ICD-9-CM: 786.50  6/9/2021 - 11/14/2022           Greater than 35 minutes were spent with the patient on counseling and coordination of care    Signed:   Lorenza Patiño MD  12/8/2022  12:19 PM

## 2022-12-08 NOTE — PROGRESS NOTES
Care Management Interventions  PCP Verified by CM: Yes  Last Visit to PCP: 11/14/22  Palliative Care Criteria Met (RRAT>21 & CHF Dx)?: No  Transition of Care Consult (CM Consult): Discharge Planning  Occupational Therapy Consult: No  Speech Therapy Consult: No  Support Systems: Spouse/Significant Other  Confirm Follow Up Transport: Family  The Plan for Transition of Care is Related to the Following Treatment Goals : Patient centered discharge planning to ensure smooth transition to community and PLOF. Discharge Location  Patient Expects to be Discharged to[de-identified] Home      Reason for Admission:   Chart reviewed and patient interviewed. Pt presented to facility as direct admission from Dr. Angela Cuellar for left displaced distal radius fracture. Cardiology consulted to [facility] for surgical clearance. Plan to have surgery today with Dr. Angela Cuellar. RUR Score:   9%                  Plan for utilizing home health:   TBD       PCP: First and Last name:  Keyana Love MD     Name of Practice:    Are you a current patient: Yes/No:    Approximate date of last visit:  11/14/22   Can you participate in a virtual visit with your PCP:                     Current Advanced Directive/Advance Care Plan: Full Code      Healthcare Decision Maker:   Click here to complete [address] including selection of the Healthcare Decision Maker Relationship (ie \"Primary\")                             Transition of Care Plan:   Plan of care includes medication reconciliation to be complete, education will be given with teach back method, and will identify need for post-acute care follow up. Patient centered discharge planning to ensure smooth transition to community and PLOF. Pt lives at home with wife. No DME use or needs, no HH use or needs, pt is independent of ADLs. Pt DC POC is to return home. HH and DME TBD s/p surgery and MD/PT recommendations. CM following for DC needs.

## 2022-12-08 NOTE — ANESTHESIA PROCEDURE NOTES
Peripheral Block    Start time: 12/8/2022 11:58 AM  End time: 12/8/2022 12:15 PM  Performed by: Samara Nieves CRNA  Authorized by: Samara Nieves CRNA       Pre-procedure:    Indications: at surgeon's request and primary anesthetic    Preanesthetic Checklist: patient identified, risks and benefits discussed, site marked, timeout performed, anesthesia consent given, patient being monitored and fire risk safety assessment completed and verbalized    Timeout Time: 11:58 EST (Golden Weaver RN)      Block Type:   Block Type:  Supraclavicular  Laterality:  Left  Monitoring:  Standard ASA monitoring, responsive to questions, oxygen, continuous pulse ox and frequent vital sign checks  Injection Technique:  Single shot  Procedures: ultrasound guided    Patient Position: seated  Prep: chlorhexidine    Location:  Supraclavicular  Needle Type:  Ultraplex  Needle Gauge:  20 G  Needle Localization:  Ultrasound guidance  Medication Injected:  FentaNYL citrate (PF) injection sedation initial - IntraVENous   100 mcg - 12/8/2022 11:58:00 AM  midazolam (VERSED) injection - IntraVENous   2 mg - 12/8/2022 11:58:00 AM  bupivacaine (PF) (MARCAINE) 0.5% injection - Peripheral Nerve Block   20 mL - 12/8/2022 12:15:00 PM  dexamethasone (DECADRON) 4 mg/mL injection - Peripheral Nerve Block   4 mg - 12/8/2022 12:15:00 PM  Med Admin Time: 12/8/2022 12:15 PM    Assessment:  Number of attempts:  1  Injection Assessment:  Incremental injection every 5 mL, local visualized surrounding nerve on ultrasound, negative aspiration for blood, no paresthesia, ultrasound image on chart, no intravascular symptoms and low pressure verified by pressure monitor  Patient tolerance:  Patient tolerated the procedure well with no immediate complications

## 2022-12-08 NOTE — INTERVAL H&P NOTE
Update History & Physical    The Patient's History and Physical was reviewed with the patient. The patient was examined. There was no change. The surgical site was confirmed by the patient and me. Patient understands and wants to proceed with the procedure. If applicable, I have discussed with the patient / power of  the rationale for blood component transfusion; its benefits in treating or preventing fatigue, organ damage, or death; and its risk which includes mild transfusion reactions, rare risk of blood borne infection, or more serious but rare reactions. I have discussed the alternatives to transfusion, including the risk and consequences of not receiving transfusion. The patient / Radha Gomezermann of  had an opportunity to ask questions and had agreed to proceed with transfusion of blood components. Plan:  The risk, benefits, expected outcome, and alternative to the recommended procedure have been discussed with the patient.       Electronically signed by BOBBI Berkowitz on 12/8/2022 at 10:26 AM

## 2022-12-08 NOTE — PROGRESS NOTES
1420- discharge instruction given, iv removed right arm, wife will  client. Vital signs stable. 1436- taken down by wheelchair - picked up by wife.

## 2022-12-08 NOTE — PROGRESS NOTES
Problem: Falls - Risk of  Goal: *Absence of Falls  Description: Document Eveline Dinero Fall Risk and appropriate interventions in the flowsheet.   Outcome: Progressing Towards Goal  Note: Fall Risk Interventions:            Medication Interventions: Teach patient to arise slowly         History of Falls Interventions: Room close to nurse's station         Problem: Patient Education: Go to Patient Education Activity  Goal: Patient/Family Education  Outcome: Progressing Towards Goal     Problem: General Medical Care Plan  Goal: *Vital signs within specified parameters  Outcome: Progressing Towards Goal  Goal: *Labs within defined limits  Outcome: Progressing Towards Goal  Goal: *Absence of infection signs and symptoms  Outcome: Progressing Towards Goal  Goal: *Optimal pain control at patient's stated goal  Outcome: Progressing Towards Goal  Goal: *Skin integrity maintained  Outcome: Progressing Towards Goal  Goal: *Fluid volume balance  Outcome: Progressing Towards Goal  Goal: *Optimize nutritional status  Outcome: Progressing Towards Goal  Goal: *Anxiety reduced or absent  Outcome: Progressing Towards Goal  Goal: *Progressive mobility and function (eg: ADL's)  Outcome: Progressing Towards Goal     Problem: Patient Education: Go to Patient Education Activity  Goal: Patient/Family Education  Outcome: Progressing Towards Goal     Problem: Pain  Goal: *Control of Pain  Outcome: Progressing Towards Goal  Goal: *PALLIATIVE CARE:  Alleviation of Pain  Outcome: Progressing Towards Goal     Problem: Patient Education: Go to Patient Education Activity  Goal: Patient/Family Education  Outcome: Progressing Towards Goal

## 2022-12-09 NOTE — ANESTHESIA POSTPROCEDURE EVALUATION
Procedure(s):  LEFT DISTAL RADIUS CLOSED REDUCTION & PINNING. regional, MAC    Anesthesia Post Evaluation      Multimodal analgesia: multimodal analgesia used between 6 hours prior to anesthesia start to PACU discharge  Patient location during evaluation: bedside  Patient participation: complete - patient participated  Level of consciousness: awake and responsive to physical stimuli  Pain management: satisfactory to patient  Airway patency: patent  Anesthetic complications: no  Cardiovascular status: acceptable  Respiratory status: acceptable  Hydration status: acceptable  Comments: Patient is awake and comfortable post operatively. Report to RN at bedside.   Post anesthesia nausea and vomiting:  none  Final Post Anesthesia Temperature Assessment:  Normothermia (36.0-37.5 degrees C)      INITIAL Post-op Vital signs:   Vitals Value Taken Time   /83 12/08/22 1412   Temp 36.1 °C (97 °F) 12/08/22 1412   Pulse 72 12/08/22 1412   Resp 18 12/08/22 1412   SpO2 100 % 12/08/22 1412

## 2022-12-16 ENCOUNTER — OFFICE VISIT (OUTPATIENT)
Dept: ORTHOPEDIC SURGERY | Age: 62
End: 2022-12-16
Payer: COMMERCIAL

## 2022-12-16 ENCOUNTER — HOSPITAL ENCOUNTER (OUTPATIENT)
Dept: GENERAL RADIOLOGY | Age: 62
End: 2022-12-16
Attending: ORTHOPAEDIC SURGERY
Payer: COMMERCIAL

## 2022-12-16 DIAGNOSIS — M25.532 LEFT WRIST PAIN: Primary | ICD-10-CM

## 2022-12-16 DIAGNOSIS — M25.532 LEFT WRIST PAIN: ICD-10-CM

## 2022-12-16 DIAGNOSIS — S62.102D CLOSED FRACTURE OF LEFT WRIST WITH ROUTINE HEALING, SUBSEQUENT ENCOUNTER: Primary | ICD-10-CM

## 2022-12-16 PROCEDURE — 99024 POSTOP FOLLOW-UP VISIT: CPT | Performed by: NURSE PRACTITIONER

## 2022-12-16 PROCEDURE — 73110 X-RAY EXAM OF WRIST: CPT

## 2022-12-16 RX ORDER — ONDANSETRON 4 MG/1
4 TABLET, ORALLY DISINTEGRATING ORAL
Qty: 20 TABLET | Refills: 0 | Status: SHIPPED | OUTPATIENT
Start: 2022-12-16

## 2022-12-16 RX ORDER — OXYCODONE AND ACETAMINOPHEN 5; 325 MG/1; MG/1
1 TABLET ORAL
Qty: 30 TABLET | Refills: 0 | Status: SHIPPED | OUTPATIENT
Start: 2022-12-16 | End: 2022-12-24

## 2022-12-16 NOTE — PROGRESS NOTES
Name: Zackary Hoang    : 1960    Weight Loss Metrics 2022 2022 2022 2022 2022 2022 10/26/2022   Today's Wt 265 lb - 265 lb - 260 lb 271 lb 4.8 oz 265 lb   BMI - 38.02 kg/m2 - 38.02 kg/m2 37.31 kg/m2 38.93 kg/m2 38.02 kg/m2       There is no height or weight on file to calculate BMI. Service Dept: [facility] and Sports Medicine    Patient's Pharmacies:    [address], [address]  [address] [place] [address]  Phone: [phone] Fax: [phone]       Chief Complaint   Patient presents with    Wrist Pain       HPI:  Follows up today for postop recheck after a left distal radius closed reduction and pinning done on 2022. He states that he has been using only ibuprofen for pain since the surgery as he has not had any pain medication and is been having a lot of difficulty controlling his pain levels. There were no vitals taken for this visit. Allergies   Allergen Reactions    Gabapentin Other (comments)     \"Caused bad nightmares\"    Statins-Hmg-Coa Reductase Inhibitors Other (comments)     Myalgias with Lipitor. Tolerates Crestor    Codeine Nausea Only    Ferrous Sulfate Other (comments)     Ferrous Sulfate (IRON) --Abdominal pain , redness in face, fever    Pcn [Penicillins] Hives      Current Outpatient Medications   Medication Sig Dispense Refill    oxyCODONE-acetaminophen (Percocet) 5-325 mg per tablet Take 1 Tablet by mouth every six (6) hours as needed for Pain for up to 8 days. Max Daily Amount: 4 Tablets. Indications: pain 30 Tablet 0    ondansetron (ZOFRAN ODT) 4 mg disintegrating tablet Take 1 Tablet by mouth every eight (8) hours as needed for Nausea, Vomiting or Nausea or Vomiting for up to 20 doses. 20 Tablet 0    rivaroxaban (XARELTO) 20 mg tab tablet Take 20 mg by mouth daily.       ondansetron hcl (Zofran) 4 mg tablet Take 1 Tablet by mouth every eight (8) hours as needed for Nausea or Vomiting. 20 Tablet 0    Stiolto Respimat 2.5-2.5 mcg/actuation inhaler Take 2 Puffs by inhalation daily. 4 g 1    relugolix (Orgovyx) 120 mg tab Take 120 mg by mouth daily. 30 Each 11    ergocalciferol (ERGOCALCIFEROL) 1,250 mcg (50,000 unit) capsule Take 1 Capsule by mouth every seven (7) days. 8 Capsule 0    DULoxetine (CYMBALTA) 60 mg capsule Take 1 Capsule by mouth daily. Indications: neuropathic pain, chronic back pain 90 Capsule 1    tamsulosin (FLOMAX) 0.4 mg capsule TAKE 1 CAPSULE BY MOUTH DAILY. 90 Capsule 1    metoprolol tartrate (LOPRESSOR) 25 mg tablet TAKE 1/2 TABLET BY MOUTH EVERY TWELVE (12) HOURS. 45 Tablet 1    Repatha Pushtronex 420 mg/3.5 mL Injt INJECT 420MG UNDER THE SKIN AS INSTRUCTED ONCE EVERY 30 DAYS 3.5 mL 3    furosemide (LASIX) 40 mg tablet Take 1 Tablet by mouth daily. 90 Tablet 1    potassium chloride (K-DUR, KLOR-CON M20) 20 mEq tablet Take 1 Tablet by mouth daily. Or as directed.  90 Tablet 1      Patient Active Problem List   Diagnosis Code    Severe obesity ([facility] E66.01    CAD (coronary artery disease) I25.10    SHILPI (obstructive sleep apnea) G47.33    Tobacco dependence F17.200    Hyperlipidemia E78.5    Acute idiopathic gout M10.00    Essential hypertension I10    Presence of drug coated stent in left circumflex coronary artery Z95.5    Ventral hernia without obstruction or gangrene K43.9    S/P CABG x 3 Z95.1    Grade II hemorrhoids K64.1    Prostate cancer ([facility] C61    Vitamin D deficiency E55.9    Stage 3a chronic kidney disease ([facility] N18.31    Wrist fracture S62.109A      Family History   Problem Relation Age of Onset    Hypertension Mother     Diabetes Mother       Social History     Socioeconomic History    Marital status:    Tobacco Use    Smoking status: Every Day     Packs/day: 0.50     Years: 40.00     Pack years: 20.00     Types: Cigarettes    Smokeless tobacco: Never   Vaping Use    Vaping Use: Never used   Substance and Sexual Activity    Alcohol use: Yes     Comment: couple drinks a day    Drug use: No     Social Determinants of Health     Physical Activity: Inactive    Days of Exercise per Week: 0 days    Minutes of Exercise per Session: 0 min      Past Surgical History:   Procedure Laterality Date    HX CHOLECYSTECTOMY      HX CORONARY STENT PLACEMENT      HX HERNIA REPAIR      IR IVC FILTER PLACEMENT PERCUTANEOUS  6/15/2021    IR REMOVE IVC FILTER W SI  1/17/2022    IR THROMBECTOMY MECH ART PRIMARY NON HETAL OR INTRACRANIAL  6/15/2021    DE CARDIAC SURG PROCEDURE UNLIST      filter and triple bypass      Past Medical History:   Diagnosis Date    Bilateral hand pain     CAD (coronary artery disease)     Dupuytren's disease of palm     Hyperlipidemia     Hypertension     SHILPI (obstructive sleep apnea)     PE (pulmonary thromboembolism) ([facility] 06/2021    Septic pulmonary embolism with acute cor pulmonale ([facility] 6/14/2021    Severe obesity (HCC)     Tobacco dependence         I have reviewed and agree with [address] [place] [address] Nw and ROS and intake form in chart and the record furthermore I have reviewed prior medical record(s) regarding this patients care during this appointment. Physical Exam:  On physical exam today his surgical bandaging and splint were removed in order to check the pin sites. These dressings were unusually tight and this may be contributing to some of his pain. His pins are intact and the pin sites appear unremarkable with no signs of drainage or erythema. There is a moderate amount of swelling throughout the dorsum of the hand and digits. New x-rays taken today of the left wrist show his pins to be intact with stable fracture fragments. Encounter Diagnoses     ICD-10-CM ICD-9-CM   1. Closed fracture of left wrist with routine healing, subsequent encounter  S62.102D V54.19       As part of continued conservative pain management options the patient was advised to utilize Tylenol or OTC NSAIDS as long as it is not medically contraindicated. Return to Office:   Today I reapplied a new dressing over the pin sites and replaced his volar splint with new cotton and Ace wrap. I put this on somewhat looser so as to allow improvement in the level of his hand swelling. I advised him not to use the sling but to keep the hand elevated above his heart is much as possible. I did send in prescriptions for Zofran and Percocet to use as needed for pain. He will follow-up in 3 more weeks for new x-rays of the left wrist and if those look good we will plan to remove the pins and advance to a Velcro wrist brace and occupational therapy.          [address] [facility]

## 2022-12-16 NOTE — PATIENT INSTRUCTIONS
Broken Wrist: Care Instructions  Your Care Instructions     Your wrist can break, or fracture, during sports, a fall, or other accidents. The break may happen when your wrist is hit or is used to protect you in a fall. Fractures can range from a small, hairline crack, to a bone or bones broken into two or more pieces. Your treatment depends on how bad the break is. Your doctor may have put your wrist in a cast or splint. This will help keep your wrist stable until your follow-up appointment. It may take weeks or months for your wrist to heal. You can help it heal with care at home. You heal best when you take good care of yourself. Eat a variety of healthy foods, and don't smoke. Follow-up care is a key part of your treatment and safety. Be sure to make and go to all appointments, and call your doctor if you are having problems. It's also a good idea to know your test results and keep a list of the medicines you take. How can you care for yourself at home? Put ice or a cold pack on your wrist for 10 to 20 minutes at a time. Try to do this every 1 to 2 hours for the next 3 days (when you are awake). Put a thin cloth between the ice and your cast or splint. Keep your cast or splint dry. Follow the splint or cast care instructions your doctor gives you. If you have a splint, do not take it off unless your doctor tells you to. Be careful not to put the splint on too tight. Be safe with medicines. Take pain medicines exactly as directed. If the doctor gave you a prescription medicine for pain, take it as prescribed. If you are not taking a prescription pain medicine, ask your doctor if you can take an over-the-counter medicine. Prop up your wrist on pillows when you sit or lie down in the first few days after the injury. Keep your wrist higher than the level of your heart. This will help reduce swelling.   Move your fingers often to reduce swelling and stiffness, but do not use that hand to grab or carry anything. Follow instructions for exercises to keep your arm strong. When should you call for help? Call your doctor now or seek immediate medical care if:    You have new or worse pain. Your hand or fingers are cool or pale or change color. Your cast or splint feels too tight. You have tingling, weakness, or numbness in your hand or fingers. Watch closely for changes in your health, and be sure to contact your doctor if:    You do not get better as expected. You have problems with your cast or splint. Where can you learn more? Go to http://meg-edy.info/  Enter J579 in the search box to learn more about \"Broken Wrist: Care Instructions. \"  Current as of: March 9, 2022               Content Version: 13.4  © 2006-2022 [employer]. Care instructions adapted under license by [employer] (which disclaims liability or warranty for this information). If you have questions about a medical condition or this instruction, always ask your healthcare professional. [address] any warranty or liability for your use of this information.

## 2022-12-20 ENCOUNTER — CLINICAL SUPPORT (OUTPATIENT)
Dept: GASTROENTEROLOGY | Age: 62
End: 2022-12-20

## 2022-12-20 DIAGNOSIS — Z12.11 ENCOUNTER FOR SCREENING COLONOSCOPY: Primary | ICD-10-CM

## 2022-12-20 RX ORDER — POLYETHYLENE GLYCOL 3350, SODIUM SULFATE ANHYDROUS, SODIUM BICARBONATE, SODIUM CHLORIDE, POTASSIUM CHLORIDE 236; 22.74; 6.74; 5.86; 2.97 G/4L; G/4L; G/4L; G/4L; G/4L
4 POWDER, FOR SOLUTION ORAL
Qty: 4000 ML | Refills: 0 | Status: SHIPPED | OUTPATIENT
Start: 2022-12-20 | End: 2022-12-20

## 2022-12-20 NOTE — PROGRESS NOTES
Peg prep will be sent through [email or web address]. Patient sched for 01/17/23. Patient informed to stop taking xarelto 5 days prior to procedure.

## 2023-01-09 ENCOUNTER — OFFICE VISIT (OUTPATIENT)
Dept: ORTHOPEDIC SURGERY | Age: 63
End: 2023-01-09
Payer: COMMERCIAL

## 2023-01-09 DIAGNOSIS — M25.532 LEFT WRIST PAIN: ICD-10-CM

## 2023-01-09 DIAGNOSIS — S62.102D CLOSED FRACTURE OF LEFT WRIST WITH ROUTINE HEALING, SUBSEQUENT ENCOUNTER: Primary | ICD-10-CM

## 2023-01-09 PROCEDURE — 99024 POSTOP FOLLOW-UP VISIT: CPT | Performed by: NURSE PRACTITIONER

## 2023-01-09 RX ORDER — POLYETHYLENE GLYCOL 3350, SODIUM SULFATE ANHYDROUS, SODIUM BICARBONATE, SODIUM CHLORIDE, POTASSIUM CHLORIDE 236; 22.74; 6.74; 5.86; 2.97 G/4L; G/4L; G/4L; G/4L; G/4L
POWDER, FOR SOLUTION ORAL
COMMUNITY
Start: 2022-12-20

## 2023-01-09 RX ORDER — ONDANSETRON 4 MG/1
TABLET, FILM COATED ORAL
COMMUNITY
Start: 2022-12-03

## 2023-01-09 NOTE — LETTER
RX/DWO/POD  DOS: 1/9/2023  Kurt Espinoza          1960   RSN#[id number]                        Elkin Cormier                                                                                           NPI# [id number]  Wrist                     Foot/Ankle                         Knee                Wrist Splint            Cam Boot                         Knee Immobilizer    Thumb Spica         Lace Up Ankle Strap       J Sleeve                                  Night Time Splint             T- Scope ROM Brace          Short Runner                                                                           OA Brace   SHOULDER    Sling    Sling w/ Abduction Pillow    ELBOW    Elbow T-Scope ROM Brace    Back    Back Brace    CRUTCHES    Left    Right    __________ Size              IDC 10 Code:____________    I hereby acknowledge receipt of the above listed equipment. I acknowledge that the equipment is in good working order. I have received instructions on safe and proper use of the equipment, including cleaning and maintenance requirements, to my complete satisfaction. I also understand that this product is not able to be returned and is non refundable. I hereby request that payment of authorized Medicare/ third party insurance benefits be made on my behalf of [address] [place] [address] for assigned claims for any authorized equipment/product furnished by [facility].  Having read the foregoing terms and conditions of the agreement on this page , I do hereby agree to be bound thereby.       _______________________________________         ____________________________  Patient/Legal Murlean Fan         Date            Representative Name

## 2023-01-09 NOTE — PROGRESS NOTES
Name: Nabila Villalpando    : 1960    Weight Loss Metrics 2022   Today's Wt 267 lb 265 lb - 265 lb - 260 lb 271 lb 4.8 oz   BMI - - 38.02 kg/m2 - 38.02 kg/m2 37.31 kg/m2 38.93 kg/m2       There is no height or weight on file to calculate BMI. Service Dept: [address] [place] [address] and Sports Medicine    Patient's Pharmacies:    [address], [address]  [address] [place] [address] [facility] [address]  Phone: [phone] Fax: [phone]       Chief Complaint   Patient presents with    Wrist Pain       HPI:  Patient follows up today for repeat clinical check of a left distal radius fracture with closed reduction and percutaneous pinning done on 2022. He has been doing well since the surgery and remains in his volar splint postoperatively. There were no vitals taken for this visit. Allergies   Allergen Reactions    Gabapentin Other (comments)     \"Caused bad nightmares\"    Statins-Hmg-Coa Reductase Inhibitors Other (comments)     Myalgias with Lipitor. Tolerates Crestor    Codeine Nausea Only    Ferrous Sulfate Other (comments)     Ferrous Sulfate (IRON) --Abdominal pain , redness in face, fever    Pcn [Penicillins] Hives      Current Outpatient Medications   Medication Sig Dispense Refill    ondansetron hcl (ZOFRAN) 4 mg tablet       PEG 3350-Electrolytes (GO-LYTELY) 236-22.74-6.74 -5.86 gram suspension       potassium chloride (K-DUR, KLOR-CON M20) 20 mEq tablet TAKE 1 TABLET BY MOUTH DAILY. OR AS DIRECTED. 90 Tablet 0    furosemide (LASIX) 40 mg tablet TAKE 1 TABLET BY MOUTH DAILY. 30 Tablet 0    ondansetron (ZOFRAN ODT) 4 mg disintegrating tablet Take 1 Tablet by mouth every eight (8) hours as needed for Nausea, Vomiting or Nausea or Vomiting for up to 20 doses. 20 Tablet 0    rivaroxaban (XARELTO) 20 mg tab tablet Take 20 mg by mouth daily.       Stiolto Respimat 2.5-2.5 mcg/actuation inhaler Take 2 Puffs by inhalation daily. 4 g 1    relugolix (Orgovyx) 120 mg tab Take 120 mg by mouth daily. 30 Each 11    ergocalciferol (ERGOCALCIFEROL) 1,250 mcg (50,000 unit) capsule Take 1 Capsule by mouth every seven (7) days. 8 Capsule 0    DULoxetine (CYMBALTA) 60 mg capsule Take 1 Capsule by mouth daily. Indications: neuropathic pain, chronic back pain 90 Capsule 1    tamsulosin (FLOMAX) 0.4 mg capsule TAKE 1 CAPSULE BY MOUTH DAILY. 90 Capsule 1    metoprolol tartrate (LOPRESSOR) 25 mg tablet TAKE 1/2 TABLET BY MOUTH EVERY TWELVE (12) HOURS.  39 Tablet 1    Repatha Pushtronex 420 mg/3.5 mL Injt INJECT 420MG UNDER THE SKIN AS INSTRUCTED ONCE EVERY 30 DAYS 3.5 mL 3      Patient Active Problem List   Diagnosis Code    Severe obesity (HCC) E66.01    CAD (coronary artery disease) I25.10    SHILPI (obstructive sleep apnea) G47.33    Tobacco dependence F17.200    Hyperlipidemia E78.5    Acute idiopathic gout M10.00    Essential hypertension I10    Presence of drug coated stent in left circumflex coronary artery Z95.5    Ventral hernia without obstruction or gangrene K43.9    S/P CABG x 3 Z95.1    Grade II hemorrhoids K64.1    Prostate cancer ([facility] C61    Vitamin D deficiency E55.9    Stage 3a chronic kidney disease ([facility] N18.31    Wrist fracture S62.109A      Family History   Problem Relation Age of Onset    Hypertension Mother     Diabetes Mother       Social History     Socioeconomic History    Marital status:    Tobacco Use    Smoking status: Every Day     Packs/day: 0.50     Years: 40.00     Pack years: 20.00     Types: Cigarettes    Smokeless tobacco: Never   Vaping Use    Vaping Use: Never used   Substance and Sexual Activity    Alcohol use: Yes     Comment: couple drinks a day    Drug use: No     Social Determinants of Health     Physical Activity: Inactive    Days of Exercise per Week: 0 days    Minutes of Exercise per Session: 0 min      Past Surgical History:   Procedure Laterality Date    HX CHOLECYSTECTOMY      HX CORONARY STENT PLACEMENT      HX HERNIA REPAIR      IR IVC FILTER PLACEMENT PERCUTANEOUS  6/15/2021    IR REMOVE IVC FILTER W SI  1/17/2022    IR THROMBECTOMY MECH ART PRIMARY NON HETAL OR INTRACRANIAL  6/15/2021    PA UNLISTED PROCEDURE CARDIAC SURGERY      filter and triple bypass      Past Medical History:   Diagnosis Date    Bilateral hand pain     CAD (coronary artery disease)     Cancer (HCC)     prostate cancer    Dupuytren's disease of palm     Hyperlipidemia     Hypertension     SHILPI (obstructive sleep apnea)     PE (pulmonary thromboembolism) ([facility] 06/2021    Septic pulmonary embolism with acute cor pulmonale ([facility] 06/14/2021    Severe obesity (HCC)     Tobacco dependence         I have reviewed and agree with PFS and ROS and intake form in chart and the record furthermore I have reviewed prior medical record(s) regarding this patients care during this appointment. Physical Exam:  On physical exam today pin sites appear clean and dry. There is some swelling throughout the wrist, dorsum of the hand, and fingers. He does have range of motion at all digits although is limited by stiffness and swelling. He is neurovascularly intact in the left hand. New x-rays taken today of the left wrist show good, stable interval healing of his fracture with pins intact. Encounter Diagnoses     ICD-10-CM ICD-9-CM   1. Closed fracture of left wrist with routine healing, subsequent encounter  S62.102D V54.19   2. Left wrist pain  M25.532 719.43       As part of continued conservative pain management options the patient was advised to utilize Tylenol or OTC NSAIDS as long as it is not medically contraindicated. Return to Office:   Today I reviewed the clinical and radiographic findings with the patient and his wife. I removed the volar splint and percutaneous pins. A Band-Aid was placed over the pin site.   Patient can begin showering and getting this area wet with soap and water as needed. He was placed into a Velcro's brace today which he will continue using over the next 4 weeks. I will refer him to begin occupational therapy.   He will follow-up in 4 more weeks for a final marcus-ray of the left wrist.         BOBBI Clancy

## 2023-01-12 ENCOUNTER — PREP FOR PROCEDURE (OUTPATIENT)
Dept: GASTROENTEROLOGY | Age: 63
End: 2023-01-12

## 2023-01-12 RX ORDER — SODIUM CHLORIDE, SODIUM LACTATE, POTASSIUM CHLORIDE, CALCIUM CHLORIDE 600; 310; 30; 20 MG/100ML; MG/100ML; MG/100ML; MG/100ML
75 INJECTION, SOLUTION INTRAVENOUS CONTINUOUS
OUTPATIENT
Start: 2023-01-12 | End: 2023-01-12

## 2023-01-13 ENCOUNTER — HOSPITAL ENCOUNTER (OUTPATIENT)
Dept: PHYSICAL THERAPY | Age: 63
Discharge: HOME | End: 2023-01-13
Payer: COMMERCIAL

## 2023-01-13 PROCEDURE — 97110 THERAPEUTIC EXERCISES: CPT

## 2023-01-13 PROCEDURE — 97166 OT EVAL MOD COMPLEX 45 MIN: CPT

## 2023-01-13 RX ORDER — FUROSEMIDE 40 MG/1
40 TABLET ORAL DAILY
Qty: 30 TABLET | Refills: 0 | Status: SHIPPED | OUTPATIENT
Start: 2023-01-13

## 2023-01-13 NOTE — PROGRESS NOTES
OUTPATIENT OCCUPATIONAL THERAPY EVALUATION    Patient: Jeff Milner (1 y.o. male)  Date: 1/13/2023  Primary Diagnosis: Fracture of unspecified carpal bone, left wrist, subsequent encounter for fracture with routine healing [S62.102D]       Precautions: L wrist splint      PLOF: lives at home with wife and reports he slipped and fell in the garage. Had surgery 12/6/22 with 2 pins placed L wrist. Pt has recently had pins removed, now with L wrist splint      ASSESSMENT :  Based on the objective data described below, the patient presents with declines in L hand ROM, strength, and function. Patient will benefit from skilled intervention to address the above impairments. Patient's rehabilitation potential is considered to be Good  Factors which may influence rehabilitation potential include:   []             None noted  []             Mental ability/status  [x]             Medical condition  [x]             Home/family situation and support systems  []             Safety awareness  [x]             Pain tolerance/management  []             Other:      PLAN :  Recommendations and Planned Interventions:   []               Self Care Training                  [x]      Therapeutic Activities  []               Functional Mobility Training   []      Cognitive Retraining  [x]               Therapeutic Exercises          []               Neuromuscular Re-Education  []               Visual/Perceptual Training     []      Home Safety Training  [x]               Patient Education                   [x]      Family Training/Education  []               Other (comment):    Frequency/Duration: Patient will be followed by occupational therapy 2 times a week to address goals. Further Equipment Recommendations for Discharge: N/A     SUBJECTIVE:   Patient stated I dont know why this hand keeps shaking.     OBJECTIVE DATA SUMMARY:     Past Medical History:   Diagnosis Date    Bilateral hand pain     CAD (coronary artery disease) Cancer [facility])     prostate cancer    Dupuytren's disease of palm     Hyperlipidemia     Hypertension     SHILPI (obstructive sleep apnea)     PE (pulmonary thromboembolism) ([facility] 06/2021    Septic pulmonary embolism with acute cor pulmonale (HCC) 06/14/2021    Severe obesity (HCC)     Tobacco dependence      Past Surgical History:   Procedure Laterality Date    HX CHOLECYSTECTOMY      HX CORONARY STENT PLACEMENT      HX HERNIA REPAIR      IR IVC FILTER PLACEMENT PERCUTANEOUS  6/15/2021    IR REMOVE IVC FILTER W SI  1/17/2022    IR THROMBECTOMY MECH ART PRIMARY NON HETAL OR INTRACRANIAL  6/15/2021    SD UNLISTED PROCEDURE CARDIAC SURGERY      filter and triple bypass     Barriers to Learning/Limitations: None  Compensate with: visual, verbal, tactile, kinesthetic cues/model    []  Right hand dominant   [x]  Left hand dominant    Cognitive/Behavioral Status:  A & O x 3; follows multi step commands       Skin: L hand healed radial pin site   Edema:   Circumference measurements-   L wrist- 22.5cm  L MPs- 24cm      Vision/Perceptual:    WFLs     Coordination: BUE                WFL          Impaired    Gross Motor  x   Fine Motor   x   Crossing the Midline   x   Bilateral Integration   x   Praxis  x   Dexterity  x   Visual Motor  x       Strength: BUE                                                3pt pinch            2pt pInch              Lateral pinch  Right 108# 14# 13# 20. 25#   Left 3# 0.5# 1.5# 1.75#         Tone & Sensation: BUE  Normal       Range of Motion: BUE     RUE   AROM R UE PROM *Goal L UE AROM L UE   PROM *Goal   Shoulder flexion          Shoulder abduction          Elbow flexion          Elbow extension          Forearm supination     32     Forearm pronation          Wrist flexion     35 37    Wrist extension     10 25    Radial Dev           Ulnar Deviation             Digit Circumference-               distance from palm-    2nd PIP- 8.5cm                                 2nd- 6cm  3rd PIP- 9cm 3rd- 7cm  4th PIP- 8.5cm                                  4th- 8cm  5th PIP- 7.25cm                                5th- 7.5cm         Therapeutic Exercise: Instructed pt in basic wrist flexion/ extension ex      Pain:  Pain level pre-treatment: 5/10   Pain level post-treatment: 5/10   Pain Intervention(s): Medication (see MAR); Rest, Ice, Repositioning         COMMUNICATION/EDUCATION:   [x] Home safety education was provided and the patient/caregiver indicated understanding. [x] Patient/family have participated as able in goal setting and plan of care. [x] Patient/family agree to work toward stated goals and plan of care. [] Patient understands intent and goals of therapy, but is neutral about his/her participation. [] Patient is unable to participate in goal setting and plan of care.     Interventions:   OT eval   Therex/ theract      Frequency/ Duration:  2x/ wk x 8 wks     Thank you for this referral.   Olivia Larsen MS, OTR/ L   Cert dates: 0/32/90- 3/12/23

## 2023-01-13 NOTE — PROGRESS NOTES
[facility], [facility], [address] [place] [address]  Phone:  [phone]    Plan of Care/Statement of Necessity for Occupational Therapy Services    Patient name: Chandra Andrea Start of Care: 2023   Referral source: Jose Qiu : 1960    Medical Diagnosis: Fracture of unspecified carpal bone, left wrist, subsequent encounter for fracture with routine healing [S62.102D]   Onset Date:2022    Treatment Diagnosis: mm weakness    Prior Hospitalization: see medical history Provider#: [id number]   Medications: Verified on Patient summary List    Comorbidities: none    Prior Level of Function: lives at home with wife. Works as a  at [employer]. Reports he slipped and fell in garage. Had pin in wrist, recently removed           The Plan of Care and following information is based on the information from the initial evaluation. Assessment/ key information: pt demonstrates pain and decreased ROM, strength and function in L wrist     Evaluation Complexity: History MEDIUM Complexity : Expanded review of history including physical, cognitive and psychosocial  history  Examination MEDIUM Complexity : 3-5 performance deficits relating to physical, cognitive , or psychosocial skils that result in activity limitations and / or participation restrictions Clinical Decision Making MEDIUM Complexity : Patient may present with comorbidities that affect occupational performnce.  Miniml to moderate modification of tasks or assistance (eg, physical or verbal ) with assesment(s) is necessary to enable patient to complete evaluation   Overall Complexity Rating: MEDIUM    Problem List: Decreased range of motion, Decreased strength, and Edema effecting function     Treatment Plan may include any combination of the following: Therapeutic activities    Patient / Family readiness to learn indicated by: asking questions, trying to perform skills, and interest    Persons(s) to be included in education:   patient (P)    Barriers to Learning/Limitations: None    Patient Goal (s): If this hand would stop hurting, I could do something    Patient Self Reported Health Status: good    Rehabilitation Potential: good    Short Term Goals: To be accomplished in 3 weeks:  1- Pt will increase L  from 3# to 20#   2- Pt will increase L 3pt pinch from 0.5# to 3#   3- Pt will increase L lateral pinch from 1.75#   4- Pt will increase L wrist flexion from 35 to 45  to increase function   5- Pt will increase L wrist extension from 10 to 25 to increase function     Long Term Goals: To be accomplished in 8 weeks:   1- pt will increase L  to 40#  2- Pt will make full composite flexion with L fist   3- Pt will complete HEP Светлана     Frequency / Duration: Patient to be seen 2 times per week for 8 weeks:    Patient/ Caregiver education and instruction: Diagnosis, prognosis, self care and exercises      Junior Griffith MS, OTR/L   1/13/2023   ________________________________________________________________________    I certify that the above Therapy Services are being furnished while the patient is under my care. I agree with the treatment plan and certify that this therapy is necessary.     Physician's Signature:____________________  Date:____________Time:__________    Please sign and return to  [facility], [facility], [address] [place] [address]   Phone:  [phone]

## 2023-01-16 ENCOUNTER — HOSPITAL ENCOUNTER (OUTPATIENT)
Dept: PHYSICAL THERAPY | Age: 63
Discharge: HOME | End: 2023-01-16
Payer: COMMERCIAL

## 2023-01-16 PROCEDURE — 97110 THERAPEUTIC EXERCISES: CPT

## 2023-01-16 NOTE — PROGRESS NOTES
OT DAILY TREATMENT NOTE     Patient Name: Judit Bishop  Date:2023  : 1960  [x]  Patient  Verified  Payor: BLUE CROSS / Plan: [address] [place] [address] / Product Type: PPO /    In time:231  Out time:316  Total Treatment Time (min): 45  Visit #: 2     Treatment Area: Fracture of unspecified carpal bone, left wrist, subsequent encounter for fracture with routine healing [S62.792P]    SUBJECTIVE  Pre- tx Pain Level (0-10 scale): 4/10  Post- tx pain level (0/10 scale)3-4/10  Any medication changes, allergies to medications, adverse drug reactions, diagnosis change, or new procedure performed?: [x] No    [] Yes (see summary sheet for update)  Subjective functional status/changes:   [] No changes reported  Pt reports decreased splint use and increased motion. 45 min Therapeutic Exercise:  [] See flow sheet :   Rationale: increase ROM, increase strength, and improve coordination to improve the patients ability to complete gross and fine motor coordination in L hand   L wrist AROM flexion/ extension x 10 reps, 2 sets   L wrist supination x 10 reps, AROM   Prayers x 10 reps, 2 sets   Digit opposition x 10 reps   Digit ab/ adduction x 10 reps     L MP circumference- 23cm  L wrist flexion- 30   L wrist extension- 15       With   [x] TE   [] TA   [] neuro   [] other: Patient Education: [x] Review HEP     Progressed/Changed HEP based on:   [x][x] positioning   [] body mechanics   [] transfers   [] heat/ice application   [] Splint wear/care   [] Sensory re-education   [] scar management      [] other:        ASSESSMENT  Progress towards goals/ change in function: pt making progress towards goals. Reports decreased pain in L wrist this  date. Patient will continue to benefit from skilled OT services to address ROM deficits and address strength deficits to attain remaining goals.      [x]  See Plan of Care  []  See progress note/recertification  []  See Discharge Summary         PLAN  [x]  Upgrade activities as tolerated       []  Continue plan of care        []  Discharge due to:_  []  Other:_      Geeta Ferguson, MS, OTR/L  1/16/2023  3:56 PM    Future Appointments   Date Time Provider Gunnar Herron   1/18/2023  2:30 PM [facility]   2/6/2023  1:00 PM BOBBI Bright BS AMB   2/15/2023  2:45 PM Jennifer Marquez MD [facility] BS AMB   4/19/2023  1:00 PM Avinash Cardoza MD [address]   6/19/2023  3:30 PM Rafael Leone MD [facility] BS AMB

## 2023-01-18 ENCOUNTER — HOSPITAL ENCOUNTER (OUTPATIENT)
Dept: PHYSICAL THERAPY | Age: 63
Discharge: HOME | End: 2023-01-18
Payer: COMMERCIAL

## 2023-01-18 PROCEDURE — 97110 THERAPEUTIC EXERCISES: CPT

## 2023-01-18 NOTE — PROGRESS NOTES
OT DAILY TREATMENT NOTE     Patient Name: Keith Chiu  Date:2023  : 1960  [x]  Patient  Verified  Payor: BLUE CROSS / Plan: [address] [place] [address] / Product Type: PPO /    In time:225  Out time:330  Total Treatment Time (min): 63  Visit #: 3   Treatment Area: Fracture of unspecified carpal bone, left wrist, subsequent encounter for fracture with routine healing [S69.274U]    SUBJECTIVE  Pre- tx Pain Level (0-10 scale): 3/10  Post- tx pain level (0/10 scale)2-3/10  Any medication changes, allergies to medications, adverse drug reactions, diagnosis change, or new procedure performed?: [x] No    [] Yes (see summary sheet for update)  Subjective functional status/changes:   [] No changes reported  Pt reports increase motion in hand in home       63 min Therapeutic Exercise:  [] See flow sheet :   Rationale: increase ROM and increase strength to improve the patients ability to compete L wrist gross and fine motor coordination. L wrist AROM flexion/ extension x 10 reps,   L supination x 10 reps (~1/2 ROM)   L RD/UD x 10 reps,   Towel squeezes x 10 reps   L thumb radial abduction x 10 reps,         With   [x] TE   [] TA   [] neuro   [] other: Patient Education: [x] Review HEP    [x] Progressed/Changed HEP based on:   [x] positioning   [x] body mechanics   [] transfers   [] heat/ice application   [] Splint wear/care   [] Sensory re-education   [] scar management      [] other:        ASSESSMENT  Progress towards goals/ change in function: pt making good progress with motion     Patient will continue to benefit from skilled OT services to address ROM deficits and address strength deficits to attain remaining goals.      [x]  See Plan of Care  []  See progress note/recertification  []  See Discharge Summary         PLAN  [x]  Upgrade activities as tolerated       []  Continue plan of care        []  Discharge due to:_  []  Other:_      Brayan Zhang MS, OTR/L  2023  5:06 PM    Future Appointments   Date Time Provider Gunnar Germaine   2023  2:30 PM [facility]   2023  2:30 PM [facility]   2023  1:00 PM BOBBI Drummond BS AMB   2/15/2023  2:45 PM Dayana Beal MD [facility] BS AMB   2023  1:00 PM Luh Dougherty MD [address]   2023  3:30 PM Jeanne Childers MD [facility] BS AMB

## 2023-01-23 ENCOUNTER — APPOINTMENT (OUTPATIENT)
Dept: PHYSICAL THERAPY | Age: 63
End: 2023-01-23
Payer: COMMERCIAL

## 2023-01-25 ENCOUNTER — HOSPITAL ENCOUNTER (OUTPATIENT)
Dept: PHYSICAL THERAPY | Age: 63
End: 2023-01-25
Payer: COMMERCIAL

## 2023-01-25 PROCEDURE — 97110 THERAPEUTIC EXERCISES: CPT

## 2023-01-25 NOTE — PROGRESS NOTES
OT DAILY TREATMENT NOTE     Patient Name: Chelsie Hurtado  Date:2023  : 1960  [x]  Patient  Verified  Payor: Bababoo Pullman / Plan: [address] [place] [address] / Product Type: PPO /    In time:230  Out time:320  Total Treatment Time (min): 50  Visit #: 4   Treatment Area: Fracture of unspecified carpal bone, left wrist, subsequent encounter for fracture with routine healing [S62.777M]    SUBJECTIVE  Pre- tx Pain Level (0-10 scale): 0/10  Post- tx pain level (0/10 scale)0/10  Any medication changes, allergies to medications, adverse drug reactions, diagnosis change, or new procedure performed?: [x] No    [] Yes (see summary sheet for update)  Subjective functional status/changes:   [] No changes reported  Pt c/o pain while doing home exercise. Continues to work on stretches at home to help with the pain. 50 min Therapeutic Exercise:  [] See flow sheet :   Rationale: increase ROM and increase strength to improve the patients ability to I complete ADLs. 10 x 2 reps forearm supination and pronation with #1  10 x 2 reps wrist extension #1  Finger opposition to increase fine motor skills  10 x 2 Yellow digiaflex  Prayers stretch   With   [x] TE   [] TA   [] neuro   [] other: Patient Education: [x] Review HEP    [x] Progressed/Changed HEP based on:   [x] positioning   [x] body mechanics   [] transfers   [] heat/ice application   [] Splint wear/care   [] Sensory re-education   [] scar management      [] other:        ASSESSMENT    Progress towards goals/ change in function:   Increase tremors in L arm while doing TE. Pt. C/o pain while doing exercise and reports continued decreased activity tolerance. Patient will continue to benefit from skilled OT services to modify and progress therapeutic interventions, address ROM deficits, and address strength deficits to attain remaining goals.      [x]  See Plan of Care  []  See progress note/recertification  []  See Discharge Summary         PLAN  [x] Upgrade activities as tolerated       []  Continue plan of care        []  Discharge due to:_  []  Other:_      Rosaline Canela, MS, OTR/L  1/25/2023  3:22 PM  [address]     Future Appointments   Date Time Provider Gunnar Herron   1/30/2023  2:30 PM [facility]   2/1/2023  2:30 PM [facility]   2/6/2023  1:00 PM BOBBI Allen BS AMB   2/15/2023  2:45 PM Loni Mohs, MD [facility] BS AMB   4/19/2023  1:00 PM Barby Pride MD [facility]   6/19/2023  3:30 PM Iliana Morgan MD [facility] BS AMB

## 2023-01-30 ENCOUNTER — APPOINTMENT (OUTPATIENT)
Dept: PHYSICAL THERAPY | Age: 63
End: 2023-01-30
Payer: COMMERCIAL

## 2023-01-30 PROCEDURE — 97110 THERAPEUTIC EXERCISES: CPT

## 2023-01-30 NOTE — PROGRESS NOTES
OT DAILY TREATMENT NOTE     Patient Name: Pamela Sanchez  Date:2023  : 1960  [x]  Patient  Verified  Payor: BLUE CROSS / Plan: [address] [place] [address] / Product Type: PPO /    In time:230  Out time:323  Total Treatment Time (min): 48  Visit #: 5   Treatment Area: Fracture of unspecified carpal bone, left wrist, subsequent encounter for fracture with routine healing [S62.739K]    SUBJECTIVE  Pre- tx Pain Level (0-10 scale): 0/10  Post- tx pain level (0/10 scale)0/10  Any medication changes, allergies to medications, adverse drug reactions, diagnosis change, or new procedure performed?: [x] No    [] Yes (see summary sheet for update)  Subjective functional status/changes:   [] No changes reported  Pt c/o pain while doing HEP and shaking in L hand. OBJECTIVE      53 min Therapeutic Exercise:  [] See flow sheet :   Rationale: increase ROM, increase strength, and improve coordination to improve the patients ability to complete ADLs while home I. With   [x] TE   [] TA   [] neuro   [] other: Patient Education: [x] Review HEP    [x] Progressed/Changed HEP based on:   [x] positioning   [x] body mechanics   [] transfers   [] heat/ice application   [] Splint wear/care   [] Sensory re-education   [] scar management      [] other:        ASSESSMENT  Progress towards goals/ change in function:  10 x 2 reps forearm supination and pronation with #1  10 x 2 reps wrist extension #1  Finger opposition to increase fine motor skills  10 x 2 Yellow digiaflex  Prayers stretch   UBE x 3 minutes     Patient will continue to benefit from skilled OT services to modify and progress therapeutic interventions, address functional mobility deficits, address ROM deficits, and address strength deficits to attain remaining goals.      [x]  See Plan of Care  []  See progress note/recertification  []  See Discharge Summary         PLAN  [x]  Upgrade activities as tolerated       []  Continue plan of care        [] Discharge due to:_  []  Other:_      Helen Kendall, MS, OTR/L  1/30/2023  3:57 PM  [address]     Future Appointments   Date Time Provider Gunnar Herron   [facility]   2/6/2023  1:00 PM BOBBI Ross AMB

## 2023-02-01 ENCOUNTER — HOSPITAL ENCOUNTER (OUTPATIENT)
Dept: PHYSICAL THERAPY | Age: 63
End: 2023-02-01
Payer: COMMERCIAL

## 2023-02-06 ENCOUNTER — OFFICE VISIT (OUTPATIENT)
Dept: ORTHOPEDIC SURGERY | Age: 63
End: 2023-02-06
Payer: COMMERCIAL

## 2023-02-06 DIAGNOSIS — Z09 FRACTURE FOLLOW-UP: Primary | ICD-10-CM

## 2023-02-06 PROCEDURE — 99024 POSTOP FOLLOW-UP VISIT: CPT | Performed by: NURSE PRACTITIONER

## 2023-02-06 RX ORDER — METHYLPREDNISOLONE 4 MG/1
TABLET ORAL
Qty: 1 DOSE PACK | Refills: 0 | Status: SHIPPED | OUTPATIENT
Start: 2023-02-06

## 2023-02-06 NOTE — PROGRESS NOTES
Name: Priscilla Rodriguez    : 1960    Weight Loss Metrics 2022   Today's Wt - 267 lb 267 lb 265 lb - 265 lb -   BMI 38.31 kg/m2 38.31 kg/m2 - - 38.02 kg/m2 - 38.02 kg/m2       There is no height or weight on file to calculate BMI. Service Dept: [address] [place] [address] and Sports Medicine    Patient's Pharmacies:    [address], [address]  [address] [place] [address] [facility] [address]  Phone: [phone] Fax: [phone]       Chief Complaint   Patient presents with    Wrist Pain       HPI:  Patient follows up today for repeat clinical check of a left distal radius fracture after closed reduction percutaneous pinning done on 2022. He has been progressing with occupational therapy and says that while he is improving his wrist motion he still has a lot of swelling and soreness over the joint. He is set to return to his job as a  on 2023. There were no vitals taken for this visit. Allergies   Allergen Reactions    Gabapentin Other (comments)     \"Caused bad nightmares\"    Statins-Hmg-Coa Reductase Inhibitors Other (comments)     Myalgias with Lipitor. Tolerates Crestor    Codeine Nausea Only    Ferrous Sulfate Other (comments)     Ferrous Sulfate (IRON) --Abdominal pain , redness in face, fever    Pcn [Penicillins] Hives      Current Outpatient Medications   Medication Sig Dispense Refill    methylPREDNISolone (MEDROL DOSEPACK) 4 mg tablet Per dose pack instructions 1 Dose Pack 0    metoprolol tartrate (LOPRESSOR) 25 mg tablet TAKE 1/2 TABLET BY MOUTH EVERY TWELVE (12) HOURS. 90 Tablet 1    furosemide (LASIX) 40 mg tablet TAKE 1 TABLET BY MOUTH DAILY.  30 Tablet 0    ondansetron hcl (ZOFRAN) 4 mg tablet       PEG 3350-Electrolytes (GO-LYTELY) 236-22.74-6.74 -5.86 gram suspension       potassium chloride (K-DUR, KLOR-CON M20) 20 mEq tablet TAKE 1 TABLET BY MOUTH DAILY. OR AS DIRECTED. 90 Tablet 0    ondansetron (ZOFRAN ODT) 4 mg disintegrating tablet Take 1 Tablet by mouth every eight (8) hours as needed for Nausea, Vomiting or Nausea or Vomiting for up to 20 doses. 20 Tablet 0    rivaroxaban (XARELTO) 20 mg tab tablet Take 20 mg by mouth daily. Stiolto Respimat 2.5-2.5 mcg/actuation inhaler Take 2 Puffs by inhalation daily. 4 g 1    relugolix (Orgovyx) 120 mg tab Take 120 mg by mouth daily. 30 Each 11    ergocalciferol (ERGOCALCIFEROL) 1,250 mcg (50,000 unit) capsule Take 1 Capsule by mouth every seven (7) days. 8 Capsule 0    DULoxetine (CYMBALTA) 60 mg capsule Take 1 Capsule by mouth daily. Indications: neuropathic pain, chronic back pain 90 Capsule 1    tamsulosin (FLOMAX) 0.4 mg capsule TAKE 1 CAPSULE BY MOUTH DAILY.  90 Capsule 1    Repatha Pushtronex 420 mg/3.5 mL Injt INJECT 420MG UNDER THE SKIN AS INSTRUCTED ONCE EVERY 30 DAYS 3.5 mL 3      Patient Active Problem List   Diagnosis Code    Severe obesity (HCC) E66.01    CAD (coronary artery disease) I25.10    SHILPI (obstructive sleep apnea) G47.33    Tobacco dependence F17.200    Hyperlipidemia E78.5    Acute idiopathic gout M10.00    Essential hypertension I10    Presence of drug coated stent in left circumflex coronary artery Z95.5    Ventral hernia without obstruction or gangrene K43.9    S/P CABG x 3 Z95.1    Grade II hemorrhoids K64.1    Prostate cancer ([facility] C61    Vitamin D deficiency E55.9    [facility] N18.31    Wrist fracture S62.109A      Family History   Problem Relation Age of Onset    Hypertension Mother     Diabetes Mother       Social History     Socioeconomic History    Marital status:    Tobacco Use    Smoking status: Every Day     Packs/day: 0.50     Years: 40.00     Pack years: 20.00     Types: Cigarettes    Smokeless tobacco: Never   Vaping Use    Vaping Use: Never used   Substance and Sexual Activity    Alcohol use: Yes     Comment: couple drinks a day Drug use: No     Social Determinants of Health     Physical Activity: Inactive    Days of Exercise per Week: 0 days    Minutes of Exercise per Session: 0 min      Past Surgical History:   Procedure Laterality Date    HX CHOLECYSTECTOMY      HX CORONARY STENT PLACEMENT      HX HERNIA REPAIR      IR IVC FILTER PLACEMENT PERCUTANEOUS  6/15/2021    IR REMOVE IVC FILTER W SI  1/17/2022    IR THROMBECTOMY MECH ART PRIMARY NON HETAL OR INTRACRANIAL  6/15/2021    AK UNLISTED PROCEDURE CARDIAC SURGERY      filter and triple bypass      Past Medical History:   Diagnosis Date    Bilateral hand pain     CAD (coronary artery disease)     Cancer (HCC)     prostate cancer    Dupuytren's disease of palm     Hyperlipidemia     Hypertension     SHILPI (obstructive sleep apnea)     PE (pulmonary thromboembolism) ([facility] 06/2021    Septic pulmonary embolism with acute cor pulmonale ([facility] 06/14/2021    Severe obesity (HCC)     Tobacco dependence         I have reviewed and agree with [address] and ROS and intake form in chart and the record furthermore I have reviewed prior medical record(s) regarding this patients care during this appointment. Physical Exam:  On physical exam today there is still a moderate amount of swelling around the dorsum of the left hand and globally around the wrist.  He demonstrates about 30 degrees of flexion and extension at the wrist but is still palpably tender around the distal radius. Today I reviewed repeat x-rays of the left wrist with Dr. Kelsey Duvall. He felt like they did reveal good interval healing of the fracture but he recommended use of a Medrol Dosepak and continued OT for the soreness and swelling. Encounter Diagnoses     ICD-10-CM ICD-9-CM   1. Fracture follow-up  Z09 V67.4       As part of continued conservative pain management options the patient was advised to utilize Tylenol or OTC NSAIDS as long as it is not medically contraindicated.      Return to Office:   Today I reviewed the clinical and radiographic findings with the patient as well as Dr. Alice Blank new suggestions for treatment. The Medrol Dosepak was sent to his pharmacy and he will continue progressing with occupational therapy. He is going to give some thought as to whether or not he needs his time off from work extended but for now he will plan to return on 2/27/2023 pain permitting.   He will follow-up again in another 4 weeks with me for final marcus-ray of the left wrist.         BOBBI Vazquez

## 2023-02-07 ENCOUNTER — HOSPITAL ENCOUNTER (OUTPATIENT)
Dept: PHYSICAL THERAPY | Age: 63
Discharge: HOME | End: 2023-02-07
Payer: COMMERCIAL

## 2023-02-07 PROCEDURE — 97110 THERAPEUTIC EXERCISES: CPT

## 2023-02-07 NOTE — PROGRESS NOTES
OT DAILY TREATMENT NOTE     Patient Name: Mame Wyatt  Date:2023  : 1960  [x]  Patient  Verified  Payor: Raman Chatman / Plan: [address] [place] [address] / Product Type: PPO /    In time:220  Out time:301  Total Treatment Time (min): 41  Visit #: 6     Treatment Area: Fracture of unspecified carpal bone, left wrist, subsequent encounter for fracture with routine healing [S62.102D]    SUBJECTIVE  Pre- tx Pain Level (0-10 scale): 2/10  Post- tx pain level (0/10 scale)4/10  Any medication changes, allergies to medications, adverse drug reactions, diagnosis change, or new procedure performed?: [x] No    [] Yes (see summary sheet for update)  Subjective functional status/changes:   [] No changes reported  Pt reports increased function, but cont to be limited by pain and weakness       41 min Therapeutic Exercise:  [] See flow sheet :   Rationale: increase ROM and increase strength to improve the patients ability to complete L wrist functional tasks   L wrist flexion/ extension with 2# x 10 reps, 2 sets   Red digiflex x 10 reps, 3 sets   1# supinator x 10 reps, 2 sets   L wrist wristercisor x 5 reps   Towel squeezes x 5 reps       With   [x] TE   [] TA   [] neuro   [] other: Patient Education: [x] Review HEP    [] Progressed/Changed HEP based on:   [x] positioning   [x] body mechanics   [] transfers   [] heat/ice application   [] Splint wear/care   [] Sensory re-education   [] scar management      [] other:        ASSESSMENT  Progress towards goals/ change in function: pt cont to make progress with increasing L arm function. Cont     Patient will continue to benefit from skilled OT services to address functional mobility deficits, address ROM deficits, and address strength deficits to attain remaining goals.      [x]  See Plan of Care  []  See progress note/recertification  []  See Discharge Summary         PLAN  [x]  Upgrade activities as tolerated       []  Continue plan of care        []  Discharge due to:_  []  Other:_      Pam Nova, MS, OTR/L  2/7/2023  3:16 PM    Future Appointments   Date Time Provider Gunnar Herron   2/9/2023  2:30 [facility]

## 2023-02-09 ENCOUNTER — HOSPITAL ENCOUNTER (OUTPATIENT)
Dept: PHYSICAL THERAPY | Age: 63
Discharge: HOME | End: 2023-02-09
Payer: COMMERCIAL

## 2023-02-09 PROCEDURE — 97110 THERAPEUTIC EXERCISES: CPT

## 2023-02-09 NOTE — PROGRESS NOTES
OT DAILY TREATMENT NOTE     Patient Name: Mary Mims  Date:2023  : 1960  [x]  Patient  Verified  Payor: BLUE CROSS / Plan: [address] [place] [address] / Product Type: PPO /    In time:225  Out time:320  Total Treatment Time (min): 62  Visit #: 7   Treatment Area: Fracture of unspecified carpal bone, left wrist, subsequent encounter for fracture with routine healing [S62.102D]    SUBJECTIVE  Pre- tx Pain Level (0-10 scale): 3/10  Post- tx pain level (0/10 scale)5/10  Any medication changes, allergies to medications, adverse drug reactions, diagnosis change, or new procedure performed?: [x] No    [] Yes (see summary sheet for update)  Subjective functional status/changes:   [] No changes reported  Pt stated his hand hurt where the pins were placed on his L wrist. Pt was advised to continue massaging scar tissue. Pt has follow up appointment for radiation 2023. Pt is starting radiation 1x day for 5 days a week. OBJECTIVE      58 min Therapeutic Exercise:  [] See flow sheet :   Rationale: increase ROM, increase strength, and improve coordination to improve the patients ability to complete ADLs I while at home.          With   [x] TE   [] TA   [] neuro   [] other: Patient Education: [x] Review HEP    [x] Progressed/Changed HEP based on:   [x] positioning   [x] body mechanics   [] transfers   [] heat/ice application   [] Splint wear/care   [] Sensory re-education   [] scar management      [] other:        ASSESSMENT  Progress towards goals/ change in function:   L wrist flexion/ extension with 2# x 10 reps, 2 sets   L wrist flexion/extension x 10 reps, 2 sets  Red digiflex x 10 reps, 3 sets   Yellow digiflex x 10 reps, 2 sets   1# supinator x 10 reps, 3 sets   Red flexbar x 10 reps, 3 sets   Supination x 10 reps, 2 sets   Supination with #2 x 10 reps, 3 sets     Patient will continue to benefit from skilled OT services to modify and progress therapeutic interventions, address functional mobility deficits, address ROM deficits, and address strength deficits to attain remaining goals. [x]  See Plan of Care  []  See progress note/recertification  []  See Discharge Summary         PLAN  [x]  Upgrade activities as tolerated       [x]  Continue plan of care        []  Discharge due to:_  []  Other:_      Padmini Queen MS, OTR/L  2/9/2023  3:24 PM  [address]  No future appointments.

## 2023-02-13 RX ORDER — EVOLOCUMAB 420 MG/3.5
KIT SUBCUTANEOUS
Qty: 3.5 ML | Refills: 3 | Status: SHIPPED | OUTPATIENT
Start: 2023-02-13

## 2023-02-14 DIAGNOSIS — S62.102D CLOSED FRACTURE OF LEFT WRIST WITH ROUTINE HEALING, SUBSEQUENT ENCOUNTER: Primary | ICD-10-CM

## 2023-02-14 SDOH — ECONOMIC STABILITY: INCOME INSECURITY: HOW HARD IS IT FOR YOU TO PAY FOR THE VERY BASICS LIKE FOOD, HOUSING, MEDICAL CARE, AND HEATING?: NOT VERY HARD

## 2023-02-14 SDOH — ECONOMIC STABILITY: TRANSPORTATION INSECURITY
IN THE PAST 12 MONTHS, HAS LACK OF TRANSPORTATION KEPT YOU FROM MEETINGS, WORK, OR FROM GETTING THINGS NEEDED FOR DAILY LIVING?: NO

## 2023-02-14 SDOH — ECONOMIC STABILITY: FOOD INSECURITY: WITHIN THE PAST 12 MONTHS, THE FOOD YOU BOUGHT JUST DIDN'T LAST AND YOU DIDN'T HAVE MONEY TO GET MORE.: NEVER TRUE

## 2023-02-14 SDOH — ECONOMIC STABILITY: FOOD INSECURITY: WITHIN THE PAST 12 MONTHS, YOU WORRIED THAT YOUR FOOD WOULD RUN OUT BEFORE YOU GOT MONEY TO BUY MORE.: NEVER TRUE

## 2023-02-14 SDOH — ECONOMIC STABILITY: HOUSING INSECURITY
IN THE LAST 12 MONTHS, WAS THERE A TIME WHEN YOU DID NOT HAVE A STEADY PLACE TO SLEEP OR SLEPT IN A SHELTER (INCLUDING NOW)?: NO

## 2023-02-15 ENCOUNTER — OFFICE VISIT (OUTPATIENT)
Facility: CLINIC | Age: 63
End: 2023-02-15
Payer: COMMERCIAL

## 2023-02-15 ENCOUNTER — HOSPITAL ENCOUNTER (OUTPATIENT)
Age: 63
Setting detail: RECURRING SERIES
Discharge: HOME | End: 2023-02-18
Payer: COMMERCIAL

## 2023-02-15 VITALS
RESPIRATION RATE: 18 BRPM | SYSTOLIC BLOOD PRESSURE: 134 MMHG | OXYGEN SATURATION: 97 % | TEMPERATURE: 99.6 F | DIASTOLIC BLOOD PRESSURE: 85 MMHG | HEART RATE: 92 BPM | WEIGHT: 260 LBS | BODY MASS INDEX: 37.22 KG/M2 | HEIGHT: 70 IN

## 2023-02-15 DIAGNOSIS — C61 PROSTATE CANCER (HCC): ICD-10-CM

## 2023-02-15 DIAGNOSIS — Z12.2 ENCOUNTER FOR SCREENING FOR LUNG CANCER: ICD-10-CM

## 2023-02-15 DIAGNOSIS — M54.41 CHRONIC BILATERAL LOW BACK PAIN WITH BILATERAL SCIATICA: ICD-10-CM

## 2023-02-15 DIAGNOSIS — Z12.11 COLON CANCER SCREENING: ICD-10-CM

## 2023-02-15 DIAGNOSIS — G89.29 CHRONIC BILATERAL LOW BACK PAIN WITH BILATERAL SCIATICA: ICD-10-CM

## 2023-02-15 DIAGNOSIS — M54.42 CHRONIC BILATERAL LOW BACK PAIN WITH BILATERAL SCIATICA: ICD-10-CM

## 2023-02-15 DIAGNOSIS — I25.10 ATHEROSCLEROSIS OF NATIVE CORONARY ARTERY OF NATIVE HEART WITHOUT ANGINA PECTORIS: Primary | ICD-10-CM

## 2023-02-15 PROCEDURE — 99214 OFFICE O/P EST MOD 30 MIN: CPT | Performed by: STUDENT IN AN ORGANIZED HEALTH CARE EDUCATION/TRAINING PROGRAM

## 2023-02-15 PROCEDURE — 3075F SYST BP GE 130 - 139MM HG: CPT | Performed by: STUDENT IN AN ORGANIZED HEALTH CARE EDUCATION/TRAINING PROGRAM

## 2023-02-15 PROCEDURE — 97110 THERAPEUTIC EXERCISES: CPT

## 2023-02-15 PROCEDURE — 3079F DIAST BP 80-89 MM HG: CPT | Performed by: STUDENT IN AN ORGANIZED HEALTH CARE EDUCATION/TRAINING PROGRAM

## 2023-02-15 RX ORDER — NITROGLYCERIN 400 UG/1
SPRAY ORAL
COMMUNITY
Start: 2019-09-23 | End: 2023-02-15 | Stop reason: SDUPTHER

## 2023-02-15 RX ORDER — NITROGLYCERIN 400 UG/1
SPRAY ORAL
Status: CANCELLED | OUTPATIENT
Start: 2023-02-15

## 2023-02-15 RX ORDER — NITROGLYCERIN 0.3 MG/1
0.3 TABLET SUBLINGUAL EVERY 5 MIN PRN
Qty: 30 TABLET | Refills: 3 | Status: CANCELLED | OUTPATIENT
Start: 2023-02-15

## 2023-02-15 RX ORDER — NITROGLYCERIN 400 UG/1
1 SPRAY ORAL EVERY 5 MIN PRN
Qty: 12 G | Refills: 0 | Status: SHIPPED | OUTPATIENT
Start: 2023-02-15

## 2023-02-15 ASSESSMENT — PATIENT HEALTH QUESTIONNAIRE - PHQ9
SUM OF ALL RESPONSES TO PHQ9 QUESTIONS 1 & 2: 0
SUM OF ALL RESPONSES TO PHQ QUESTIONS 1-9: 0
SUM OF ALL RESPONSES TO PHQ QUESTIONS 1-9: 0
1. LITTLE INTEREST OR PLEASURE IN DOING THINGS: 0
SUM OF ALL RESPONSES TO PHQ QUESTIONS 1-9: 0
SUM OF ALL RESPONSES TO PHQ QUESTIONS 1-9: 0
2. FEELING DOWN, DEPRESSED OR HOPELESS: 0

## 2023-02-15 NOTE — PROGRESS NOTES
Subjective:   Hillis Landau is a 57 y.o. male who was seen for Follow-up (3m follow up./)    Reports he is noting improvement since starting duloxetine for his back and was on a coarse of steroids with ortho and noticed improvement. Reports he just got done with physical therapy for his wrist which is helping. Reports that he did get a call for colon cancer screening, but had to reschedule. He had another study done yesterday pending prostate cancer radiation therapy which is starting tomorrow. He is supposed to drive back and forth to this topic every day for this. Prior to Admission medications    Medication Sig Start Date End Date Taking? Authorizing Provider   nitroGLYCERIN (NITROLINGUAL) 0.4 MG/SPRAY 0.4 mg spray Place 1 spray under the tongue every 5 minutes as needed for Chest pain 2/15/23  Yes Yamilet Sprague MD   [address] [place] [address] 420 MG/3.5ML SOCT INJECT 420MG UNDER THE SKIN AS INSTRUCTED ONCE EVERY 30 DAYS 2/13/23  Yes Bill Lima MD   DULoxetine (CYMBALTA) 60 MG extended release capsule Take 60 mg by mouth daily 11/14/22  Yes Ar Automatic Reconciliation   ergocalciferol (ERGOCALCIFEROL) 1.25 MG (94934 UT) capsule Take 50,000 Units by mouth every 7 days 11/14/22  Yes Ar Automatic Reconciliation   furosemide (LASIX) 40 MG tablet Take 40 mg by mouth daily 12/20/22  Yes Ar Automatic Reconciliation   metoprolol tartrate (LOPRESSOR) 25 MG tablet TAKE 1/2 TABLET BY MOUTH EVERY TWELVE (12) HOURS.  10/4/22  Yes Ar Automatic Reconciliation   ondansetron (ZOFRAN-ODT) 4 MG disintegrating tablet Take 4 mg by mouth every 8 hours as needed 12/16/22  Yes Ar Automatic Reconciliation   potassium chloride (KLOR-CON M) 20 MEQ extended release tablet Take 20 mEq by mouth daily 12/27/22  Yes Ar Automatic Reconciliation   Relugolix (ORGOVYX) 120 MG TABS Take 120 mg by mouth daily 11/15/22  Yes Ar Automatic Reconciliation   rivaroxaban (XARELTO) 20 MG TABS tablet Take 20 mg by mouth daily   Yes Ar Automatic Reconciliation   tamsulosin (FLOMAX) 0.4 MG capsule Take 0.4 mg by mouth daily 10/7/22  Yes Ar Automatic Reconciliation   tiotropium-olodaterol (STIOLTO) 2.5-2.5 MCG/ACT AERS Inhale 2 puffs into the lungs daily 11/18/22  Yes Ar Automatic Reconciliation     Allergies   Allergen Reactions    Gabapentin Other (See Comments)     \"Caused bad nightmares\"    Statins Other (See Comments)     Myalgias with Lipitor. Tolerates Crestor    Codeine Nausea Only    Ferrous Sulfate Other (See Comments)     Ferrous Sulfate (IRON) --Abdominal pain , redness in face, fever    Penicillins Hives     Social History     Tobacco Use    Smoking status: Every Day     Packs/day: 0.50     Types: Cigarettes    Smokeless tobacco: Never   Substance Use Topics    Alcohol use: Yes    Drug use: No        Review of Systems   As noted above in HPI. Objective:   /85 (Site: Left Upper Arm, Position: Sitting, Cuff Size: Large Adult)   Pulse 92   Temp 99.6 °F (37.6 °C) (Temporal)   Resp 18   Ht 5' 10\" (1.778 m)   Wt 260 lb (117.9 kg)   SpO2 97%   BMI 37.31 kg/m²      General: alert, oriented, not in distress  Chest/Lungs: clear breath sounds, no wheezing or crackles  Heart: normal rate, regular rhythm, no murmur  Extremities: no focal deformities, no edema  Skin: no active skin lesions    NM bone scan wh body 12/22/22:  No scintigraphic evidence for osteoblastic metastatic disease    CT abd/pelv IV and oral cont 12/22/22: IMPRESSION       1. No evidence of extension of disease beyond the confines of the prostate. No adenopathy or evidence of metastatic disease. 2. Diffuse urinary bladder wall thickening may be due to is low volume state plus or minus an element of partial bladder outlet obstruction. Cystitis cannot be completely excluded. 3. Small supraumbilical ventral hernias. Assessment & Plan:     Atherosclerosis of native coronary artery of native heart without angina pectoris  Comments:  stable.  will refill nitroglycerin spray. Followed by cards. Has an appt in a couple months  Orders:  -     nitroGLYCERIN (NITROLINGUAL) 0.4 MG/SPRAY 0.4 mg spray; Place 1 spray under the tongue every 5 minutes as needed for Chest pain, Disp-12 g, R-0Normal  Chronic bilateral low back pain with bilateral sciatica  Comments:  Improved. Continue duloxetine 60 mg daily  Encounter for screening for lung cancer  Comments:  Due for lung cancer screening. will order  Orders:  -     CT LUNG SCREENING; Future  Colon cancer screening  Comments:  due. Needs to reschedule  Prostate cancer [facility])  Comments: Followed by urology. Having radiation starting tomorrow. No follow-up provider specified. I have discussed the diagnosis with the patient and the intended plan as seen in the above orders. The patient has received an after-visit summary and questions were answered concerning future plans. I have discussed medication side effects and warnings with the patient as well. I have reviewed the plan of care with the patient, accepted their input and they are in agreement with the treatment goals. Previous lab and imaging results were reviewed by me.        Kaela Bautista MD  February 15, 2023

## 2023-02-15 NOTE — PROGRESS NOTES
Rishi Green presents today for   Chief Complaint   Patient presents with    Follow-up     3m follow up. Is someone accompanying this pt? no    Is the patient using any DME equipment during OV? no    Health Maintenance reviewed and discussed and ordered per Provider. Health Maintenance Due   Topic Date Due    COVID-19 Vaccine (1) Never done    Pneumococcal 0-64 years Vaccine (1 - PCV) Never done    HIV screen  Never done    Hepatitis C screen  Never done    DTaP/Tdap/Td vaccine (1 - Tdap) Never done    Colorectal Cancer Screen  Never done    Shingles vaccine (1 of 2) Never done    Low dose CT lung screening  Never done    Flu vaccine (1) Never done   . Coordination of Care:  1. \"Have you been to the ER, urgent care clinic since your last visit? Hospitalized since your last visit? \" No    2. \"Have you seen or consulted any other health care providers outside of the [address] [place] [address] since your last visit? \" No    3. For patients aged 39-70: Has the patient had a colonoscopy? No    If the patient is female:    4. For patients aged 41-77: Has the patient had a mammogram within the past 2 years? N/A based on age/sex    5. For patients aged 21-65: Has the patient had a pap smear?  N/A based on age/sex

## 2023-02-17 ENCOUNTER — HOSPITAL ENCOUNTER (OUTPATIENT)
Age: 63
Setting detail: RECURRING SERIES
Discharge: HOME | End: 2023-02-20
Payer: COMMERCIAL

## 2023-02-17 PROCEDURE — 97110 THERAPEUTIC EXERCISES: CPT

## 2023-02-17 NOTE — PROGRESS NOTES
OT DAILY TREATMENT NOTE     Patient Name: Devon Mckeon  Date:2023  : 1960  [x]  Patient  Verified  Payor: BCBS / Plan: BCBS OUT OF STATE / Product Type: *No Product type* /    In time:145  Out time:220  Total Treatment Time (min): 23  Visit #: 9     Treatment Area: Fracture of unspecified carpal bone, left wrist, subsequent encounter for fracture with routine healing [S62.102D]    SUBJECTIVE  Pre- tx Pain Level (0-10 scale): 2/10  Post- tx pain level (0/10 scale)2/10  Any medication changes, allergies to medications, adverse drug reactions, diagnosis change, or new procedure performed?: [x] No    [] Yes (see summary sheet for update)  Subjective functional status/changes:   [] No changes reported  Stated that he felt good this visit. After measurement c/o pain and had enough TE at this date. Reassessment taken this visit.    OBJECTIVE      22 min Therapeutic Exercise:  [] See flow sheet :   Rationale:  to improve the patient’s ability to increase fine motor skills and complete ADLs I while at home.         With   [x] TE   [] TA   [] neuro   [] other: Patient Education: [x] Review HEP    [x] Progressed/Changed HEP based on:   [x] positioning   [x] body mechanics   [] transfers   [] heat/ice application   [] Splint wear/care   [] Sensory re-education   [] scar management      [] other:        ASSESSMENT  Progress towards goals/ change in function:     2# wrist flexion x 10 reps, 3 sets   2# wrist extension x 10 reps, 3 sets   2# Supination x 10 reps, 3 sets   2# radial deviation x 10 reps, 3 sets  Prayer stretch x 6 reps   Red digiflex x 10 reps, 3 sets  Hitchhiker thumb x 10 reps , 1 set  Patient will continue to benefit from skilled OT services to attain remaining goals.     [x]  See Plan of Care  []  See progress note/recertification  []  See Discharge Summary         PLAN  [x]  Upgrade activities as tolerated       []  Continue plan of care        []  Discharge due to:_  []  Other:_   Yenny Willis, OTR/L  2/17/2023  2:32 PM  Rubens Cool     Future Appointments   Date Time Provider Christal Montero   2/20/2023  3:00 PM Yenny Willis [facility]   3/6/2023  1:30 PM TR Ramires Mc BS AMB   4/19/2023  1:00 PM Vika Santiago MD [facility]   5/15/2023  2:30 PM Susanne Madden MD [facility] BS AMB   6/19/2023  3:30 PM Benjamín Hagan MD [facility] BS AMB

## 2023-02-20 ENCOUNTER — HOSPITAL ENCOUNTER (OUTPATIENT)
Age: 63
Setting detail: RECURRING SERIES
Discharge: HOME | End: 2023-02-23
Payer: COMMERCIAL

## 2023-02-20 PROCEDURE — 97110 THERAPEUTIC EXERCISES: CPT

## 2023-02-20 NOTE — PROGRESS NOTES
OT DAILY TREATMENT NOTE     Patient Name: Temo Curran  Date:2023  : 1960  [x]  Patient  Verified  Payor: Ashu  / Plan: [address] [place] [address] / Product Type: *No Product type* /    In time:245  Out time:330  Total Treatment Time (min): 45  Visit #: 10     Treatment Area: Fracture of unspecified carpal bone, left wrist, subsequent encounter for fracture with routine healing [S62.102D]    SUBJECTIVE  Pre- tx Pain Level (0-10 scale): 3/10  Post- tx pain level (0/10 scale)3/10  Any medication changes, allergies to medications, adverse drug reactions, diagnosis change, or new procedure performed?: [x] No    [] Yes (see summary sheet for update)  Subjective functional status/changes:   [] No changes reported    pt reports pain where the pin site are located on the wrist. Starts at [place] [id number] on 2023 for radiation treatment and will call to continue     OBJECTIVE    45 min Therapeutic Exercise:  [] See flow sheet :   Rationale:  to improve the patients ability to complete ADLs I while at home. With   [x] TE   [] TA   [] neuro   [] other: Patient Education: [x] Review HEP    [x] Progressed/Changed HEP based on:   [x] positioning   [x] body mechanics   [] transfers   [] heat/ice application   [] Splint wear/care   [] Sensory re-education   [] scar management      [] other:        ASSESSMENT  Progress towards goals/ change in function:   BUE x 10 min  wrist flexion x 10 reps, 3 sets   wrist extension x 10 reps, 3 sets   Red flexbar x 10 reps, 2 sets   Supination x 10 reps, 3 sets   Radial deviation x 10 reps, 3 sets  Prayer stretch x 6 reps   Green digiflex x 10 reps, 3 sets  Hitchhiker thumb x 10 reps , 1 set    Patient will continue to benefit from skilled OT services  to attain remaining goals.      [x]  See Plan of Care  []  See progress note/recertification  []  See Discharge Summary         PLAN  [x]  Upgrade activities as tolerated       []  Continue plan of care        []  Discharge due to:_  [] Other:_      Kelly Murillo, OT 2/20/2023  3:48 PM  Rubens Deal    Future Appointments   Date Time Provider Christal Mahoneyi   3/6/2023  1:30 PM TR Jerome BS AMB   4/19/2023  1:00 PM Julisa Schmid MD [facility]   5/15/2023  2:30 PM Katarzyna Ruelas MD [facility] BS AMB   6/19/2023  3:30 PM Bebeto Lerner MD [facility] BS AMB

## 2023-02-28 ENCOUNTER — HOSPITAL ENCOUNTER (OUTPATIENT)
Age: 63
Setting detail: RECURRING SERIES
Discharge: HOME | End: 2023-03-03
Payer: COMMERCIAL

## 2023-02-28 PROCEDURE — 97110 THERAPEUTIC EXERCISES: CPT

## 2023-02-28 NOTE — PROGRESS NOTES
OT DAILY TREATMENT NOTE     Patient Name: Malachi Cruz  Date:2023  : 1960  [x]  Patient  Verified  Payor: Jarrett Nagy / Plan: Precious Hamilton / Product Type: *No Product type* /    In time:300  Out time:355  Total Treatment Time (min): 45  Visit #: 11     Treatment Area: Fracture of unspecified carpal bone, left wrist, subsequent encounter for fracture with routine healing [S62.102D]    SUBJECTIVE  Pre- tx Pain Level (0-10 scale): 3/10  Post- tx pain level (0/10 scale)3/10  Any medication changes, allergies to medications, adverse drug reactions, diagnosis change, or new procedure performed?: [x] No    [] Yes (see summary sheet for update)  Subjective functional status/changes:   [] No changes reported  Pt had a fall over the weekend resulting to a injury to L eye and L forearm. Pt stated that he would do as much therapy as he could due to injury from fall. OBJECTIVE      45 min Therapeutic Exercise:  [] See flow sheet :   Rationale: increase ROM, increase strength, and improve coordination to improve the patients ability to use L hand to complete ADLs I while at home. With   [x] TE   [] TA   [] neuro   [] other: Patient Education: [x] Review HEP    [x] Progressed/Changed HEP based on:   [x] positioning   [x] body mechanics   [] transfers   [] heat/ice application   [] Splint wear/care   [] Sensory re-education   [] scar management      [] other:        ASSESSMENT  Progress towards goals/ change in function:   2# supination x 10 reps, 3 sets  Green digiflex x 10 reps, 4 sets   2# wrist extension X 10 reps, 3 sets  2# wrist flexion x 10 reps, 3 sets  2# radial deviation x 10 reps, 3 sets   Red flexbar x 6 reps       Patient will continue to benefit from skilled OT services to modify and progress therapeutic interventions, address functional mobility deficits, address ROM deficits, and address strength deficits to attain remaining goals.      [x]  See Plan of Care  []  See progress note/recertification  []  See Discharge Summary         PLAN  [x]  Upgrade activities as tolerated       []  Continue plan of care        []  Discharge due to:_  []  Other:_      Ethan Hull MS, OTR/L   2/28/2023  3:57 PM  Rubens Ivory    Future Appointments   Date Time Provider Christal Kylah   3/6/2023  2:30 PM TR Holly BS AMB   3/6/2023  3:00 PM [facility]   3/8/2023  3:00 PM [facility]   4/19/2023  1:00 PM Wilian Ngo MD [facility]   5/15/2023  2:30 PM Stefano Goss MD [facility] BS AMB   6/19/2023  3:30 PM Vashti Moise MD [facility] BS AMB

## 2023-03-06 ENCOUNTER — OFFICE VISIT (OUTPATIENT)
Age: 63
End: 2023-03-06

## 2023-03-06 ENCOUNTER — HOSPITAL ENCOUNTER (OUTPATIENT)
Age: 63
Setting detail: RECURRING SERIES
Discharge: HOME | End: 2023-03-09
Payer: COMMERCIAL

## 2023-03-06 DIAGNOSIS — Z09 FRACTURE FOLLOW-UP: Primary | ICD-10-CM

## 2023-03-06 PROCEDURE — 97110 THERAPEUTIC EXERCISES: CPT

## 2023-03-06 PROCEDURE — 99024 POSTOP FOLLOW-UP VISIT: CPT | Performed by: NURSE PRACTITIONER

## 2023-03-06 RX ORDER — FUROSEMIDE 40 MG/1
TABLET ORAL
Qty: 90 TABLET | Refills: 0 | Status: SHIPPED | OUTPATIENT
Start: 2023-03-06

## 2023-03-06 NOTE — PATIENT INSTRUCTIONS
Colles Fracture: Care Instructions  Overview     A Colles (say \"CALL-us\" or \"CALL-eez\") fracture is a specific type of broken wrist. In this type of fracture, the broken bone in the wrist tilts upward when the hand is palm down. The break may happen when you throw out a hand to protect yourself in a fall. Your treatment depends on how bad the break is. Your doctor may have put your wrist in a cast or splint. This will help keep your wrist stable and keep the bone in the right position. Sometimes surgery is needed to help keep the bone in position for good healing. It will take several weeks to a few months for your wrist to heal. You can help it heal with care at home. You heal best when you take good care of yourself. Eat a variety of healthy foods, and don't smoke. You may have had a sedative to help you relax. You may be unsteady after having sedation. It can take a few hours for the medicine's effects to wear off. Common side effects include nausea, vomiting, and feeling sleepy or tired. The doctor has checked you carefully, but problems can develop later. If you notice any problems or new symptoms, get medical treatment right away. Follow-up care is a key part of your treatment and safety. Be sure to make and go to all appointments, and call your doctor if you are having problems. It's also a good idea to know your test results and keep a list of the medicines you take. How can you care for yourself at home? If your doctor gave you a sedative: For 24 hours, don't do anything that requires attention to detail, such as going to work, making important decisions, or signing any legal documents. It takes time for the medicine's effects to completely wear off. For your safety, do not drive or operate any machinery that could be dangerous. Wait until the medicine wears off. You need to be able to think clearly and react easily. Be safe with medicines. Take pain medicines exactly as directed.   If the doctor gave you a prescription medicine for pain, take it as prescribed. If you are not taking a prescription pain medicine, ask your doctor if you can take an over-the-counter medicine. Put ice or a cold pack on your wrist for 10 to 20 minutes at a time. Try to do this every 1 to 2 hours for the next 3 days (when you are awake). Put a thin cloth between the ice and your cast or splint. Keep your cast or splint dry. Follow the splint or cast care instructions that your doctor gives you. If you have a splint, do not take it off unless your doctor tells you to. Be careful not to put the splint on too tight. Prop up your arm on pillows when you sit or lie down in the first few days after the injury. Keep your wrist higher than the level of your heart. This will help reduce swelling. Move your fingers often to reduce swelling and stiffness. But don't use that hand to grab or carry anything. Follow instructions for exercises to keep your arm strong. When should you call for help? Call 911 anytime you think you may need emergency care. For example, call if:    You have trouble breathing. You passed out (lost consciousness). You are very sleepy, and you have trouble waking up. Call your doctor now or seek immediate medical care if:    You have new or worse nausea or vomiting. You have new or worse pain. Your hand or fingers are cool or pale or change color. Your cast or splint feels too tight. You have tingling, weakness, or numbness in your hand or fingers. Watch closely for changes in your health, and be sure to contact your doctor if:    You have problems with your cast or splint. You do not get better as expected. Where can you learn more? Go to http://www.woods.com/ and enter F496 to learn more about \"Colles Fracture: Care Instructions. \"  Current as of: March 9, 2022               Content Version: 13.5  © 8399-8606 [employer].    Care instructions adapted under license by [facility] ([facility]). If you have questions about a medical condition or this instruction, always ask your healthcare professional. [address] any warranty or liability for your use of this information.

## 2023-03-06 NOTE — PROGRESS NOTES
Name: Arabella Hale    : 1960    Ambulatory Bariatric Summary 2/15/2023 2023 2022 2022 2022 2022 4495   Systolic 590 - - 548 280 890 529   Diastolic 85 - - 83 71 69 68   Pulse 92 - - 72 66 67 66   Temp 99.6 - - - - - -   Resp 18 - - - - - -   Weight 260 267 267 - - - -   Height 70 70 - - - - -   BMI 37.4 kg/m2 38.4 kg/m2 0 kg/m2 - - - -       There is no height or weight on file to calculate BMI. Service Dept: [facility]  [address], [address] [place] [id number]  Dept: [phone]  Dept Fax: [phone]     Patient's Pharmacies:    [address], [address] [place] [address]  Phone: [phone] Fax: [phone]       Chief Complaint   Patient presents with    Wrist Pain    Post-Op Check       HPI:  Patient follows up today for a repeat clinical check of the left distal radius fracture after closed reduction and percutaneous pinning done on 2022. He has been out of his brace and progressing with occupational therapy. Overall he says the wrist is feeling much better. There were no vitals taken for this visit. Allergies   Allergen Reactions    Gabapentin Other (See Comments)     \"Caused bad nightmares\"    Statins Other (See Comments)     Myalgias with Lipitor.   Tolerates Crestor    Codeine Nausea Only    Ferrous Sulfate Other (See Comments)     Ferrous Sulfate (IRON) --Abdominal pain , redness in face, fever    Penicillins Hives      Current Outpatient Medications   Medication Sig Dispense Refill    Relugolix (ORGOVYX) 120 MG TABS Take 120 mg by mouth daily 30 tablet 5    nitroGLYCERIN (NITROLINGUAL) 0.4 MG/SPRAY 0.4 mg spray Place 1 spray under the tongue every 5 minutes as needed for Chest pain 12 g 0    REPATHA PUSHTRONEX SYSTEM 420 MG/3.5ML SOCT INJECT 420MG UNDER THE SKIN AS INSTRUCTED ONCE EVERY 30 DAYS 3.5 mL 3    DULoxetine (CYMBALTA) 60 MG extended release capsule Take 60 mg by mouth daily      ergocalciferol (ERGOCALCIFEROL) 1.25 MG (68350 UT) capsule Take 50,000 Units by mouth every 7 days      furosemide (LASIX) 40 MG tablet Take 40 mg by mouth daily      metoprolol tartrate (LOPRESSOR) 25 MG tablet TAKE 1/2 TABLET BY MOUTH EVERY TWELVE (12) HOURS.      ondansetron (ZOFRAN-ODT) 4 MG disintegrating tablet Take 4 mg by mouth every 8 hours as needed      potassium chloride (KLOR-CON M) 20 MEQ extended release tablet Take 20 mEq by mouth daily      rivaroxaban (XARELTO) 20 MG TABS tablet Take 20 mg by mouth daily      tamsulosin (FLOMAX) 0.4 MG capsule Take 0.4 mg by mouth daily      tiotropium-olodaterol (STIOLTO) 2.5-2.5 MCG/ACT AERS Inhale 2 puffs into the lungs daily       No current facility-administered medications for this visit.       Patient Active Problem List   Diagnosis    Hyperlipidemia    S/P CABG x 3    HYACINTH (obstructive sleep apnea)    Acute idiopathic gout    Essential hypertension    Grade II hemorrhoids    Severe obesity (HCC)    CAD (coronary artery disease)    Tobacco dependence    Presence of drug coated stent in left circumflex coronary artery    Ventral hernia without obstruction or gangrene    Prostate cancer (HCC)    Vitamin D deficiency    Stage 3a chronic kidney disease ([facility] [address]    Wrist fracture      Family History   Problem Relation Age of Onset    Diabetes Mother     Hypertension Mother       Social History     Socioeconomic History    Marital status:      Spouse name: None    Number of children: None    Years of education: None    Highest education level: None   Tobacco Use    Smoking status: Every Day     Packs/day: 0.50     Types: Cigarettes    Smokeless tobacco: Never   Substance and Sexual Activity    Alcohol use: Yes    Drug use: No     Social Determinants of Health     Physical Activity: Inactive    Days of Exercise per Week: 0 days    Minutes of Exercise per Session: 0 min   Intimate Partner Violence: Not At Risk    Fear of Current or Ex-Partner: No    Emotionally Abused: No    Physically Abused: No    Sexually Abused: No      Past Surgical History:   Procedure Laterality Date    CHOLECYSTECTOMY      CORONARY ANGIOPLASTY WITH STENT PLACEMENT      HERNIA REPAIR      IR IVC FILTER PLACEMENT W IMAGING  6/15/2021    IR IVC FILTER PLACEMENT W IMAGING 6/15/2021 [facility] RAD ANGIO IR    IR IVC FILTER PLACEMENT W IMAGING  6/15/2021    IR IVC FILTER RETRIEVAL  1/17/2022    IR IVC FILTER RETRIEVAL 1/17/2022 [facility] RAD ANGIO IR    IR IVC FILTER RETRIEVAL  1/17/2022    IR MECHANICAL ART THROMBECTOMY INIT  6/15/2021    IR MECHANICAL ART THROMBECTOMY INIT 6/15/2021 [facility] RAD ANGIO IR    IR MECHANICAL ART THROMBECTOMY INIT  6/15/2021    CO UNLISTED PROCEDURE CARDIAC SURGERY      filter and triple bypass      Past Medical History:   Diagnosis Date    Bilateral hand pain     CAD (coronary artery disease)     Cancer ([facility] [address]     prostate cancer    Dupuytren's disease of palm     Hyperlipidemia     Hypertension     HYACINTH (obstructive sleep apnea)     PE (pulmonary thromboembolism) ([facility] [address] 06/2021    Septic pulmonary embolism with acute cor pulmonale ([facility] [address] 06/14/2021    Severe obesity ([facility] [address]     Tobacco dependence         I have reviewed and agree with [address] [place] [address] Nw and ROS and intake form in chart and the record furthermore I have reviewed prior medical record(s) regarding this patients care during this appointment. Physical Exam:      On physical exam today there is still some swelling around the joint but no tenderness over the area of the fracture. He demonstrates good motion on flexion and extension of the wrist and is neurovascularly intact in the left hand. New x-rays taken today of the left wrist show continued progressive interval healing of the left distal radius fracture. Encounter Diagnosis   Name Primary?     Fracture follow-up Yes        As part of continued conservative pain management options the patient was advised to utilize Tylenol or OTC NSAIDS as long as it is not medically contraindicated.     Return to Office:   Today I reviewed the clinical and radiographic findings with the patient and his wife.  He will proceed with activity as tolerated and weightbearing as tolerated.  He will continue with occupational therapy but as long as he is doing well no further routine follow-up or x-rays will be scheduled.  He can see us back as needed.         TR Hurst

## 2023-03-06 NOTE — PROGRESS NOTES
OT DAILY TREATMENT NOTE     Patient Name: Devon Mckeon  Date:3/6/2023  : 1960  [x]  Patient  Verified  Payor: BCBS / Plan: BCBS OUT OF STATE / Product Type: *No Product type* /    In time:300  Out time:342  Total Treatment Time (min): 42  Visit #: 12   Treatment Area: Fracture of unspecified carpal bone, left wrist, subsequent encounter for fracture with routine healing [S62.102D]    SUBJECTIVE  Pre- tx Pain Level (0-10 scale): 2/10  Post- tx pain level (0/10 scale)1-2/10  Any medication changes, allergies to medications, adverse drug reactions, diagnosis change, or new procedure performed?: [x] No    [] Yes (see summary sheet for update)  Subjective functional status/changes:   [] No changes reported  Pt cont with some redness and swelling to L forearm. Reports he followed up with Leticia this morning       42 min Therapeutic Exercise:  [] See flow sheet :   Rationale: increase ROM, increase strength, and improve coordination to improve the patient’s ability to increase L arm motion  L wrist flexion/ extension x 10 reps, 2 sets 3#   L supination x 10 reps, 2#   L hand green digiflex x 10 reps, 5 sets   UBE x 5 min in forward   Red flexbar x 10 reps, up and down   All digit opposition x 10 reps,         With   [x] TE   [] TA   [] neuro   [] other: Patient Education: [x] Review HEP    [x] Progressed/Changed HEP based on:   [x] positioning   [x] body mechanics   [] transfers   [] heat/ice application   [] Splint wear/care   [] Sensory re-education   [] scar management      [] other:        ASSESSMENT  Progress towards goals/ change in function: pt cont to make progress with L arm motion, cont     Patient will continue to benefit from skilled OT services to address functional mobility deficits, address ROM deficits, and address strength deficits to attain remaining goals.     [x]  See Plan of Care  []  See progress note/recertification  []  See Discharge Summary         PLAN  [x]  Upgrade activities as  tolerated       []  Continue plan of care        []  Discharge due to:_  []  Other:_      Katlin Shah, MS, OTR/L   3/6/2023  4:21 PM    Future Appointments   Date Time Provider Christal Montero   3/8/2023  3:00 PM Shruthi Herring OT [facility]   4/19/2023  1:00 PM Isaac Dalal MD [facility] Sched   5/15/2023  2:30 PM Jovani Dow MD [facility] BS AMB   6/19/2023  3:30 PM Cristel Michel MD [facility] BS AMB

## 2023-03-08 ENCOUNTER — HOSPITAL ENCOUNTER (OUTPATIENT)
Age: 63
Setting detail: RECURRING SERIES
Discharge: HOME | End: 2023-03-11
Payer: COMMERCIAL

## 2023-03-08 PROCEDURE — 97110 THERAPEUTIC EXERCISES: CPT

## 2023-03-08 NOTE — PROGRESS NOTES
OT DAILY TREATMENT NOTE     Patient Name: Jake Triana  Date:3/8/2023  : 1960  [x]  Patient  Verified  Payor: Aaron Greenberg / Plan:  [facility] / Product Type: *No Product type* /    In time:300  Out time:346  Total Treatment Time (min): 46  Visit #: 13   Treatment Area: Fracture of unspecified carpal bone, left wrist, subsequent encounter for fracture with routine healing [S62.102D]    SUBJECTIVE  Pre- tx Pain Level (0-10 scale): 2/10  Post- tx pain level (0/10 scale)2/10  Any medication changes, allergies to medications, adverse drug reactions, diagnosis change, or new procedure performed?: [x] No    [] Yes (see summary sheet for update)  Subjective functional status/changes:   [] No changes reported  Pt seen in gym reporting decreased pain in L wrist from fall       46 min Therapeutic Exercise:  [] See flow sheet :   Rationale: increase ROM and increase strength to improve the patients ability to complete gross and fine motor coordination of L wrist   3# L wrist flexion/ extension x 10 reps, 2 sets, all planes   2# supinator x 10 reps, 2 sets   Green digiflex x 10 reps, 2 sets   Red flexbar x 10 reps, 3 sets   UBE x 10 min level 1.5   Rebounder 2# x 10 reps         With   [x] TE   [] TA   [] neuro   [] other: Patient Education: [x] Review HEP    [x] Progressed/Changed HEP based on:   [x] positioning   [x] body mechanics   [] transfers   [] heat/ice application   [] Splint wear/care   [] Sensory re-education   [] scar management      [] other:        ASSESSMENT  Progress towards goals/ change in function: pt cont to make progress towards goals. Cont     Patient will continue to benefit from skilled OT services to address ROM deficits and address strength deficits to attain remaining goals.      [x]  See Plan of Care  []  See progress note/recertification  []  See Discharge Summary         PLAN  [x]  Upgrade activities as tolerated       []  Continue plan of care        []  Discharge due to:_  [] Other:_      Estefania Moise, MS, OTR/L   3/8/2023  4:13 PM    Future Appointments   Date Time Provider Christal Mahoneyi   3/14/2023  3:00 PM Tank Key, OT [facility]   3/16/2023  3:00 PM Tank Key, OT [facility]   4/19/2023  1:00 PM Heath Peterson MD [facility] Sched   5/15/2023  2:30 PM Austen Dave MD [facility] BS AMB   6/19/2023  3:30 PM Bill Joseph MD [facility] BS AMB

## 2023-03-14 ENCOUNTER — HOSPITAL ENCOUNTER (OUTPATIENT)
Age: 63
Setting detail: RECURRING SERIES
Discharge: HOME | End: 2023-03-17
Payer: COMMERCIAL

## 2023-03-14 PROCEDURE — 97110 THERAPEUTIC EXERCISES: CPT

## 2023-03-15 NOTE — BH NOTE
OT DAILY TREATMENT NOTE     Patient Name: Di Diaz  Date:3/15/2023  : 1960  [x]  Patient  Verified  Payor: Raymundo Carter / Plan: [address] [place] [address] / Product Type: *No Product type* /    In time:300  Out time:342  Total Treatment Time (min): 42  Visit #: 14     Treatment Area: Fracture of unspecified carpal bone, left wrist, subsequent encounter for fracture with routine healing [S62.102D]    SUBJECTIVE  Pre- tx Pain Level (0-10 scale): 2/10  Post- tx pain level (0/10 scale)2/10  Any medication changes, allergies to medications, adverse drug reactions, diagnosis change, or new procedure performed?: [x] No    [] Yes (see summary sheet for update)  Subjective functional status/changes:   [] No changes reported  Pt reports decreased pain and increased function in L wrist       42 min Therapeutic Exercise:  [] See flow sheet :   Rationale: increase ROM, increase strength, and improve coordination to improve the patients ability to complete L wrist gross and fine motor functional tasks   3# L wrist flexion/ extension x 10 reps, 2 sets   2# supinator x 10 reps, 2 sets   Blue digiflex x 10 reps, 5 sets   Red flexbar x 10 reps, 2 sets   UBE x 10 min level 2.5   Pink theratube web slide ex for shoulder extension x 10 reps, focusing on full flexion in L hand. 2# rebounder x 10 reps,       With   [x] TE   [] TA   [] neuro   [] other: Patient Education: [x] Review HEP    [x] Progressed/Changed HEP based on:   [x] positioning   [x] body mechanics   [] transfers   [] heat/ice application   [] Splint wear/care   [] Sensory re-education   [] scar management      [] other:        ASSESSMENT  Progress towards goals/ change in function: pt cont to make improvement in activity tolerance       Patient will continue to benefit from skilled OT services to address ROM deficits and address strength deficits to attain remaining goals.      [x]  See Plan of Care  []  See progress note/recertification  []  See Discharge Summary PLAN  [x]  Upgrade activities as tolerated       []  Continue plan of care        []  Discharge due to:_  []  Other:_      Manan Ornelas MS, OTR/L   3/15/2023  9:37 AM    Future Appointments   Date Time Provider Christal Montero   3/16/2023  3:00 PM Perez Hill OT [facility]   4/19/2023  1:00 PM Nisha Wiggins MD [facility] Sched   5/15/2023  2:30 PM Jose Canales MD [facility] BS AMB   6/19/2023  3:30 PM Bill Gauthier MD [facility] BS AMB

## 2023-03-16 ENCOUNTER — HOSPITAL ENCOUNTER (OUTPATIENT)
Age: 63
Setting detail: RECURRING SERIES
Discharge: HOME | End: 2023-03-19
Payer: COMMERCIAL

## 2023-03-16 PROCEDURE — 97110 THERAPEUTIC EXERCISES: CPT

## 2023-03-16 NOTE — PROGRESS NOTES
OT DAILY TREATMENT NOTE     Patient Name: Matthias Martin  Date:3/16/2023  : 1960  [x]  Patient  Verified  Payor: Danis Hernandez / Plan: [facility] / Product Type: *No Product type* /    In time:300  Out time:349  Total Treatment Time (min): 49  Visit #: 15   Treatment Area: Fracture of unspecified carpal bone, left wrist, subsequent encounter for fracture with routine healing [S62.102D]    SUBJECTIVE  Pre- tx Pain Level (0-10 scale): 1-2/10  Post- tx pain level (0/10 scale)1/10  Any medication changes, allergies to medications, adverse drug reactions, diagnosis change, or new procedure performed?: [x] No    [] Yes (see summary sheet for update)  Subjective functional status/changes:   [] No changes reported  Pt reports increased function in L wrist with decreased pain       49 min Therapeutic Exercise:  [] See flow sheet :   Rationale: increase ROM, increase strength, and improve coordination to improve the patients ability to increase L wrist function. 3# L wrist flexion/ extension x 10 reps, 2 sets   2# L wrist supination  x 10 reps, 2 sets   Blue digiflex x 10 reps, 5 sets   UBE x 10 min level 2  2# rebounder x 10 reps L hand           With   [x] TE   [] TA   [] neuro   [] other: Patient Education: [x] Review HEP    [x] Progressed/Changed HEP based on:   [x] positioning   [x] body mechanics   [] transfers   [] heat/ice application   [] Splint wear/care   [] Sensory re-education   [] scar management      [] other:        ASSESSMENT  Progress towards goals/ change in function: pt continues to increase pain free function in L hand. Cont     Patient will continue to benefit from skilled OT services to address ROM deficits and address strength deficits to attain remaining goals.      [x]  See Plan of Care  []  See progress note/recertification  []  See Discharge Summary         PLAN  [x]  Upgrade activities as tolerated       []  Continue plan of care        []  Discharge due to:_  []  Other:_

## 2023-03-20 ENCOUNTER — APPOINTMENT (OUTPATIENT)
Age: 63
End: 2023-03-20
Payer: COMMERCIAL

## 2023-03-22 ENCOUNTER — HOSPITAL ENCOUNTER (OUTPATIENT)
Age: 63
Setting detail: RECURRING SERIES
Discharge: HOME | End: 2023-03-25
Payer: COMMERCIAL

## 2023-03-22 ENCOUNTER — HOSPITAL ENCOUNTER (OUTPATIENT)
Age: 63
Discharge: HOME | End: 2023-03-25
Payer: COMMERCIAL

## 2023-03-22 DIAGNOSIS — Z51.0 ENCOUNTER FOR RADIOTHERAPY: ICD-10-CM

## 2023-03-22 DIAGNOSIS — C61 MALIGNANT NEOPLASM OF PROSTATE (HCC): ICD-10-CM

## 2023-03-22 LAB
ERYTHROCYTE [DISTWIDTH] IN BLOOD BY AUTOMATED COUNT: 18.6 % (ref 11.6–14.5)
HCT VFR BLD AUTO: 38.3 % (ref 36–48)
HGB BLD-MCNC: 13.2 G/DL (ref 13–16)
MCH RBC QN AUTO: 35.3 PG (ref 24–34)
MCHC RBC AUTO-ENTMCNC: 34.5 G/DL (ref 31–37)
MCV RBC AUTO: 102.4 FL (ref 78–100)
NRBC # BLD: 0 K/UL (ref 0–0.01)
NRBC BLD-RTO: 0 PER 100 WBC
PLATELET # BLD AUTO: 152 K/UL (ref 135–420)
PMV BLD AUTO: 9.5 FL (ref 9.2–11.8)
RBC # BLD AUTO: 3.74 M/UL (ref 4.35–5.65)
WBC # BLD AUTO: 6 K/UL (ref 4.6–13.2)

## 2023-03-22 PROCEDURE — 36415 COLL VENOUS BLD VENIPUNCTURE: CPT

## 2023-03-22 PROCEDURE — 85027 COMPLETE CBC AUTOMATED: CPT

## 2023-03-22 PROCEDURE — 97110 THERAPEUTIC EXERCISES: CPT

## 2023-03-22 NOTE — PROGRESS NOTES
unable to process this request in 24 hours please contact our office: [phone].    ___ I have read the above report and request that my patient be discharged from therapy.      Physician Signature:        Date:       Time:

## 2023-03-22 NOTE — PROGRESS NOTES
Time Provider Christal Kylah   4/19/2023  1:00 PM Denis Sever, MD [facility] Sched   5/15/2023  2:30 PM Cynthia Hull MD [facility] BS AMB   6/19/2023  3:30 PM Bill Jorgensen MD [facility] BS AMB

## 2023-03-24 RX ORDER — POTASSIUM CHLORIDE 20 MEQ/1
TABLET, EXTENDED RELEASE ORAL
Qty: 90 TABLET | Refills: 0 | Status: SHIPPED | OUTPATIENT
Start: 2023-03-24

## 2023-04-05 NOTE — TELEPHONE ENCOUNTER
Attempted to contact pt at  number, no answer. Lvm for pt to return call to office at [phone]. Will continue to try to contact pt. Mease Countryside Stearns previously followed with Dr. Herrera, [facility]

## 2023-04-07 RX ORDER — RIVAROXABAN 20 MG/1
TABLET, FILM COATED ORAL
Qty: 90 TABLET | Refills: 0 | Status: SHIPPED | OUTPATIENT
Start: 2023-04-07

## 2023-04-24 RX ORDER — TAMSULOSIN HYDROCHLORIDE 0.4 MG/1
0.4 CAPSULE ORAL DAILY
Qty: 90 CAPSULE | Refills: 1 | Status: SHIPPED | OUTPATIENT
Start: 2023-04-24 | End: 2023-05-15

## 2023-05-10 RX ORDER — DULOXETIN HYDROCHLORIDE 60 MG/1
60 CAPSULE, DELAYED RELEASE ORAL DAILY
Qty: 90 CAPSULE | Refills: 1 | Status: SHIPPED | OUTPATIENT
Start: 2023-05-10

## 2023-05-15 ENCOUNTER — OFFICE VISIT (OUTPATIENT)
Facility: CLINIC | Age: 63
End: 2023-05-15
Payer: COMMERCIAL

## 2023-05-15 VITALS
HEIGHT: 70 IN | WEIGHT: 251.6 LBS | BODY MASS INDEX: 36.02 KG/M2 | HEART RATE: 80 BPM | SYSTOLIC BLOOD PRESSURE: 127 MMHG | OXYGEN SATURATION: 94 % | DIASTOLIC BLOOD PRESSURE: 80 MMHG | TEMPERATURE: 98 F

## 2023-05-15 DIAGNOSIS — F17.200 TOBACCO DEPENDENCE: ICD-10-CM

## 2023-05-15 DIAGNOSIS — Z53.20 LUNG CANCER SCREENING DECLINED BY PATIENT: ICD-10-CM

## 2023-05-15 DIAGNOSIS — I10 ESSENTIAL HYPERTENSION: ICD-10-CM

## 2023-05-15 DIAGNOSIS — Z12.11 COLON CANCER SCREENING: Primary | ICD-10-CM

## 2023-05-15 DIAGNOSIS — E78.5 HYPERLIPIDEMIA, UNSPECIFIED HYPERLIPIDEMIA TYPE: ICD-10-CM

## 2023-05-15 PROCEDURE — 99213 OFFICE O/P EST LOW 20 MIN: CPT | Performed by: STUDENT IN AN ORGANIZED HEALTH CARE EDUCATION/TRAINING PROGRAM

## 2023-05-15 PROCEDURE — 3079F DIAST BP 80-89 MM HG: CPT | Performed by: STUDENT IN AN ORGANIZED HEALTH CARE EDUCATION/TRAINING PROGRAM

## 2023-05-15 PROCEDURE — 3074F SYST BP LT 130 MM HG: CPT | Performed by: STUDENT IN AN ORGANIZED HEALTH CARE EDUCATION/TRAINING PROGRAM

## 2023-05-15 RX ORDER — FUROSEMIDE 40 MG/1
40 TABLET ORAL DAILY
Qty: 90 TABLET | Refills: 3 | Status: SHIPPED | OUTPATIENT
Start: 2023-05-15

## 2023-05-15 ASSESSMENT — PATIENT HEALTH QUESTIONNAIRE - PHQ9
SUM OF ALL RESPONSES TO PHQ QUESTIONS 1-9: 0
1. LITTLE INTEREST OR PLEASURE IN DOING THINGS: 0
SUM OF ALL RESPONSES TO PHQ QUESTIONS 1-9: 0
SUM OF ALL RESPONSES TO PHQ9 QUESTIONS 1 & 2: 0
SUM OF ALL RESPONSES TO PHQ QUESTIONS 1-9: 0
2. FEELING DOWN, DEPRESSED OR HOPELESS: 0
SUM OF ALL RESPONSES TO PHQ QUESTIONS 1-9: 0

## 2023-05-15 NOTE — PROGRESS NOTES
Christopher Haider presents today for   Chief Complaint   Patient presents with    3 Month Follow-Up              Is someone accompanying this pt? Yes Nadir Hernandezs     Is the patient using any DME equipment during OV? No     Health Maintenance reviewed and discussed and ordered per Provider. Health Maintenance Due   Topic Date Due    Pneumococcal 0-64 years Vaccine (1 - PCV) Never done    HIV screen  Never done    Hepatitis C screen  Never done    DTaP/Tdap/Td vaccine (1 - Tdap) Never done    Colorectal Cancer Screen  Never done    Shingles vaccine (1 of 2) Never done    Low dose CT lung screening  Never done   . Coordination of Care:  1. \"Have you been to the ER, urgent care clinic since your last visit? Hospitalized since your last visit? \" No    2. \"Have you seen or consulted any other health care providers outside of the [address] [place] [address] since your last visit? \" Yes Radiologist     3. For patients aged 39-70: Has the patient had a colonoscopy?  No

## 2023-05-15 NOTE — PROGRESS NOTES
Subjective:   Paty Cousin is a 57 y.o. male who was seen for 3 Month Follow-Up (/)    Patient here for chronic disease visit. He just finished a course of radiation for prostate cancer. His prostate specific antigen is improved. They want to reduce his testosterone level down to 0. He is planning on getting hormone injections for this. Would like to get them done here as he was saving a trip to [place]. He is due for screening test however he would like to hold off until his prostate cancer is fully taken care of. He still smoking and does not want to quit at this time. Prior to Admission medications    Medication Sig Start Date End Date Taking? Authorizing Provider   furosemide (LASIX) 40 MG tablet Take 1 tablet by mouth daily 5/15/23  Yes Laverda Epley, MD   DULoxetine (CYMBALTA) 60 MG extended release capsule TAKE 1 CAPSULE BY MOUTH DAILY. 5/10/23  Yes Laverda Epley, MD   oxybutynin (DITROPAN) 5 MG tablet  4/5/23  Yes Historical Provider, MD   XARELTO 20 MG TABS tablet TAKE 1 TABLET BY MOUTH DAILY. 4/7/23  Yes Laverda Epley, MD   potassium chloride (KLOR-CON M) 20 MEQ extended release tablet TAKE 1 TABLET BY MOUTH DAILY. OR AS DIRECTED. 3/24/23  Yes Laverda Epley, MD   Relugolix (ORGOVYX) 120 MG TABS Take 120 mg by mouth daily 2/24/23  Yes Sonal Solano MD   nitroGLYCERIN (NITROLINGUAL) 0.4 MG/SPRAY 0.4 mg spray Place 1 spray under the tongue every 5 minutes as needed for Chest pain 2/15/23  Yes Laverda Epley, MD   [address] [place] [address] 420 MG/3.5ML SOCT INJECT 420MG UNDER THE SKIN AS INSTRUCTED ONCE EVERY 30 DAYS 2/13/23  Yes Hipolito Burrell MD   ergocalciferol (ERGOCALCIFEROL) 1.25 MG (06058 UT) capsule Take 1 capsule by mouth every 7 days 11/14/22  Yes Ar Automatic Reconciliation   metoprolol tartrate (LOPRESSOR) 25 MG tablet TAKE 1/2 TABLET BY MOUTH EVERY TWELVE (12) HOURS.  10/4/22  Yes Ar Automatic Reconciliation   tiotropium-olodaterol (STIOLTO)

## 2023-05-24 RX ORDER — OXYBUTYNIN CHLORIDE 5 MG/1
TABLET ORAL
Qty: 90 TABLET | Refills: 1 | Status: SHIPPED | OUTPATIENT
Start: 2023-05-24

## 2023-06-02 RX ORDER — TIOTROPIUM BROMIDE AND OLODATEROL 3.124; 2.736 UG/1; UG/1
SPRAY, METERED RESPIRATORY (INHALATION)
Qty: 4 EACH | Refills: 3 | Status: SHIPPED | OUTPATIENT
Start: 2023-06-02

## 2023-06-16 ENCOUNTER — HOSPITAL ENCOUNTER (OUTPATIENT)
Facility: HOSPITAL | Age: 63
Setting detail: SPECIMEN
Discharge: HOME | End: 2023-06-19

## 2023-06-16 ENCOUNTER — HOSPITAL ENCOUNTER (OUTPATIENT)
Age: 63
Discharge: HOME | End: 2023-06-19
Payer: COMMERCIAL

## 2023-06-16 DIAGNOSIS — C61 MALIGNANT NEOPLASM OF PROSTATE (HCC): ICD-10-CM

## 2023-06-16 LAB — PSA SERPL-MCNC: 0.9 NG/ML (ref 0–4)

## 2023-06-16 PROCEDURE — 84153 ASSAY OF PSA TOTAL: CPT

## 2023-06-16 PROCEDURE — 36415 COLL VENOUS BLD VENIPUNCTURE: CPT

## 2023-06-19 RX ORDER — POTASSIUM CHLORIDE 20 MEQ/1
TABLET, EXTENDED RELEASE ORAL
Qty: 90 TABLET | Refills: 1 | Status: SHIPPED | OUTPATIENT
Start: 2023-06-19 | End: 2023-06-26 | Stop reason: SDUPTHER

## 2023-06-21 ENCOUNTER — HOSPITAL ENCOUNTER (OUTPATIENT)
Age: 63
Discharge: HOME | End: 2023-06-24
Payer: COMMERCIAL

## 2023-06-21 DIAGNOSIS — C61 MALIGNANT NEOPLASM OF PROSTATE (HCC): ICD-10-CM

## 2023-06-21 PROCEDURE — 84402 ASSAY OF FREE TESTOSTERONE: CPT

## 2023-06-21 PROCEDURE — 36415 COLL VENOUS BLD VENIPUNCTURE: CPT

## 2023-06-23 RX ORDER — EVOLOCUMAB 420 MG/3.5
KIT SUBCUTANEOUS
Qty: 3.5 ML | Refills: 3 | Status: SHIPPED | OUTPATIENT
Start: 2023-06-23

## 2023-06-24 LAB — TESTOST FREE SERPL-MCNC: 5.7 PG/ML (ref 6.6–18.1)

## 2023-06-26 ENCOUNTER — OFFICE VISIT (OUTPATIENT)
Age: 63
End: 2023-06-26
Payer: COMMERCIAL

## 2023-06-26 VITALS
SYSTOLIC BLOOD PRESSURE: 122 MMHG | HEART RATE: 81 BPM | WEIGHT: 252 LBS | OXYGEN SATURATION: 96 % | BODY MASS INDEX: 36.16 KG/M2 | DIASTOLIC BLOOD PRESSURE: 74 MMHG

## 2023-06-26 DIAGNOSIS — I82.409 DEEP VEIN THROMBOSIS (DVT) OF LOWER EXTREMITY, UNSPECIFIED CHRONICITY, UNSPECIFIED LATERALITY, UNSPECIFIED VEIN (HCC): Primary | ICD-10-CM

## 2023-06-26 DIAGNOSIS — I25.10 ATHEROSCLEROSIS OF NATIVE CORONARY ARTERY OF NATIVE HEART WITHOUT ANGINA PECTORIS: ICD-10-CM

## 2023-06-26 PROCEDURE — 3078F DIAST BP <80 MM HG: CPT | Performed by: INTERNAL MEDICINE

## 2023-06-26 PROCEDURE — 3074F SYST BP LT 130 MM HG: CPT | Performed by: INTERNAL MEDICINE

## 2023-06-26 PROCEDURE — 99214 OFFICE O/P EST MOD 30 MIN: CPT | Performed by: INTERNAL MEDICINE

## 2023-06-26 RX ORDER — NITROGLYCERIN 400 UG/1
1 SPRAY ORAL EVERY 5 MIN PRN
Qty: 12 G | Refills: 3 | Status: SHIPPED | OUTPATIENT
Start: 2023-06-26

## 2023-06-26 RX ORDER — ASPIRIN 81 MG/1
81 TABLET, CHEWABLE ORAL DAILY
Qty: 30 TABLET | Refills: 3 | Status: SHIPPED | OUTPATIENT
Start: 2023-06-26

## 2023-06-26 RX ORDER — POTASSIUM CHLORIDE 20 MEQ/1
TABLET, EXTENDED RELEASE ORAL
Qty: 90 TABLET | Refills: 3 | Status: SHIPPED | OUTPATIENT
Start: 2023-06-26

## 2023-06-26 RX ORDER — FUROSEMIDE 40 MG/1
40 TABLET ORAL DAILY
Qty: 90 TABLET | Refills: 3 | Status: SHIPPED | OUTPATIENT
Start: 2023-06-26

## 2023-08-14 ENCOUNTER — OFFICE VISIT (OUTPATIENT)
Facility: CLINIC | Age: 63
End: 2023-08-14
Payer: MEDICARE

## 2023-08-14 VITALS
HEIGHT: 70 IN | WEIGHT: 253 LBS | HEART RATE: 84 BPM | BODY MASS INDEX: 36.22 KG/M2 | TEMPERATURE: 97.9 F | SYSTOLIC BLOOD PRESSURE: 135 MMHG | OXYGEN SATURATION: 95 % | DIASTOLIC BLOOD PRESSURE: 84 MMHG

## 2023-08-14 DIAGNOSIS — E78.5 HYPERLIPIDEMIA, UNSPECIFIED HYPERLIPIDEMIA TYPE: Primary | ICD-10-CM

## 2023-08-14 DIAGNOSIS — C61 PROSTATE CANCER (HCC): ICD-10-CM

## 2023-08-14 DIAGNOSIS — Z11.59 NEED FOR HEPATITIS C SCREENING TEST: ICD-10-CM

## 2023-08-14 DIAGNOSIS — Z12.5 ENCOUNTER FOR SCREENING FOR MALIGNANT NEOPLASM OF PROSTATE: ICD-10-CM

## 2023-08-14 DIAGNOSIS — I10 ESSENTIAL HYPERTENSION: ICD-10-CM

## 2023-08-14 DIAGNOSIS — Z11.3 ROUTINE SCREENING FOR STI (SEXUALLY TRANSMITTED INFECTION): ICD-10-CM

## 2023-08-14 DIAGNOSIS — Z12.11 COLON CANCER SCREENING: ICD-10-CM

## 2023-08-14 PROCEDURE — 1123F ACP DISCUSS/DSCN MKR DOCD: CPT | Performed by: STUDENT IN AN ORGANIZED HEALTH CARE EDUCATION/TRAINING PROGRAM

## 2023-08-14 PROCEDURE — 4004F PT TOBACCO SCREEN RCVD TLK: CPT | Performed by: STUDENT IN AN ORGANIZED HEALTH CARE EDUCATION/TRAINING PROGRAM

## 2023-08-14 PROCEDURE — G8428 CUR MEDS NOT DOCUMENT: HCPCS | Performed by: STUDENT IN AN ORGANIZED HEALTH CARE EDUCATION/TRAINING PROGRAM

## 2023-08-14 PROCEDURE — 3079F DIAST BP 80-89 MM HG: CPT | Performed by: STUDENT IN AN ORGANIZED HEALTH CARE EDUCATION/TRAINING PROGRAM

## 2023-08-14 PROCEDURE — 3017F COLORECTAL CA SCREEN DOC REV: CPT | Performed by: STUDENT IN AN ORGANIZED HEALTH CARE EDUCATION/TRAINING PROGRAM

## 2023-08-14 PROCEDURE — 99214 OFFICE O/P EST MOD 30 MIN: CPT | Performed by: STUDENT IN AN ORGANIZED HEALTH CARE EDUCATION/TRAINING PROGRAM

## 2023-08-14 PROCEDURE — 3075F SYST BP GE 130 - 139MM HG: CPT | Performed by: STUDENT IN AN ORGANIZED HEALTH CARE EDUCATION/TRAINING PROGRAM

## 2023-08-14 PROCEDURE — G8417 CALC BMI ABV UP PARAM F/U: HCPCS | Performed by: STUDENT IN AN ORGANIZED HEALTH CARE EDUCATION/TRAINING PROGRAM

## 2023-08-14 ASSESSMENT — PATIENT HEALTH QUESTIONNAIRE - PHQ9
SUM OF ALL RESPONSES TO PHQ QUESTIONS 1-9: 0
SUM OF ALL RESPONSES TO PHQ9 QUESTIONS 1 & 2: 0
SUM OF ALL RESPONSES TO PHQ QUESTIONS 1-9: 0
1. LITTLE INTEREST OR PLEASURE IN DOING THINGS: 0
SUM OF ALL RESPONSES TO PHQ QUESTIONS 1-9: 0
2. FEELING DOWN, DEPRESSED OR HOPELESS: 0
SUM OF ALL RESPONSES TO PHQ QUESTIONS 1-9: 0

## 2023-08-14 NOTE — PROGRESS NOTES
Patricia Sims presents today for   Chief Complaint   Patient presents with    3 Month Follow-Up       Is someone accompanying this pt? His wife    Is the patient using any DME equipment during [address]? No     Health Maintenance reviewed and discussed and ordered per Provider. Health Maintenance Due   Topic Date Due    Pneumococcal 65+ years Vaccine (1 - PCV) Never done    HIV screen  Never done    Hepatitis C screen  Never done    DTaP/Tdap/Td vaccine (1 - Tdap) Never done    Colorectal Cancer Screen  Never done    Shingles vaccine (1 of 2) Never done    Flu vaccine (1) Never done   . Coordination of Care:  1. \"Have you been to the ER, urgent care clinic since your last visit? Hospitalized since your last visit? \" No    2. \"Have you seen or consulted any other health care providers outside of the [address] since your last visit? \" [address] [facility]     3. For patients aged 43-73: Has the patient had a colonoscopy? He is due for another one.

## 2023-08-14 NOTE — PROGRESS NOTES
Subjective:   Stephanie Macias is a 70 y.o. male who was seen for 3 Month Follow-Up  Patient finished course of radiation for prostate cancer. He is starting chemo for 6 months. He is ready to do colonoscopy. Will order GI referral.  No headaches or vision changes lightheadedness or dizziness or chest pain or shortness of breath or edema. Due for blood work. Will order      Prior to Admission medications    Medication Sig Start Date End Date Taking? Authorizing Provider   leuprolide acetate, 6 Month, (ELIGARD) 45 MG injection Inject 0.375 mLs into the skin once for 1 dose 7/25/23 8/14/23 Yes Carolin Bowles MD   furosemide (LASIX) 40 MG tablet Take 1 tablet by mouth daily 6/26/23  Yes Jack PEREA MD   nitroGLYCERIN (NITROLINGUAL) 0.4 MG/SPRAY 0.4 mg spray Place 1 spray under the tongue every 5 minutes as needed for Chest pain 6/26/23  Yes Jack PEREA MD   potassium chloride (KLOR-CON M) 20 MEQ extended release tablet TAKE 1 TABLET BY MOUTH DAILY OR AS DIRECTED. 6/26/23  Yes Jack PEREA MD   rivaroxaban (XARELTO) 20 MG TABS tablet Take 1 tablet by mouth daily 6/26/23  Yes Jack PEREA MD   aspirin (ASPIRIN CHILDRENS) 81 MG chewable tablet Take 1 tablet by mouth daily 6/26/23  Yes Jack PEREA MD   metoprolol tartrate (LOPRESSOR) 25 MG tablet TAKE 1/2 TABLET BY MOUTH EVERY TWELVE (12) HOURS. 6/26/23  Yes Bill Willoughby MD   [address] [place] 420 MG/3.5ML SOCT INJECT 420MG UNDER THE SKIN AS INSTRUCTED ONCE EVERY 30 DAYS 6/23/23  Yes Chris Corona PA-C   STIOLTO RESPIMAT 2.5-2.5 MCG/ACT AERS TAKE 2 PUFFS BY INHALATION DAILY. 6/2/23  Yes Rosetta Garibay MD   DULoxetine (CYMBALTA) 60 MG extended release capsule TAKE 1 CAPSULE BY MOUTH DAILY.  5/10/23  Yes Rosetta Garibay MD   degarelix acetate [facility]) 80 MG SOLR chemo injection Inject 4 mLs into the skin once for 1 dose 6/21/23 6/21/23  Carolin Bowles MD     Allergies   Allergen Reactions    Gabapentin Other (See Comments)

## 2023-09-14 ENCOUNTER — SCHEDULED TELEPHONE ENCOUNTER (OUTPATIENT)
Age: 63
End: 2023-09-14

## 2023-09-14 ENCOUNTER — HOSPITAL ENCOUNTER (OUTPATIENT)
Age: 63
Discharge: HOME | End: 2023-09-14
Payer: COMMERCIAL

## 2023-09-14 DIAGNOSIS — E55.9 VITAMIN D DEFICIENCY: ICD-10-CM

## 2023-09-14 DIAGNOSIS — C61 MALIGNANT NEOPLASM OF PROSTATE (HCC): ICD-10-CM

## 2023-09-14 DIAGNOSIS — Z12.11 ENCOUNTER FOR SCREENING FOR MALIGNANT NEOPLASM OF COLON: Primary | ICD-10-CM

## 2023-09-14 PROBLEM — S62.109A WRIST FRACTURE: Status: ACTIVE | Noted: 2022-12-06

## 2023-09-14 PROBLEM — R07.9 CHEST PAIN: Status: ACTIVE | Noted: 2018-09-13

## 2023-09-14 PROBLEM — E78.5 DYSLIPIDEMIA, GOAL LDL BELOW 70: Status: ACTIVE | Noted: 2018-09-13

## 2023-09-14 PROBLEM — I25.10 CAD, MULTIPLE VESSEL: Status: ACTIVE | Noted: 2018-09-13

## 2023-09-14 LAB
25(OH)D3 SERPL-MCNC: 60.8 NG/ML (ref 30–100)
PSA SERPL-MCNC: 0.2 NG/ML (ref 0–4)

## 2023-09-14 PROCEDURE — 36415 COLL VENOUS BLD VENIPUNCTURE: CPT

## 2023-09-14 PROCEDURE — 84153 ASSAY OF PSA TOTAL: CPT

## 2023-09-14 PROCEDURE — 84403 ASSAY OF TOTAL TESTOSTERONE: CPT

## 2023-09-14 PROCEDURE — 82306 VITAMIN D 25 HYDROXY: CPT

## 2023-09-14 RX ORDER — OXYBUTYNIN CHLORIDE 5 MG/1
TABLET ORAL
COMMUNITY
Start: 2023-08-22

## 2023-09-15 DIAGNOSIS — Z12.11 ENCOUNTER FOR SCREENING FOR MALIGNANT NEOPLASM OF COLON: Primary | ICD-10-CM

## 2023-09-15 LAB
COMMENT: ABNORMAL
TESTOST SERPL-MCNC: 18 NG/DL (ref 264–916)

## 2023-09-15 NOTE — PROGRESS NOTES
Peg prep given to the patient via telephone and MyChart. Procedure 9/26/23., Dx Code M48.59  Patient verbalized understanding.

## 2023-09-15 NOTE — TELEPHONE ENCOUNTER
Peg prep given to the patient via telephone and MyChart. Procedure 9/26/23, Dx Code Z12.11  Patient verbalized understanding.
Yes

## 2023-09-19 ENCOUNTER — PREP FOR PROCEDURE (OUTPATIENT)
Age: 63
End: 2023-09-19

## 2023-09-19 DIAGNOSIS — Z12.11 COLON CANCER SCREENING: Primary | ICD-10-CM

## 2023-09-19 RX ORDER — SODIUM CHLORIDE, SODIUM LACTATE, POTASSIUM CHLORIDE, CALCIUM CHLORIDE 600; 310; 30; 20 MG/100ML; MG/100ML; MG/100ML; MG/100ML
INJECTION, SOLUTION INTRAVENOUS CONTINUOUS
Status: CANCELLED | OUTPATIENT
Start: 2023-09-19

## 2023-09-19 RX ORDER — POLYETHYLENE GLYCOL 3350, SODIUM SULFATE ANHYDROUS, SODIUM BICARBONATE, SODIUM CHLORIDE, POTASSIUM CHLORIDE 236; 22.74; 6.74; 5.86; 2.97 G/4L; G/4L; G/4L; G/4L; G/4L
4 POWDER, FOR SOLUTION ORAL ONCE
Qty: 4000 ML | Refills: 0 | Status: SHIPPED | OUTPATIENT
Start: 2023-09-19 | End: 2023-09-19

## 2023-09-25 ENCOUNTER — TELEPHONE (OUTPATIENT)
Dept: FAMILY MEDICINE CLINIC | Facility: CLINIC | Age: 63
End: 2023-09-25

## 2023-09-25 NOTE — TELEPHONE ENCOUNTER
Patient called in to cancel his colonoscopy that was scheduled for tomorrow 9/26/23 due to recommendations from his urologist who advised it would be best to wait due to damage from radiation from his prostate that if biopsies or anything else would have to be done puts him at a very high risk to have a tear in his colon per the urologist. Patient stated he will call us back once he gets the clear from urology to proceed.  mima Jaffe

## 2023-10-11 RX ORDER — TIOTROPIUM BROMIDE AND OLODATEROL 3.124; 2.736 UG/1; UG/1
SPRAY, METERED RESPIRATORY (INHALATION)
Qty: 4 EACH | Refills: 3 | Status: SHIPPED | OUTPATIENT
Start: 2023-10-11

## 2023-11-13 RX ORDER — DULOXETIN HYDROCHLORIDE 60 MG/1
60 CAPSULE, DELAYED RELEASE ORAL DAILY
Qty: 90 CAPSULE | Refills: 1 | Status: SHIPPED | OUTPATIENT
Start: 2023-11-13

## 2023-11-21 RX ORDER — OXYBUTYNIN CHLORIDE 5 MG/1
5 TABLET ORAL 3 TIMES DAILY
Qty: 90 TABLET | Refills: 1 | Status: SHIPPED | OUTPATIENT
Start: 2023-11-21

## 2023-11-29 NOTE — PROGRESS NOTES
1900-  Report received. 2008-  Norco 1 tab PO.  2200-  PTT drawn and picked up by . 2223-  Dilaudid 0.5 mg IVP. Pt educated on last dose of IV pain intervention. Pt verbalized understanding. 0030-  Norco 2 tab PO.  0120-  Lab called about PTT results not yet posted. 0400-  Heparin d/c per MD order.   Pt on RA.  0500-  Pt OOB to chair with stand by assist. Yes - the patient is able to be screened

## 2023-12-15 RX ORDER — COLCHICINE 0.6 MG/1
0.6 CAPSULE ORAL DAILY
Qty: 30 CAPSULE | Refills: 2 | OUTPATIENT
Start: 2023-12-15

## 2023-12-27 RX ORDER — COLCHICINE 0.6 MG/1
0.6 CAPSULE ORAL DAILY
Qty: 30 CAPSULE | Refills: 2 | OUTPATIENT
Start: 2023-12-27

## 2024-01-05 RX ORDER — COLCHICINE 0.6 MG/1
0.6 CAPSULE ORAL DAILY
Qty: 30 CAPSULE | Refills: 1 | Status: SHIPPED | OUTPATIENT
Start: 2024-01-05

## 2024-01-05 NOTE — TELEPHONE ENCOUNTER
Patient requesting a refill on Mitigare stating it was a medication from .     He stated that the gout feet is so bad it hurts to walk.     I do not see it in the chart.     Call back number is [phone]

## 2024-01-09 NOTE — TELEPHONE ENCOUNTER
Message comes from pharmacy saying the copay for colchicine capsules is $115.56 and requests a new rx for colchicine tablets for money saving for patient.

## 2024-01-10 RX ORDER — COLCHICINE 0.6 MG/1
0.6 TABLET ORAL DAILY
Qty: 90 TABLET | Refills: 1 | Status: SHIPPED | OUTPATIENT
Start: 2024-01-10

## 2024-01-22 RX ORDER — TIOTROPIUM BROMIDE AND OLODATEROL 3.124; 2.736 UG/1; UG/1
SPRAY, METERED RESPIRATORY (INHALATION)
Qty: 4 EACH | Refills: 2 | Status: SHIPPED | OUTPATIENT
Start: 2024-01-22

## 2024-01-23 ENCOUNTER — HOSPITAL ENCOUNTER (OUTPATIENT)
Age: 64
Discharge: HOME | End: 2024-01-26

## 2024-01-23 LAB — SENTARA SPECIMEN COLLECTION: NORMAL

## 2024-01-26 RX ORDER — FUROSEMIDE 40 MG/1
40 TABLET ORAL DAILY
Qty: 90 TABLET | Refills: 1 | Status: SHIPPED | OUTPATIENT
Start: 2024-01-26

## 2024-02-14 ENCOUNTER — OFFICE VISIT (OUTPATIENT)
Facility: CLINIC | Age: 64
End: 2024-02-14
Payer: MEDICARE

## 2024-02-14 VITALS
HEIGHT: 70 IN | WEIGHT: 271.3 LBS | SYSTOLIC BLOOD PRESSURE: 139 MMHG | RESPIRATION RATE: 18 BRPM | HEART RATE: 85 BPM | OXYGEN SATURATION: 97 % | TEMPERATURE: 97.7 F | DIASTOLIC BLOOD PRESSURE: 89 MMHG | BODY MASS INDEX: 38.84 KG/M2

## 2024-02-14 DIAGNOSIS — I25.119 ATHEROSCLEROSIS OF NATIVE CORONARY ARTERY OF NATIVE HEART WITH ANGINA PECTORIS (HCC): ICD-10-CM

## 2024-02-14 DIAGNOSIS — E66.01 SEVERE OBESITY (BMI 35.0-39.9) WITH COMORBIDITY (HCC): ICD-10-CM

## 2024-02-14 DIAGNOSIS — R73.9 HYPERGLYCEMIA: ICD-10-CM

## 2024-02-14 DIAGNOSIS — I10 ESSENTIAL HYPERTENSION: ICD-10-CM

## 2024-02-14 DIAGNOSIS — I82.409 DEEP VEIN THROMBOSIS (DVT) OF LOWER EXTREMITY, UNSPECIFIED CHRONICITY, UNSPECIFIED LATERALITY, UNSPECIFIED VEIN (HCC): ICD-10-CM

## 2024-02-14 DIAGNOSIS — Z86.711 HISTORY OF PULMONARY EMBOLUS (PE): ICD-10-CM

## 2024-02-14 DIAGNOSIS — C61 PROSTATE CANCER (HCC): ICD-10-CM

## 2024-02-14 DIAGNOSIS — J44.9 CHRONIC OBSTRUCTIVE PULMONARY DISEASE, UNSPECIFIED COPD TYPE (HCC): Primary | ICD-10-CM

## 2024-02-14 PROBLEM — N18.31 STAGE 3A CHRONIC KIDNEY DISEASE (HCC): Status: RESOLVED | Noted: 2022-11-14 | Resolved: 2024-02-14

## 2024-02-14 PROCEDURE — 1124F ACP DISCUSS-NO DSCNMKR DOCD: CPT | Performed by: STUDENT IN AN ORGANIZED HEALTH CARE EDUCATION/TRAINING PROGRAM

## 2024-02-14 PROCEDURE — 3075F SYST BP GE 130 - 139MM HG: CPT | Performed by: STUDENT IN AN ORGANIZED HEALTH CARE EDUCATION/TRAINING PROGRAM

## 2024-02-14 PROCEDURE — 99214 OFFICE O/P EST MOD 30 MIN: CPT | Performed by: STUDENT IN AN ORGANIZED HEALTH CARE EDUCATION/TRAINING PROGRAM

## 2024-02-14 PROCEDURE — 3079F DIAST BP 80-89 MM HG: CPT | Performed by: STUDENT IN AN ORGANIZED HEALTH CARE EDUCATION/TRAINING PROGRAM

## 2024-02-14 NOTE — PROGRESS NOTES
Devon Mckeon presents today for   Chief Complaint   Patient presents with    Follow-up     6m follow up        Is someone accompanying this pt? no    Is the patient using any DME equipment during OV? no    Health Maintenance reviewed and discussed and ordered per Provider.    Health Maintenance Due   Topic Date Due    Pneumococcal 65+ years Vaccine (1 - PCV) Never done    HIV screen  Never done    Hepatitis C screen  Never done    DTaP/Tdap/Td vaccine (1 - Tdap) Never done    Colorectal Cancer Screen  Never done    Shingles vaccine (1 of 2) Never done    Respiratory Syncytial Virus (RSV) Pregnant or age 60 yrs+ (1 - 1-dose 60+ series) Never done    Flu vaccine (1) Never done    GFR test (Diabetes, CKD 3-4, OR last GFR 15-59)  12/08/2023    Annual Wellness Visit (Medicare Advantage)  Never done   .      \"Have you been to the ER, urgent care clinic since your last visit?  Hospitalized since your last visit?\"    NO    “Have you seen or consulted any other health care providers outside of [facility] since your last visit?”    NO    “Have you had a colorectal cancer screening such as a colonoscopy/FIT/Cologuard?    NO         
fever    Penicillins Hives     Social History     Tobacco Use    Smoking status: Every Day     Current packs/day: 1.00     Average packs/day: 1 pack/day for 40.0 years (40.0 ttl pk-yrs)     Types: Cigarettes    Smokeless tobacco: Never   Vaping Use    Vaping Use: Never used   Substance Use Topics    Alcohol use: Yes     Alcohol/week: 2.0 standard drinks of alcohol     Types: 2 Drinks containing 0.5 oz of alcohol per week    Drug use: No        Review of Systems   As noted above in HPI.      Objective:   /89 (Site: Left Upper Arm, Position: Sitting, Cuff Size: Large Adult)   Pulse 85   Temp 97.7 °F (36.5 °C) (Temporal)   Resp 18   Ht 1.778 m (5' 10\")   Wt 123.1 kg (271 lb 4.8 oz)   SpO2 97%   BMI 38.93 kg/m²      General: alert, oriented, not in distress  Chest/Lungs: clear breath sounds, no wheezing or crackles  Heart: normal rate, regular rhythm, no murmur  Extremities: no focal deformities, no edema  Skin: no active skin lesions      Assessment & Plan:     Chronic obstructive pulmonary disease, unspecified COPD type (HCC)  Comments:  stable. continue stiolto respimat  Orders:  -     CBC; Future  Atherosclerosis of native coronary artery of native heart with angina pectoris (HCC)  Comments:  stable. follow by cardiology  Prostate cancer ([facility])  Comments:  continuing with chemo. Has appt in March 2024.  Deep vein thrombosis (DVT) of lower extremity, unspecified chronicity, unspecified laterality, unspecified vein ([facility])  History of pulmonary embolus (PE)  Comments:  Continue Xarelto 20 mg daily  Severe obesity (BMI 35.0-39.9) with comorbidity (HCC)  Comments:  Work on increasing physical activity to at least 150 minutes a week  Orders:  -     Hemoglobin A1C; Future  Essential hypertension  Comments:  Controlled.  Continue metoprolol 25 mg daily  Orders:  -     Basic Metabolic Panel; Future  Hyperglycemia  -     Hemoglobin A1C; Future       No follow-up provider specified.      I have discussed the

## 2024-03-20 ENCOUNTER — HOSPITAL ENCOUNTER (OUTPATIENT)
Age: 64
Discharge: HOME | End: 2024-03-23

## 2024-03-21 LAB
ANION GAP SERPL CALCULATED.3IONS-SCNC: 17 MMOL/L (ref 3–15)
BUN BLDV-MCNC: 11 MG/DL (ref 6–22)
CALCIUM SERPL-MCNC: 9.8 MG/DL (ref 8.4–10.5)
CHLORIDE BLD-SCNC: 101 MMOL/L (ref 98–110)
CO2: 23 MMOL/L (ref 20–32)
CREAT SERPL-MCNC: 1 MG/DL (ref 0.8–1.6)
ESTIMATED AVERAGE GLUCOSE: 110 MG/DL (ref 91–123)
GLOMERULAR FILTRATION RATE: >60 ML/MIN/1.73 SQ.M.
GLUCOSE: 132 MG/DL (ref 70–99)
HBA1C MFR BLD: 5.5 % (ref 4.8–5.6)
HCT VFR BLD CALC: 46.6 % (ref 37.8–52.2)
HEMOGLOBIN: 15.1 G/DL (ref 12.6–17.1)
MCH RBC QN AUTO: 32 PG (ref 26–34)
MCHC RBC AUTO-ENTMCNC: 32 G/DL (ref 31–36)
MCV RBC AUTO: 98 FL (ref 80–95)
PDW BLD-RTO: 14.8 % (ref 10–15.5)
PLATELET # BLD: 223 K/UL (ref 140–440)
PMV BLD AUTO: 11.2 FL (ref 9–13)
POTASSIUM SERPL-SCNC: 4.7 MMOL/L (ref 3.5–5.5)
RBC: 4.76 M/UL (ref 3.8–5.8)
SODIUM BLD-SCNC: 141 MMOL/L (ref 133–145)
WBC: 7 K/UL (ref 4–11)

## 2024-03-29 ENCOUNTER — HOSPITAL ENCOUNTER (OUTPATIENT)
Age: 64
Discharge: HOME | End: 2024-04-01

## 2024-05-17 RX ORDER — DULOXETIN HYDROCHLORIDE 60 MG/1
60 CAPSULE, DELAYED RELEASE ORAL DAILY
Qty: 90 CAPSULE | Refills: 1 | Status: SHIPPED | OUTPATIENT
Start: 2024-05-17

## 2024-05-20 RX ORDER — OXYBUTYNIN CHLORIDE 5 MG/1
5 TABLET ORAL 3 TIMES DAILY
Qty: 90 TABLET | Refills: 3 | Status: SHIPPED | OUTPATIENT
Start: 2024-05-20

## 2024-07-23 RX ORDER — TIOTROPIUM BROMIDE AND OLODATEROL 3.124; 2.736 UG/1; UG/1
2 SPRAY, METERED RESPIRATORY (INHALATION) DAILY
Qty: 4 EACH | Refills: 2 | Status: SHIPPED | OUTPATIENT
Start: 2024-07-23

## 2024-07-26 RX ORDER — RIVAROXABAN 20 MG/1
20 TABLET, FILM COATED ORAL DAILY
Qty: 90 TABLET | Refills: 3 | Status: SHIPPED | OUTPATIENT
Start: 2024-07-26

## 2024-09-17 SDOH — HEALTH STABILITY: PHYSICAL HEALTH: ON AVERAGE, HOW MANY DAYS PER WEEK DO YOU ENGAGE IN MODERATE TO STRENUOUS EXERCISE (LIKE A BRISK WALK)?: 0 DAYS

## 2024-09-17 SDOH — HEALTH STABILITY: PHYSICAL HEALTH: ON AVERAGE, HOW MANY MINUTES DO YOU ENGAGE IN EXERCISE AT THIS LEVEL?: 0 MIN

## 2024-09-17 ASSESSMENT — LIFESTYLE VARIABLES
HOW OFTEN DURING THE LAST YEAR HAVE YOU HAD A FEELING OF GUILT OR REMORSE AFTER DRINKING: NEVER
HOW OFTEN DURING THE LAST YEAR HAVE YOU BEEN UNABLE TO REMEMBER WHAT HAPPENED THE NIGHT BEFORE BECAUSE YOU HAD BEEN DRINKING: NEVER
HOW MANY STANDARD DRINKS CONTAINING ALCOHOL DO YOU HAVE ON A TYPICAL DAY: 2
HOW OFTEN DO YOU HAVE A DRINK CONTAINING ALCOHOL: 5
HAVE YOU OR SOMEONE ELSE BEEN INJURED AS A RESULT OF YOUR DRINKING: NO
HOW OFTEN DURING THE LAST YEAR HAVE YOU FOUND THAT YOU WERE NOT ABLE TO STOP DRINKING ONCE YOU HAD STARTED: NEVER
HAS A RELATIVE, FRIEND, DOCTOR, OR ANOTHER HEALTH PROFESSIONAL EXPRESSED CONCERN ABOUT YOUR DRINKING OR SUGGESTED YOU CUT DOWN: NO
HOW OFTEN DURING THE LAST YEAR HAVE YOU BEEN UNABLE TO REMEMBER WHAT HAPPENED THE NIGHT BEFORE BECAUSE YOU HAD BEEN DRINKING: NEVER
HOW OFTEN DURING THE LAST YEAR HAVE YOU FAILED TO DO WHAT WAS NORMALLY EXPECTED FROM YOU BECAUSE OF DRINKING: NEVER
HOW OFTEN DO YOU HAVE A DRINK CONTAINING ALCOHOL: 4 OR MORE TIMES A WEEK
HOW OFTEN DO YOU HAVE SIX OR MORE DRINKS ON ONE OCCASION: 2
HOW OFTEN DURING THE LAST YEAR HAVE YOU NEEDED AN ALCOHOLIC DRINK FIRST THING IN THE MORNING TO GET YOURSELF GOING AFTER A NIGHT OF HEAVY DRINKING: NEVER
HOW OFTEN DURING THE LAST YEAR HAVE YOU NEEDED AN ALCOHOLIC DRINK FIRST THING IN THE MORNING TO GET YOURSELF GOING AFTER A NIGHT OF HEAVY DRINKING: NEVER
HAVE YOU OR SOMEONE ELSE BEEN INJURED AS A RESULT OF YOUR DRINKING: NO
HOW OFTEN DURING THE LAST YEAR HAVE YOU FOUND THAT YOU WERE NOT ABLE TO STOP DRINKING ONCE YOU HAD STARTED: NEVER
HOW OFTEN DURING THE LAST YEAR HAVE YOU FAILED TO DO WHAT WAS NORMALLY EXPECTED FROM YOU BECAUSE OF DRINKING: NEVER
HOW OFTEN DURING THE LAST YEAR HAVE YOU HAD A FEELING OF GUILT OR REMORSE AFTER DRINKING: NEVER
HOW MANY STANDARD DRINKS CONTAINING ALCOHOL DO YOU HAVE ON A TYPICAL DAY: 3 OR 4
HAS A RELATIVE, FRIEND, DOCTOR, OR ANOTHER HEALTH PROFESSIONAL EXPRESSED CONCERN ABOUT YOUR DRINKING OR SUGGESTED YOU CUT DOWN: NO

## 2024-09-17 ASSESSMENT — PATIENT HEALTH QUESTIONNAIRE - PHQ9
SUM OF ALL RESPONSES TO PHQ QUESTIONS 1-9: 0
1. LITTLE INTEREST OR PLEASURE IN DOING THINGS: NOT AT ALL
SUM OF ALL RESPONSES TO PHQ9 QUESTIONS 1 & 2: 0
2. FEELING DOWN, DEPRESSED OR HOPELESS: NOT AT ALL
SUM OF ALL RESPONSES TO PHQ QUESTIONS 1-9: 0

## 2024-09-18 ENCOUNTER — HOSPITAL ENCOUNTER (OUTPATIENT)
Age: 64
Discharge: HOME | End: 2024-09-21

## 2024-09-18 LAB — SENTARA SPECIMEN COLLECTION: NORMAL

## 2024-09-18 PROCEDURE — 99001 SPECIMEN HANDLING PT-LAB: CPT

## 2024-09-18 RX ORDER — POTASSIUM CHLORIDE 1500 MG/1
TABLET, EXTENDED RELEASE ORAL
Qty: 90 TABLET | Refills: 3 | OUTPATIENT
Start: 2024-09-18

## 2024-09-25 RX ORDER — POTASSIUM CHLORIDE 1500 MG/1
TABLET, EXTENDED RELEASE ORAL
Qty: 90 TABLET | Refills: 3 | OUTPATIENT
Start: 2024-09-25

## 2024-09-30 ENCOUNTER — OFFICE VISIT (OUTPATIENT)
Facility: CLINIC | Age: 64
End: 2024-09-30
Payer: MEDICARE

## 2024-09-30 VITALS
DIASTOLIC BLOOD PRESSURE: 84 MMHG | HEART RATE: 75 BPM | OXYGEN SATURATION: 97 % | SYSTOLIC BLOOD PRESSURE: 143 MMHG | WEIGHT: 269.6 LBS | RESPIRATION RATE: 18 BRPM | TEMPERATURE: 97.6 F | HEIGHT: 70 IN | BODY MASS INDEX: 38.6 KG/M2

## 2024-09-30 DIAGNOSIS — E66.01 SEVERE OBESITY (BMI 35.0-35.9 WITH COMORBIDITY): ICD-10-CM

## 2024-09-30 DIAGNOSIS — Z86.711 HISTORY OF PULMONARY EMBOLISM: ICD-10-CM

## 2024-09-30 DIAGNOSIS — E78.5 DYSLIPIDEMIA: ICD-10-CM

## 2024-09-30 DIAGNOSIS — G89.29 CHRONIC BILATERAL LOW BACK PAIN WITHOUT SCIATICA: ICD-10-CM

## 2024-09-30 DIAGNOSIS — I25.119 ATHEROSCLEROSIS OF NATIVE CORONARY ARTERY OF NATIVE HEART WITH ANGINA PECTORIS (HCC): ICD-10-CM

## 2024-09-30 DIAGNOSIS — M54.50 CHRONIC BILATERAL LOW BACK PAIN WITHOUT SCIATICA: ICD-10-CM

## 2024-09-30 DIAGNOSIS — G47.33 OSA (OBSTRUCTIVE SLEEP APNEA): ICD-10-CM

## 2024-09-30 DIAGNOSIS — I25.10 CAD, MULTIPLE VESSEL: ICD-10-CM

## 2024-09-30 DIAGNOSIS — Z87.891 PERSONAL HISTORY OF TOBACCO USE: ICD-10-CM

## 2024-09-30 DIAGNOSIS — E78.5 DYSLIPIDEMIA, GOAL LDL BELOW 70: ICD-10-CM

## 2024-09-30 DIAGNOSIS — Z00.00 MEDICARE ANNUAL WELLNESS VISIT, SUBSEQUENT: Primary | ICD-10-CM

## 2024-09-30 DIAGNOSIS — I10 ESSENTIAL HYPERTENSION: ICD-10-CM

## 2024-09-30 DIAGNOSIS — J44.9 CHRONIC OBSTRUCTIVE PULMONARY DISEASE, UNSPECIFIED COPD TYPE (HCC): ICD-10-CM

## 2024-09-30 PROBLEM — K64.1 GRADE II HEMORRHOIDS: Status: RESOLVED | Noted: 2021-08-23 | Resolved: 2024-09-30

## 2024-09-30 PROBLEM — Z95.1 S/P CABG X 3: Status: RESOLVED | Noted: 2021-08-11 | Resolved: 2024-09-30

## 2024-09-30 PROBLEM — S62.109A WRIST FRACTURE: Status: RESOLVED | Noted: 2022-12-06 | Resolved: 2024-09-30

## 2024-09-30 PROBLEM — Z95.5 PRESENCE OF DRUG COATED STENT IN LEFT CIRCUMFLEX CORONARY ARTERY: Status: RESOLVED | Noted: 2018-09-20 | Resolved: 2024-09-30

## 2024-09-30 PROBLEM — I82.409 DEEP VEIN THROMBOSIS (DVT) OF LOWER EXTREMITY, UNSPECIFIED CHRONICITY, UNSPECIFIED LATERALITY, UNSPECIFIED VEIN (HCC): Status: RESOLVED | Noted: 2024-02-14 | Resolved: 2024-09-30

## 2024-09-30 PROBLEM — R07.9 CHEST PAIN: Status: RESOLVED | Noted: 2018-09-13 | Resolved: 2024-09-30

## 2024-09-30 PROCEDURE — 1124F ACP DISCUSS-NO DSCNMKR DOCD: CPT | Performed by: STUDENT IN AN ORGANIZED HEALTH CARE EDUCATION/TRAINING PROGRAM

## 2024-09-30 PROCEDURE — 99215 OFFICE O/P EST HI 40 MIN: CPT | Performed by: STUDENT IN AN ORGANIZED HEALTH CARE EDUCATION/TRAINING PROGRAM

## 2024-09-30 PROCEDURE — 99406 BEHAV CHNG SMOKING 3-10 MIN: CPT | Performed by: STUDENT IN AN ORGANIZED HEALTH CARE EDUCATION/TRAINING PROGRAM

## 2024-09-30 PROCEDURE — G0296 VISIT TO DETERM LDCT ELIG: HCPCS | Performed by: STUDENT IN AN ORGANIZED HEALTH CARE EDUCATION/TRAINING PROGRAM

## 2024-09-30 PROCEDURE — 3077F SYST BP >= 140 MM HG: CPT | Performed by: STUDENT IN AN ORGANIZED HEALTH CARE EDUCATION/TRAINING PROGRAM

## 2024-09-30 PROCEDURE — 3079F DIAST BP 80-89 MM HG: CPT | Performed by: STUDENT IN AN ORGANIZED HEALTH CARE EDUCATION/TRAINING PROGRAM

## 2024-09-30 PROCEDURE — G0439 PPPS, SUBSEQ VISIT: HCPCS | Performed by: STUDENT IN AN ORGANIZED HEALTH CARE EDUCATION/TRAINING PROGRAM

## 2024-09-30 RX ORDER — LOSARTAN POTASSIUM 50 MG/1
50 TABLET ORAL DAILY
Qty: 90 TABLET | Refills: 1 | Status: SHIPPED | OUTPATIENT
Start: 2024-09-30

## 2024-09-30 RX ORDER — POTASSIUM CHLORIDE 1500 MG/1
TABLET, EXTENDED RELEASE ORAL
Qty: 90 TABLET | Refills: 3 | Status: SHIPPED | OUTPATIENT
Start: 2024-09-30

## 2024-09-30 NOTE — PROGRESS NOTES
Medicare Annual Wellness Visit    Devon Mckeon is here for New Patient (Patient is here to establish care with provider. Former [facility] patient.), Medicare AWV, and Medication Refill (Potassium )    Assessment & Plan   Personal history of tobacco use  -     NM VISIT TO DISCUSS LUNG CA SCREEN W LDCT  Medicare annual wellness visit, subsequent  Recommendations for Preventive Services Due: see orders and patient instructions/AVS.  Recommended screening schedule for the next 5-10 years is provided to the patient in written form: see Patient Instructions/AVS.     No follow-ups on file.     Subjective       Patient's complete Health Risk Assessment and screening values have been reviewed and are found in Flowsheets. The following problems were reviewed today and where indicated follow up appointments were made and/or referrals ordered.    Positive Risk Factor Screenings with Interventions:    Fall Risk:  Do you feel unsteady or are you worried about falling? : (!) yes  2 or more falls in past year?: (!) yes  Fall with injury in past year?: no     Interventions:    Reviewed medications, home hazards, visual acuity, and co-morbidities that can increase risk for falls      Alcohol Screening:  AUDIT-C Score: 6  AUDIT Total Score: 6  Total Score Interpretation: 0-7 suggests low risk alcohol consumption but assess individual risks   Interventions:  Patient advised to follow up in the office for further evalution and treatment              Inactivity:  On average, how many days per week do you engage in moderate to strenuous exercise (like a brisk walk)?: 0 days (!) Abnormal  On average, how many minutes do you engage in exercise at this level?: 0 min  Interventions:  See AVS for additional education material     Abnormal BMI (obese):  Body mass index is 38.68 kg/m². (!) Abnormal  Interventions:  See AVS for additional education material        Dentist Screen:  Have you seen the dentist within the past year?: (!)

## 2024-09-30 NOTE — PROGRESS NOTES
Devon Mckeon presents today for   Chief Complaint   Patient presents with    New Patient     Patient is here to establish care with provider. Former [facility] patient.    Medicare AW    Medication Refill     Potassium        Is someone accompanying this pt? yes    Is the patient using any DME equipment during OV? no    Risk Factor Screenings with Interventions     Fall Risk:  2 or more falls in past year?: (!) yes  Fall with injury in past year?: no    Alcohol:  Alcohol Use: Alcohol Misuse (9/25/2024)    AUDIT-C     Frequency of Alcohol Consumption: 4 or more times a week     Average Number of Drinks: 3 or 4     Frequency of Binge Drinking: Less than monthly       Depression:  PHQ-2 Score: 0    Cognitive:  Clock Drawing Test (CDT): Normal  Words recalled: 3 Words Recalled  Total Score: 5  Total Score Interpretation: Normal Mini-Cog    Health Risk Assessment:     General  In general, how would you say your health is?: Fair  In the past 7 days, have you experienced any of the following: New or Increased Pain, New or Increased Fatigue, Loneliness, Social Isolation, Stress or Anger?: No      Health Habits/Nutrition  On average, how many days per week do you engage in moderate to strenuous exercise (like a brisk walk)?: 0 days  On average, how many minutes do you engage in exercise at this level?: 0 min  Do you eat balanced/healthy meals regularly?: Yes  Have you seen the dentist within the past year?: (!) No  Body mass index is 38.68 kg/m².      Hearing/Vision  Have you had an eye exam within the past year?: (!) No  Do you have difficulty driving, watching TV, or doing any of your daily activities because of your eyesight?: No  Do you or your family notice any trouble with your hearing that hasn't been managed with hearing aids?: No      Safety  Do you have working smoke detectors?: Yes  Do you have any tripping hazards - loose or unsecured carpets or rugs?: No  Do you have non-slip mats or non-slip surfaces or shower

## 2024-09-30 NOTE — PATIENT INSTRUCTIONS
drink a day for women. Too much alcohol can cause health problems.     Manage other health problems such as diabetes, high blood pressure, and high cholesterol. If you think you may have a problem with alcohol or drug use, talk to your doctor.   Medicines    Take your medicines exactly as prescribed. Call your doctor if you think you are having a problem with your medicine.     If your doctor recommends aspirin, take the amount directed each day. Make sure you take aspirin and not another kind of pain reliever, such as acetaminophen (Tylenol).   When should you call for help?   Call 911 if you have symptoms of a heart attack. These may include:    Chest pain or pressure, or a strange feeling in the chest.     Sweating.     Shortness of breath.     Pain, pressure, or a strange feeling in the back, neck, jaw, or upper belly or in one or both shoulders or arms.     Lightheadedness or sudden weakness.     A fast or irregular heartbeat.   After you call 911, the  may tell you to chew 1 adult-strength or 2 to 4 low-dose aspirin. Wait for an ambulance. Do not try to drive yourself.  Watch closely for changes in your health, and be sure to contact your doctor if you have any problems.  Where can you learn more?  Go to https://www.[employer].net/patientEd and enter F075 to learn more about \"A Healthy Heart: Care Instructions.\"  Current as of: June 24, 2023  Content Version: 14.1  © 5290-9588 [employer].   Care instructions adapted under license by [employer]. If you have questions about a medical condition or this instruction, always ask your healthcare professional. [employer] disclaims any warranty or liability for your use of this information.      Personalized Preventive Plan for Devon Mckeon - 9/30/2024  Medicare offers a range of preventive health benefits. Some of the tests and screenings are paid in full while other may be subject to a deductible, co-insurance, and/or

## 2024-09-30 NOTE — PROGRESS NOTES
Chronic Disease Follow up    Devon Mckeon (: 1960) is a 64 y.o. male here for evaluation of the following chief concerns(s):  New Patient (Patient is here to establish care with provider. Former [facility] patient.), Medicare AWV, and Medication Refill (Potassium )       ASSESSMENT/PLAN:  1. Medicare annual wellness visit, subsequent  2. Personal history of tobacco use  -     LA VISIT TO DISCUSS LUNG CA SCREEN W LDCT  3. Dyslipidemia, goal LDL below 70  -     Hepatitis C Antibody; Future  -     Lipid Panel; Future  -     Comprehensive Metabolic Panel; Future  -     CBC with Auto Differential; Future  4. Essential hypertension  -     Hepatitis C Antibody; Future  -     Lipid Panel; Future  -     Comprehensive Metabolic Panel; Future  -     CBC with Auto Differential; Future  -     losartan (COZAAR) 50 MG tablet; Take 1 tablet by mouth daily, Disp-90 tablet, R-1Normal  5. Severe obesity (BMI 35.0-35.9 with comorbidity)  -     Hepatitis C Antibody; Future  -     Lipid Panel; Future  -     Comprehensive Metabolic Panel; Future  -     CBC with Auto Differential; Future  6. Dyslipidemia  7. Atherosclerosis of native coronary artery of native heart with angina pectoris (HCC)  8. CAD, multiple vessel  9. Chronic obstructive pulmonary disease, unspecified COPD type (HCC)  10. History of pulmonary embolism  11. Chronic bilateral low back pain without sciatica  12. HYACINTH (obstructive sleep apnea)    Orders Placed This Encounter   Procedures    Hepatitis C Antibody     Standing Status:   Future     Standing Expiration Date:   2025    Lipid Panel     Standing Status:   Future     Standing Expiration Date:   2025    Comprehensive Metabolic Panel     Standing Status:   Future     Standing Expiration Date:   2025    CBC with Auto Differential     Standing Status:   Future     Standing Expiration Date:   2025    LA VISIT TO DISCUSS LUNG CA SCREEN W LDCT     CAD- stable overall. Has been lost to follow up with

## 2024-10-15 ENCOUNTER — NURSE ONLY (OUTPATIENT)
Facility: CLINIC | Age: 64
End: 2024-10-15
Payer: MEDICARE

## 2024-10-15 ENCOUNTER — HOSPITAL ENCOUNTER (OUTPATIENT)
Age: 64
Setting detail: SPECIMEN
Discharge: HOME | End: 2024-10-18

## 2024-10-15 VITALS — SYSTOLIC BLOOD PRESSURE: 130 MMHG | DIASTOLIC BLOOD PRESSURE: 80 MMHG

## 2024-10-15 DIAGNOSIS — I10 ESSENTIAL HYPERTENSION: Primary | ICD-10-CM

## 2024-10-15 LAB — SENTARA SPECIMEN COLLECTION: NORMAL

## 2024-10-15 PROCEDURE — 3079F DIAST BP 80-89 MM HG: CPT | Performed by: STUDENT IN AN ORGANIZED HEALTH CARE EDUCATION/TRAINING PROGRAM

## 2024-10-15 PROCEDURE — 99001 SPECIMEN HANDLING PT-LAB: CPT

## 2024-10-15 PROCEDURE — 99211 OFF/OP EST MAY X REQ PHY/QHP: CPT | Performed by: STUDENT IN AN ORGANIZED HEALTH CARE EDUCATION/TRAINING PROGRAM

## 2024-10-15 PROCEDURE — 3075F SYST BP GE 130 - 139MM HG: CPT | Performed by: STUDENT IN AN ORGANIZED HEALTH CARE EDUCATION/TRAINING PROGRAM

## 2024-10-15 NOTE — PROGRESS NOTES
Patient here for nurse visit to recheck blood pressure per order of Nicole Barkley MD.     Are you currently experiencing any of the following:  Dizziness: no  Blurry vision: no  Headache: no  Chest pain or discomfort: no  Speech difficulties: no    In the past 30-60 minutes have you:  Smoked: no  Drank caffeine or alcohol: no  Participated in any strenuous activity: no    Patient was seated for at least 5 minutes before blood pressure was read. Today's readings were relayed to Nicole Barkley MD. Nicole Barkley MD said to continue current treatment and follow up as scheduled. Relayed information to patient and they expressed understanding.

## 2024-10-29 ENCOUNTER — TELEMEDICINE (OUTPATIENT)
Facility: CLINIC | Age: 64
End: 2024-10-29

## 2024-10-29 DIAGNOSIS — E78.5 DYSLIPIDEMIA: ICD-10-CM

## 2024-10-29 DIAGNOSIS — I25.10 CAD, MULTIPLE VESSEL: Primary | ICD-10-CM

## 2024-10-29 DIAGNOSIS — Z78.9 STATIN INTOLERANCE: ICD-10-CM

## 2024-10-29 RX ORDER — EZETIMIBE 10 MG/1
10 TABLET ORAL DAILY
Qty: 90 TABLET | Refills: 1 | Status: SHIPPED | OUTPATIENT
Start: 2024-10-29

## 2024-10-29 NOTE — PROGRESS NOTES
Patient-Reported Vitals  No data recorded     Physical Exam             --Nicole Barkley MD

## 2025-01-08 ENCOUNTER — HOSPITAL ENCOUNTER (OUTPATIENT)
Age: 65
Discharge: HOME | End: 2025-01-11

## 2025-01-08 LAB — SENTARA SPECIMEN COLLECTION: NORMAL

## 2025-01-08 PROCEDURE — 99001 SPECIMEN HANDLING PT-LAB: CPT

## 2025-02-24 DIAGNOSIS — G47.33 OSA (OBSTRUCTIVE SLEEP APNEA): Primary | ICD-10-CM

## 2025-02-24 RX ORDER — FUROSEMIDE 40 MG/1
40 TABLET ORAL DAILY
Qty: 90 TABLET | Refills: 1 | Status: SHIPPED | OUTPATIENT
Start: 2025-02-24

## 2025-03-28 RX ORDER — OXYBUTYNIN CHLORIDE 5 MG/1
5 TABLET ORAL 3 TIMES DAILY
Qty: 90 TABLET | Refills: 1 | Status: SHIPPED | OUTPATIENT
Start: 2025-03-28

## 2025-03-31 ENCOUNTER — OFFICE VISIT (OUTPATIENT)
Facility: CLINIC | Age: 65
End: 2025-03-31
Payer: MEDICARE

## 2025-03-31 VITALS
TEMPERATURE: 98.2 F | HEIGHT: 70 IN | HEART RATE: 83 BPM | DIASTOLIC BLOOD PRESSURE: 72 MMHG | RESPIRATION RATE: 18 BRPM | OXYGEN SATURATION: 97 % | WEIGHT: 290 LBS | SYSTOLIC BLOOD PRESSURE: 128 MMHG | BODY MASS INDEX: 41.52 KG/M2

## 2025-03-31 DIAGNOSIS — D75.89 MACROCYTOSIS WITHOUT ANEMIA: ICD-10-CM

## 2025-03-31 DIAGNOSIS — E78.5 DYSLIPIDEMIA: ICD-10-CM

## 2025-03-31 DIAGNOSIS — F10.10 ALCOHOL ABUSE: ICD-10-CM

## 2025-03-31 DIAGNOSIS — I10 ESSENTIAL HYPERTENSION: ICD-10-CM

## 2025-03-31 DIAGNOSIS — I25.119 ATHEROSCLEROSIS OF NATIVE CORONARY ARTERY OF NATIVE HEART WITH ANGINA PECTORIS: ICD-10-CM

## 2025-03-31 DIAGNOSIS — G47.33 OSA (OBSTRUCTIVE SLEEP APNEA): ICD-10-CM

## 2025-03-31 DIAGNOSIS — J44.9 CHRONIC OBSTRUCTIVE PULMONARY DISEASE, UNSPECIFIED COPD TYPE (HCC): ICD-10-CM

## 2025-03-31 DIAGNOSIS — I25.10 CAD, MULTIPLE VESSEL: ICD-10-CM

## 2025-03-31 DIAGNOSIS — E66.01 SEVERE OBESITY: Primary | ICD-10-CM

## 2025-03-31 PROCEDURE — 99214 OFFICE O/P EST MOD 30 MIN: CPT | Performed by: STUDENT IN AN ORGANIZED HEALTH CARE EDUCATION/TRAINING PROGRAM

## 2025-03-31 PROCEDURE — 1124F ACP DISCUSS-NO DSCNMKR DOCD: CPT | Performed by: STUDENT IN AN ORGANIZED HEALTH CARE EDUCATION/TRAINING PROGRAM

## 2025-03-31 PROCEDURE — 3074F SYST BP LT 130 MM HG: CPT | Performed by: STUDENT IN AN ORGANIZED HEALTH CARE EDUCATION/TRAINING PROGRAM

## 2025-03-31 PROCEDURE — 3078F DIAST BP <80 MM HG: CPT | Performed by: STUDENT IN AN ORGANIZED HEALTH CARE EDUCATION/TRAINING PROGRAM

## 2025-03-31 SDOH — ECONOMIC STABILITY: FOOD INSECURITY: WITHIN THE PAST 12 MONTHS, THE FOOD YOU BOUGHT JUST DIDN'T LAST AND YOU DIDN'T HAVE MONEY TO GET MORE.: OFTEN TRUE

## 2025-03-31 SDOH — ECONOMIC STABILITY: FOOD INSECURITY: WITHIN THE PAST 12 MONTHS, YOU WORRIED THAT YOUR FOOD WOULD RUN OUT BEFORE YOU GOT MONEY TO BUY MORE.: OFTEN TRUE

## 2025-03-31 ASSESSMENT — PATIENT HEALTH QUESTIONNAIRE - PHQ9
SUM OF ALL RESPONSES TO PHQ QUESTIONS 1-9: 0
SUM OF ALL RESPONSES TO PHQ QUESTIONS 1-9: 0
1. LITTLE INTEREST OR PLEASURE IN DOING THINGS: NOT AT ALL
2. FEELING DOWN, DEPRESSED OR HOPELESS: NOT AT ALL
SUM OF ALL RESPONSES TO PHQ QUESTIONS 1-9: 0
SUM OF ALL RESPONSES TO PHQ QUESTIONS 1-9: 0

## 2025-03-31 NOTE — PROGRESS NOTES
Devon Mckeon presents today for   Chief Complaint   Patient presents with    Follow-up     6m follow up        Is someone accompanying this pt? no    Is the patient using any DME equipment during OV? no    Health Maintenance Due   Topic Date Due    Pneumococcal 50+ years Vaccine (1 of 2 - PCV) Never done    Colorectal Cancer Screen  Never done    Shingles vaccine (1 of 2) Never done    Respiratory Syncytial Virus (RSV) Pregnant or age 60 yrs+ (1 - Risk 60-74 years 1-dose series) Never done    DTaP/Tdap/Td vaccine (2 - Td or Tdap) 08/16/2022    Lung Cancer Screening &/or Counseling  05/15/2024    Flu vaccine (1) Never done    COVID-19 Vaccine (1 - 2024-25 season) Never done         \"Have you been to the ER, urgent care clinic since your last visit?  Hospitalized since your last visit?\"    NO    “Have you seen or consulted any other health care providers outside our system since your last visit?”    NO    “Have you had a colorectal cancer screening such as a colonoscopy/FIT/Cologuard?    NO    No colonoscopy on file  No cologuard on file  No FIT/FOBT on file   No flexible sigmoidoscopy on file

## 2025-03-31 NOTE — PROGRESS NOTES
Chronic Disease Follow up    Devon Mckeon (: 1960) is a 64 y.o. male here for evaluation of the following chief concerns(s):  Follow-up (6m follow up )       ASSESSMENT/PLAN:  1. Severe obesity  2. HYACINTH (obstructive sleep apnea)  3. Essential hypertension  -     Basic Metabolic Panel; Future  4. Dyslipidemia  -     Lipid Panel; Future  5. Chronic obstructive pulmonary disease, unspecified COPD type (HCC)  6. CAD, multiple vessel  7. Atherosclerosis of native coronary artery of native heart with angina pectoris  8. Macrocytosis without anemia  -     Folate; Future  -     Vitamin B12; Future  -     Reticulocytes; Future  -     Path Review, Smear; Future  9. Alcohol abuse      Orders Placed This Encounter   Procedures    Lipid Panel     Standing Status:   Future     Expected Date:   3/31/2025     Expiration Date:   3/31/2026    Basic Metabolic Panel     Standing Status:   Future     Expected Date:   3/31/2025     Expiration Date:   3/31/2026    Folate     Standing Status:   Future     Expected Date:   3/31/2025     Expiration Date:   3/31/2026    Vitamin B12     Standing Status:   Future     Expected Date:   3/31/2025     Expiration Date:   3/31/2026    Reticulocytes     Standing Status:   Future     Expected Date:   3/31/2025     Expiration Date:   3/31/2026    Path Review, Smear     Standing Status:   Future     Expected Date:   3/31/2025     Expiration Date:   3/31/2026     Reason for Review::   Red cell abnormality     CAD- stable overall. Has been lost to follow up with cardiology, needs to call to schedule follow up. Recheck labs per above . Goal LDL <55. Continue ASA, Lasix, Metoprolol.     HTN- uncontrolled. Start Losartan- discussed side effect profile with patient    H/O Hypokalemia- off K supplements. Recheck BMP    H/O PE- stable. Continue Eliquis     Gout- stable currently. Treat as needed     HYACINTH- stable. Continue CPAP     Prostate ca- stable. Following with urology. In remission    COPD-

## 2025-04-09 DIAGNOSIS — I10 ESSENTIAL HYPERTENSION: ICD-10-CM

## 2025-04-09 RX ORDER — LOSARTAN POTASSIUM 50 MG/1
50 TABLET ORAL DAILY
Qty: 90 TABLET | Refills: 1 | Status: SHIPPED | OUTPATIENT
Start: 2025-04-09

## 2025-04-29 ENCOUNTER — HOSPITAL ENCOUNTER (OUTPATIENT)
Age: 65
Setting detail: SPECIMEN
Discharge: HOME | End: 2025-05-02

## 2025-04-29 LAB — LABCORP DRAW FEE: NORMAL

## 2025-04-29 PROCEDURE — 99001 SPECIMEN HANDLING PT-LAB: CPT

## 2025-04-30 LAB
ANION GAP SERPL CALCULATED.3IONS-SCNC: 10 MMOL/L (ref 3–15)
BUN BLDV-MCNC: 10 MG/DL (ref 6–22)
CALCIUM SERPL-MCNC: 8.9 MG/DL (ref 8.4–10.5)
CHLORIDE BLD-SCNC: 103 MMOL/L (ref 98–110)
CHOLESTEROL, TOTAL: 133 MG/DL (ref 110–200)
CHOLESTEROL/HDL RATIO: 3.2 (ref 0–5)
CO2: 26 MMOL/L (ref 20–32)
CREAT SERPL-MCNC: 1.1 MG/DL (ref 0.8–1.6)
FOLATE: 8.3 NG/ML
GFR, ESTIMATED: >60 ML/MIN/1.73 SQ.M.
GLUCOSE: 107 MG/DL (ref 70–99)
HDLC SERPL-MCNC: 42 MG/DL
LDL CHOLESTEROL: 80 MG/DL (ref 50–99)
LDL/HDL RATIO: 1.9
NON-HDL CHOLESTEROL: 91 MG/DL
POTASSIUM SERPL-SCNC: 4.3 MMOL/L (ref 3.5–5.5)
RETIC HEMOGLOBIN: 30.9 PG (ref 28.2–38.2)
RETIC: 2.7 % (ref 0.5–2)
RETICULOCYTE ABSOLUTE COUNT: 0.1 M/UL (ref 0.03–0.1)
SODIUM BLD-SCNC: 139 MMOL/L (ref 133–145)
TESTOSTERONE: 182 NG/DL (ref 348–1197)
TRIGL SERPL-MCNC: 55 MG/DL (ref 40–149)
VITAMIN B-12: 197 PG/ML (ref 211–911)
VITAMIN D 25-HYDROXY: 24.7 NG/ML (ref 32–100)
VLDLC SERPL CALC-MCNC: 11 MG/DL (ref 8–30)

## 2025-05-01 ENCOUNTER — RESULTS FOLLOW-UP (OUTPATIENT)
Facility: CLINIC | Age: 65
End: 2025-05-01

## 2025-05-01 DIAGNOSIS — E78.5 DYSLIPIDEMIA: ICD-10-CM

## 2025-05-01 DIAGNOSIS — Z78.9 STATIN INTOLERANCE: ICD-10-CM

## 2025-05-01 DIAGNOSIS — I25.10 CAD, MULTIPLE VESSEL: ICD-10-CM

## 2025-05-01 LAB — PATHOLOGY REVIEW: NORMAL

## 2025-05-01 RX ORDER — EZETIMIBE 10 MG/1
10 TABLET ORAL DAILY
Qty: 90 TABLET | Refills: 1 | Status: SHIPPED | OUTPATIENT
Start: 2025-05-01

## 2025-05-01 NOTE — TELEPHONE ENCOUNTER
----- Message from Dr. Nicole Barkley MD sent at 5/1/2025 12:24 PM EDT -----  Can you call Mr. Mckeon for me?    Labs look fairly stable ovearll. He is low in both vitamin B12 and vitamin D   I recommend a b complex supplement as well as a vitamin D supplement- both which are over the counter. Vitamin D dose should be 1000 units daily.    Thanks!    Nicole

## 2025-05-01 NOTE — TELEPHONE ENCOUNTER
Called patient to speak about message from Nicole Barkley MD. Confirmed name and date of birth. Spoke with patient about message. Patient expressed understanding.

## 2025-05-12 RX ORDER — DULOXETIN HYDROCHLORIDE 60 MG/1
60 CAPSULE, DELAYED RELEASE ORAL DAILY
Qty: 90 CAPSULE | Refills: 1 | Status: SHIPPED | OUTPATIENT
Start: 2025-05-12

## 2025-05-19 RX ORDER — TIOTROPIUM BROMIDE AND OLODATEROL 3.124; 2.736 UG/1; UG/1
2 SPRAY, METERED RESPIRATORY (INHALATION) DAILY
Qty: 4 EACH | Refills: 2 | OUTPATIENT
Start: 2025-05-19

## 2025-05-29 ENCOUNTER — TELEPHONE (OUTPATIENT)
Facility: CLINIC | Age: 65
End: 2025-05-29

## 2025-05-29 RX ORDER — TIOTROPIUM BROMIDE AND OLODATEROL 3.124; 2.736 UG/1; UG/1
2 SPRAY, METERED RESPIRATORY (INHALATION) DAILY
Qty: 4 EACH | Refills: 2 | OUTPATIENT
Start: 2025-05-29

## 2025-05-29 NOTE — TELEPHONE ENCOUNTER
Patient called requesting a refill on Stiolto Respimat 2.5-2.5 mcg/act. Patient stated that he has been out of it for about a month. Would like for prescription to be sent to [facility] in [place], [place].

## 2025-06-04 RX ORDER — METOPROLOL TARTRATE 25 MG/1
TABLET, FILM COATED ORAL
Qty: 90 TABLET | Refills: 3 | Status: SHIPPED | OUTPATIENT
Start: 2025-06-04

## 2025-06-26 DIAGNOSIS — F17.200 NICOTINE DEPENDENCE, UNCOMPLICATED, UNSPECIFIED NICOTINE PRODUCT TYPE: Primary | ICD-10-CM

## 2025-06-26 RX ORDER — NICOTINE 21 MG/24HR
1 PATCH, TRANSDERMAL 24 HOURS TRANSDERMAL DAILY
Qty: 42 PATCH | Refills: 1 | Status: SHIPPED | OUTPATIENT
Start: 2025-06-26 | End: 2025-08-07

## 2025-07-21 SDOH — HEALTH STABILITY: PHYSICAL HEALTH: ON AVERAGE, HOW MANY DAYS PER WEEK DO YOU ENGAGE IN MODERATE TO STRENUOUS EXERCISE (LIKE A BRISK WALK)?: 1 DAY

## 2025-07-21 SDOH — HEALTH STABILITY: PHYSICAL HEALTH: ON AVERAGE, HOW MANY MINUTES DO YOU ENGAGE IN EXERCISE AT THIS LEVEL?: 10 MIN

## 2025-07-21 ASSESSMENT — LIFESTYLE VARIABLES
HOW OFTEN DURING THE LAST YEAR HAVE YOU FAILED TO DO WHAT WAS NORMALLY EXPECTED FROM YOU BECAUSE OF DRINKING: NEVER
HAVE YOU OR SOMEONE ELSE BEEN INJURED AS A RESULT OF YOUR DRINKING: NO
HAS A RELATIVE, FRIEND, DOCTOR, OR ANOTHER HEALTH PROFESSIONAL EXPRESSED CONCERN ABOUT YOUR DRINKING OR SUGGESTED YOU CUT DOWN: NO
HOW MANY STANDARD DRINKS CONTAINING ALCOHOL DO YOU HAVE ON A TYPICAL DAY: 5
HOW OFTEN DURING THE LAST YEAR HAVE YOU FAILED TO DO WHAT WAS NORMALLY EXPECTED FROM YOU BECAUSE OF DRINKING: NEVER
HOW MANY STANDARD DRINKS CONTAINING ALCOHOL DO YOU HAVE ON A TYPICAL DAY: 10 OR MORE
HOW OFTEN DURING THE LAST YEAR HAVE YOU BEEN UNABLE TO REMEMBER WHAT HAPPENED THE NIGHT BEFORE BECAUSE YOU HAD BEEN DRINKING: NEVER
HOW OFTEN DO YOU HAVE A DRINK CONTAINING ALCOHOL: 5
HOW OFTEN DURING THE LAST YEAR HAVE YOU NEEDED AN ALCOHOLIC DRINK FIRST THING IN THE MORNING TO GET YOURSELF GOING AFTER A NIGHT OF HEAVY DRINKING: NEVER
HOW OFTEN DURING THE LAST YEAR HAVE YOU BEEN UNABLE TO REMEMBER WHAT HAPPENED THE NIGHT BEFORE BECAUSE YOU HAD BEEN DRINKING: NEVER
HOW OFTEN DURING THE LAST YEAR HAVE YOU FOUND THAT YOU WERE NOT ABLE TO STOP DRINKING ONCE YOU HAD STARTED: NEVER
HOW OFTEN DURING THE LAST YEAR HAVE YOU HAD A FEELING OF GUILT OR REMORSE AFTER DRINKING: NEVER
HOW OFTEN DURING THE LAST YEAR HAVE YOU NEEDED AN ALCOHOLIC DRINK FIRST THING IN THE MORNING TO GET YOURSELF GOING AFTER A NIGHT OF HEAVY DRINKING: NEVER
HAVE YOU OR SOMEONE ELSE BEEN INJURED AS A RESULT OF YOUR DRINKING: NO
HOW OFTEN DO YOU HAVE SIX OR MORE DRINKS ON ONE OCCASION: 5
HOW OFTEN DO YOU HAVE A DRINK CONTAINING ALCOHOL: 4 OR MORE TIMES A WEEK
HOW OFTEN DURING THE LAST YEAR HAVE YOU HAD A FEELING OF GUILT OR REMORSE AFTER DRINKING: NEVER
HOW OFTEN DURING THE LAST YEAR HAVE YOU FOUND THAT YOU WERE NOT ABLE TO STOP DRINKING ONCE YOU HAD STARTED: NEVER
HAS A RELATIVE, FRIEND, DOCTOR, OR ANOTHER HEALTH PROFESSIONAL EXPRESSED CONCERN ABOUT YOUR DRINKING OR SUGGESTED YOU CUT DOWN: NO

## 2025-07-21 ASSESSMENT — PATIENT HEALTH QUESTIONNAIRE - PHQ9
SUM OF ALL RESPONSES TO PHQ QUESTIONS 1-9: 0
2. FEELING DOWN, DEPRESSED OR HOPELESS: NOT AT ALL
1. LITTLE INTEREST OR PLEASURE IN DOING THINGS: NOT AT ALL
SUM OF ALL RESPONSES TO PHQ QUESTIONS 1-9: 0

## 2025-07-24 ENCOUNTER — OFFICE VISIT (OUTPATIENT)
Facility: CLINIC | Age: 65
End: 2025-07-24
Payer: MEDICARE

## 2025-07-24 VITALS
HEIGHT: 70 IN | TEMPERATURE: 98.2 F | HEART RATE: 79 BPM | SYSTOLIC BLOOD PRESSURE: 127 MMHG | OXYGEN SATURATION: 95 % | WEIGHT: 280 LBS | RESPIRATION RATE: 18 BRPM | DIASTOLIC BLOOD PRESSURE: 74 MMHG | BODY MASS INDEX: 40.09 KG/M2

## 2025-07-24 DIAGNOSIS — Z87.891 PERSONAL HISTORY OF TOBACCO USE: ICD-10-CM

## 2025-07-24 DIAGNOSIS — Z00.00 MEDICARE ANNUAL WELLNESS VISIT, SUBSEQUENT: Primary | ICD-10-CM

## 2025-07-24 PROCEDURE — 3074F SYST BP LT 130 MM HG: CPT | Performed by: FAMILY MEDICINE

## 2025-07-24 PROCEDURE — 1124F ACP DISCUSS-NO DSCNMKR DOCD: CPT | Performed by: FAMILY MEDICINE

## 2025-07-24 PROCEDURE — 3078F DIAST BP <80 MM HG: CPT | Performed by: FAMILY MEDICINE

## 2025-07-24 PROCEDURE — G0439 PPPS, SUBSEQ VISIT: HCPCS | Performed by: FAMILY MEDICINE

## 2025-07-24 RX ORDER — RIVAROXABAN 20 MG/1
20 TABLET, FILM COATED ORAL DAILY
Qty: 90 TABLET | Refills: 3 | Status: SHIPPED | OUTPATIENT
Start: 2025-07-24 | End: 2025-07-24

## 2025-07-24 NOTE — PATIENT INSTRUCTIONS
people.  What are the benefits of screening?  Your scan may be normal (negative).  For some people who are at higher risk, screening lowers the chance of dying of lung cancer. How much and how long you smoked helps to determine your risk level. Screening can find some cancers early, when treatment may be more likely to work.  What happens after screening?  The results of your CT scan will be sent to your doctor. Someone from your care team will explain the results of your scan and answer any questions you may have. If you need any follow-up, he or she will help you understand what to do next.  After a lung cancer screening, you can go back to your usual activities right away.  A lung cancer screening test can't tell if you have lung cancer. If your results are positive, your doctor can't tell whether an abnormal finding is a harmless nodule, cancer, or something else without doing more tests.  What can you do to help prevent lung cancer?  Some lung cancers can't be prevented. But if you smoke, quitting smoking is the best step you can take to prevent lung cancer. If you want to quit, your doctor can recommend medicines or other ways to help.  Follow-up care is a key part of your treatment and safety. Be sure to make and go to all appointments, and call your doctor if you are having problems. It's also a good idea to know your test results and keep a list of the medicines you take.  Where can you learn more?  Go to https://www.[employer].net/patientEd and enter Q940 to learn more about \"Learning About Lung Cancer Screening.\"  Current as of: October 25, 2024  Content Version: 14.5  © 2024-2025 [employer].   Care instructions adapted under license by [employer]. If you have questions about a medical condition or this instruction, always ask your healthcare professional. [employer] disclaims any warranty or liability for your use of this information.         A Healthy Heart: Care

## 2025-07-24 NOTE — PROGRESS NOTES
Medicare Annual Wellness Visit    Devon Mckeon is here for Medicare AWV    Assessment & Plan   Medicare annual wellness visit, subsequent  Personal history of tobacco use       Return in about 6 months (around 1/24/2026), or if symptoms worsen or fail to improve.       Subjective       Patient's complete Health Risk Assessment and screening values have been reviewed and are found in Flowsheets. The following problems were reviewed today and where indicated follow up appointments were made and/or referrals ordered.    Positive Risk Factor Screenings with Interventions:    Fall Risk:  Do you feel unsteady or are you worried about falling? : (!) yes  2 or more falls in past year?: (!) yes  Fall with injury in past year?: no  Interventions:    Reviewed medications, home hazards, visual acuity, and co-morbidities that can increase risk for falls  Patient declines any further evaluation or treatment. Has nerve damage left leg. Has a walk in shower. No clutter in house. Has already been through PT. Has a cane and walker as needed.       Alcohol Screening:  AUDIT-C Score: 12  AUDIT Total Score: 12  Total Score Interpretation: 8-14 suggests harmful or hazardous alcohol consumption in men   Interventions:  Alcohol consumption guidelines reviewed. Moderation recommended. Patient will try to cut back.               Inactivity:  On average, how many days per week do you engage in moderate to strenuous exercise (like a brisk walk)?: 1 day (!) Abnormal  On average, how many minutes do you engage in exercise at this level?: 10 min  Interventions:  Tries to be active at home- yard work ect. Difficult due to back and left leg pain.      Abnormal BMI (obese):  Body mass index is 40.18 kg/m². (!) Abnormal  Interventions:  Declines further evaluation. Eats healthy. Alcohol intake likely affecting weight and inactivity. Offered referral for nutritional counseling- patient declined.         Dentist Screen:  Have you seen the dentist 
non-slip mats or non-slip surfaces or shower bars or grab bars in your shower or bathtub?: (Patient-Rptd) Yes  Do you have working smoke detectors?: (Patient-Rptd) Yes  Do you always fasten your seatbelt when you are in a car?: (Patient-Rptd) Yes  Would you like to be tested for or have any concerns about having a sexually transmitted disease (STD)?: (Patient-Rptd) No      ADLs  In the past 7 days, did you need help from others to perform any of the following everyday activities: Eating, dressing, grooming, bathing, toileting, or walking/balance?: (Patient-Rptd) No  In the past 7 days, did you need help from others to take care of any of the following: Laundry, housekeeping, banking/finances, shopping, telephone use, food preparation, transportation, or taking medications?: (Patient-Rptd) No    Health Maintenance reviewed and discussed and ordered per Provider.    Health Maintenance Due   Topic Date Due    Pneumococcal 50+ years Vaccine (1 of 2 - PCV) Never done    Colorectal Cancer Screen  Never done    Shingles vaccine (1 of 2) Never done    Respiratory Syncytial Virus (RSV) Pregnant or age 60 yrs+ (1 - Risk 60-74 years 1-dose series) Never done    DTaP/Tdap/Td vaccine (2 - Td or Tdap) 08/16/2022    Lung Cancer Screening &/or Counseling  05/15/2024    COVID-19 Vaccine (1 - 2024-25 season) Never done        \"Have you been to the ER, urgent care clinic since your last visit?  Hospitalized since your last visit?\"    NO    “Have you seen or consulted any other health care providers outside our system since your last visit?”    NO    “Have you had a colorectal cancer screening such as a colonoscopy/FIT/Cologuard?    NO    No colonoscopy on file  No cologuard on file  No FIT/FOBT on file   No flexible sigmoidoscopy on file

## 2025-07-30 ENCOUNTER — HOSPITAL ENCOUNTER (OUTPATIENT)
Age: 65
Setting detail: SPECIMEN
Discharge: HOME | End: 2025-08-02

## 2025-07-30 LAB — SENTARA SPECIMEN COLLECTION: NORMAL

## 2025-07-30 PROCEDURE — 99001 SPECIMEN HANDLING PT-LAB: CPT

## 2025-08-12 RX ORDER — DULOXETIN HYDROCHLORIDE 60 MG/1
60 CAPSULE, DELAYED RELEASE ORAL DAILY
Qty: 90 CAPSULE | Refills: 1 | Status: SHIPPED | OUTPATIENT
Start: 2025-08-12

## 2025-08-25 RX ORDER — FUROSEMIDE 40 MG/1
40 TABLET ORAL DAILY
Qty: 90 TABLET | Refills: 1 | Status: SHIPPED | OUTPATIENT
Start: 2025-08-25

## 2025-09-02 RX ORDER — TIOTROPIUM BROMIDE AND OLODATEROL 3.124; 2.736 UG/1; UG/1
2 SPRAY, METERED RESPIRATORY (INHALATION) DAILY
Qty: 4 EACH | Refills: 2 | Status: SHIPPED | OUTPATIENT
Start: 2025-09-02

## 2025-09-05 ENCOUNTER — OFFICE VISIT (OUTPATIENT)
Age: 65
End: 2025-09-05

## 2025-09-05 VITALS
HEART RATE: 86 BPM | BODY MASS INDEX: 39.51 KG/M2 | OXYGEN SATURATION: 96 % | WEIGHT: 276 LBS | HEIGHT: 70 IN | SYSTOLIC BLOOD PRESSURE: 138 MMHG | DIASTOLIC BLOOD PRESSURE: 72 MMHG

## 2025-09-05 DIAGNOSIS — Z71.6 TOBACCO ABUSE COUNSELING: Primary | ICD-10-CM

## 2025-09-05 RX ORDER — EVOLOCUMAB 140 MG/ML
140 INJECTION, SOLUTION SUBCUTANEOUS
Qty: 2.1 ML | Refills: 5 | Status: SHIPPED | OUTPATIENT
Start: 2025-09-05

## 2025-09-05 RX ORDER — EVOLOCUMAB 140 MG/ML
140 INJECTION, SOLUTION SUBCUTANEOUS
COMMUNITY
End: 2025-09-05 | Stop reason: SDUPTHER

## (undated) DEVICE — PENCIL ES L3M BTTN SWCH S STL HEX LOK BLDE ELECTRD HOLSTER

## (undated) DEVICE — SUTURE PROL SZ 7-0 L24IN NONABSORBABLE BLU L8MM BV175-6 3/8 8735H

## (undated) DEVICE — TUBING SUCT 10FR MAL ALUM SHFT FN CAP VENT UNIV CONN W/ OBT

## (undated) DEVICE — BANDAGE, ELASTIC, COTTON/POLYESTER/SPANDEX, DUAL SELF CLOSURE, NON-STERILE, LATEX-FREE, NATURAL, 6"X5YD: Brand: TRONEX INTERNATIONAL, INC.

## (undated) DEVICE — PAD,ABDOMINAL,8"X10",ST,LF: Brand: MEDLINE

## (undated) DEVICE — STERILE POLYISOPRENE POWDER-FREE SURGICAL GLOVES: Brand: PROTEXIS

## (undated) DEVICE — BLANKET WRM AD W50XL85.8IN PACU FULL BODY FORC AIR

## (undated) DEVICE — FLEX ADVANTAGE 1500CC: Brand: FLEX ADVANTAGE

## (undated) DEVICE — COVER,MAYO STAND,STERILE: Brand: MEDLINE

## (undated) DEVICE — BNDG ELAS ESMARK 4INX12FT LF -- STRL

## (undated) DEVICE — SUTURE VCRL SZ 1 L36IN ABSRB UD CTX L48MM 1/2 CIR J977H

## (undated) DEVICE — SUT SLK 0 30IN FSL BLK --

## (undated) DEVICE — SUTURE ETHLN 1 L30IN NONABSORBABLE BLK CTX L48MM 1/2 CIR 830H

## (undated) DEVICE — NDL PRT INJ NSAF BLNT 18GX1.5 --

## (undated) DEVICE — GOWN,AURORA,FABRIC-REINFORCED,X-LARGE: Brand: MEDLINE

## (undated) DEVICE — SPONGE HEMOSTAT CELLULS 4X8IN -- SURGICEL

## (undated) DEVICE — SUT SLK 1 30IN TIE MP BLK --

## (undated) DEVICE — SUTURE PROL SZ 7-0 L30IN NONABSORBABLE BLU L9.3MM BV-1 1/2 8703H

## (undated) DEVICE — ARGYLE FRAZIER SURGICAL SUCTION INSTRUMENT 8 FR/CH (2.7 MM): Brand: ARGYLE

## (undated) DEVICE — TUBING, SUCTION, 9/32" X 10', STRAIGHT: Brand: MEDLINE

## (undated) DEVICE — GLOVE ORANGE PI 7   MSG9070

## (undated) DEVICE — SUT PROL 6-0 30IN C1 DA BLU --

## (undated) DEVICE — NEEDLE HYPO 25GA L0.625IN BLU POLYPR HUB S STL REG BVL STR

## (undated) DEVICE — REM POLYHESIVE ADULT PATIENT RETURN ELECTRODE: Brand: VALLEYLAB

## (undated) DEVICE — TOWEL SURG W16XL26IN WHT NONFENESTRATED ST 4 PER PK

## (undated) DEVICE — DECANTER BAG 9": Brand: MEDLINE INDUSTRIES, INC.

## (undated) DEVICE — SPONGE DRAIN NONWOVEN 4X4IN -- 2/PK

## (undated) DEVICE — COUNTER NDL 40 COUNT HLD 70 FOAM BLK ADH W/ MAG

## (undated) DEVICE — SYS VSL HARV HEMOPRO2 VASOVIEW -- HARV SYS MINIMUM ORDER 5/EA

## (undated) DEVICE — CLEANER,CAUTERY TIP,2X2",STERILE: Brand: MEDLINE

## (undated) DEVICE — SUTURE VCRL SZ 0 L27IN ABSRB UD L36MM CT-1 1/2 CIR J260H

## (undated) DEVICE — GDWIRE ANGIO DUO FLX 0.018X25 --

## (undated) DEVICE — GAUZE,SPONGE,4"X4",16PLY,STRL,LF,10/TRAY: Brand: MEDLINE

## (undated) DEVICE — SHEET,DRAPE,70X100,STERILE: Brand: MEDLINE

## (undated) DEVICE — SUT SLK 0 30IN SH BLK --

## (undated) DEVICE — Y BARBED CONNECTOR POLYPROPYLENE, LARGE: Brand: ARGYLE

## (undated) DEVICE — AVID DUAL STAGE VENOUS DRAINAGE CANNULA: Brand: AVID DUAL STAGE VENOUS DRAINAGE CANNULA

## (undated) DEVICE — SUTURE VCRL SZ 2-0 L27IN ABSRB UD L26MM CT-2 1/2 CIR J269H

## (undated) DEVICE — THE STERILE LIGHT HANDLE COVER IS USED WITH STERIS SURGICAL LIGHTING AND VISUALIZATION SYSTEMS.

## (undated) DEVICE — ZIMMER® STERILE DISPOSABLE TOURNIQUET CUFF WITH PLC, DUAL PORT, SINGLE BLADDER, 18 IN. (46 CM)

## (undated) DEVICE — DRESSING,GAUZE,XEROFORM,CURAD,1"X8",ST: Brand: CURAD

## (undated) DEVICE — SUTURE SZ 7 L18IN NONABSORBABLE SIL CCS L48MM 1/2 CIR STRNM M655G

## (undated) DEVICE — DRAPE,TOP,102X53,STERILE: Brand: MEDLINE

## (undated) DEVICE — SPONGE LAP SOFT 18X18 IN X RAY DETECTABLE

## (undated) DEVICE — PREP SKN CHLRAPRP APL 26ML STR --

## (undated) DEVICE — YANKAUER,BULB TIP,W/O VENT,RIGID,STERILE: Brand: MEDLINE

## (undated) DEVICE — COVER,TABLE,HEAVY DUTY,77"X90",STRL: Brand: MEDLINE

## (undated) DEVICE — INTENDED FOR TISSUE SEPARATION, AND OTHER PROCEDURES THAT REQUIRE A SHARP SURGICAL BLADE TO PUNCTURE OR CUT.: Brand: BARD-PARKER SAFETY BLADES SIZE 10, STERILE

## (undated) DEVICE — GLOVE SURG 7 BIOGEL PI ULTRATOUCH G

## (undated) DEVICE — SOLUTION IV 1000ML 0.9% SOD CHL

## (undated) DEVICE — HEART II: Brand: MEDLINE INDUSTRIES, INC.

## (undated) DEVICE — GOWN,SIRUS,NONRNF,SETINSLV,XL,20/CS: Brand: MEDLINE

## (undated) DEVICE — MARKER SKN REG TIP W/RULER -- STRL

## (undated) DEVICE — STOCKINETTE ORTHOPEDIC IMPERV 36X9 IN STRL

## (undated) DEVICE — SUTURE MCRYL SZ 4-0 L18IN ABSRB UD L19MM PS-2 3/8 CIR PRIM Y496G

## (undated) DEVICE — FOGARTY SPRING CLIPS 6MM: Brand: FOGARTY SOFTJAW

## (undated) DEVICE — HI-TORQUE BALANCE MIDDLEWEIGHT UNIVERSAL GUIDE WIRE .014 STRAIGHT TIP 3.0 CM X 190 CM: Brand: HI-TORQUE BALANCE MIDDLEWEIGHT UNIVERSAL

## (undated) DEVICE — BAND RADIAL COMPR ARTERY 24CM -- REG BX/10

## (undated) DEVICE — Device

## (undated) DEVICE — (D)PREP SKN CHLRAPRP APPL 26ML -- CONVERT TO ITEM 371833

## (undated) DEVICE — PRESSURE MONITORING SET: Brand: TRUWAVE

## (undated) DEVICE — INSERT SUT HLD F/OCTBS RETRCTR --

## (undated) DEVICE — Device: Brand: RETRACT-O-TAPE 18G X 30.5CM BLUNT NEEDLE

## (undated) DEVICE — SYR LR LCK 1ML GRAD NSAF 30ML --

## (undated) DEVICE — SUTURE ETHBND EXCEL SZ 2-0 L36IN NONABSORBABLE GRN SH-1 X763H

## (undated) DEVICE — Device: Brand: OMNIWIRE PRESSURE GUIDE WIRE

## (undated) DEVICE — SUT PROL 4-0 36IN RB1 DA BLU --

## (undated) DEVICE — UNDERGLOVE SURG SZ 7.5 BLU LTX FREE SYN POLYISOPRENE

## (undated) DEVICE — RADIFOCUS OPTITORQUE ANGIOGRAPHIC CATHETER: Brand: OPTITORQUE

## (undated) DEVICE — SUT SLK 2 60IN TIE MP BLK --

## (undated) DEVICE — ROTATING SURGICAL PUNCHES, 1 PER POUCH: Brand: A&E MEDICAL / ROTATING SURGICAL PUNCHES

## (undated) DEVICE — KIT CATH 7FR L16CM CTRL VEN POLYUR 3 LUMN PRSS INJ BLU

## (undated) DEVICE — 1000 S-DRAPE TOWEL DRAPE 10/BX: Brand: STERI-DRAPE™

## (undated) DEVICE — SYRINGE BLB 60 CC TIP PROTECTOR STRL LF

## (undated) DEVICE — SUTURE PROL SZ 5-0 L36IN NONABSORBABLE BLU L13MM C-1 3/8 8720H

## (undated) DEVICE — PADDING CAST W6INXL4YD SYNTHETIC NATURAL PROTOUCH

## (undated) DEVICE — 3M™ COBAN™ NL STERILE NON-LATEX SELF-ADHERENT WRAP, 2086S, 6 IN X 5 YD (15 CM X 4,5 M), 12 ROLLS/CASE: Brand: 3M™ COBAN™

## (undated) DEVICE — GLOVE SURG SZ 8 L11.77IN FNGR THK9.8MIL STRW LTX POLYMER

## (undated) DEVICE — CATHETER THOR 28FR L23CM DIA9.3MM POLYVI CHL TAPR CONN TIP

## (undated) DEVICE — STANDARD SURGICAL GOWN, L: Brand: CONVERTORS

## (undated) DEVICE — SURGICAL PROCEDURE KIT LT HRT CUST

## (undated) DEVICE — CATHETER ART 20 GAX4 CM 22 GA RADIAL FEP

## (undated) DEVICE — GAUZE SPNG AVANT 4X4 4PLY NS --

## (undated) DEVICE — SUTURE VCRL SZ 0 L36IN ABSRB UD L36MM CT-1 1/2 CIR J946H

## (undated) DEVICE — MEDI-VAC TUBING CONNECTOR 5-IN-1 STRAIGHT: Brand: CARDINAL HEALTH

## (undated) DEVICE — DRAPE EQUIP C ARM 74X42 IN MOB XR W/ TIE RUBBER BND LF

## (undated) DEVICE — SOFT SILICONE HYDROCELLULAR SACRUM DRESSING WITH LOCK AWAY LAYER: Brand: ALLEVYN LIFE SACRUM (LARGE) PACK OF 10

## (undated) DEVICE — ZIMMER® STERILE DISPOSABLE TOURNIQUET CUFF WITH PLC, DUAL PORT, SINGLE BLADDER, 34 IN. (86 CM)

## (undated) DEVICE — 3M™ IOBAN™ 2 ANTIMICROBIAL INCISE DRAPE 6650EZ: Brand: IOBAN™ 2

## (undated) DEVICE — TRAY CATHETER 16FR W URIN M STATLOK STBL DEV FOR LUBRI-SIL 2 TEMP

## (undated) DEVICE — ARGYLE FRAZIER SURGICAL SUCTION INSTRUMENT 10 FR/CH (3.3 MM): Brand: ARGYLE

## (undated) DEVICE — GLOVE SURG SZ 65 THK91MIL LTX FREE SYN POLYISOPRENE

## (undated) DEVICE — SUTURE MCRYL SZ 4 0 L18IN ABSRB VLT PS 1 L24MM 3 8 CIR REV Y682H

## (undated) DEVICE — SET ANGIO L6.5IN L BOR 3 W STPCOCK SPIK TBNG

## (undated) DEVICE — 450 ML BOTTLE OF 0.05% CHLORHEXIDINE GLUCONATE IN 99.95% STERILE WATER FOR IRRIGATION, USP AND APPLICATOR.: Brand: IRRISEPT ANTIMICROBIAL WOUND LAVAGE

## (undated) DEVICE — DR LANCEY ON PUMP PACK: Brand: MEDLINE INDUSTRIES, INC.

## (undated) DEVICE — GOWN,PRECEPT, XLNG/XLRG ,STRL: Brand: MEDLINE

## (undated) DEVICE — PROBE VASC 8MHZ WTRPRF

## (undated) DEVICE — SUT PROL 4-0 30IN SH1 DA BLU --

## (undated) DEVICE — BARD® PTFE FELT PLEDGETS, (RECTANGLE), 4.8 MM X 6 MM: Brand: BARD® PTFE FELT PLEDGETS

## (undated) DEVICE — SLING ARM PD PCH VOGUE XL BLU --

## (undated) DEVICE — X-RAY SPONGES,12 PLY: Brand: DERMACEA

## (undated) DEVICE — GARMENT,MEDLINE,DVT,INT,CALF,MED, GEN2: Brand: MEDLINE

## (undated) DEVICE — SHEET, ORTHO, SPLIT, STERILE: Brand: MEDLINE

## (undated) DEVICE — CATHETER GUID EXTRA BACKUP 3.5 0.070IN 6FR 100CM VISTA BRITE TIP

## (undated) DEVICE — GOWN,NON-REINFORCED,3XL: Brand: MEDLINE

## (undated) DEVICE — EZ GLIDE AORTIC CANNULA: Brand: EDWARDS LIFESCIENCES EZ GLIDE AORTIC CANNULA

## (undated) DEVICE — SUTURE PERMAHAND SZ 2-0 L18IN NONABSORBABLE BLK L26MM SH C012D

## (undated) DEVICE — SWAN-GANZ CCOMBO V THERMODILUTION CATHETER: Brand: SWAN-GANZ CCOMBO V

## (undated) DEVICE — POWDER HEMOSTAT GEL 1GR --

## (undated) DEVICE — GLIDESHEATH SLENDER STAINLESS STEEL KIT: Brand: GLIDESHEATH SLENDER

## (undated) DEVICE — SUTURE VCRL SZ 1 L27IN ABSRB UD CT-1 L36MM 1/2 CIR J261H

## (undated) DEVICE — INTENDED FOR TISSUE SEPARATION, AND OTHER PROCEDURES THAT REQUIRE A SHARP SURGICAL BLADE TO PUNCTURE OR CUT.: Brand: BARD-PARKER SAFETY BLADES SIZE 15, STERILE

## (undated) DEVICE — ADAPTER CARDPLG SET MGMT FEM LUER W/ CLR CODE CLMP DLP

## (undated) DEVICE — BRUSH SCRB 4% CHG RED DISP --

## (undated) DEVICE — PENCIL SMK EVAC L10FT TBNG NONSTICK ESU BLDE PLUMEPEN ELITE

## (undated) DEVICE — DRAPE,U/ SHT,SPLIT,PLAS,STERIL: Brand: MEDLINE